# Patient Record
Sex: MALE | Race: WHITE | Employment: FULL TIME | URBAN - METROPOLITAN AREA
[De-identification: names, ages, dates, MRNs, and addresses within clinical notes are randomized per-mention and may not be internally consistent; named-entity substitution may affect disease eponyms.]

---

## 2017-01-17 ENCOUNTER — OFFICE VISIT (OUTPATIENT)
Dept: INTERNAL MEDICINE CLINIC | Age: 52
End: 2017-01-17

## 2017-01-17 ENCOUNTER — TELEPHONE (OUTPATIENT)
Dept: INTERNAL MEDICINE | Age: 52
End: 2017-01-17

## 2017-01-17 VITALS
WEIGHT: 306 LBS | TEMPERATURE: 98.6 F | HEART RATE: 65 BPM | BODY MASS INDEX: 46.53 KG/M2 | DIASTOLIC BLOOD PRESSURE: 94 MMHG | SYSTOLIC BLOOD PRESSURE: 140 MMHG | OXYGEN SATURATION: 98 %

## 2017-01-17 DIAGNOSIS — J01.10 ACUTE NON-RECURRENT FRONTAL SINUSITIS: Primary | ICD-10-CM

## 2017-01-17 PROCEDURE — 99213 OFFICE O/P EST LOW 20 MIN: CPT | Performed by: NURSE PRACTITIONER

## 2017-01-17 RX ORDER — AMOXICILLIN AND CLAVULANATE POTASSIUM 875; 125 MG/1; MG/1
1 TABLET, FILM COATED ORAL 2 TIMES DAILY
Qty: 20 TABLET | Refills: 0 | Status: SHIPPED | OUTPATIENT
Start: 2017-01-17 | End: 2017-01-27

## 2017-01-17 ASSESSMENT — ENCOUNTER SYMPTOMS
VOMITING: 0
RHINORRHEA: 1
SORE THROAT: 1
WHEEZING: 0
COUGH: 1
NAUSEA: 1
SHORTNESS OF BREATH: 1

## 2017-01-26 RX ORDER — AMLODIPINE BESYLATE 10 MG/1
TABLET ORAL
Qty: 90 TABLET | Refills: 1 | Status: SHIPPED | OUTPATIENT
Start: 2017-01-26 | End: 2017-10-04 | Stop reason: SDUPTHER

## 2017-01-26 RX ORDER — ATORVASTATIN CALCIUM 10 MG/1
10 TABLET, FILM COATED ORAL DAILY
Qty: 90 TABLET | Refills: 1 | Status: SHIPPED | OUTPATIENT
Start: 2017-01-26 | End: 2017-10-04 | Stop reason: SDUPTHER

## 2017-01-26 RX ORDER — SPIRONOLACTONE 25 MG/1
TABLET ORAL
Qty: 90 TABLET | Refills: 0 | Status: SHIPPED | OUTPATIENT
Start: 2017-01-26 | End: 2017-03-01

## 2017-01-26 RX ORDER — CARVEDILOL 12.5 MG/1
TABLET ORAL
Qty: 180 TABLET | Refills: 1 | Status: SHIPPED | OUTPATIENT
Start: 2017-01-26 | End: 2017-10-04 | Stop reason: SDUPTHER

## 2017-01-26 RX ORDER — LOSARTAN POTASSIUM 100 MG/1
100 TABLET ORAL DAILY
Qty: 90 TABLET | Refills: 1 | Status: SHIPPED | OUTPATIENT
Start: 2017-01-26 | End: 2017-10-04 | Stop reason: SDUPTHER

## 2017-03-01 RX ORDER — SPIRONOLACTONE 25 MG/1
TABLET ORAL
Qty: 90 TABLET | Refills: 0 | Status: SHIPPED | OUTPATIENT
Start: 2017-03-01 | End: 2017-04-12 | Stop reason: SDUPTHER

## 2017-04-12 RX ORDER — SPIRONOLACTONE 25 MG/1
25 TABLET ORAL DAILY
Qty: 90 TABLET | Refills: 0 | Status: SHIPPED | OUTPATIENT
Start: 2017-04-12 | End: 2017-10-04 | Stop reason: SDUPTHER

## 2017-09-28 ENCOUNTER — TELEPHONE (OUTPATIENT)
Dept: INTERNAL MEDICINE | Age: 52
End: 2017-09-28

## 2017-09-28 RX ORDER — SPIRONOLACTONE 25 MG/1
25 TABLET ORAL DAILY
Qty: 90 TABLET | Refills: 0 | OUTPATIENT
Start: 2017-09-28

## 2017-09-28 RX ORDER — ATORVASTATIN CALCIUM 10 MG/1
10 TABLET, FILM COATED ORAL DAILY
Qty: 90 TABLET | Refills: 1 | Status: CANCELLED | OUTPATIENT
Start: 2017-09-28

## 2017-09-28 RX ORDER — LOSARTAN POTASSIUM 100 MG/1
100 TABLET ORAL DAILY
Qty: 90 TABLET | Refills: 1 | OUTPATIENT
Start: 2017-09-28

## 2017-09-28 RX ORDER — AMLODIPINE BESYLATE 10 MG/1
TABLET ORAL
Qty: 90 TABLET | Refills: 1 | OUTPATIENT
Start: 2017-09-28

## 2017-09-28 RX ORDER — ATORVASTATIN CALCIUM 10 MG/1
10 TABLET, FILM COATED ORAL DAILY
Qty: 90 TABLET | Refills: 1 | OUTPATIENT
Start: 2017-09-28

## 2017-09-28 RX ORDER — CARVEDILOL 12.5 MG/1
TABLET ORAL
Qty: 180 TABLET | Refills: 1 | Status: CANCELLED | OUTPATIENT
Start: 2017-09-28

## 2017-09-28 RX ORDER — AMLODIPINE BESYLATE 10 MG/1
TABLET ORAL
Qty: 90 TABLET | Refills: 1 | Status: CANCELLED | OUTPATIENT
Start: 2017-09-28

## 2017-09-28 RX ORDER — SPIRONOLACTONE 25 MG/1
25 TABLET ORAL DAILY
Qty: 90 TABLET | Refills: 0 | Status: CANCELLED | OUTPATIENT
Start: 2017-09-28

## 2017-09-28 RX ORDER — CARVEDILOL 12.5 MG/1
TABLET ORAL
Qty: 180 TABLET | Refills: 1 | OUTPATIENT
Start: 2017-09-28

## 2017-09-28 RX ORDER — LOSARTAN POTASSIUM 100 MG/1
100 TABLET ORAL DAILY
Qty: 90 TABLET | Refills: 1 | Status: CANCELLED | OUTPATIENT
Start: 2017-09-28

## 2017-10-04 ENCOUNTER — OFFICE VISIT (OUTPATIENT)
Dept: INTERNAL MEDICINE | Age: 52
End: 2017-10-04

## 2017-10-04 VITALS
OXYGEN SATURATION: 98 % | WEIGHT: 314 LBS | SYSTOLIC BLOOD PRESSURE: 190 MMHG | DIASTOLIC BLOOD PRESSURE: 115 MMHG | HEART RATE: 67 BPM | BODY MASS INDEX: 47.59 KG/M2 | TEMPERATURE: 98.1 F | HEIGHT: 68 IN

## 2017-10-04 DIAGNOSIS — F34.1 DYSTHYMIA: ICD-10-CM

## 2017-10-04 DIAGNOSIS — E78.2 MIXED HYPERLIPIDEMIA: ICD-10-CM

## 2017-10-04 DIAGNOSIS — I10 ESSENTIAL HYPERTENSION: Primary | ICD-10-CM

## 2017-10-04 PROCEDURE — 99214 OFFICE O/P EST MOD 30 MIN: CPT | Performed by: NURSE PRACTITIONER

## 2017-10-04 RX ORDER — LOSARTAN POTASSIUM 100 MG/1
100 TABLET ORAL DAILY
Qty: 90 TABLET | Refills: 1 | Status: SHIPPED | OUTPATIENT
Start: 2017-10-04 | End: 2018-07-05 | Stop reason: SDUPTHER

## 2017-10-04 RX ORDER — AMLODIPINE BESYLATE 10 MG/1
TABLET ORAL
Qty: 90 TABLET | Refills: 1 | Status: SHIPPED | OUTPATIENT
Start: 2017-10-04 | End: 2018-10-22 | Stop reason: SDUPTHER

## 2017-10-04 RX ORDER — SPIRONOLACTONE 25 MG/1
25 TABLET ORAL DAILY
Qty: 90 TABLET | Refills: 0 | Status: SHIPPED | OUTPATIENT
Start: 2017-10-04 | End: 2017-12-31 | Stop reason: SDUPTHER

## 2017-10-04 RX ORDER — HYDRALAZINE HYDROCHLORIDE 10 MG/1
10 TABLET, FILM COATED ORAL 3 TIMES DAILY
Qty: 90 TABLET | Refills: 3 | Status: SHIPPED | OUTPATIENT
Start: 2017-10-04 | End: 2017-10-04 | Stop reason: SDUPTHER

## 2017-10-04 RX ORDER — ASPIRIN 81 MG/1
81 TABLET ORAL DAILY
Qty: 30 TABLET | Refills: 3 | Status: SHIPPED | OUTPATIENT
Start: 2017-10-04 | End: 2018-03-04 | Stop reason: SDUPTHER

## 2017-10-04 RX ORDER — ATORVASTATIN CALCIUM 10 MG/1
10 TABLET, FILM COATED ORAL DAILY
Qty: 90 TABLET | Refills: 1 | Status: SHIPPED | OUTPATIENT
Start: 2017-10-04 | End: 2018-03-04 | Stop reason: SDUPTHER

## 2017-10-04 RX ORDER — HYDRALAZINE HYDROCHLORIDE 10 MG/1
10 TABLET, FILM COATED ORAL 3 TIMES DAILY
Qty: 90 TABLET | Refills: 3 | Status: SHIPPED | OUTPATIENT
Start: 2017-10-04 | End: 2017-10-11 | Stop reason: SDUPTHER

## 2017-10-04 RX ORDER — CARVEDILOL 12.5 MG/1
TABLET ORAL
Qty: 180 TABLET | Refills: 1 | Status: SHIPPED | OUTPATIENT
Start: 2017-10-04 | End: 2018-07-05 | Stop reason: SDUPTHER

## 2017-10-04 ASSESSMENT — ENCOUNTER SYMPTOMS
COUGH: 0
WHEEZING: 0
SHORTNESS OF BREATH: 0

## 2017-10-04 NOTE — MR AVS SNAPSHOT
Learn more at: [email or web address]             Today's Medication Changes          These changes are accurate as of: 10/4/17  3:08 PM.  If you have any questions, ask your nurse or doctor. START taking these medications           hydrALAZINE 10 MG tablet   Commonly known as:  APRESOLINE   Instructions:   Take 1 tablet by mouth 3 times daily   Quantity:  90 tablet   Refills:  3   Started by:  Jaimie Blue CNP            Where to Get Your Medications      These medications were sent to [facility]/pharmacy #[id number]- [place], [place] - [address] - P [phone] - F [phone]  [address], [facility] [place] [address]     Phone:  [phone]     amLODIPine 10 MG tablet    aspirin 81 MG EC tablet    atorvastatin 10 MG tablet    carvedilol 12.5 MG tablet    hydrALAZINE 10 MG tablet    losartan 100 MG tablet    sertraline 50 MG tablet    spironolactone 25 MG tablet               Your Current Medications Are              hydrALAZINE (APRESOLINE) 10 MG tablet Take 1 tablet by mouth 3 times daily    spironolactone (ALDACTONE) 25 MG tablet Take 1 tablet by mouth daily    carvedilol (COREG) 12.5 MG tablet TAKE 1 TABLET BY MOUTH TWICE A DAY AFTER MEALS    amLODIPine (NORVASC) 10 MG tablet TAKE 1 TABLET BY MOUTH NIGHTLY    sertraline (ZOLOFT) 50 MG tablet TAKE ONE TABLET BY MOUTH EVERY DAY    losartan (COZAAR) 100 MG tablet Take 1 tablet by mouth daily    atorvastatin (LIPITOR) 10 MG tablet Take 1 tablet by mouth daily    aspirin 81 MG EC tablet Take 1 tablet by mouth daily    HYDROcodone-acetaminophen (NORCO) 5-325 MG per tablet Take 1-2 tablets by mouth every 8 hours as needed for Pain      Allergies              Lisinopril     cough         Additional Information        Basic Information     Date Of Birth Sex Race Ethnicity Preferred Language    1965 Male White Non-/Non  English      Problem List as of 10/4/2017  Date Reviewed: 1/17/2017 Paresthesia    Lumbar strain    SEVILLA (dyspnea on exertion)    Routine general medical examination at a health care facility    Depression    Obesity    MARY (obstructive sleep apnea)    Hypertension      Immunizations as of 10/4/2017     Name Date    Influenza Virus Vaccine 10/6/2014    Influenza Whole 11/10/2008    Tdap (Boostrix, Adacel) 2/11/2010      Preventive Care        Date Due    Colon Cancer Stool Test 6/7/2017    Yearly Flu Vaccine (1) 9/1/2017    Diabetes Screening 6/7/2019    Tetanus Combination Vaccine (2 - Td) 2/11/2020    Cholesterol Screening 6/7/2021            [employer] Signup           Our records indicate that you have an active [employer] account. You can view your After Visit Summary by going to [email or web address] and logging in with your [employer] username and password. If you don't have a [employer] username and password but a parent or guardian has access to your record, the parent or guardian should login with their own [employer] username and password and access your record to view the After Visit Summary. Additional Information  If you have questions, please contact the physician practice where you receive care. Remember, [employer] is NOT to be used for urgent needs. For medical emergencies, dial 911. For questions regarding your [employer] account call [phone]. If you have a clinical question, please call your doctor's office.

## 2017-10-04 NOTE — PROGRESS NOTES
hyperlipidemia    - atorvastatin (LIPITOR) 10 MG tablet; Take 1 tablet by mouth daily  Dispense: 90 tablet; Refill: 1        Discussed use, benefit, and side effects of all prescribed medications. Barriers to medication compliance addressed. All patient questions answered. Pt voiced understanding. All patient questions answered. Pt voiced understanding.

## 2017-10-11 ENCOUNTER — OFFICE VISIT (OUTPATIENT)
Dept: INTERNAL MEDICINE | Age: 52
End: 2017-10-11

## 2017-10-11 VITALS
OXYGEN SATURATION: 98 % | WEIGHT: 315 LBS | HEIGHT: 68 IN | SYSTOLIC BLOOD PRESSURE: 142 MMHG | BODY MASS INDEX: 47.74 KG/M2 | HEART RATE: 65 BPM | DIASTOLIC BLOOD PRESSURE: 100 MMHG

## 2017-10-11 DIAGNOSIS — I10 ESSENTIAL HYPERTENSION: ICD-10-CM

## 2017-10-11 DIAGNOSIS — E66.01 MORBID OBESITY DUE TO EXCESS CALORIES (HCC): Primary | ICD-10-CM

## 2017-10-11 PROCEDURE — 99213 OFFICE O/P EST LOW 20 MIN: CPT | Performed by: NURSE PRACTITIONER

## 2017-10-11 RX ORDER — HYDRALAZINE HYDROCHLORIDE 25 MG/1
25 TABLET, FILM COATED ORAL 3 TIMES DAILY
Qty: 90 TABLET | Refills: 3 | Status: SHIPPED | OUTPATIENT
Start: 2017-10-11 | End: 2018-04-30 | Stop reason: SDUPTHER

## 2017-10-11 NOTE — PROGRESS NOTES
TAKE 1 TABLET BY MOUTH NIGHTLY 90 tablet 1    sertraline (ZOLOFT) 50 MG tablet TAKE ONE TABLET BY MOUTH EVERY DAY 90 tablet 1    losartan (COZAAR) 100 MG tablet Take 1 tablet by mouth daily 90 tablet 1    atorvastatin (LIPITOR) 10 MG tablet Take 1 tablet by mouth daily 90 tablet 1    aspirin 81 MG EC tablet Take 1 tablet by mouth daily 30 tablet 3    HYDROcodone-acetaminophen (NORCO) 5-325 MG per tablet Take 1-2 tablets by mouth every 8 hours as needed for Pain 16 tablet 0     No current facility-administered medications for this visit. Assessment:     1. Morbid obesity due to excess calories ([address]    2. Essential hypertension      Plan:   1. Essential hypertension  - increase apresoline to 25 mg TID  - follow up in 1-2 weeks  - hydrALAZINE (APRESOLINE) 25 MG tablet; Take 1 tablet by mouth 3 times daily  Dispense: 90 tablet; Refill: 3    2. Morbid obesity due to excess calories ([address]  - [facility] [address] [facility]        Discussed use, benefit, and side effects of all prescribed medications. Barriers to medication compliance addressed. All patient questions answered. Pt voiced understanding. All patient questions answered. Pt voiced understanding.

## 2017-10-18 ENCOUNTER — OFFICE VISIT (OUTPATIENT)
Dept: INTERNAL MEDICINE | Age: 52
End: 2017-10-18

## 2017-10-18 VITALS
SYSTOLIC BLOOD PRESSURE: 164 MMHG | HEART RATE: 73 BPM | WEIGHT: 311 LBS | OXYGEN SATURATION: 97 % | BODY MASS INDEX: 47.13 KG/M2 | DIASTOLIC BLOOD PRESSURE: 102 MMHG | HEIGHT: 68 IN | TEMPERATURE: 98.1 F

## 2017-10-18 DIAGNOSIS — I10 ESSENTIAL HYPERTENSION: ICD-10-CM

## 2017-10-18 PROCEDURE — 99213 OFFICE O/P EST LOW 20 MIN: CPT | Performed by: NURSE PRACTITIONER

## 2017-10-24 ENCOUNTER — TELEPHONE (OUTPATIENT)
Dept: INTERNAL MEDICINE | Age: 52
End: 2017-10-24

## 2017-10-24 DIAGNOSIS — I10 ESSENTIAL HYPERTENSION: Primary | ICD-10-CM

## 2017-10-24 NOTE — TELEPHONE ENCOUNTER
What specific order do they recommend? CTA abd and pelvis? Please order what they recommend.  Thanks

## 2017-10-26 ENCOUNTER — OFFICE VISIT (OUTPATIENT)
Dept: INTERNAL MEDICINE | Age: 52
End: 2017-10-26

## 2017-10-26 VITALS
WEIGHT: 313 LBS | OXYGEN SATURATION: 98 % | TEMPERATURE: 98.3 F | BODY MASS INDEX: 47.44 KG/M2 | SYSTOLIC BLOOD PRESSURE: 164 MMHG | HEIGHT: 68 IN | DIASTOLIC BLOOD PRESSURE: 115 MMHG | HEART RATE: 78 BPM

## 2017-10-26 DIAGNOSIS — I10 ESSENTIAL HYPERTENSION: Primary | ICD-10-CM

## 2017-10-26 PROCEDURE — 99213 OFFICE O/P EST LOW 20 MIN: CPT | Performed by: NURSE PRACTITIONER

## 2017-10-27 ENCOUNTER — HOSPITAL ENCOUNTER (OUTPATIENT)
Dept: CT IMAGING | Age: 52
Discharge: HOME | End: 2017-10-27
Attending: NURSE PRACTITIONER | Admitting: NURSE PRACTITIONER

## 2017-10-27 DIAGNOSIS — I10 ESSENTIAL (PRIMARY) HYPERTENSION: ICD-10-CM

## 2017-10-27 DIAGNOSIS — I10 ESSENTIAL HYPERTENSION: ICD-10-CM

## 2017-10-27 ASSESSMENT — ENCOUNTER SYMPTOMS
WHEEZING: 0
SHORTNESS OF BREATH: 0
CHEST TIGHTNESS: 0
COUGH: 0

## 2017-10-27 NOTE — PROGRESS NOTES
Subjective:      Patient ID: Sammi Cheung is a 47 y.o. male. HPI  Theo Flores is here today to follow up for his HTN  He has been taking an extensive amount of medication for his HTN and yet he is still not at goal  He continues to try to loose weight  He is having CTA tomorrow to check renal etiologies  He denies any side effects related to his HTN  Past Medical History:   Diagnosis Date    Carpal tunnel syndrome     Hypertension     Obesity, unspecified     MARY (obstructive sleep apnea)      Past Surgical History:   Procedure Laterality Date    KNEE ARTHROSCOPY Right     NOSE SURGERY      Repair from Fx     Family History   Problem Relation Age of Onset    Diabetes Mother     Stroke Maternal Uncle      Social History     Social History    Marital status:      Spouse name: N/A    Number of children: 2    Years of education: N/A     Occupational History          Social History Main Topics    Smoking status: Never Smoker    Smokeless tobacco: Never Used    Alcohol use Yes      Comment: occasionally    Drug use: No    Sexual activity: Not on file     Other Topics Concern    Not on file     Social History Narrative    No narrative on file       Review of Systems   Constitutional: Negative for fatigue. Respiratory: Negative for cough, chest tightness, shortness of breath and wheezing. Cardiovascular: Negative for chest pain, palpitations and leg swelling. Neurological: Negative for dizziness, syncope, weakness, light-headedness and headaches. Objective:     Vitals:    10/26/17 1644 10/26/17 1651   BP: (!) 168/96 (!) 164/115   Site: Left Arm    Position: Sitting    Cuff Size: Medium Adult    Pulse: 78    Temp: 98.3 °F (36.8 °C)    SpO2: 98%    Weight: (!) 313 lb (142 kg)    Height: 5' 8\" (1.727 m)      Physical Exam   Constitutional: He appears well-developed and well-nourished. Cardiovascular: Normal rate, regular rhythm and normal heart sounds.

## 2017-11-02 DIAGNOSIS — I70.1 RENAL ARTERY STENOSIS (HCC): Primary | ICD-10-CM

## 2017-11-06 ENCOUNTER — TELEPHONE (OUTPATIENT)
Dept: CARDIAC CATH/INVASIVE PROCEDURES | Age: 52
End: 2017-11-06

## 2017-11-06 NOTE — TELEPHONE ENCOUNTER
Phone patient today. Aware of Renal artery angiogram with possible stent placement with  on 11/10 at 10:30. Patient will arrive at 0900, be NPO at midnight the night before, have a  the day of and someone will stay the night with him that night. Left him my name and number should any questions arise.  This will be done at [address] [facility]  2:47 PM  [phone]

## 2017-11-09 NOTE — PRE-PROCEDURE INSTRUCTIONS
Pre procedure phone call complete. Pt aware of 0900 arrival, npo after midnight, to take morning meds with a small amt of water, to bring a list of all meds with dosages and to have someone available to drive him home and stay the next 24 hours with him. Understanding verbalized.

## 2017-11-10 ENCOUNTER — HOSPITAL ENCOUNTER (OUTPATIENT)
Dept: CARDIAC CATH/INVASIVE PROCEDURES | Age: 52
Discharge: HOME | End: 2017-11-10
Attending: RADIOLOGY | Admitting: RADIOLOGY

## 2017-11-10 VITALS
HEIGHT: 68 IN | SYSTOLIC BLOOD PRESSURE: 169 MMHG | BODY MASS INDEX: 47.74 KG/M2 | DIASTOLIC BLOOD PRESSURE: 102 MMHG | WEIGHT: 315 LBS | TEMPERATURE: 97.9 F

## 2017-11-10 DIAGNOSIS — I70.1 RENAL ARTERY STENOSIS (HCC): ICD-10-CM

## 2017-11-10 LAB
ANION GAP SERPL CALCULATED.3IONS-SCNC: 11 MMOL/L (ref 3–16)
BUN BLDV-MCNC: 19 MG/DL (ref 7–20)
CALCIUM SERPL-MCNC: 9.1 MG/DL (ref 8.3–10.6)
CHLORIDE BLD-SCNC: 103 MMOL/L (ref 99–110)
CO2: 26 MMOL/L (ref 21–32)
CREAT SERPL-MCNC: 0.9 MG/DL (ref 0.9–1.3)
GFR AFRICAN AMERICAN: >60
GFR NON-AFRICAN AMERICAN: >60
GLUCOSE BLD-MCNC: 111 MG/DL (ref 70–99)
HCT VFR BLD CALC: 44.6 % (ref 40.5–52.5)
HEMOGLOBIN: 15 G/DL (ref 13.5–17.5)
INR BLD: 1.04 (ref 0.85–1.15)
MCH RBC QN AUTO: 29.1 PG (ref 26–34)
MCHC RBC AUTO-ENTMCNC: 33.6 G/DL (ref 31–36)
MCV RBC AUTO: 86.6 FL (ref 80–100)
PDW BLD-RTO: 14.4 % (ref 12.4–15.4)
PLATELET # BLD: 194 K/UL (ref 135–450)
PMV BLD AUTO: 9.9 FL (ref 5–10.5)
POC ACT LR: 175 SEC (ref 113–149)
POTASSIUM SERPL-SCNC: 4.3 MMOL/L (ref 3.5–5.1)
PROTHROMBIN TIME: 11.7 SEC (ref 9.6–13)
RBC # BLD: 5.15 M/UL (ref 4.2–5.9)
SODIUM BLD-SCNC: 140 MMOL/L (ref 136–145)
WBC # BLD: 6.7 K/UL (ref 4–11)

## 2017-11-10 RX ORDER — CLOPIDOGREL 300 MG/1
300 TABLET, FILM COATED ORAL ONCE
Status: COMPLETED | OUTPATIENT
Start: 2017-11-10 | End: 2017-11-10

## 2017-11-10 RX ORDER — HYDRALAZINE HYDROCHLORIDE 20 MG/ML
20 INJECTION INTRAMUSCULAR; INTRAVENOUS ONCE
Status: COMPLETED | OUTPATIENT
Start: 2017-11-10 | End: 2017-11-10

## 2017-11-10 RX ADMIN — HYDRALAZINE HYDROCHLORIDE 20 MG: 20 INJECTION INTRAMUSCULAR; INTRAVENOUS at 13:39

## 2017-11-10 RX ADMIN — CLOPIDOGREL 300 MG: 300 TABLET, FILM COATED ORAL at 16:25

## 2017-11-10 NOTE — H&P
Patient:  Kiana Dodd   :   1965      Relevant clinical history, particularly as it involves the pending procedure, was reviewed and discussed. The procedure including risks and benefits was discussed at length with the patient (or designated family member) and all questions were answered. Informed consent to proceed with the procedure was given. Vital signs were monitored and documented by the Radiology nurse. Targeted physical examination  Heart : regular rate and rhythm  Lungs : clear, breathing easily  Condition : stable    [facility] nurses notes reviewed and agreed. Past Medical History:        Diagnosis Date    Carpal tunnel syndrome     Hypertension     Obesity, unspecified     MARY (obstructive sleep apnea)        Past Surgical History:           Procedure Laterality Date    KNEE ARTHROSCOPY Right     NOSE SURGERY      Repair from Fx       Allergies:  Lisinopril    Medications:   Home Meds  Current Outpatient Prescriptions on File Prior to Encounter   Medication Sig Dispense Refill    hydrALAZINE (APRESOLINE) 25 MG tablet Take 1 tablet by mouth 3 times daily 90 tablet 3    spironolactone (ALDACTONE) 25 MG tablet Take 1 tablet by mouth daily 90 tablet 0    carvedilol (COREG) 12.5 MG tablet TAKE 1 TABLET BY MOUTH TWICE A DAY AFTER MEALS 180 tablet 1    amLODIPine (NORVASC) 10 MG tablet TAKE 1 TABLET BY MOUTH NIGHTLY 90 tablet 1    sertraline (ZOLOFT) 50 MG tablet TAKE ONE TABLET BY MOUTH EVERY DAY 90 tablet 1    losartan (COZAAR) 100 MG tablet Take 1 tablet by mouth daily 90 tablet 1    atorvastatin (LIPITOR) 10 MG tablet Take 1 tablet by mouth daily 90 tablet 1    aspirin 81 MG EC tablet Take 1 tablet by mouth daily 30 tablet 3    HYDROcodone-acetaminophen (NORCO) 5-325 MG per tablet Take 1-2 tablets by mouth every 8 hours as needed for Pain 16 tablet 0     No current facility-administered medications on file prior to encounter.         Current Meds    clopidogrel

## 2017-11-13 ENCOUNTER — TELEPHONE (OUTPATIENT)
Dept: INTERNAL MEDICINE | Age: 52
End: 2017-11-13

## 2017-11-15 ENCOUNTER — OFFICE VISIT (OUTPATIENT)
Dept: INTERNAL MEDICINE | Age: 52
End: 2017-11-15

## 2017-11-15 VITALS
HEART RATE: 63 BPM | BODY MASS INDEX: 47.74 KG/M2 | HEIGHT: 68 IN | TEMPERATURE: 98 F | DIASTOLIC BLOOD PRESSURE: 90 MMHG | OXYGEN SATURATION: 98 % | WEIGHT: 315 LBS | SYSTOLIC BLOOD PRESSURE: 148 MMHG

## 2017-11-15 DIAGNOSIS — I10 ESSENTIAL HYPERTENSION: Primary | ICD-10-CM

## 2017-11-15 DIAGNOSIS — Z98.890 HISTORY OF STENT INSERTION OF RENAL ARTERY: ICD-10-CM

## 2017-11-15 PROCEDURE — 99213 OFFICE O/P EST LOW 20 MIN: CPT | Performed by: NURSE PRACTITIONER

## 2017-11-15 RX ORDER — CLOPIDOGREL BISULFATE 75 MG/1
75 TABLET ORAL DAILY
COMMUNITY
End: 2018-04-30

## 2017-11-15 ASSESSMENT — ENCOUNTER SYMPTOMS
EYE ITCHING: 0
WHEEZING: 0
VOMITING: 0
COLOR CHANGE: 0
RHINORRHEA: 0
DIARRHEA: 0
CHEST TIGHTNESS: 0
SINUS PRESSURE: 0
NAUSEA: 0
BLOOD IN STOOL: 0
SORE THROAT: 0
EYE REDNESS: 0
CONSTIPATION: 0
ABDOMINAL PAIN: 0
BACK PAIN: 0
SHORTNESS OF BREATH: 0
COUGH: 0

## 2017-11-15 NOTE — PROGRESS NOTES
Subjective:      Patient ID: Antelmo Chacon is a 47 y.o. male. HPI   Patient following up from renal stent placement last week. He stated that surgery went well, and he has been feeling good. He states that his BP when he left the hospital was 130/70 which he was really happy about. His BP is up today, but significantly improved. He is still taking his medication daily. He knows he needs to work on diet and weight loss. He was also started on plavix for 6 months for stent placement. He denies any blood in the stool, urine, or other abnormal bruising. He goes back to work on Monday. Past Medical History:   Diagnosis Date    Carpal tunnel syndrome     Hypertension     Obesity, unspecified     MARY (obstructive sleep apnea)      Past Surgical History:   Procedure Laterality Date    KNEE ARTHROSCOPY Right     NOSE SURGERY      Repair from Fx     Family History   Problem Relation Age of Onset    Diabetes Mother     Stroke Maternal Uncle      Social History     Social History    Marital status:      Spouse name: N/A    Number of children: 2    Years of education: N/A     Occupational History          Social History Main Topics    Smoking status: Never Smoker    Smokeless tobacco: Never Used    Alcohol use Yes      Comment: occasionally    Drug use: No    Sexual activity: Not on file     Other Topics Concern    Not on file     Social History Narrative    No narrative on file       Review of Systems   Constitutional: Negative for chills, fatigue and fever. HENT: Negative for congestion, ear pain, postnasal drip, rhinorrhea, sinus pressure, sneezing and sore throat. Eyes: Negative for redness and itching. Respiratory: Negative for cough, chest tightness, shortness of breath and wheezing. Cardiovascular: Negative for chest pain and palpitations. Gastrointestinal: Negative for abdominal pain, blood in stool, constipation, diarrhea, nausea and vomiting.    Endocrine: Current Outpatient Prescriptions   Medication Sig Dispense Refill    clopidogrel (PLAVIX) 75 MG tablet Take 75 mg by mouth daily      hydrALAZINE (APRESOLINE) 25 MG tablet Take 1 tablet by mouth 3 times daily 90 tablet 3    spironolactone (ALDACTONE) 25 MG tablet Take 1 tablet by mouth daily 90 tablet 0    carvedilol (COREG) 12.5 MG tablet TAKE 1 TABLET BY MOUTH TWICE A DAY AFTER MEALS 180 tablet 1    amLODIPine (NORVASC) 10 MG tablet TAKE 1 TABLET BY MOUTH NIGHTLY 90 tablet 1    sertraline (ZOLOFT) 50 MG tablet TAKE ONE TABLET BY MOUTH EVERY DAY 90 tablet 1    losartan (COZAAR) 100 MG tablet Take 1 tablet by mouth daily 90 tablet 1    atorvastatin (LIPITOR) 10 MG tablet Take 1 tablet by mouth daily 90 tablet 1    aspirin 81 MG EC tablet Take 1 tablet by mouth daily 30 tablet 3    HYDROcodone-acetaminophen (NORCO) 5-325 MG per tablet Take 1-2 tablets by mouth every 8 hours as needed for Pain 16 tablet 0     No current facility-administered medications for this visit. Assessment:     1. Essential hypertension    2. History of stent insertion of renal artery      Plan:   1. Essential hypertension  - BP is a little elevated today from where I would like him to be  - will have him continue medication at this time  - he will be in weekly for BP checks     2. History of stent insertion of renal artery  - on plavix for 6 months, stable at this time        Discussed use, benefit, and side effects of all prescribed medications. Barriers to medication compliance addressed. All patient questions answered. Pt voiced understanding. All patient questions answered. Pt voiced understanding.

## 2017-11-15 NOTE — LETTER
[facility] [address]  [address] [address]  Phone: [phone]  Fax: [phone]    Jillian Morton CNP        November 15, 2017     Patient: Dori Conde   YOB: 1965   Date of Visit: 11/15/2017       To Whom It May Concern: It is my medical opinion that Melida Wei may return to full duty 11/21/17 with no restrictions. If you have any questions or concerns, please don't hesitate to call.     Sincerely,        Jillian Morton CNP

## 2017-12-31 DIAGNOSIS — I10 ESSENTIAL HYPERTENSION: ICD-10-CM

## 2018-01-02 RX ORDER — SPIRONOLACTONE 25 MG/1
25 TABLET ORAL DAILY
Qty: 90 TABLET | Refills: 0 | Status: SHIPPED | OUTPATIENT
Start: 2018-01-02 | End: 2018-04-23 | Stop reason: SDUPTHER

## 2018-02-19 ENCOUNTER — OFFICE VISIT (OUTPATIENT)
Dept: INTERNAL MEDICINE | Age: 53
End: 2018-02-19

## 2018-02-19 VITALS
OXYGEN SATURATION: 98 % | HEART RATE: 80 BPM | DIASTOLIC BLOOD PRESSURE: 90 MMHG | SYSTOLIC BLOOD PRESSURE: 142 MMHG | BODY MASS INDEX: 47.74 KG/M2 | WEIGHT: 315 LBS | HEIGHT: 68 IN

## 2018-02-19 DIAGNOSIS — I10 ESSENTIAL HYPERTENSION: Primary | ICD-10-CM

## 2018-02-19 DIAGNOSIS — E78.2 MIXED HYPERLIPIDEMIA: ICD-10-CM

## 2018-02-19 DIAGNOSIS — F34.1 DYSTHYMIA: ICD-10-CM

## 2018-02-19 PROCEDURE — 99214 OFFICE O/P EST MOD 30 MIN: CPT | Performed by: INTERNAL MEDICINE

## 2018-02-19 ASSESSMENT — ENCOUNTER SYMPTOMS
SHORTNESS OF BREATH: 0
WHEEZING: 0
GASTROINTESTINAL NEGATIVE: 1
RESPIRATORY NEGATIVE: 1
CHEST TIGHTNESS: 0

## 2018-04-16 ENCOUNTER — TELEPHONE (OUTPATIENT)
Dept: INTERNAL MEDICINE | Age: 53
End: 2018-04-16

## 2018-04-19 ENCOUNTER — TELEPHONE (OUTPATIENT)
Dept: INTERNAL MEDICINE | Age: 53
End: 2018-04-19

## 2018-04-23 DIAGNOSIS — I10 ESSENTIAL HYPERTENSION: ICD-10-CM

## 2018-04-23 RX ORDER — SPIRONOLACTONE 25 MG/1
25 TABLET ORAL DAILY
Qty: 90 TABLET | Refills: 0 | Status: SHIPPED | OUTPATIENT
Start: 2018-04-23 | End: 2018-10-29 | Stop reason: SDUPTHER

## 2018-04-30 ENCOUNTER — OFFICE VISIT (OUTPATIENT)
Dept: INTERNAL MEDICINE | Age: 53
End: 2018-04-30

## 2018-04-30 VITALS
HEIGHT: 66 IN | RESPIRATION RATE: 16 BRPM | DIASTOLIC BLOOD PRESSURE: 90 MMHG | BODY MASS INDEX: 50.62 KG/M2 | HEART RATE: 74 BPM | TEMPERATURE: 98 F | SYSTOLIC BLOOD PRESSURE: 190 MMHG | WEIGHT: 315 LBS | OXYGEN SATURATION: 98 %

## 2018-04-30 DIAGNOSIS — I10 ESSENTIAL HYPERTENSION: Primary | ICD-10-CM

## 2018-04-30 DIAGNOSIS — I10 HYPERTENSION, POOR CONTROL: ICD-10-CM

## 2018-04-30 DIAGNOSIS — I70.1 RENAL ARTERY STENOSIS (HCC): ICD-10-CM

## 2018-04-30 PROCEDURE — 99213 OFFICE O/P EST LOW 20 MIN: CPT | Performed by: INTERNAL MEDICINE

## 2018-04-30 RX ORDER — HYDRALAZINE HYDROCHLORIDE 50 MG/1
50 TABLET, FILM COATED ORAL 3 TIMES DAILY
Qty: 270 TABLET | Refills: 0
Start: 2018-04-30 | End: 2018-05-03 | Stop reason: SDUPTHER

## 2018-04-30 ASSESSMENT — ENCOUNTER SYMPTOMS
SHORTNESS OF BREATH: 0
GASTROINTESTINAL NEGATIVE: 1
CHEST TIGHTNESS: 0
RESPIRATORY NEGATIVE: 1
WHEEZING: 0

## 2018-04-30 ASSESSMENT — PATIENT HEALTH QUESTIONNAIRE - PHQ9
SUM OF ALL RESPONSES TO PHQ QUESTIONS 1-9: 0
SUM OF ALL RESPONSES TO PHQ9 QUESTIONS 1 & 2: 0
2. FEELING DOWN, DEPRESSED OR HOPELESS: 0
1. LITTLE INTEREST OR PLEASURE IN DOING THINGS: 0

## 2018-05-02 ENCOUNTER — TELEPHONE (OUTPATIENT)
Dept: INTERNAL MEDICINE | Age: 53
End: 2018-05-02

## 2018-05-02 VITALS — DIASTOLIC BLOOD PRESSURE: 100 MMHG | OXYGEN SATURATION: 99 % | HEART RATE: 78 BPM | SYSTOLIC BLOOD PRESSURE: 160 MMHG

## 2018-05-02 DIAGNOSIS — I10 ESSENTIAL HYPERTENSION: ICD-10-CM

## 2018-05-03 RX ORDER — HYDRALAZINE HYDROCHLORIDE 50 MG/1
50 TABLET, FILM COATED ORAL 4 TIMES DAILY
Qty: 270 TABLET | Refills: 0 | Status: SHIPPED | OUTPATIENT
Start: 2018-05-03 | End: 2018-07-30 | Stop reason: SDUPTHER

## 2018-05-07 ENCOUNTER — OFFICE VISIT (OUTPATIENT)
Dept: INTERNAL MEDICINE | Age: 53
End: 2018-05-07

## 2018-05-07 VITALS
HEIGHT: 68 IN | WEIGHT: 315 LBS | BODY MASS INDEX: 47.74 KG/M2 | OXYGEN SATURATION: 98 % | DIASTOLIC BLOOD PRESSURE: 78 MMHG | SYSTOLIC BLOOD PRESSURE: 144 MMHG | HEART RATE: 78 BPM

## 2018-05-07 DIAGNOSIS — I10 HYPERTENSION, POOR CONTROL: ICD-10-CM

## 2018-05-07 PROCEDURE — 99213 OFFICE O/P EST LOW 20 MIN: CPT | Performed by: INTERNAL MEDICINE

## 2018-05-07 ASSESSMENT — ENCOUNTER SYMPTOMS
GASTROINTESTINAL NEGATIVE: 1
RESPIRATORY NEGATIVE: 1
WHEEZING: 0
CHEST TIGHTNESS: 0
SHORTNESS OF BREATH: 0

## 2018-05-15 ENCOUNTER — HOSPITAL ENCOUNTER (OUTPATIENT)
Dept: CT IMAGING | Age: 53
Discharge: HOME | End: 2018-05-15

## 2018-05-15 DIAGNOSIS — I70.1 RENAL ARTERY STENOSIS (HCC): ICD-10-CM

## 2018-05-15 DIAGNOSIS — I10 ESSENTIAL HYPERTENSION: ICD-10-CM

## 2018-05-15 DIAGNOSIS — I10 ESSENTIAL (PRIMARY) HYPERTENSION: ICD-10-CM

## 2018-07-05 DIAGNOSIS — I10 ESSENTIAL HYPERTENSION: ICD-10-CM

## 2018-07-05 DIAGNOSIS — F34.1 DYSTHYMIA: ICD-10-CM

## 2018-07-05 RX ORDER — LOSARTAN POTASSIUM 100 MG/1
100 TABLET ORAL DAILY
Qty: 90 TABLET | Refills: 1 | Status: SHIPPED | OUTPATIENT
Start: 2018-07-05 | End: 2019-03-19

## 2018-07-05 RX ORDER — CARVEDILOL 12.5 MG/1
TABLET ORAL
Qty: 180 TABLET | Refills: 1 | Status: SHIPPED | OUTPATIENT
Start: 2018-07-05 | End: 2018-10-22

## 2018-07-05 NOTE — TELEPHONE ENCOUNTER
From: Jeri Interiano  Sent: 7/4/2018 7:08 PM EDT  Subject: Medication Renewal Request    Ulisses Landry.  Darren would like a refill of the following medications:     losartan (COZAAR) 100 MG tablet JARET Rae - CNP]    Preferred pharmacy:  [address], [address] - F [phone]    Comment:

## 2018-07-30 DIAGNOSIS — I10 ESSENTIAL HYPERTENSION: ICD-10-CM

## 2018-07-30 RX ORDER — HYDRALAZINE HYDROCHLORIDE 50 MG/1
50 TABLET, FILM COATED ORAL 4 TIMES DAILY
Qty: 270 TABLET | Refills: 0 | Status: SHIPPED | OUTPATIENT
Start: 2018-07-30 | End: 2019-03-18 | Stop reason: SDUPTHER

## 2018-09-15 DIAGNOSIS — I10 ESSENTIAL HYPERTENSION: ICD-10-CM

## 2018-09-17 RX ORDER — ASPIRIN 81 MG/1
81 TABLET ORAL DAILY
Qty: 30 TABLET | Refills: 3 | Status: ON HOLD | OUTPATIENT
Start: 2018-09-17 | End: 2021-01-07

## 2018-10-22 ENCOUNTER — OFFICE VISIT (OUTPATIENT)
Dept: INTERNAL MEDICINE CLINIC | Age: 53
End: 2018-10-22
Payer: COMMERCIAL

## 2018-10-22 VITALS
DIASTOLIC BLOOD PRESSURE: 115 MMHG | HEART RATE: 81 BPM | HEIGHT: 68 IN | BODY MASS INDEX: 47.29 KG/M2 | SYSTOLIC BLOOD PRESSURE: 182 MMHG | WEIGHT: 312 LBS | OXYGEN SATURATION: 97 %

## 2018-10-22 DIAGNOSIS — I10 ESSENTIAL HYPERTENSION: ICD-10-CM

## 2018-10-22 DIAGNOSIS — Z01.818 PRE-OP EXAM: Primary | ICD-10-CM

## 2018-10-22 PROCEDURE — 93000 ELECTROCARDIOGRAM COMPLETE: CPT | Performed by: NURSE PRACTITIONER

## 2018-10-22 PROCEDURE — 99214 OFFICE O/P EST MOD 30 MIN: CPT | Performed by: NURSE PRACTITIONER

## 2018-10-22 RX ORDER — NEBIVOLOL 5 MG/1
5 TABLET ORAL DAILY
Qty: 30 TABLET | Refills: 3 | Status: SHIPPED | OUTPATIENT
Start: 2018-10-22 | End: 2018-10-22 | Stop reason: SDUPTHER

## 2018-10-22 RX ORDER — NEBIVOLOL 5 MG/1
5 TABLET ORAL DAILY
Qty: 21 TABLET | Refills: 0 | COMMUNITY
Start: 2018-10-22 | End: 2019-03-18 | Stop reason: SDUPTHER

## 2018-10-22 RX ORDER — AMLODIPINE BESYLATE 10 MG/1
TABLET ORAL
Qty: 90 TABLET | Refills: 1 | Status: SHIPPED | OUTPATIENT
Start: 2018-10-22 | End: 2019-03-17 | Stop reason: SDUPTHER

## 2018-10-22 ASSESSMENT — ENCOUNTER SYMPTOMS
WHEEZING: 0
CONSTIPATION: 0
COLOR CHANGE: 0
COUGH: 0
DIARRHEA: 0
BLOOD IN STOOL: 0
RHINORRHEA: 0
SHORTNESS OF BREATH: 0
EYE ITCHING: 0
SINUS PRESSURE: 0
BACK PAIN: 0
NAUSEA: 0
VOMITING: 0
ABDOMINAL PAIN: 0
SORE THROAT: 0
EYE REDNESS: 0
CHEST TIGHTNESS: 0

## 2018-10-22 ASSESSMENT — PATIENT HEALTH QUESTIONNAIRE - PHQ9
1. LITTLE INTEREST OR PLEASURE IN DOING THINGS: 0
SUM OF ALL RESPONSES TO PHQ9 QUESTIONS 1 & 2: 0
SUM OF ALL RESPONSES TO PHQ QUESTIONS 1-9: 0
2. FEELING DOWN, DEPRESSED OR HOPELESS: 0
SUM OF ALL RESPONSES TO PHQ QUESTIONS 1-9: 0

## 2018-10-22 NOTE — PROGRESS NOTES
No respiratory distress. He has no wheezes. Abdominal: Soft. Bowel sounds are normal. He exhibits no distension and no mass. There is no tenderness. There is no rebound and no guarding. Musculoskeletal: Normal range of motion. He exhibits no tenderness. Neurological: He is alert and oriented to person, place, and time. He has normal reflexes. Skin: Skin is warm and dry. Psychiatric: He has a normal mood and affect. His behavior is normal.       ASSESSMENT/PLAN:  1. Pre-op exam  - Nael Woodward is not cleared for surgery today due to uncontrolled htn, please see below for plan  - cbc, cmp, bnp  - EKG 12 lead    2. Essential hypertension  - ekg concerning for LVH, given uncontrolled HTN, this is likely, will check ECHO and blood work. - stop coreg, switch to bystolic 5 mg  - given 10 mg samples, split in half, patient verbalized understanding  - amLODIPine (NORVASC) 10 MG tablet; TAKE 1 TABLET BY MOUTH NIGHTLY  Dispense: 90 tablet; Refill: 1  - ECHO Complete 2D W Doppler W Color; Future  - echo scheduled for Friday, follow up in office on Monday       No Follow-up on file. An electronic signature was used to authenticate this note.     --JARET García - CNP on 10/22/2018 at 4:08 PM
no fever/no nausea/no burning urination/no dysuria/no vomiting/no abdominal distention

## 2018-10-26 ENCOUNTER — HOSPITAL ENCOUNTER (OUTPATIENT)
Dept: NON INVASIVE DIAGNOSTICS | Age: 53
Discharge: HOME | End: 2018-10-26
Payer: COMMERCIAL

## 2018-10-26 DIAGNOSIS — I10 ESSENTIAL HYPERTENSION: ICD-10-CM

## 2018-10-26 DIAGNOSIS — Z01.818 PRE-OP EXAM: ICD-10-CM

## 2018-10-26 LAB
A/G RATIO: 1.6 (ref 1.1–2.2)
ALBUMIN SERPL-MCNC: 4.6 G/DL (ref 3.4–5)
ALP BLD-CCNC: 85 U/L (ref 40–129)
ALT SERPL-CCNC: 23 U/L (ref 10–40)
ANION GAP SERPL CALCULATED.3IONS-SCNC: 15 MMOL/L (ref 3–16)
AST SERPL-CCNC: 16 U/L (ref 15–37)
BASOPHILS ABSOLUTE: 0 K/UL (ref 0–0.2)
BASOPHILS RELATIVE PERCENT: 0.6 %
BILIRUB SERPL-MCNC: 0.7 MG/DL (ref 0–1)
BUN BLDV-MCNC: 20 MG/DL (ref 7–20)
CALCIUM SERPL-MCNC: 9.7 MG/DL (ref 8.3–10.6)
CHLORIDE BLD-SCNC: 104 MMOL/L (ref 99–110)
CO2: 22 MMOL/L (ref 21–32)
CREAT SERPL-MCNC: 0.9 MG/DL (ref 0.9–1.3)
EOSINOPHILS ABSOLUTE: 0.1 K/UL (ref 0–0.6)
EOSINOPHILS RELATIVE PERCENT: 1.8 %
GFR AFRICAN AMERICAN: >60
GFR NON-AFRICAN AMERICAN: >60
GLOBULIN: 2.9 G/DL
GLUCOSE BLD-MCNC: 110 MG/DL (ref 70–99)
HCT VFR BLD CALC: 45 % (ref 40.5–52.5)
HEMOGLOBIN: 14.9 G/DL (ref 13.5–17.5)
LV EF: 63 %
LVEF MODALITY: NORMAL
LYMPHOCYTES ABSOLUTE: 2.3 K/UL (ref 1–5.1)
LYMPHOCYTES RELATIVE PERCENT: 30.9 %
MCH RBC QN AUTO: 28.9 PG (ref 26–34)
MCHC RBC AUTO-ENTMCNC: 33 G/DL (ref 31–36)
MCV RBC AUTO: 87.5 FL (ref 80–100)
MONOCYTES ABSOLUTE: 0.6 K/UL (ref 0–1.3)
MONOCYTES RELATIVE PERCENT: 7.9 %
NEUTROPHILS ABSOLUTE: 4.5 K/UL (ref 1.7–7.7)
NEUTROPHILS RELATIVE PERCENT: 58.8 %
PDW BLD-RTO: 15.3 % (ref 12.4–15.4)
PLATELET # BLD: 197 K/UL (ref 135–450)
PMV BLD AUTO: 10.4 FL (ref 5–10.5)
POTASSIUM SERPL-SCNC: 4.5 MMOL/L (ref 3.5–5.1)
RBC # BLD: 5.15 M/UL (ref 4.2–5.9)
SODIUM BLD-SCNC: 141 MMOL/L (ref 136–145)
TOTAL PROTEIN: 7.5 G/DL (ref 6.4–8.2)
WBC # BLD: 7.6 K/UL (ref 4–11)

## 2018-10-26 PROCEDURE — 93306 TTE W/DOPPLER COMPLETE: CPT

## 2018-10-29 ENCOUNTER — OFFICE VISIT (OUTPATIENT)
Dept: INTERNAL MEDICINE CLINIC | Age: 53
End: 2018-10-29
Payer: COMMERCIAL

## 2018-10-29 VITALS
DIASTOLIC BLOOD PRESSURE: 82 MMHG | HEIGHT: 68 IN | SYSTOLIC BLOOD PRESSURE: 140 MMHG | OXYGEN SATURATION: 98 % | WEIGHT: 315 LBS | HEART RATE: 71 BPM | BODY MASS INDEX: 47.74 KG/M2

## 2018-10-29 DIAGNOSIS — I50.30 (HFPEF) HEART FAILURE WITH PRESERVED EJECTION FRACTION (HCC): ICD-10-CM

## 2018-10-29 DIAGNOSIS — I10 HYPERTENSION, POOR CONTROL: Primary | ICD-10-CM

## 2018-10-29 DIAGNOSIS — E66.01 CLASS 3 SEVERE OBESITY DUE TO EXCESS CALORIES WITH SERIOUS COMORBIDITY AND BODY MASS INDEX (BMI) OF 45.0 TO 49.9 IN ADULT (HCC): ICD-10-CM

## 2018-10-29 PROCEDURE — 99214 OFFICE O/P EST MOD 30 MIN: CPT | Performed by: NURSE PRACTITIONER

## 2018-10-29 RX ORDER — SPIRONOLACTONE 25 MG/1
37.5 TABLET ORAL DAILY
Qty: 90 TABLET | Refills: 0 | Status: SHIPPED | OUTPATIENT
Start: 2018-10-29 | End: 2018-11-12

## 2018-10-29 ASSESSMENT — ENCOUNTER SYMPTOMS
EYE PAIN: 0
STRIDOR: 0
SHORTNESS OF BREATH: 0
SINUS PAIN: 0
CONSTIPATION: 0
COLOR CHANGE: 0
CHEST TIGHTNESS: 0
COUGH: 0
ABDOMINAL PAIN: 0
SORE THROAT: 0
VOMITING: 0
PHOTOPHOBIA: 0
EYE ITCHING: 0
EYE REDNESS: 0
BACK PAIN: 0
EYE DISCHARGE: 0
WHEEZING: 0
NAUSEA: 0
DIARRHEA: 0
BLOOD IN STOOL: 0
SINUS PRESSURE: 0
RHINORRHEA: 0

## 2018-10-29 NOTE — PROGRESS NOTES
Head: Normocephalic. Right Ear: External ear normal.   Left Ear: External ear normal.   Eyes: Pupils are equal, round, and reactive to light. Conjunctivae and EOM are normal.   Neck: Normal range of motion. Neck supple. No JVD present. Cardiovascular: Normal rate and regular rhythm. Exam reveals no gallop and no friction rub. No murmur heard. Pulmonary/Chest: Effort normal and breath sounds normal. No respiratory distress. He has no wheezes. Abdominal: Soft. Bowel sounds are normal. He exhibits no distension and no mass. There is no tenderness. There is no rebound and no guarding. Musculoskeletal: Normal range of motion. He exhibits no tenderness. Neurological: He is alert and oriented to person, place, and time. He has normal reflexes. Skin: Skin is warm and dry. Psychiatric: He has a normal mood and affect. His behavior is normal.       ASSESSMENT/PLAN:  1. Hypertension, poor control  - increase aldactone to 37.5 mg daily, track BP BID bring in list on Thrusday    2. Class 3 severe obesity due to excess calories with serious comorbidity and body mass index (BMI) of 45.0 to 49.9 in adult [facility])  - firm conversation about weight and direct consequence to his HF. He seems to be more concerned about his knee replacement  - firm conversation included that weight loss would aid in recovery from knee replacement    3. (HFpEF) heart failure with preserved ejection fraction (HCC)  - increase aldactone to 37. 5 mg, track BP, weight loss, follow up Thursday   - spironolactone (ALDACTONE) 25 MG tablet; Take 1.5 tablets by mouth daily  Dispense: 90 tablet; Refill: 0      No Follow-up on file. An electronic signature was used to authenticate this note.     --JARET Ross - CNP on 10/29/2018 at 12:13 PM

## 2018-11-01 ENCOUNTER — TELEPHONE (OUTPATIENT)
Dept: INTERNAL MEDICINE CLINIC | Age: 53
End: 2018-11-01

## 2018-11-02 ENCOUNTER — OFFICE VISIT (OUTPATIENT)
Dept: INTERNAL MEDICINE CLINIC | Age: 53
End: 2018-11-02
Payer: COMMERCIAL

## 2018-11-02 VITALS
WEIGHT: 315 LBS | HEIGHT: 68 IN | BODY MASS INDEX: 47.74 KG/M2 | HEART RATE: 70 BPM | OXYGEN SATURATION: 99 % | SYSTOLIC BLOOD PRESSURE: 135 MMHG | DIASTOLIC BLOOD PRESSURE: 85 MMHG

## 2018-11-02 DIAGNOSIS — Z01.818 PREOP EXAM FOR INTERNAL MEDICINE: Primary | ICD-10-CM

## 2018-11-02 DIAGNOSIS — E78.2 MIXED HYPERLIPIDEMIA: ICD-10-CM

## 2018-11-02 DIAGNOSIS — I50.30 (HFPEF) HEART FAILURE WITH PRESERVED EJECTION FRACTION (HCC): ICD-10-CM

## 2018-11-02 DIAGNOSIS — M17.11 PRIMARY OSTEOARTHRITIS OF RIGHT KNEE: ICD-10-CM

## 2018-11-02 DIAGNOSIS — I10 HYPERTENSION, POOR CONTROL: ICD-10-CM

## 2018-11-02 PROCEDURE — 99244 OFF/OP CNSLTJ NEW/EST MOD 40: CPT | Performed by: NURSE PRACTITIONER

## 2018-11-02 ASSESSMENT — ENCOUNTER SYMPTOMS
VOMITING: 0
SHORTNESS OF BREATH: 0
COUGH: 0
CONSTIPATION: 0
SORE THROAT: 0
NAUSEA: 0
COLOR CHANGE: 0
SINUS PRESSURE: 0
BLOOD IN STOOL: 0
WHEEZING: 0
BACK PAIN: 0
EYE REDNESS: 0
DIARRHEA: 0
EYE ITCHING: 0
CHEST TIGHTNESS: 0
ABDOMINAL PAIN: 0
RHINORRHEA: 0

## 2018-11-02 NOTE — PROGRESS NOTES
Subjective:   Pedro Dandy who presentsto the office today for a preoperative consultation at the request of surgeon Dr. Rambo James who plans on performing right total knee arthroplasty on 11/6/18 at [facility]. Thisconsultation is requested for the specific conditions prompting preoperative evaluation(i.e. because of potential affect on operative risk): HTN, HFpEF. Planned anesthesia isGeneral.  The patient has the following known anesthesiaissues: none  Patient has a bleeding risk of : none  Patient's medications, allergies, past medical, surgical,social and family histories were reviewed and updated as appropriate. Past Medical History:   Diagnosis Date    Carpal tunnel syndrome     Hypertension     Obesity, unspecified     MARY (obstructive sleep apnea)      Past Surgical History:   Procedure Laterality Date    KNEE ARTHROSCOPY Right     NOSE SURGERY      Repair from Fx     Social History     Social History    Marital status:      Spouse name: N/A    Number of children: 2    Years of education: N/A     Occupational History          Social History Main Topics    Smoking status: Never Smoker    Smokeless tobacco: Never Used    Alcohol use Yes      Comment: occasionally    Drug use: No    Sexual activity: Not on file     Other Topics Concern    Not on file     Social History Narrative    No narrative on file       Review of Systems  Review of Systems   Constitutional: Negative for chills, fatigue and fever. HENT: Negative for congestion, ear pain, postnasal drip, rhinorrhea, sinus pressure, sneezing and sore throat. Eyes: Negative for redness and itching. Respiratory: Negative for cough, chest tightness, shortness of breath and wheezing. Cardiovascular: Negative for chest pain and palpitations. Gastrointestinal: Negative for abdominal pain, blood in stool, constipation, diarrhea, nausea and vomiting.    Endocrine: Negative for cold intolerance and mouth daily 90 tablet 0    amLODIPine (NORVASC) 10 MG tablet TAKE 1 TABLET BY MOUTH NIGHTLY 90 tablet 1    nebivolol (BYSTOLIC) 5 MG tablet Take 1 tablet by mouth daily 21 tablet 0    aspirin 81 MG EC tablet TAKE 1 TABLET BY MOUTH DAILY 30 tablet 3    hydrALAZINE (APRESOLINE) 50 MG tablet TAKE 1 TABLET BY MOUTH 4 TIMES DAILY 270 tablet 0    sertraline (ZOLOFT) 50 MG tablet Take 1 tablet by mouth daily 90 tablet 0    losartan (COZAAR) 100 MG tablet Take 1 tablet by mouth daily 90 tablet 1    atorvastatin (LIPITOR) 10 MG tablet TAKE 1 TABLET BY MOUTH DAILY 90 tablet 0     No current facility-administered medications on file prior to visit. Assessment:       1. Preop exam for internal medicine    2. Primary osteoarthritis of right knee    3. Hypertension, poor control    4. Mixed hyperlipidemia    5. (HFpEF) heart failure with preserved ejection fraction ([facility] [address]           Plan:     1. Preop exam for internal medicine  - EKG stabel, echo showed HFpEF, on aldactone, working to get better BP control, BP stable at home pressures 135/80s  - Preoperative workup as follows ECG, ECHO   - Change in medication regimen before surgery: no meds morning of surgery   - Prophylaxisfor cardiac events with perioperative beta-blockers: should be considered, specific regimen per anesthesia  - Perioperative risk related to the patient's upcoming surgery is considered low. he is cleared for surgery. Pre-op exam was completed on 11/2/18 11:24 AM.      2. Primary osteoarthritis of right knee  - surgery as planned     3. Hypertension, poor control  - BP elevated today, 135/80 at home   - will likely need to increase aldactone to 50 mg, follow up after surgery     4. Mixed hyperlipidemia  - stable, controlled  - no changes    5.  (HFpEF) heart failure with preserved ejection fraction (HCC)  - stable today, pressures high in the office, but controlled at home  - continue aldactone at this time at current dose, plan for increase to 50 mg after surgery

## 2018-11-10 DIAGNOSIS — E78.2 MIXED HYPERLIPIDEMIA: ICD-10-CM

## 2018-11-10 DIAGNOSIS — I10 ESSENTIAL HYPERTENSION: ICD-10-CM

## 2018-11-12 RX ORDER — SPIRONOLACTONE 25 MG/1
25 TABLET ORAL DAILY
Qty: 90 TABLET | Refills: 0 | Status: SHIPPED | OUTPATIENT
Start: 2018-11-12 | End: 2019-03-19

## 2018-11-12 RX ORDER — ATORVASTATIN CALCIUM 10 MG/1
10 TABLET, FILM COATED ORAL DAILY
Qty: 90 TABLET | Refills: 0 | Status: SHIPPED | OUTPATIENT
Start: 2018-11-12 | End: 2019-03-18 | Stop reason: SDUPTHER

## 2018-11-26 ENCOUNTER — OFFICE VISIT (OUTPATIENT)
Dept: INTERNAL MEDICINE CLINIC | Age: 53
End: 2018-11-26
Payer: COMMERCIAL

## 2018-11-26 VITALS
BODY MASS INDEX: 47.44 KG/M2 | HEART RATE: 71 BPM | HEIGHT: 68 IN | SYSTOLIC BLOOD PRESSURE: 140 MMHG | WEIGHT: 313 LBS | DIASTOLIC BLOOD PRESSURE: 80 MMHG | OXYGEN SATURATION: 98 %

## 2018-11-26 DIAGNOSIS — E66.01 CLASS 3 SEVERE OBESITY DUE TO EXCESS CALORIES WITH SERIOUS COMORBIDITY AND BODY MASS INDEX (BMI) OF 45.0 TO 49.9 IN ADULT (HCC): ICD-10-CM

## 2018-11-26 DIAGNOSIS — I10 HYPERTENSION, POOR CONTROL: ICD-10-CM

## 2018-11-26 DIAGNOSIS — I50.30 (HFPEF) HEART FAILURE WITH PRESERVED EJECTION FRACTION (HCC): Primary | ICD-10-CM

## 2018-11-26 DIAGNOSIS — Z23 NEED FOR PROPHYLACTIC VACCINATION AGAINST STREPTOCOCCUS PNEUMONIAE (PNEUMOCOCCUS): ICD-10-CM

## 2018-11-26 PROCEDURE — 90732 PPSV23 VACC 2 YRS+ SUBQ/IM: CPT | Performed by: NURSE PRACTITIONER

## 2018-11-26 PROCEDURE — 90471 IMMUNIZATION ADMIN: CPT | Performed by: NURSE PRACTITIONER

## 2018-11-26 PROCEDURE — 99214 OFFICE O/P EST MOD 30 MIN: CPT | Performed by: NURSE PRACTITIONER

## 2018-11-26 ASSESSMENT — ENCOUNTER SYMPTOMS
NAUSEA: 0
SHORTNESS OF BREATH: 0
SINUS PRESSURE: 0
WHEEZING: 0
SORE THROAT: 0
COLOR CHANGE: 0
CHEST TIGHTNESS: 0
BACK PAIN: 0
ABDOMINAL PAIN: 0
EYE REDNESS: 0
COUGH: 0
CONSTIPATION: 0
EYE ITCHING: 0
DIARRHEA: 0
VOMITING: 0
BLOOD IN STOOL: 0
RHINORRHEA: 0

## 2018-11-26 ASSESSMENT — PATIENT HEALTH QUESTIONNAIRE - PHQ9
SUM OF ALL RESPONSES TO PHQ QUESTIONS 1-9: 0
SUM OF ALL RESPONSES TO PHQ9 QUESTIONS 1 & 2: 0
1. LITTLE INTEREST OR PLEASURE IN DOING THINGS: 0
SUM OF ALL RESPONSES TO PHQ QUESTIONS 1-9: 0
2. FEELING DOWN, DEPRESSED OR HOPELESS: 0

## 2019-03-18 DIAGNOSIS — F34.1 DYSTHYMIA: ICD-10-CM

## 2019-03-18 DIAGNOSIS — E78.2 MIXED HYPERLIPIDEMIA: ICD-10-CM

## 2019-03-18 DIAGNOSIS — I10 ESSENTIAL HYPERTENSION: ICD-10-CM

## 2019-03-19 RX ORDER — LOSARTAN POTASSIUM 100 MG/1
100 TABLET ORAL DAILY
Qty: 90 TABLET | Refills: 1 | Status: SHIPPED | OUTPATIENT
Start: 2019-03-19 | End: 2020-02-24 | Stop reason: SDUPTHER

## 2019-03-19 RX ORDER — NEBIVOLOL 5 MG/1
5 TABLET ORAL DAILY
Qty: 21 TABLET | Refills: 0 | Status: SHIPPED | OUTPATIENT
Start: 2019-03-19 | End: 2020-02-24 | Stop reason: SDUPTHER

## 2019-03-19 RX ORDER — SPIRONOLACTONE 25 MG/1
25 TABLET ORAL DAILY
Qty: 90 TABLET | Refills: 0 | Status: SHIPPED | OUTPATIENT
Start: 2019-03-19 | End: 2020-02-24 | Stop reason: SDUPTHER

## 2019-03-19 RX ORDER — HYDRALAZINE HYDROCHLORIDE 50 MG/1
50 TABLET, FILM COATED ORAL 4 TIMES DAILY
Qty: 270 TABLET | Refills: 0 | Status: ON HOLD | OUTPATIENT
Start: 2019-03-19 | End: 2020-02-21 | Stop reason: HOSPADM

## 2019-03-19 RX ORDER — AMLODIPINE BESYLATE 10 MG/1
TABLET ORAL
Qty: 30 TABLET | Refills: 0 | Status: SHIPPED | OUTPATIENT
Start: 2019-03-19 | End: 2020-02-24 | Stop reason: SDUPTHER

## 2019-03-19 RX ORDER — ATORVASTATIN CALCIUM 10 MG/1
10 TABLET, FILM COATED ORAL DAILY
Qty: 90 TABLET | Refills: 0 | Status: SHIPPED | OUTPATIENT
Start: 2019-03-19 | End: 2020-02-24 | Stop reason: SDUPTHER

## 2019-04-11 ENCOUNTER — HOSPITAL ENCOUNTER (EMERGENCY)
Age: 54
Discharge: HOME | End: 2019-04-11
Attending: EMERGENCY MEDICINE
Payer: COMMERCIAL

## 2019-04-11 VITALS
SYSTOLIC BLOOD PRESSURE: 136 MMHG | HEART RATE: 72 BPM | BODY MASS INDEX: 47.29 KG/M2 | OXYGEN SATURATION: 98 % | HEIGHT: 68 IN | DIASTOLIC BLOOD PRESSURE: 72 MMHG | TEMPERATURE: 98.2 F | WEIGHT: 312 LBS | RESPIRATION RATE: 18 BRPM

## 2019-04-11 DIAGNOSIS — R39.198 DIFFICULTY URINATING: Primary | ICD-10-CM

## 2019-04-11 LAB
ANION GAP SERPL CALCULATED.3IONS-SCNC: 11 MMOL/L (ref 3–16)
BILIRUBIN URINE: NEGATIVE
BLOOD, URINE: NEGATIVE
BUN BLDV-MCNC: 22 MG/DL (ref 7–20)
CALCIUM SERPL-MCNC: 9.6 MG/DL (ref 8.3–10.6)
CHLORIDE BLD-SCNC: 104 MMOL/L (ref 99–110)
CLARITY: CLEAR
CO2: 25 MMOL/L (ref 21–32)
COLOR: YELLOW
CREAT SERPL-MCNC: 0.9 MG/DL (ref 0.9–1.3)
GFR AFRICAN AMERICAN: >60
GFR NON-AFRICAN AMERICAN: >60
GLUCOSE BLD-MCNC: 101 MG/DL (ref 70–99)
GLUCOSE URINE: NEGATIVE MG/DL
KETONES, URINE: NEGATIVE MG/DL
LEUKOCYTE ESTERASE, URINE: NEGATIVE
MICROSCOPIC EXAMINATION: NORMAL
NITRITE, URINE: NEGATIVE
PH UA: 6 (ref 5–8)
POTASSIUM REFLEX MAGNESIUM: 4.1 MMOL/L (ref 3.5–5.1)
PROTEIN UA: NEGATIVE MG/DL
SODIUM BLD-SCNC: 140 MMOL/L (ref 136–145)
SPECIFIC GRAVITY UA: 1.02 (ref 1–1.03)
URINE TYPE: NORMAL
UROBILINOGEN, URINE: 0.2 E.U./DL

## 2019-04-11 PROCEDURE — 99283 EMERGENCY DEPT VISIT LOW MDM: CPT

## 2019-04-11 PROCEDURE — 81003 URINALYSIS AUTO W/O SCOPE: CPT

## 2019-04-11 PROCEDURE — 80048 BASIC METABOLIC PNL TOTAL CA: CPT

## 2019-04-11 RX ORDER — TAMSULOSIN HYDROCHLORIDE 0.4 MG/1
0.4 CAPSULE ORAL DAILY
Qty: 14 CAPSULE | Refills: 0 | Status: SHIPPED | OUTPATIENT
Start: 2019-04-11 | End: 2020-02-18

## 2019-04-11 ASSESSMENT — ENCOUNTER SYMPTOMS
EYES NEGATIVE: 1
ALLERGIC/IMMUNOLOGIC NEGATIVE: 1
RESPIRATORY NEGATIVE: 1
ABDOMINAL PAIN: 1

## 2019-04-11 ASSESSMENT — PAIN DESCRIPTION - PAIN TYPE: TYPE: ACUTE PAIN

## 2019-04-11 NOTE — ED PROVIDER NOTES
aspect on exam   Musculoskeletal: He exhibits no edema or deformity. Neurological: He is oriented to person, place, and time. Skin: Skin is warm and dry. Diagnostic Results     EKG       RADIOLOGY:  No orders to display       LABS:   Results for orders placed or performed during the hospital encounter of 99/33/69   Basic Metabolic Panel w/ Reflex to MG   Result Value Ref Range    Sodium 140 136 - 145 mmol/L    Potassium reflex Magnesium 4.1 3.5 - 5.1 mmol/L    Chloride 104 99 - 110 mmol/L    CO2 25 21 - 32 mmol/L    Anion Gap 11 3 - 16    Glucose 101 (H) 70 - 99 mg/dL    BUN 22 (H) 7 - 20 mg/dL    CREATININE 0.9 0.9 - 1.3 mg/dL    GFR Non-African American >60 >60    GFR African American >60 >60    Calcium 9.6 8.3 - 10.6 mg/dL   Urinalysis, reflex to microscopic   Result Value Ref Range    Color, UA Yellow Straw/Yellow    Clarity, UA Clear Clear    Glucose, Ur Negative Negative mg/dL    Bilirubin Urine Negative Negative    Ketones, Urine Negative Negative mg/dL    Specific Gravity, UA 1.025 1.005 - 1.030    Blood, Urine Negative Negative    pH, UA 6.0 5.0 - 8.0    Protein, UA Negative Negative mg/dL    Urobilinogen, Urine 0.2 <2.0 E.U./dL    Nitrite, Urine Negative Negative    Leukocyte Esterase, Urine Negative Negative    Microscopic Examination Not Indicated     Urine Type VOID        ED BEDSIDE ULTRASOUND:      RECENT VITALS:  BP: 136/72,Temp: 98.2 °F (36.8 °C), Pulse: 72, Resp: 18, SpO2: 98 %     Procedures         ED Course     Nursing Notes, Past Medical Hx, Past Surgical Hx, Social Hx,Allergies, and Family Hx were reviewed.     The patient was given the following medications:  Orders Placed This Encounter   Medications    tamsulosin (FLOMAX) 0.4 MG capsule     Sig: Take 1 capsule by mouth daily for 14 days     Dispense:  14 capsule     Refill:  0       CONSULTS:  IP CONSULT TO Amy [facility] DECISIONMAKING / ASSESSMENT / Rupali Julien is a 47 y.o. male who presents with progressive

## 2019-04-16 ENCOUNTER — TELEPHONE (OUTPATIENT)
Dept: INTERNAL MEDICINE CLINIC | Age: 54
End: 2019-04-16

## 2019-04-16 NOTE — TELEPHONE ENCOUNTER
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

## 2019-09-07 ENCOUNTER — HOSPITAL ENCOUNTER (EMERGENCY)
Age: 54
Discharge: HOME | End: 2019-09-07
Attending: EMERGENCY MEDICINE
Payer: COMMERCIAL

## 2019-09-07 ENCOUNTER — APPOINTMENT (OUTPATIENT)
Dept: GENERAL RADIOLOGY | Age: 54
End: 2019-09-07
Payer: COMMERCIAL

## 2019-09-07 VITALS
SYSTOLIC BLOOD PRESSURE: 207 MMHG | HEART RATE: 77 BPM | OXYGEN SATURATION: 99 % | RESPIRATION RATE: 16 BRPM | HEIGHT: 68 IN | TEMPERATURE: 98.4 F | BODY MASS INDEX: 47.74 KG/M2 | WEIGHT: 315 LBS | DIASTOLIC BLOOD PRESSURE: 110 MMHG

## 2019-09-07 DIAGNOSIS — M79.672 LEFT FOOT PAIN: Primary | ICD-10-CM

## 2019-09-07 PROCEDURE — 99283 EMERGENCY DEPT VISIT LOW MDM: CPT

## 2019-09-07 PROCEDURE — 73630 X-RAY EXAM OF FOOT: CPT

## 2019-09-07 PROCEDURE — 73610 X-RAY EXAM OF ANKLE: CPT

## 2019-09-07 RX ORDER — ACETAMINOPHEN 500 MG
1000 TABLET ORAL 3 TIMES DAILY PRN
Qty: 30 TABLET | Refills: 0 | Status: ON HOLD | OUTPATIENT
Start: 2019-09-07 | End: 2021-01-07

## 2019-09-07 RX ORDER — ACETAMINOPHEN AND CODEINE PHOSPHATE 300; 30 MG/1; MG/1
1 TABLET ORAL EVERY 4 HOURS PRN
Qty: 42 TABLET | Refills: 0 | Status: SHIPPED | OUTPATIENT
Start: 2019-09-07 | End: 2019-09-07

## 2019-09-07 RX ORDER — IBUPROFEN 600 MG/1
600 TABLET ORAL 3 TIMES DAILY PRN
Qty: 30 TABLET | Refills: 0 | Status: ON HOLD | OUTPATIENT
Start: 2019-09-07 | End: 2020-10-26 | Stop reason: DRUGHIGH

## 2019-09-07 ASSESSMENT — PAIN SCALES - GENERAL: PAINLEVEL_OUTOF10: 6

## 2019-09-07 NOTE — ED PROVIDER NOTES
daily    SPIRONOLACTONE (ALDACTONE) 25 MG TABLET    Take 1 tablet by mouth daily    TAMSULOSIN (FLOMAX) 0.4 MG CAPSULE    Take 1 capsule by mouth daily for 14 days       Allergies     He is allergic to lisinopril. Physical Exam     INITIAL VITALS: BP (!) 206/122 Comment: pt states this is his baseline when he doesn't take his BP med, didnt take bp med today, dr. Evelin Clemons made aware no new orders  Pulse 80   Temp 98.4 °F (36.9 °C) (Oral)   Resp 16   Ht 5' 8\" (1.727 m)   Wt (!) 316 lb (143.3 kg)   SpO2 99%   BMI 48.05 kg/m²      Physical Examination:   · General appearance: Appears comfortable at rest, fully alert and orientated    · HEENT: Atraumatic, normocephalic, moist mucus membranes  · Respiratory: Normal respiratory effort. No wheezes, rubs, or crackles  · Cardiovascular: regular S1/S2, with no Murmur, rub or gallop. · Abdomen: Soft, non-tender, non-distended  · Musculoskeletal: There is some swelling in the left ankle. Sharp tenderness over the base of the fifth metatarsal.  Moderate tenderness in the posterior aspect of the lateral malleoli. · Neurologic: Patient is neurovascularly intact in all extremities. Diagnostic Results     RADIOLOGY:  XR FOOT LEFT (MIN 3 VIEWS)   Final Result   Impression:    No acute osseous injury. XR ANKLE LEFT (MIN 3 VIEWS)   Final Result   Impression:    No acute osseous injury.         LABS:   Labs Reviewed - No data to display    RECENT VITALS:  BP: (!) 206/122(pt states this is his baseline when he doesn't take his BP med, didnt take bp med today, dr. Evelin Clemons made aware no new orders), Temp: 98.4 °F (36.9 °C), Pulse: 80, Resp: 16     Procedures     None    ED Course     The patient was given the following medications:  Orders Placed This Encounter   Medications    ibuprofen (ADVIL;MOTRIN) 600 MG tablet     Sig: Take 1 tablet by mouth 3 times daily as needed for Pain     Dispense:  30 tablet     Refill:  0    acetaminophen-codeine (TYLENOL/CODEINE #3) 300-30

## 2020-02-18 ENCOUNTER — APPOINTMENT (OUTPATIENT)
Dept: CT IMAGING | Age: 55
DRG: 287 | End: 2020-02-18
Payer: COMMERCIAL

## 2020-02-18 ENCOUNTER — APPOINTMENT (OUTPATIENT)
Dept: GENERAL RADIOLOGY | Age: 55
DRG: 287 | End: 2020-02-18
Payer: COMMERCIAL

## 2020-02-18 ENCOUNTER — HOSPITAL ENCOUNTER (INPATIENT)
Age: 55
LOS: 3 days | Discharge: HOME | DRG: 287 | End: 2020-02-21
Attending: EMERGENCY MEDICINE
Payer: COMMERCIAL

## 2020-02-18 PROBLEM — I16.0 HYPERTENSIVE URGENCY: Status: ACTIVE | Noted: 2020-02-18

## 2020-02-18 LAB
A/G RATIO: 1.4 (ref 1.1–2.2)
ALBUMIN SERPL-MCNC: 4.4 G/DL (ref 3.4–5)
ALP BLD-CCNC: 98 U/L (ref 40–129)
ALT SERPL-CCNC: 24 U/L (ref 10–40)
ANION GAP SERPL CALCULATED.3IONS-SCNC: 17 MMOL/L (ref 3–16)
AST SERPL-CCNC: 30 U/L (ref 15–37)
BASOPHILS ABSOLUTE: 0.1 K/UL (ref 0–0.2)
BASOPHILS RELATIVE PERCENT: 1.2 %
BILIRUB SERPL-MCNC: 1.2 MG/DL (ref 0–1)
BUN BLDV-MCNC: 14 MG/DL (ref 7–20)
CALCIUM SERPL-MCNC: 9.7 MG/DL (ref 8.3–10.6)
CHLORIDE BLD-SCNC: 101 MMOL/L (ref 99–110)
CO2: 22 MMOL/L (ref 21–32)
CREAT SERPL-MCNC: 0.9 MG/DL (ref 0.9–1.3)
EKG ATRIAL RATE: 187 BPM
EKG ATRIAL RATE: 77 BPM
EKG ATRIAL RATE: 80 BPM
EKG DIAGNOSIS: NORMAL
EKG P AXIS: -20 DEGREES
EKG P AXIS: 45 DEGREES
EKG P-R INTERVAL: 150 MS
EKG P-R INTERVAL: 158 MS
EKG Q-T INTERVAL: 272 MS
EKG Q-T INTERVAL: 430 MS
EKG Q-T INTERVAL: 434 MS
EKG QRS DURATION: 84 MS
EKG QRS DURATION: 98 MS
EKG QRS DURATION: 98 MS
EKG QTC CALCULATION (BAZETT): 456 MS
EKG QTC CALCULATION (BAZETT): 486 MS
EKG QTC CALCULATION (BAZETT): 500 MS
EKG R AXIS: -70 DEGREES
EKG T AXIS: 110 DEGREES
EKG T AXIS: 123 DEGREES
EKG T AXIS: 126 DEGREES
EKG VENTRICULAR RATE: 169 BPM
EKG VENTRICULAR RATE: 77 BPM
EKG VENTRICULAR RATE: 80 BPM
EOSINOPHILS ABSOLUTE: 0.2 K/UL (ref 0–0.6)
EOSINOPHILS RELATIVE PERCENT: 1.7 %
GFR AFRICAN AMERICAN: >60
GFR NON-AFRICAN AMERICAN: >60
GLOBULIN: 3.2 G/DL
GLUCOSE BLD-MCNC: 142 MG/DL (ref 70–99)
HCT VFR BLD CALC: 49.7 % (ref 40.5–52.5)
HEMOGLOBIN: 16.6 G/DL (ref 13.5–17.5)
LYMPHOCYTES ABSOLUTE: 3.4 K/UL (ref 1–5.1)
LYMPHOCYTES RELATIVE PERCENT: 35 %
MCH RBC QN AUTO: 28 PG (ref 26–34)
MCHC RBC AUTO-ENTMCNC: 33.5 G/DL (ref 31–36)
MCV RBC AUTO: 83.7 FL (ref 80–100)
MONOCYTES ABSOLUTE: 0.6 K/UL (ref 0–1.3)
MONOCYTES RELATIVE PERCENT: 6.1 %
NEUTROPHILS ABSOLUTE: 5.4 K/UL (ref 1.7–7.7)
NEUTROPHILS RELATIVE PERCENT: 56 %
PDW BLD-RTO: 14.7 % (ref 12.4–15.4)
PLATELET # BLD: 190 K/UL (ref 135–450)
PLATELET SLIDE REVIEW: ADEQUATE
PMV BLD AUTO: 10.3 FL (ref 5–10.5)
POTASSIUM SERPL-SCNC: 3.7 MMOL/L (ref 3.5–5.1)
PRO-BNP: 890 PG/ML (ref 0–124)
RBC # BLD: 5.94 M/UL (ref 4.2–5.9)
SODIUM BLD-SCNC: 140 MMOL/L (ref 136–145)
TOTAL PROTEIN: 7.6 G/DL (ref 6.4–8.2)
TROPONIN: 0.01 NG/ML
TROPONIN: 0.04 NG/ML
TROPONIN: 0.05 NG/ML
WBC # BLD: 9.6 K/UL (ref 4–11)

## 2020-02-18 PROCEDURE — 2580000003 HC RX 258: Performed by: EMERGENCY MEDICINE

## 2020-02-18 PROCEDURE — 93005 ELECTROCARDIOGRAM TRACING: CPT | Performed by: EMERGENCY MEDICINE

## 2020-02-18 PROCEDURE — 93005 ELECTROCARDIOGRAM TRACING: CPT | Performed by: INTERNAL MEDICINE

## 2020-02-18 PROCEDURE — 6360000002 HC RX W HCPCS

## 2020-02-18 PROCEDURE — 2500000003 HC RX 250 WO HCPCS: Performed by: INTERNAL MEDICINE

## 2020-02-18 PROCEDURE — 80053 COMPREHEN METABOLIC PANEL: CPT

## 2020-02-18 PROCEDURE — 99285 EMERGENCY DEPT VISIT HI MDM: CPT

## 2020-02-18 PROCEDURE — 6370000000 HC RX 637 (ALT 250 FOR IP)

## 2020-02-18 PROCEDURE — 36415 COLL VENOUS BLD VENIPUNCTURE: CPT

## 2020-02-18 PROCEDURE — 2060000000 HC ICU INTERMEDIATE R&B

## 2020-02-18 PROCEDURE — 74174 CTA ABD&PLVS W/CONTRAST: CPT

## 2020-02-18 PROCEDURE — 93010 ELECTROCARDIOGRAM REPORT: CPT | Performed by: INTERNAL MEDICINE

## 2020-02-18 PROCEDURE — 2580000003 HC RX 258

## 2020-02-18 PROCEDURE — 71046 X-RAY EXAM CHEST 2 VIEWS: CPT

## 2020-02-18 PROCEDURE — 85025 COMPLETE CBC W/AUTO DIFF WBC: CPT

## 2020-02-18 PROCEDURE — 2500000003 HC RX 250 WO HCPCS: Performed by: EMERGENCY MEDICINE

## 2020-02-18 PROCEDURE — 84484 ASSAY OF TROPONIN QUANT: CPT

## 2020-02-18 PROCEDURE — 83880 ASSAY OF NATRIURETIC PEPTIDE: CPT

## 2020-02-18 PROCEDURE — 84443 ASSAY THYROID STIM HORMONE: CPT

## 2020-02-18 PROCEDURE — 6360000004 HC RX CONTRAST MEDICATION: Performed by: EMERGENCY MEDICINE

## 2020-02-18 PROCEDURE — 6370000000 HC RX 637 (ALT 250 FOR IP): Performed by: EMERGENCY MEDICINE

## 2020-02-18 RX ORDER — POLYETHYLENE GLYCOL 3350 17 G/17G
17 POWDER, FOR SOLUTION ORAL DAILY PRN
Status: DISCONTINUED | OUTPATIENT
Start: 2020-02-18 | End: 2020-02-21 | Stop reason: HOSPADM

## 2020-02-18 RX ORDER — ATORVASTATIN CALCIUM 10 MG/1
10 TABLET, FILM COATED ORAL DAILY
Status: DISCONTINUED | OUTPATIENT
Start: 2020-02-19 | End: 2020-02-21 | Stop reason: HOSPADM

## 2020-02-18 RX ORDER — AMLODIPINE BESYLATE 5 MG/1
10 TABLET ORAL ONCE
Status: COMPLETED | OUTPATIENT
Start: 2020-02-18 | End: 2020-02-18

## 2020-02-18 RX ORDER — LABETALOL 20 MG/4 ML (5 MG/ML) INTRAVENOUS SYRINGE
10 EVERY 4 HOURS PRN
Status: DISCONTINUED | OUTPATIENT
Start: 2020-02-18 | End: 2020-02-19 | Stop reason: SDUPTHER

## 2020-02-18 RX ORDER — METOPROLOL TARTRATE 5 MG/5ML
10 INJECTION INTRAVENOUS EVERY 5 MIN PRN
Status: DISCONTINUED | OUTPATIENT
Start: 2020-02-18 | End: 2020-02-18 | Stop reason: ALTCHOICE

## 2020-02-18 RX ORDER — LABETALOL HYDROCHLORIDE 5 MG/ML
20 INJECTION, SOLUTION INTRAVENOUS
Status: DISCONTINUED | OUTPATIENT
Start: 2020-02-18 | End: 2020-02-18

## 2020-02-18 RX ORDER — NICARDIPINE HYDROCHLORIDE 2.5 MG/ML
INJECTION INTRAVENOUS
Status: DISCONTINUED
Start: 2020-02-18 | End: 2020-02-19

## 2020-02-18 RX ORDER — SODIUM CHLORIDE 0.9 % (FLUSH) 0.9 %
10 SYRINGE (ML) INJECTION EVERY 12 HOURS SCHEDULED
Status: DISCONTINUED | OUTPATIENT
Start: 2020-02-18 | End: 2020-02-21 | Stop reason: HOSPADM

## 2020-02-18 RX ORDER — ASPIRIN 81 MG/1
TABLET, CHEWABLE ORAL
Status: DISCONTINUED
Start: 2020-02-18 | End: 2020-02-18

## 2020-02-18 RX ORDER — ONDANSETRON 2 MG/ML
4 INJECTION INTRAMUSCULAR; INTRAVENOUS EVERY 6 HOURS PRN
Status: DISCONTINUED | OUTPATIENT
Start: 2020-02-18 | End: 2020-02-21 | Stop reason: HOSPADM

## 2020-02-18 RX ORDER — ASPIRIN 81 MG/1
324 TABLET, CHEWABLE ORAL ONCE
Status: COMPLETED | OUTPATIENT
Start: 2020-02-18 | End: 2020-02-18

## 2020-02-18 RX ORDER — HYDRALAZINE HYDROCHLORIDE 25 MG/1
50 TABLET, FILM COATED ORAL ONCE
Status: COMPLETED | OUTPATIENT
Start: 2020-02-18 | End: 2020-02-18

## 2020-02-18 RX ORDER — SODIUM CHLORIDE 0.9 % (FLUSH) 0.9 %
10 SYRINGE (ML) INJECTION PRN
Status: DISCONTINUED | OUTPATIENT
Start: 2020-02-18 | End: 2020-02-21 | Stop reason: HOSPADM

## 2020-02-18 RX ORDER — LABETALOL HYDROCHLORIDE 5 MG/ML
10 INJECTION, SOLUTION INTRAVENOUS ONCE
Status: COMPLETED | OUTPATIENT
Start: 2020-02-18 | End: 2020-02-18

## 2020-02-18 RX ORDER — SPIRONOLACTONE 25 MG/1
25 TABLET ORAL DAILY
Status: DISCONTINUED | OUTPATIENT
Start: 2020-02-19 | End: 2020-02-21 | Stop reason: HOSPADM

## 2020-02-18 RX ORDER — LOSARTAN POTASSIUM 100 MG/1
100 TABLET ORAL DAILY
Status: DISCONTINUED | OUTPATIENT
Start: 2020-02-19 | End: 2020-02-21 | Stop reason: HOSPADM

## 2020-02-18 RX ORDER — NITROGLYCERIN 0.4 MG/1
0.4 TABLET SUBLINGUAL EVERY 5 MIN PRN
Status: DISCONTINUED | OUTPATIENT
Start: 2020-02-18 | End: 2020-02-21 | Stop reason: HOSPADM

## 2020-02-18 RX ORDER — ASPIRIN 325 MG
325 TABLET ORAL ONCE
Status: DISCONTINUED | OUTPATIENT
Start: 2020-02-18 | End: 2020-02-18

## 2020-02-18 RX ORDER — NEBIVOLOL 5 MG/1
5 TABLET ORAL DAILY
Status: DISCONTINUED | OUTPATIENT
Start: 2020-02-19 | End: 2020-02-21 | Stop reason: HOSPADM

## 2020-02-18 RX ORDER — HYDRALAZINE HYDROCHLORIDE 50 MG/1
50 TABLET, FILM COATED ORAL 4 TIMES DAILY
Status: DISCONTINUED | OUTPATIENT
Start: 2020-02-18 | End: 2020-02-19

## 2020-02-18 RX ORDER — AMLODIPINE BESYLATE 10 MG/1
10 TABLET ORAL DAILY
Status: DISCONTINUED | OUTPATIENT
Start: 2020-02-19 | End: 2020-02-21 | Stop reason: HOSPADM

## 2020-02-18 RX ORDER — LABETALOL 20 MG/4 ML (5 MG/ML) INTRAVENOUS SYRINGE
10 ONCE
Status: DISCONTINUED | OUTPATIENT
Start: 2020-02-18 | End: 2020-02-18 | Stop reason: CLARIF

## 2020-02-18 RX ORDER — ASPIRIN 81 MG/1
81 TABLET ORAL DAILY
Status: DISCONTINUED | OUTPATIENT
Start: 2020-02-18 | End: 2020-02-21 | Stop reason: HOSPADM

## 2020-02-18 RX ADMIN — ASPIRIN 324 MG: 81 TABLET, CHEWABLE ORAL at 11:28

## 2020-02-18 RX ADMIN — Medication 10 ML: at 21:45

## 2020-02-18 RX ADMIN — IOPAMIDOL 100 ML: 755 INJECTION, SOLUTION INTRAVENOUS at 12:20

## 2020-02-18 RX ADMIN — HYDRALAZINE HYDROCHLORIDE 50 MG: 25 TABLET, FILM COATED ORAL at 15:10

## 2020-02-18 RX ADMIN — NITROGLYCERIN 0.4 MG: 0.4 TABLET, ORALLY DISINTEGRATING SUBLINGUAL at 11:28

## 2020-02-18 RX ADMIN — ENOXAPARIN SODIUM 40 MG: 40 INJECTION SUBCUTANEOUS at 21:43

## 2020-02-18 RX ADMIN — HYDRALAZINE HYDROCHLORIDE 50 MG: 50 TABLET, FILM COATED ORAL at 21:43

## 2020-02-18 RX ADMIN — METOPROLOL TARTRATE 10 MG: 5 INJECTION INTRAVENOUS at 11:32

## 2020-02-18 RX ADMIN — SODIUM CHLORIDE 5 MG/HR: 9 INJECTION, SOLUTION INTRAVENOUS at 12:06

## 2020-02-18 RX ADMIN — ASPIRIN 81 MG: 81 TABLET, COATED ORAL at 19:53

## 2020-02-18 RX ADMIN — AMLODIPINE BESYLATE 10 MG: 5 TABLET ORAL at 14:16

## 2020-02-18 RX ADMIN — LABETALOL HYDROCHLORIDE 10 MG: 5 INJECTION, SOLUTION INTRAVENOUS at 17:54

## 2020-02-18 RX ADMIN — ASPIRIN 81 MG 324 MG: 81 TABLET ORAL at 11:28

## 2020-02-18 ASSESSMENT — ENCOUNTER SYMPTOMS
COUGH: 0
APNEA: 0
VOMITING: 0
ABDOMINAL PAIN: 0
SHORTNESS OF BREATH: 1
SORE THROAT: 0
NAUSEA: 0
DIARRHEA: 0
CHEST TIGHTNESS: 0
CONSTIPATION: 0

## 2020-02-18 ASSESSMENT — PAIN DESCRIPTION - LOCATION: LOCATION: CHEST

## 2020-02-18 ASSESSMENT — PAIN DESCRIPTION - FREQUENCY: FREQUENCY: CONTINUOUS

## 2020-02-18 ASSESSMENT — PAIN SCALES - GENERAL
PAINLEVEL_OUTOF10: 0
PAINLEVEL_OUTOF10: 0

## 2020-02-18 ASSESSMENT — PAIN DESCRIPTION - PAIN TYPE: TYPE: ACUTE PAIN

## 2020-02-18 NOTE — H&P
Diagnosis Date    Carpal tunnel syndrome     Hypertension     Obesity, unspecified     MARY (obstructive sleep apnea)    ·     Past Surgical History:        Procedure Laterality Date    KNEE ARTHROSCOPY Right     NOSE SURGERY      Repair from Fx   ·     Medications Priorto Admission:    · Medications Prior to Admission: ibuprofen (ADVIL;MOTRIN) 600 MG tablet, Take 1 tablet by mouth 3 times daily as needed for Pain  · acetaminophen (TYLENOL) 500 MG tablet, Take 2 tablets by mouth 3 times daily as needed for Pain  · sertraline (ZOLOFT) 50 MG tablet, Take 1 tablet by mouth daily  · losartan (COZAAR) 100 MG tablet, Take 1 tablet by mouth daily  · hydrALAZINE (APRESOLINE) 50 MG tablet, Take 1 tablet by mouth 4 times daily  · nebivolol (BYSTOLIC) 5 MG tablet, Take 1 tablet by mouth daily  · spironolactone (ALDACTONE) 25 MG tablet, Take 1 tablet by mouth daily  · atorvastatin (LIPITOR) 10 MG tablet, Take 1 tablet by mouth daily  · amLODIPine (NORVASC) 10 MG tablet, TAKE 1 TABLET BY MOUTH NIGHTLY  · aspirin 81 MG EC tablet, TAKE 1 TABLET BY MOUTH DAILY    Allergies:  Lisinopril    Social History:   · TOBACCO:   reports that he has never smoked. He has never used smokeless tobacco.  · ETOH:   reports current alcohol use. · DRUGS : none  · Patient currently lives with family    Family History:       Problem Relation Age of Onset    Diabetes Mother     Stroke Maternal Uncle    ·     Review of Systems   Constitutional: Negative for activity change, appetite change, chills, diaphoresis, fever and unexpected weight change. HENT: Negative for congestion and sore throat. Eyes: Negative for visual disturbance. Respiratory: Positive for shortness of breath. Negative for apnea, cough and chest tightness. Cardiovascular: Positive for chest pain (mild non-radiating) and palpitations. Negative for leg swelling. Gastrointestinal: Negative for abdominal pain, constipation, diarrhea, nausea and vomiting. Endocrine: Negative for cold intolerance, heat intolerance, polydipsia, polyphagia and polyuria. Genitourinary: Negative for difficulty urinating. Musculoskeletal: Negative for arthralgias and myalgias. Neurological: Negative for weakness and headaches. Psychiatric/Behavioral: Negative for confusion and sleep disturbance. All other systems reviewed and are negative. ROS: A 10 point review of systems was conducted, significant findings as noted in HPI. Physical Exam:     Vitals:    02/18/20 1705   BP: (!) 189/108   Pulse:    Resp:    Temp:    SpO2:      Physical Exam  Vitals signs reviewed. Constitutional:       General: He is not in acute distress. Appearance: He is well-developed and well-groomed. He is obese. HENT:      Head: Normocephalic and atraumatic. Mouth/Throat:      Mouth: Mucous membranes are moist.      Pharynx: Oropharynx is clear. Eyes:      General: No scleral icterus. Extraocular Movements: Extraocular movements intact. Conjunctiva/sclera: Conjunctivae normal.      Pupils: Pupils are equal, round, and reactive to light. Neck:      Musculoskeletal: Full passive range of motion without pain, normal range of motion and neck supple. Cardiovascular:      Rate and Rhythm: Normal rate and regular rhythm. Pulses: Normal pulses. Heart sounds: Normal heart sounds, S1 normal and S2 normal. No murmur. Pulmonary:      Effort: Pulmonary effort is normal. No respiratory distress. Breath sounds: Normal breath sounds and air entry. Abdominal:      General: Abdomen is flat. Bowel sounds are normal.      Palpations: Abdomen is soft. Tenderness: There is no abdominal tenderness. Musculoskeletal: Normal range of motion. General: No swelling. Right lower leg: No edema. Left lower leg: No edema. Skin:     General: Skin is warm and dry. Neurological:      General: No focal deficit present.       Mental Status: He is alert and oriented to person, place, and time. Cranial Nerves: Cranial nerves are intact. Sensory: Sensation is intact. Motor: Motor function is intact. Coordination: Coordination is intact. Gait: Gait is intact. Deep Tendon Reflexes: Reflexes are normal and symmetric. Psychiatric:         Attention and Perception: Attention and perception normal.         Mood and Affect: Mood normal.         Speech: Speech normal.         Behavior: Behavior normal. Behavior is cooperative. Thought Content: Thought content normal.         Cognition and Memory: Cognition and memory normal.         Judgment: Judgment normal.         Data:   Labs:  CBC:   Recent Labs     02/18/20  1116   WBC 9.6   HGB 16.6   HCT 49.7          BMP:   Recent Labs     02/18/20  1116      K 3.7      CO2 22   BUN 14   CREATININE 0.9   GLUCOSE 142*     LFT's:   Recent Labs     02/18/20  1116   AST 30   ALT 24   BILITOT 1.2*   ALKPHOS 98     Troponin:   Recent Labs     02/18/20  1116   TROPONINI 0.01     BNP:No results for input(s): BNP in the last 72 hours. ABGs: No results for input(s): PHART, DYS0AMU, PO2ART in the last 72 hours. INR: No results for input(s): INR in the last 72 hours. U/A:No results for input(s): NITRITE, COLORU, PHUR, LABCAST, WBCUA, RBCUA, MUCUS, TRICHOMONAS, YEAST, BACTERIA, CLARITYU, SPECGRAV, LEUKOCYTESUR, UROBILINOGEN, BILIRUBINUR, BLOODU, GLUCOSEU, AMORPHOUS in the last 72 hours. Invalid input(s): KETONESU    CTA CHEST ABDOMEN PELVIS W CONTRAST   Final Result      Scattered coronary calcification noted. No acute infiltrate or effusion. No evidence of aneurysm or dissection identified in the thoracic or abdominal aorta. Prior left renal artery stent appears grossly patent. Hepatic steatosis. Otherwise no acute abnormality identified.       XR CHEST STANDARD (2 VW)   Final Result      No lobar consolidation      Ectasia versus aneurysm of the thoracic aorta. If further evaluation of the aorta is required CT without and with contrast recommended            ASSESSMENT AND PLAN:   Mr. Radha Murguia is a 49year-old male with a PMHx of Hypertension, hyperlipidemia, right renal artery stenosis (s/p stent in 2018), grade II diastolic heart failure, Obstructive Sleep Apnea, and obesity presents with palpitations. Hypertensive urgency, in setting of Essential Hypertension  Patient compliant with his medications. Regularly checks his BP and it is in the 130-140s. · Amlodipine 10 mg daily  · Hydralazine 50 m 4x daily  · Losartan 100 mg daily  · Labatolol 10 mg Q4hr PRN for SBP >160  · F/U Echocardiogram    New onset Atrial Fibrillation  Patient converted back to sinus rhythm. Initial Troponin in the ED <0.01. Second 0.04.  · Monitor on telemetry  · Trend Troponin ( 0.01->0.04->  )  · F/U Echocardiogram  · F/U TSH    Grade II Diastolic Heart Failure  ECHO (10/26/18): Mild-Mod LVH, EF 60-65%. No regional wall motion abnormalities, grade II diastolic dysfunction. · Aldactone 25 mg  · Nebivolol 5 mg daily  · F/U Pro BNP    Hyperlipidemia  · Lipitor 10    Left renal artery stenosis (s/p stent in 2018)  CTA done 2/18/20 showed a left sided renal artery stent in place. Renal arteries appear patent.   · Consider further workup for cause of hypertensive urgency     Depression  · Zoloft    Obstructive Sleep Apnea  · CPAP at night    Obesity  · Patient BMI >47, complicated care and contributing to comorbid conditions    Code Status: Full  FEN: Cardiac  PPX: Lovenox  DISPO: General Medical Floor    This patient has been staffed and discussed with Marquis Marcella MD.   -----------------------------  Judie Rangel MD, PGY-1  2/18/2020  5:41 PM

## 2020-02-18 NOTE — ED NOTES
Called [facility] dispatch to inform of patient transport set up.      Braden Cockayne, RN  02/18/20 4524

## 2020-02-18 NOTE — ED PROVIDER NOTES
EMERGENCY DEPARTMENT PHYSICIAN DOCUMENTATION      CHIEF COMPLAINT  Chest Pain and Shortness of Breath      Patient information was obtained from patient. History/Exam limitations: none. HISTORY OF PRESENT ILLNESS  Chelsea Mantilla is a 49 y.o. male with complaint of Chest Pain and Shortness of Breath  . Patient is a 52-year-old man with aggressive hypertension on several antihypertensives who is here for dyspnea chest discomfort and palpitations. He states this started a week ago at rest and lasted several hours and resolved. He states that intervening week he has had no exertional chest pain. He states 1 hour prior to arrival after eating breakfast he  developed sudden onset palpitation feeling of dyspnea, sweats, very mild chest discomfort which radiates to his neck. Is associated primarily with dyspnea and the palpitations that bother him the most.    REVIEW OF SYSTEMS  A full 10 point Review of Systems was performed and is negative aside from pertinent positives mentioned in HPI    ALLERGIES:  Allergies   Allergen Reactions    Lisinopril      cough       PAST HISTORY  Past Medical History:   Diagnosis Date    Carpal tunnel syndrome     Hypertension     Obesity, unspecified     MARY (obstructive sleep apnea)        Family History   Problem Relation Age of Onset    Diabetes Mother     Stroke Maternal Uncle        No current facility-administered medications on file prior to encounter.       Current Outpatient Medications on File Prior to Encounter   Medication Sig Dispense Refill    ibuprofen (ADVIL;MOTRIN) 600 MG tablet Take 1 tablet by mouth 3 times daily as needed for Pain 30 tablet 0    acetaminophen (TYLENOL) 500 MG tablet Take 2 tablets by mouth 3 times daily as needed for Pain 30 tablet 0    tamsulosin (FLOMAX) 0.4 MG capsule Take 1 capsule by mouth daily for 14 days 14 capsule 0    sertraline (ZOLOFT) 50 MG tablet Take 1 tablet by mouth daily 90 tablet 0    losartan (COZAAR) 100 MG monitoring for potential decompensation. Interventions were performed as documented above. This medical chart used with aid of transcription software. As such, there may be inadvertent errors in transcription of spellings and words despite physician's attempts to correct all possible errors.          Leonarda Rowe MD  02/18/20 9685

## 2020-02-18 NOTE — PROGRESS NOTES
[facility] Vital signs obtained and tele placed on pt. After returning from the bathroom, pt's /113. Pt's BP reevaluated a few moments later and was 189/108. Dr. Octavia Fernando made aware. Order placed for 10 mg IV labetalol one time. Awaiting medication from pharmacy. Pt low fall risk and is able to ambulate independently. Pt instructed to call RN before getting up if he needs assistance, or experiences any lightheadedness or dizziness. Pt is alert and oriented x 4 and aware of his own needs. Pt oriented to room and call light. Currently awaiting orders from physician. Will continue to monitor and update MD as needed.  Electronically signed by Andria Gitelman, RN on 2/18/2020 at 5:20 PM

## 2020-02-19 LAB
ANION GAP SERPL CALCULATED.3IONS-SCNC: 13 MMOL/L (ref 3–16)
BASOPHILS ABSOLUTE: 0 K/UL (ref 0–0.2)
BASOPHILS RELATIVE PERCENT: 0.4 %
BUN BLDV-MCNC: 15 MG/DL (ref 7–20)
CALCIUM SERPL-MCNC: 9.1 MG/DL (ref 8.3–10.6)
CHLORIDE BLD-SCNC: 101 MMOL/L (ref 99–110)
CO2: 26 MMOL/L (ref 21–32)
CREAT SERPL-MCNC: 0.9 MG/DL (ref 0.9–1.3)
EKG ATRIAL RATE: 70 BPM
EKG DIAGNOSIS: NORMAL
EKG P AXIS: 66 DEGREES
EKG P-R INTERVAL: 150 MS
EKG Q-T INTERVAL: 450 MS
EKG QRS DURATION: 100 MS
EKG QTC CALCULATION (BAZETT): 486 MS
EKG R AXIS: 15 DEGREES
EKG T AXIS: 209 DEGREES
EKG VENTRICULAR RATE: 70 BPM
EOSINOPHILS ABSOLUTE: 0.2 K/UL (ref 0–0.6)
EOSINOPHILS RELATIVE PERCENT: 1.9 %
GFR AFRICAN AMERICAN: >60
GFR NON-AFRICAN AMERICAN: >60
GLUCOSE BLD-MCNC: 115 MG/DL (ref 70–99)
HCT VFR BLD CALC: 45.2 % (ref 40.5–52.5)
HEMOGLOBIN: 15.1 G/DL (ref 13.5–17.5)
LV EF: 58 %
LVEF MODALITY: NORMAL
LYMPHOCYTES ABSOLUTE: 1.9 K/UL (ref 1–5.1)
LYMPHOCYTES RELATIVE PERCENT: 23.3 %
MCH RBC QN AUTO: 28.5 PG (ref 26–34)
MCHC RBC AUTO-ENTMCNC: 33.5 G/DL (ref 31–36)
MCV RBC AUTO: 85.1 FL (ref 80–100)
MONOCYTES ABSOLUTE: 0.6 K/UL (ref 0–1.3)
MONOCYTES RELATIVE PERCENT: 7.8 %
NEUTROPHILS ABSOLUTE: 5.4 K/UL (ref 1.7–7.7)
NEUTROPHILS RELATIVE PERCENT: 66.6 %
PDW BLD-RTO: 14.9 % (ref 12.4–15.4)
PLATELET # BLD: 183 K/UL (ref 135–450)
PMV BLD AUTO: 10.1 FL (ref 5–10.5)
POTASSIUM REFLEX MAGNESIUM: 4.4 MMOL/L (ref 3.5–5.1)
RBC # BLD: 5.31 M/UL (ref 4.2–5.9)
SODIUM BLD-SCNC: 140 MMOL/L (ref 136–145)
TROPONIN: 0.03 NG/ML
TSH REFLEX: 2.8 UIU/ML (ref 0.27–4.2)
WBC # BLD: 8.1 K/UL (ref 4–11)

## 2020-02-19 PROCEDURE — 84484 ASSAY OF TROPONIN QUANT: CPT

## 2020-02-19 PROCEDURE — 85025 COMPLETE CBC W/AUTO DIFF WBC: CPT

## 2020-02-19 PROCEDURE — 36415 COLL VENOUS BLD VENIPUNCTURE: CPT

## 2020-02-19 PROCEDURE — 6360000004 HC RX CONTRAST MEDICATION

## 2020-02-19 PROCEDURE — 2500000003 HC RX 250 WO HCPCS

## 2020-02-19 PROCEDURE — 6360000002 HC RX W HCPCS

## 2020-02-19 PROCEDURE — 93010 ELECTROCARDIOGRAM REPORT: CPT | Performed by: INTERNAL MEDICINE

## 2020-02-19 PROCEDURE — C8929 TTE W OR WO FOL WCON,DOPPLER: HCPCS

## 2020-02-19 PROCEDURE — 6370000000 HC RX 637 (ALT 250 FOR IP)

## 2020-02-19 PROCEDURE — 2060000000 HC ICU INTERMEDIATE R&B

## 2020-02-19 PROCEDURE — 99252 IP/OBS CONSLTJ NEW/EST SF 35: CPT | Performed by: INTERNAL MEDICINE

## 2020-02-19 PROCEDURE — 2580000003 HC RX 258

## 2020-02-19 PROCEDURE — 80048 BASIC METABOLIC PNL TOTAL CA: CPT

## 2020-02-19 RX ORDER — LABETALOL HYDROCHLORIDE 5 MG/ML
10 INJECTION, SOLUTION INTRAVENOUS EVERY 4 HOURS PRN
Status: DISCONTINUED | OUTPATIENT
Start: 2020-02-19 | End: 2020-02-21 | Stop reason: HOSPADM

## 2020-02-19 RX ADMIN — LOSARTAN POTASSIUM 100 MG: 100 TABLET ORAL at 08:37

## 2020-02-19 RX ADMIN — SERTRALINE HYDROCHLORIDE 50 MG: 50 TABLET ORAL at 08:37

## 2020-02-19 RX ADMIN — HYDRALAZINE HYDROCHLORIDE 75 MG: 50 TABLET, FILM COATED ORAL at 21:19

## 2020-02-19 RX ADMIN — NEBIVOLOL HYDROCHLORIDE 5 MG: 5 TABLET ORAL at 08:37

## 2020-02-19 RX ADMIN — Medication 10 ML: at 08:59

## 2020-02-19 RX ADMIN — ENOXAPARIN SODIUM 40 MG: 40 INJECTION SUBCUTANEOUS at 21:19

## 2020-02-19 RX ADMIN — PERFLUTREN 1.65 MG: 6.52 INJECTION, SUSPENSION INTRAVENOUS at 14:38

## 2020-02-19 RX ADMIN — SPIRONOLACTONE 25 MG: 25 TABLET ORAL at 08:37

## 2020-02-19 RX ADMIN — AMLODIPINE BESYLATE 10 MG: 10 TABLET ORAL at 08:37

## 2020-02-19 RX ADMIN — Medication 10 ML: at 08:38

## 2020-02-19 RX ADMIN — ASPIRIN 81 MG: 81 TABLET, COATED ORAL at 08:37

## 2020-02-19 RX ADMIN — HYDRALAZINE HYDROCHLORIDE 50 MG: 50 TABLET, FILM COATED ORAL at 08:37

## 2020-02-19 RX ADMIN — HYDRALAZINE HYDROCHLORIDE 75 MG: 50 TABLET, FILM COATED ORAL at 18:32

## 2020-02-19 RX ADMIN — LABETALOL HYDROCHLORIDE 10 MG: 5 INJECTION INTRAVENOUS at 05:55

## 2020-02-19 RX ADMIN — Medication 10 ML: at 21:19

## 2020-02-19 RX ADMIN — ATORVASTATIN CALCIUM 10 MG: 10 TABLET, FILM COATED ORAL at 08:37

## 2020-02-19 RX ADMIN — HYDRALAZINE HYDROCHLORIDE 75 MG: 50 TABLET, FILM COATED ORAL at 15:08

## 2020-02-19 ASSESSMENT — ENCOUNTER SYMPTOMS
SHORTNESS OF BREATH: 0
NAUSEA: 0
COUGH: 0
CHEST TIGHTNESS: 0
CONSTIPATION: 0
VOMITING: 0
SORE THROAT: 0
ABDOMINAL PAIN: 0
DIARRHEA: 0
APNEA: 0

## 2020-02-19 ASSESSMENT — PAIN SCALES - GENERAL
PAINLEVEL_OUTOF10: 0

## 2020-02-19 NOTE — PROGRESS NOTES
Progress Note  PGY 1  Internal Medicine      Admit Date: 2/18/2020  Day: 2  Diet: DIET CARDIAC;    Chief Complaint: Palpitations    Interval history:   Patient has been asymptomatic overnight. He does have new ST changes on EKG this morning. Trops were <0.1, 0.04, 0.05, 0.03. Consult has been placed to cardiology. Patient's BP this morinng is in the 170s, continuing to monitor as he receives his morning medications. Denies chest pain, shortness of breath, palpitations, and abdominal pain. HPI:   Sharonda Meng is a 49year-old male with a PMHx of Hypertension, hyperlipidemia, right renal artery stenosis (s/p stent in 2018), grade II diastolic heart failure, Obstructive Sleep Apnea, and obesity presents with palpitations to [facility] ED. Patient also found to be in hypertensive urgencyy with SBP >200. Patient states that 8 days before admission he had similar palpitations that resolved on their own and he did not present to the hospital at that time. The day before admission he also endorses palpitations that were not sustained. On the day of admission he endorses sustained palpitations with dyspnea, diaphoresis, and some chest discomfort. Mr. Cassidy Class states that he has been compliant with his medications, checks his blood pressure regularly at home (has been in the 130-140s, which is his baseline), and has never had an episode like this before. The only change the patient has made recently is he stopped drinking soda, making improved dietary choices, and has lost 20 lbs in the past 1-2 months. He denies leg swelling, PND, and is able to lay flat at night. Does use a CPAP at night. Of note, Mr. Timm Severs has a history of renal artery stenosis and had a stent placed 2.5 years ago.     In the ED: Initial EKG showed A. Fib with a rate of 170. Repeat EKG showed sinus rhythm. Patient hypertensive to >200s. Placed briefly on Nicaripine drip. CTA performed showed no evidence of aneurysm or dissection.  a left perception normal.         Mood and Affect: Mood normal.         Speech: Speech normal.         Behavior: Behavior normal. Behavior is cooperative. Thought Content: Thought content normal.         Cognition and Memory: Cognition and memory normal.         Judgment: Judgment normal.         LABS:    CBC:   Recent Labs     02/18/20  1116 02/19/20  0459   WBC 9.6 8.1   HGB 16.6 15.1   HCT 49.7 45.2    183   MCV 83.7 85.1     Renal:    Recent Labs     02/18/20  1116 02/19/20  0459    140   K 3.7 4.4    101   CO2 22 26   BUN 14 15   CREATININE 0.9 0.9   GLUCOSE 142* 115*   CALCIUM 9.7 9.1   ANIONGAP 17* 13     Hepatic:   Recent Labs     02/18/20  1116   AST 30   ALT 24   BILITOT 1.2*   PROT 7.6   LABALBU 4.4   ALKPHOS 98     Troponin:   Recent Labs     02/18/20  1751 02/18/20  2321 02/19/20  0459   TROPONINI 0.04* 0.05* 0.03*     BNP: No results for input(s): BNP in the last 72 hours. Lipids: No results for input(s): CHOL, HDL in the last 72 hours. Invalid input(s): LDLCALCU, TRIGLYCERIDE  ABGs:  No results for input(s): PHART, HJP9FHI, PO2ART, WYP8UXH, BEART, THGBART, J2IJNKFE, COT3TPQ in the last 72 hours. INR: No results for input(s): INR in the last 72 hours. Lactate: No results for input(s): LACTATE in the last 72 hours. Cultures:  -----------------------------------------------------------------  RAD:   CTA CHEST ABDOMEN PELVIS W CONTRAST   Final Result      Scattered coronary calcification noted. No acute infiltrate or effusion. No evidence of aneurysm or dissection identified in the thoracic or abdominal aorta. Prior left renal artery stent appears grossly patent. Hepatic steatosis. Otherwise no acute abnormality identified. XR CHEST STANDARD (2 VW)   Final Result      No lobar consolidation      Ectasia versus aneurysm of the thoracic aorta.  If further evaluation of the aorta is required CT without and with contrast recommended

## 2020-02-19 NOTE — CONSULTS
[facility]  Cardiology Inpatient Consult Service                                                                                          Pt Name: Darryl Chauhan  Age: 49 y.o. Sex: male  : 1965  Location: [id number]    Referring Physician: Bret Lange MD      Reason for Consult:       Reason for Consultation/Chief Complaint: Hypertensive emergency, elevated troponin, new onset Afib,renal artery stent      HPI:      Darryl Chauhan is a 49 y.o. male with a past medical history of NAIDA, HTN, HLD, G2DD who presented to the hospital with substernal chest pressure, mod to severe intensity, rad to neck, assoc with palpitations, imporved once in the ER. On arrival noted to be hypertensive and in AF w/RVR. We are consulted for further management. Histories     Past Medical History:   has a past medical history of Carpal tunnel syndrome, Hypertension, Obesity, unspecified, and MARY (obstructive sleep apnea). Surgical History:   has a past surgical history that includes Nose surgery and Knee arthroscopy (Right). Social History:   reports that he has never smoked. He has never used smokeless tobacco. He reports current alcohol use. He reports that he does not use drugs. Family History:  No evidence for sudden cardiac death or premature CAD      Medications:       Home Medications  Were reviewed and are listed in nursing record. and/or listed below  Prior to Admission medications    Medication Sig Start Date End Date Taking?  Authorizing Provider   sertraline (ZOLOFT) 50 MG tablet Take 1 tablet by mouth daily 3/19/19  Yes Shawnee Chadwick MD   losartan (COZAAR) 100 MG tablet Take 1 tablet by mouth daily 3/19/19  Yes Shawnee Chadwick MD   hydrALAZINE (APRESOLINE) 50 MG tablet Take 1 tablet by mouth 4 times daily 3/19/19  Yes Shawnee Chadwick MD   nebivolol (BYSTOLIC) 5 MG tablet Take 1 tablet by mouth daily 3/19/19  Yes Shawnee Chadwick MD spironolactone (ALDACTONE) 25 MG tablet Take 1 tablet by mouth daily 3/19/19  Yes Alek Rendon MD   atorvastatin (LIPITOR) 10 MG tablet Take 1 tablet by mouth daily 3/19/19  Yes Alek Rendon MD   amLODIPine (NORVASC) 10 MG tablet TAKE 1 TABLET BY MOUTH NIGHTLY 3/19/19  Yes Alek Rendon MD   aspirin 81 MG EC tablet TAKE 1 TABLET BY MOUTH DAILY 9/17/18  Yes Alek Rendon MD   ibuprofen (ADVIL;MOTRIN) 600 MG tablet Take 1 tablet by mouth 3 times daily as needed for Pain 9/7/19   Mnidy Alberts MD   acetaminophen (TYLENOL) 500 MG tablet Take 2 tablets by mouth 3 times daily as needed for Pain 9/7/19   Mindy Alberts MD          Inpatient Medications:   hydrALAZINE  75 mg Oral 4x Daily    amLODIPine  10 mg Oral Daily    aspirin  81 mg Oral Daily    atorvastatin  10 mg Oral Daily    losartan  100 mg Oral Daily    nebivolol  5 mg Oral Daily    sertraline  50 mg Oral Daily    spironolactone  25 mg Oral Daily    sodium chloride flush  10 mL Intravenous 2 times per day    enoxaparin  40 mg Subcutaneous Nightly       IV drips:      PRN:  labetalol, nitroGLYCERIN, sodium chloride flush, ondansetron, polyethylene glycol, perflutren lipid microspheres    Allergy:     Lisinopril       Review of Systems:     All 12 point review of symptoms completed. Pertinent positives identified in the HPI, all other review of symptoms negative as below. CONSTITUTIONAL: Nounanticipated weight loss. No change in energy level, sleep pattern, or activity level. SKIN: No rash or pruritis. EYES: No visual changes or diplopia. No scleral icterus. ENT: No Headaches, hearing loss or vertigo. No mouth sores or sore throat. CARDIOVASCULAR: No chest pain/chest pressure/chest discomfort. Palpitations. RESPIRATORY: No cough or wheezing, no sputum production. No hematemesis. GASTROINTESTINAL: No N/V/D. No abdominal pain, appetite loss, blood in stools.   GENITOURINARY: No dysuria, trouble voiding, texture, turgor normal, no rashes or lesions   Pysch: Normal mood and affect   Neurologic: Normal gross motor and sensory exam.  Cranial nerves intact        Labs:     Recent Labs     02/18/20  1116 02/19/20  0459    140   K 3.7 4.4   BUN 14 15   CREATININE 0.9 0.9    101   CO2 22 26   GLUCOSE 142* 115*   CALCIUM 9.7 9.1     Recent Labs     02/18/20  1116 02/19/20  0459   WBC 9.6 8.1   HGB 16.6 15.1   HCT 49.7 45.2    183   MCV 83.7 85.1     No results for input(s): CHOLTOT, TRIG, HDL in the last 72 hours. Invalid input(s): LIPIDCOMM, CHOLHDL, VLDCHOL, LDL  No results for input(s): PTT, INR in the last 72 hours. Invalid input(s): PT  Recent Labs     02/18/20  1116 02/18/20  1751 02/18/20  2321 02/19/20  0459   TROPONINI 0.01 0.04* 0.05* 0.03*     No results for input(s): BNP in the last 72 hours. No results for input(s): TSH in the last 72 hours. No results for input(s): CHOL, HDL, LDLCALC, TRIG in the last 72 hours.]    Lab Results   Component Value Date    TROPONINI 0.03 (H) 02/19/2020         Imaging:     I personally reviewed imaging studies including CXR, Stress test, TTE/SAMINA. Last ECG (if available) - EKG:  I have reviewed EKG with the following interpretation     -Normal sinus rhythm. Possible Left atrial enlargement. Intra-ventricular conduction delay. Septal infarct, age undetermined. Marked ST abnormality, possible inferior subendocardial injury.   -Afib w/RVR    Telemetry: NSR  NSR    Last Stress (if available):    Last TTE/SAMINA(if available):    Last Cath (if available):    Last CMR  (if available):      Assessment / Plan:   Hypertensive urgency on essential hypertension  - SBP >200 on presentation  - Continue Amlodipine 10 mg daily  - Continue Hydralazine to 75 mg 4x daily  - Continue Losartan 100 mg daily  - Continue Labatolol 10 mg Q4hr PRN for SBP >160  - F/U Echocardiogram today    Elevated troponin with EKG changes  - Trops: <0.01, 0.04, 0.05, 0.03  - EKG  2/19/20 showed

## 2020-02-19 NOTE — PLAN OF CARE
Problem: Falls - Risk of:  Goal: Will remain free from falls  Description  Will remain free from falls  Outcome: Met This Shift   Pt scored as low fall risk and has steady gait. Problem: Cardiac:  Goal: Ability to maintain vital signs within normal range will improve  Description  Ability to maintain vital signs within normal range will improve  Outcome: Ongoing   BP elevated PRN medications given per MD order. Will continue to monitor.

## 2020-02-19 NOTE — CARE COORDINATION
Case Management Assessment           Initial Evaluation                Date / Time of Evaluation: 2/19/2020 10:02 AM                 Assessment Completed by: Renojohan Stevens    Patient Name: Suraj Roberson     YOB: 1965  Diagnosis: Hypertensive urgency [I16.0]     Date / Time: 2/18/2020 11:02 AM    Patient Admission Status: Inpatient    If patient is discharged prior to next notation, then this note serves as note for discharge by case management. Current PCP: Herlinda [place] MD  Clinic Patient: No    Chart Reviewed: Yes  Patient/ Family Interviewed: Yes    Initial assessment completed at bedside with: patient and son, Nolberto Chacon    Hospitalization in the last 30 days: No    Emergency Contacts:  Extended Emergency Contact Information  Primary Emergency Contact: [address] Phone: [phone]  Work Phone: [phone]  Relation: Parent  Secondary Emergency Contact: Shanice Banks  Address: [facility]           [id number]  [place] Phone: [phone]  Relation: Child    Advance Directives:   Code Status: Full [address]: No      Copy present: No     In paper Chart: No    Scanned into EMR No    Financial  Payor: Minus Blanks / Plan: BCBS - [place] PPO / Product Type: *No Product type* /     Pre-cert required for SNF: Yes    Pharmacy    [facility]  [address] [place] [address]  Phone: [phone] Fax: [phone] [facility] Danielle [address] [phone]  [address] [place] [address]  Phone: [phone] Fax: [phone]      Potential assistance Purchasing Medications: Potential Assistance Purchasing Medications: No  Does Patient want to participate in local refill/ meds to beds program?: No    Meds To Beds General Rules:  1. Can ONLY be done Monday- Friday between 8:30am-5pm  2. Prescription(s) must be in pharmacy by 3pm to be filled same day  3. Copy of patient's insurance/ prescription drug card and patient face sheet must be sent along with the prescription(s)  4. Cost of Rx cannot be added to hospital bill. If financial assistance is needed, please contact unit  or ;  or  CANNOT provide pharmacy voucher for patients co-pays  5. Patients can then  the prescription on their way out of the hospital at discharge, or pharmacy can deliver to the bedside if staff is available. (payment due at time of pick-up or delivery - cash, check, or card accepted)     Able to afford home medications/ co-pay costs: No    ADLS  Support Systems: Family Members    PT AM-PAC:   /24  OT AM-PAC:   /24    [place]: home with son  Steps: 2    Plans to RETURN to current housing: Yes  Barriers to RETURNING to current housing: none    Glenn Kaur 79  Currently ACTIVE with [address]: No  Home Care Agency: Not Applicable    Currently ACTIVE with [place] on Aging: No  Passport/ Waiver: No  Passport/ Waiver Services: Not Applicable          Durable Medical Equipment  DME Provider: na  Equipment: na    Home Oxygen and [address] prior to admission: No  [address]: Not Applicable  Other Respiratory Equipment: na    Dialysis  Active with HD/PD prior to admission: No  Nephrologist: na    HD Center:  Not Applicable    DISCHARGE PLAN:  Disposition: Home- No Services Needed    Transportation PLAN for discharge: family     Factors facilitating achievement of predicted outcomes: Pleasant    Barriers to discharge: none    Additional Case Management Notes: CM met with pt.       The Plan for Transition of Care is related to the following treatment goals of Hypertensive urgency [I16.0]    The Patient and/or patient representative Giovanni Cloud and his family were provided with a choice of provider and agrees with the discharge plan Not Indicated    Freedom of choice list was provided with basic dialogue that supports the patient's individualized plan of care/goals and shares the quality data associated with the providers.  Not Indicated      Care Transition patient: No    Patito Kirkland RN  [facility] [employer]  Case Management Department  Ph: [phone]

## 2020-02-20 PROBLEM — I16.1 HYPERTENSIVE EMERGENCY: Status: ACTIVE | Noted: 2020-02-20

## 2020-02-20 LAB
ANION GAP SERPL CALCULATED.3IONS-SCNC: 14 MMOL/L (ref 3–16)
BASOPHILS ABSOLUTE: 0 K/UL (ref 0–0.2)
BASOPHILS RELATIVE PERCENT: 0.4 %
BUN BLDV-MCNC: 15 MG/DL (ref 7–20)
CALCIUM SERPL-MCNC: 9.2 MG/DL (ref 8.3–10.6)
CHLORIDE BLD-SCNC: 101 MMOL/L (ref 99–110)
CO2: 24 MMOL/L (ref 21–32)
CREAT SERPL-MCNC: 0.8 MG/DL (ref 0.9–1.3)
EOSINOPHILS ABSOLUTE: 0.1 K/UL (ref 0–0.6)
EOSINOPHILS RELATIVE PERCENT: 1.6 %
GFR AFRICAN AMERICAN: >60
GFR NON-AFRICAN AMERICAN: >60
GLUCOSE BLD-MCNC: 114 MG/DL (ref 70–99)
HCT VFR BLD CALC: 45.3 % (ref 40.5–52.5)
HEMOGLOBIN: 15 G/DL (ref 13.5–17.5)
LEFT VENTRICULAR EJECTION FRACTION HIGH VALUE: 55 %
LEFT VENTRICULAR EJECTION FRACTION MODE: NORMAL
LV EF: 50 %
LV EF: 58 %
LVEF MODALITY: NORMAL
LYMPHOCYTES ABSOLUTE: 1.9 K/UL (ref 1–5.1)
LYMPHOCYTES RELATIVE PERCENT: 21.2 %
MAGNESIUM: 2 MG/DL (ref 1.8–2.4)
MCH RBC QN AUTO: 28.1 PG (ref 26–34)
MCHC RBC AUTO-ENTMCNC: 33.1 G/DL (ref 31–36)
MCV RBC AUTO: 84.8 FL (ref 80–100)
MONOCYTES ABSOLUTE: 0.6 K/UL (ref 0–1.3)
MONOCYTES RELATIVE PERCENT: 7.3 %
NEUTROPHILS ABSOLUTE: 6.2 K/UL (ref 1.7–7.7)
NEUTROPHILS RELATIVE PERCENT: 69.5 %
PDW BLD-RTO: 15 % (ref 12.4–15.4)
PLATELET # BLD: 200 K/UL (ref 135–450)
PMV BLD AUTO: 9.4 FL (ref 5–10.5)
POTASSIUM REFLEX MAGNESIUM: 3.5 MMOL/L (ref 3.5–5.1)
RBC # BLD: 5.35 M/UL (ref 4.2–5.9)
SODIUM BLD-SCNC: 139 MMOL/L (ref 136–145)
WBC # BLD: 8.9 K/UL (ref 4–11)

## 2020-02-20 PROCEDURE — C1887 CATHETER, GUIDING: HCPCS

## 2020-02-20 PROCEDURE — 80048 BASIC METABOLIC PNL TOTAL CA: CPT

## 2020-02-20 PROCEDURE — 2500000003 HC RX 250 WO HCPCS

## 2020-02-20 PROCEDURE — 6360000002 HC RX W HCPCS: Performed by: STUDENT IN AN ORGANIZED HEALTH CARE EDUCATION/TRAINING PROGRAM

## 2020-02-20 PROCEDURE — C1894 INTRO/SHEATH, NON-LASER: HCPCS

## 2020-02-20 PROCEDURE — 85025 COMPLETE CBC W/AUTO DIFF WBC: CPT

## 2020-02-20 PROCEDURE — 36252 INS CATH REN ART 1ST BILAT: CPT | Performed by: INTERNAL MEDICINE

## 2020-02-20 PROCEDURE — 36415 COLL VENOUS BLD VENIPUNCTURE: CPT

## 2020-02-20 PROCEDURE — 6360000004 HC RX CONTRAST MEDICATION: Performed by: INTERNAL MEDICINE

## 2020-02-20 PROCEDURE — 2060000000 HC ICU INTERMEDIATE R&B

## 2020-02-20 PROCEDURE — 2709999900 HC NON-CHARGEABLE SUPPLY

## 2020-02-20 PROCEDURE — 2580000003 HC RX 258: Performed by: INTERNAL MEDICINE

## 2020-02-20 PROCEDURE — 36252 INS CATH REN ART 1ST BILAT: CPT

## 2020-02-20 PROCEDURE — 93017 CV STRESS TEST TRACING ONLY: CPT

## 2020-02-20 PROCEDURE — C1725 CATH, TRANSLUMIN NON-LASER: HCPCS

## 2020-02-20 PROCEDURE — B2111ZZ FLUOROSCOPY OF MULTIPLE CORONARY ARTERIES USING LOW OSMOLAR CONTRAST: ICD-10-PCS | Performed by: INTERNAL MEDICINE

## 2020-02-20 PROCEDURE — 4A023N7 MEASUREMENT OF CARDIAC SAMPLING AND PRESSURE, LEFT HEART, PERCUTANEOUS APPROACH: ICD-10-PCS | Performed by: INTERNAL MEDICINE

## 2020-02-20 PROCEDURE — 99153 MOD SED SAME PHYS/QHP EA: CPT

## 2020-02-20 PROCEDURE — 93458 L HRT ARTERY/VENTRICLE ANGIO: CPT

## 2020-02-20 PROCEDURE — A9502 TC99M TETROFOSMIN: HCPCS | Performed by: INTERNAL MEDICINE

## 2020-02-20 PROCEDURE — 78451 HT MUSCLE IMAGE SPECT SING: CPT

## 2020-02-20 PROCEDURE — 93458 L HRT ARTERY/VENTRICLE ANGIO: CPT | Performed by: INTERNAL MEDICINE

## 2020-02-20 PROCEDURE — 78452 HT MUSCLE IMAGE SPECT MULT: CPT

## 2020-02-20 PROCEDURE — 2580000003 HC RX 258

## 2020-02-20 PROCEDURE — 6360000002 HC RX W HCPCS

## 2020-02-20 PROCEDURE — 99152 MOD SED SAME PHYS/QHP 5/>YRS: CPT

## 2020-02-20 PROCEDURE — C1769 GUIDE WIRE: HCPCS

## 2020-02-20 PROCEDURE — 83735 ASSAY OF MAGNESIUM: CPT

## 2020-02-20 PROCEDURE — B2151ZZ FLUOROSCOPY OF LEFT HEART USING LOW OSMOLAR CONTRAST: ICD-10-PCS | Performed by: INTERNAL MEDICINE

## 2020-02-20 PROCEDURE — 6370000000 HC RX 637 (ALT 250 FOR IP)

## 2020-02-20 PROCEDURE — 6370000000 HC RX 637 (ALT 250 FOR IP): Performed by: INTERNAL MEDICINE

## 2020-02-20 PROCEDURE — 3430000000 HC RX DIAGNOSTIC RADIOPHARMACEUTICAL: Performed by: INTERNAL MEDICINE

## 2020-02-20 RX ORDER — ACETAMINOPHEN 325 MG/1
650 TABLET ORAL EVERY 4 HOURS PRN
Status: DISCONTINUED | OUTPATIENT
Start: 2020-02-20 | End: 2020-02-21 | Stop reason: HOSPADM

## 2020-02-20 RX ORDER — SODIUM CHLORIDE 9 MG/ML
INJECTION, SOLUTION INTRAVENOUS CONTINUOUS
Status: ACTIVE | OUTPATIENT
Start: 2020-02-20 | End: 2020-02-20

## 2020-02-20 RX ORDER — SODIUM CHLORIDE 9 MG/ML
INJECTION, SOLUTION INTRAVENOUS CONTINUOUS
Status: DISCONTINUED | OUTPATIENT
Start: 2020-02-20 | End: 2020-02-21

## 2020-02-20 RX ORDER — HYDRALAZINE HYDROCHLORIDE 100 MG/1
100 TABLET, FILM COATED ORAL 4 TIMES DAILY
Status: DISCONTINUED | OUTPATIENT
Start: 2020-02-20 | End: 2020-02-21

## 2020-02-20 RX ORDER — SODIUM CHLORIDE 0.9 % (FLUSH) 0.9 %
10 SYRINGE (ML) INJECTION EVERY 12 HOURS SCHEDULED
Status: DISCONTINUED | OUTPATIENT
Start: 2020-02-20 | End: 2020-02-20 | Stop reason: SDUPTHER

## 2020-02-20 RX ORDER — SODIUM CHLORIDE 0.9 % (FLUSH) 0.9 %
10 SYRINGE (ML) INJECTION PRN
Status: DISCONTINUED | OUTPATIENT
Start: 2020-02-20 | End: 2020-02-20 | Stop reason: SDUPTHER

## 2020-02-20 RX ADMIN — HYDRALAZINE HYDROCHLORIDE 100 MG: 100 TABLET, FILM COATED ORAL at 13:08

## 2020-02-20 RX ADMIN — SODIUM CHLORIDE: 9 INJECTION, SOLUTION INTRAVENOUS at 13:15

## 2020-02-20 RX ADMIN — IOVERSOL 200 ML: 741 INJECTION INTRA-ARTERIAL; INTRAVENOUS at 14:36

## 2020-02-20 RX ADMIN — SERTRALINE HYDROCHLORIDE 50 MG: 50 TABLET ORAL at 08:27

## 2020-02-20 RX ADMIN — ASPIRIN 81 MG: 81 TABLET, COATED ORAL at 08:27

## 2020-02-20 RX ADMIN — POTASSIUM BICARBONATE 40 MEQ: 782 TABLET, EFFERVESCENT ORAL at 13:01

## 2020-02-20 RX ADMIN — AMLODIPINE BESYLATE 10 MG: 10 TABLET ORAL at 08:27

## 2020-02-20 RX ADMIN — Medication 10 ML: at 10:25

## 2020-02-20 RX ADMIN — NEBIVOLOL HYDROCHLORIDE 5 MG: 5 TABLET ORAL at 13:08

## 2020-02-20 RX ADMIN — SODIUM CHLORIDE: 9 INJECTION, SOLUTION INTRAVENOUS at 15:21

## 2020-02-20 RX ADMIN — LOSARTAN POTASSIUM 100 MG: 100 TABLET ORAL at 08:27

## 2020-02-20 RX ADMIN — ACETAMINOPHEN 650 MG: 325 TABLET ORAL at 16:01

## 2020-02-20 RX ADMIN — REGADENOSON 0.4 MG: 0.08 INJECTION, SOLUTION INTRAVENOUS at 10:25

## 2020-02-20 RX ADMIN — Medication 10 ML: at 08:32

## 2020-02-20 RX ADMIN — HYDRALAZINE HYDROCHLORIDE 100 MG: 100 TABLET, FILM COATED ORAL at 16:44

## 2020-02-20 RX ADMIN — HYDRALAZINE HYDROCHLORIDE 100 MG: 100 TABLET, FILM COATED ORAL at 22:05

## 2020-02-20 RX ADMIN — Medication 10 ML: at 22:06

## 2020-02-20 RX ADMIN — HYDRALAZINE HYDROCHLORIDE 75 MG: 50 TABLET, FILM COATED ORAL at 08:27

## 2020-02-20 RX ADMIN — TETROFOSMIN 30 MILLICURIE: 1.38 INJECTION, POWDER, LYOPHILIZED, FOR SOLUTION INTRAVENOUS at 10:25

## 2020-02-20 RX ADMIN — ATORVASTATIN CALCIUM 10 MG: 10 TABLET, FILM COATED ORAL at 08:31

## 2020-02-20 ASSESSMENT — PAIN SCALES - GENERAL
PAINLEVEL_OUTOF10: 0
PAINLEVEL_OUTOF10: 2

## 2020-02-20 ASSESSMENT — ENCOUNTER SYMPTOMS
SORE THROAT: 0
VOMITING: 0
CONSTIPATION: 0
NAUSEA: 0
ABDOMINAL PAIN: 0
CHEST TIGHTNESS: 0
DIARRHEA: 0
SHORTNESS OF BREATH: 0
APNEA: 0
COUGH: 0

## 2020-02-20 NOTE — PLAN OF CARE
Problem: Falls - Risk of:  Goal: Will remain free from falls  Outcome: Ongoing  Note:   Remains free from falls and accidental injury. Assessed as low fall risk, all precautions in place, utilizing call light appropriately for needs. Will monitor. Problem: Cardiac:  Goal: Ability to maintain vital signs within normal range will improve  Outcome: Ongoing  Note:   Vitals have been stable this shift, BP controlled under 160. Patient denies pain or SOB.

## 2020-02-20 NOTE — PLAN OF CARE
Problem: Falls - Risk of:  Goal: Will remain free from falls  Description  Will remain free from falls  2/20/2020 0905 by Allie Herman RN  Note:   Pt is a Low Fall Risk. See Pete Pugh Fall Risk Score. Pt bed in low position, bed wheels locked, non-skid socks in use, and 2/4 side rails up. Pt up ad albert with steady gait. Pt denies dizziness, SOB, or pain upon ambulation. Call light and belongings in reach and pt encouraged to call for assistance. Will continue with hourly rounds for PO intake, pain needs, toileting, and repositioning as needed. No needs expressed at this time. Problem: Cardiac:  Goal: Ability to maintain vital signs within normal range will improve  Description  Ability to maintain vital signs within normal range will improve  2/20/2020 0905 by Allie Herman RN  Note:   Vitals signs stable (see doc flow sheets). NSR on telemetry. Pt denies dizziness, SOB, or pain at this time. Pt is NPO for stress test today. Will continue to monitor and report changes in condition to physician.

## 2020-02-20 NOTE — PROGRESS NOTES
Pt returned to room from cath lab.  Electronically signed by Angela Arzate RN on 2/20/2020 at 3:00 PM

## 2020-02-20 NOTE — ANESTHESIA PRE-OP
Brief Pre-Op Note/Sedation Assessment      Luisana Swanson  1965  [id number]  [id number]  2:59 PM    Planned Procedure: Cardiac Catheterization Procedure    Post Procedure Plan: Return to same level of care    Consent: I have discussed with the patient and/or the patient representative the indication, alternatives, and the possible risks and/or complications of the planned procedure and the anesthesia methods. The patient and/or patient representative appear to understand and agree to proceed. Chief Complaint: Chest Pain/Pressure  Dyspnea on Exertion  Dyspnea      Indications for the Procedure:   CAD Presentation:  Suspected CAD and Other  Anginal Classification within 2 weeks:  CCS I - Angina only during strenuous or prolonged physical activity  NYHA Heart Failure Class within 2 weeks: No symptoms      Anti- Anginal Meds within 2 weeks:   ANTI-ANGINAL MEDS: Yes: Beta Blockers      Stress or Imaging Studies Performed:  None    Vital Signs:  BP (!) (P) 142/65   Pulse (P) 80   Temp 98 °F (36.7 °C) (Oral)   Resp (P) 20   Ht 5' 8\" (1.727 m)   Wt (!) 309 lb 6.4 oz (140.3 kg)   SpO2 98%   BMI 47.04 kg/m²     Allergies:   Allergies   Allergen Reactions    Lisinopril      cough       Past Medical History:  Past Medical History:   Diagnosis Date    Carpal tunnel syndrome     Hypertension     Obesity, unspecified     MARY (obstructive sleep apnea)          Surgical History:  Past Surgical History:   Procedure Laterality Date    KNEE ARTHROSCOPY Right     NOSE SURGERY      Repair from Fx         Medications:  Current Facility-Administered Medications   Medication Dose Route Frequency Provider Last Rate Last Dose    hydrALAZINE (APRESOLINE) tablet 100 mg  100 mg Oral 4x Daily iVkki Pacheco MD   100 mg at 02/20/20 1308    0.9 % sodium chloride infusion   Intravenous Continuous Lance Barlow MD   Stopped at 02/20/20 1436    labetalol (NORMODYNE;TRANDATE) injection 10 mg  10 mg Intravenous

## 2020-02-20 NOTE — PROCEDURES
The [address]                                                LEFT HEART CATH    Latanya Gao   49 y.o., male  1965 2/20/2020      Procedure  Selective Coronary Angiography  Cardiac Catheterization for Coronary Anatomy  Left Heart Catheterization  Left Ventriculogram  Selective renal angiogram  Arterial Access Right Radial Artery after a negative Christo test  TR Band    Indication:Cardiac cath to rule out ischemic CAD, Possible angioplasty, The procedure and risks described to patient including risk of CVA, MI, bleeding, emergency surgery, death, previuos stent to tne left renal artery, Consent signed or positive stress test  Unspecified Angina  Anesthesia: Moderate sedation with Versed and Fentanyl IV  Estimated blood loss :minimal  Abnormal Stress Test      Procedure:  After written informed consent was obtained, the patient was   brought to the cardiac catheterization suite, where patient was prepped and   draped in the usual sterile fashion. Local anesthesia was achieved in the   right wrist with 2% lidocaine. A 5-Iranian hemostasis sheath was placed into   the right radial artery. The pre cocktail of heparin, verapamil and nitroglycerin was injected into the sheath. A 5-Iranian Kyle catheter was introduced; used to engage the left main   coronary artery. Radiographic  images were obtained. The catheter was pulled back then rotated. The Kyle catheter was introduced  to engage the right coronary   artery. Radiographic  images were obtained. The catheter was removed and exchanged over an exchange length 0.35 soft guide wire. A 5-Iranian angled pigtail catheter was then positioned in the left ventricle. Left ventriculogram was performed.  After

## 2020-02-20 NOTE — PROGRESS NOTES
difficulty urinating. Musculoskeletal: Negative for arthralgias and myalgias. Neurological: Negative for weakness and headaches. Psychiatric/Behavioral: Negative for confusion and sleep disturbance. All other systems reviewed and are negative. Physical Exam  Vitals signs reviewed. Constitutional:       General: He is not in acute distress. Appearance: He is well-developed and well-groomed. He is obese. HENT:      Head: Normocephalic and atraumatic. Mouth/Throat:      Mouth: Mucous membranes are moist.      Pharynx: Oropharynx is clear. Eyes:      General: No scleral icterus. Extraocular Movements: Extraocular movements intact. Conjunctiva/sclera: Conjunctivae normal.      Pupils: Pupils are equal, round, and reactive to light. Neck:      Musculoskeletal: Full passive range of motion without pain, normal range of motion and neck supple. Cardiovascular:      Rate and Rhythm: Normal rate and regular rhythm. Pulses: Normal pulses. Heart sounds: Normal heart sounds, S1 normal and S2 normal. No murmur. Pulmonary:      Effort: Pulmonary effort is normal. No respiratory distress. Breath sounds: Normal breath sounds and air entry. Abdominal:      General: Abdomen is flat. Bowel sounds are normal.      Palpations: Abdomen is soft. Tenderness: There is no abdominal tenderness. Musculoskeletal: Normal range of motion. Skin:     General: Skin is warm and dry. Neurological:      General: No focal deficit present. Mental Status: He is alert and oriented to person, place, and time. Cranial Nerves: Cranial nerves are intact. Sensory: Sensation is intact. Motor: Motor function is intact. Coordination: Coordination is intact. Gait: Gait is intact. Deep Tendon Reflexes: Reflexes are normal and symmetric.    Psychiatric:         Attention and Perception: Attention and perception normal.         Mood and Affect: Mood normal. Speech: Speech normal.         Behavior: Behavior normal. Behavior is cooperative. Thought Content: Thought content normal.         Cognition and Memory: Cognition and memory normal.         Judgment: Judgment normal.         LABS:    CBC:   Recent Labs     02/18/20  1116 02/19/20  0459 02/20/20  0459   WBC 9.6 8.1 8.9   HGB 16.6 15.1 15.0   HCT 49.7 45.2 45.3    183 200   MCV 83.7 85.1 84.8     Renal:    Recent Labs     02/18/20  1116 02/19/20  0459 02/20/20 0459    140 139   K 3.7 4.4 3.5    101 101   CO2 22 26 24   BUN 14 15 15   CREATININE 0.9 0.9 0.8*   GLUCOSE 142* 115* 114*   CALCIUM 9.7 9.1 9.2   MG  --   --  2.00   ANIONGAP 17* 13 14     Hepatic:   Recent Labs     02/18/20  1116   AST 30   ALT 24   BILITOT 1.2*   PROT 7.6   LABALBU 4.4   ALKPHOS 98     Troponin:   Recent Labs     02/18/20  1751 02/18/20  2321 02/19/20 0459   TROPONINI 0.04* 0.05* 0.03*     BNP: No results for input(s): BNP in the last 72 hours. Lipids: No results for input(s): CHOL, HDL in the last 72 hours. Invalid input(s): LDLCALCU, TRIGLYCERIDE  ABGs:  No results for input(s): PHART, AIT1THT, PO2ART, SLC6FOI, BEART, THGBART, Z6UOACVV, NBM3BWT in the last 72 hours. INR: No results for input(s): INR in the last 72 hours. Lactate: No results for input(s): LACTATE in the last 72 hours. Cultures:  -----------------------------------------------------------------  RAD:   CTA CHEST ABDOMEN PELVIS W CONTRAST   Final Result      Scattered coronary calcification noted. No acute infiltrate or effusion. No evidence of aneurysm or dissection identified in the thoracic or abdominal aorta. Prior left renal artery stent appears grossly patent. Hepatic steatosis. Otherwise no acute abnormality identified. XR CHEST STANDARD (2 VW)   Final Result      No lobar consolidation      Ectasia versus aneurysm of the thoracic aorta.  If further evaluation of the aorta is required CT without and with contrast recommended      NM Cardiac Stress Test Nuclear Imaging    (Results Pending)       Assessment/Plan:   Mr. Aldo Cortes is a 49year-old male with a PMHx of Hypertension, hyperlipidemia, right renal artery stenosis (s/p stent in 2018), grade II diastolic heart failure, Obstructive Sleep Apnea, and obesity presents with palpitations.     Hypertensive urgency, in setting of Essential Hypertension, R/O ACS  Patient compliant with his medications. Regularly checks his BP and it is in the 130-140s. Had BP elevated to >200 at presentation. Trops: <0.01, 0.04, 0.05, 0.03. EKG  2/19/20 showed some new ST changes. CT reviewed with radiology, Renal arteries appear patent w/o visible stenosis. · Amlodipine 10 mg daily  · Increase Hydralazine to 100 mg 4x daily  · Losartan 100 mg daily  · Labatolol 10 mg Q4hr PRN for SBP >160  · Consult Placed to Cardiology:  · Stress test 2/20/20     New onset Atrial Fibrillation  Patient converted back to sinus rhythm. Initial Troponin in the ED <0.01. TSH 2.8  · Trend Troponin ( 0.01->0.04-> 0.05 ->0.03)  · Monitor on telemetry  · Consult Placed to Cardiology:  · Stress test 2/20/20  · Recommend AC with Eliquis at D/C     Grade II Diastolic Heart Failure  Echo (2/20/20): LV normal size, mild-mod LVH, EF 55-60%, no regional wall motion abnormalities, grade II diastolic dysfunction. ECHO (10/26/18): Mild-Mod LVH, EF 60-65%. No regional wall motion abnormalities, grade II diastolic dysfunction. Pro-  · Aldactone 25 mg  · Nebivolol 5 mg daily     Hyperlipidemia  · Lipitor 10     Left renal artery stenosis (s/p stent in 2018)  CTA done 2/18/20 showed a left sided renal artery stent in place. Renal arteries appear patent.   · Consider further workup for cause of hypertensive urgency      Depression  · Zoloft     Obstructive Sleep Apnea  · CPAP at night     Obesity  · Patient BMI >60, complicated care and contributing to comorbid conditions     Code Status: Full  FEN: Cardiac  PPX: Lovenox  DISPO: General Medical Floor    This patient has been staffed and discussed with Preston Russell MD.   -----------------------------  Vikki Pacheco MD, PGY-1  2/20/2020  8:57 AM

## 2020-02-21 VITALS
BODY MASS INDEX: 46.89 KG/M2 | HEIGHT: 68 IN | SYSTOLIC BLOOD PRESSURE: 150 MMHG | HEART RATE: 65 BPM | RESPIRATION RATE: 20 BRPM | OXYGEN SATURATION: 96 % | TEMPERATURE: 97.8 F | DIASTOLIC BLOOD PRESSURE: 75 MMHG | WEIGHT: 309.4 LBS

## 2020-02-21 LAB
ANION GAP SERPL CALCULATED.3IONS-SCNC: 14 MMOL/L (ref 3–16)
BASOPHILS ABSOLUTE: 0 K/UL (ref 0–0.2)
BASOPHILS RELATIVE PERCENT: 0.4 %
BUN BLDV-MCNC: 18 MG/DL (ref 7–20)
CALCIUM SERPL-MCNC: 9.4 MG/DL (ref 8.3–10.6)
CHLORIDE BLD-SCNC: 100 MMOL/L (ref 99–110)
CO2: 27 MMOL/L (ref 21–32)
CREAT SERPL-MCNC: 1 MG/DL (ref 0.9–1.3)
EOSINOPHILS ABSOLUTE: 0.1 K/UL (ref 0–0.6)
EOSINOPHILS RELATIVE PERCENT: 1 %
GFR AFRICAN AMERICAN: >60
GFR NON-AFRICAN AMERICAN: >60
GLUCOSE BLD-MCNC: 110 MG/DL (ref 70–99)
HCT VFR BLD CALC: 44.9 % (ref 40.5–52.5)
HEMOGLOBIN: 14.9 G/DL (ref 13.5–17.5)
LYMPHOCYTES ABSOLUTE: 2.1 K/UL (ref 1–5.1)
LYMPHOCYTES RELATIVE PERCENT: 21.4 %
MCH RBC QN AUTO: 28.4 PG (ref 26–34)
MCHC RBC AUTO-ENTMCNC: 33.3 G/DL (ref 31–36)
MCV RBC AUTO: 85.3 FL (ref 80–100)
MONOCYTES ABSOLUTE: 0.8 K/UL (ref 0–1.3)
MONOCYTES RELATIVE PERCENT: 7.9 %
NEUTROPHILS ABSOLUTE: 6.7 K/UL (ref 1.7–7.7)
NEUTROPHILS RELATIVE PERCENT: 69.3 %
PDW BLD-RTO: 15.7 % (ref 12.4–15.4)
PLATELET # BLD: 198 K/UL (ref 135–450)
PMV BLD AUTO: 9.7 FL (ref 5–10.5)
POTASSIUM REFLEX MAGNESIUM: 4.2 MMOL/L (ref 3.5–5.1)
RBC # BLD: 5.26 M/UL (ref 4.2–5.9)
SODIUM BLD-SCNC: 141 MMOL/L (ref 136–145)
WBC # BLD: 9.7 K/UL (ref 4–11)

## 2020-02-21 PROCEDURE — 85025 COMPLETE CBC W/AUTO DIFF WBC: CPT

## 2020-02-21 PROCEDURE — 6370000000 HC RX 637 (ALT 250 FOR IP)

## 2020-02-21 PROCEDURE — 80048 BASIC METABOLIC PNL TOTAL CA: CPT

## 2020-02-21 PROCEDURE — 6370000000 HC RX 637 (ALT 250 FOR IP): Performed by: STUDENT IN AN ORGANIZED HEALTH CARE EDUCATION/TRAINING PROGRAM

## 2020-02-21 PROCEDURE — 6370000000 HC RX 637 (ALT 250 FOR IP): Performed by: INTERNAL MEDICINE

## 2020-02-21 PROCEDURE — 36415 COLL VENOUS BLD VENIPUNCTURE: CPT

## 2020-02-21 PROCEDURE — 2580000003 HC RX 258

## 2020-02-21 RX ORDER — HYDRALAZINE HYDROCHLORIDE 25 MG/1
75 TABLET, FILM COATED ORAL 4 TIMES DAILY
Qty: 360 TABLET | Refills: 3 | Status: SHIPPED | OUTPATIENT
Start: 2020-02-21 | End: 2021-01-12 | Stop reason: SDUPTHER

## 2020-02-21 RX ORDER — HYDRALAZINE HYDROCHLORIDE 25 MG/1
75 TABLET, FILM COATED ORAL 4 TIMES DAILY
Qty: 90 TABLET | Refills: 3 | Status: SHIPPED | OUTPATIENT
Start: 2020-02-21 | End: 2020-02-21

## 2020-02-21 RX ADMIN — SERTRALINE HYDROCHLORIDE 50 MG: 50 TABLET ORAL at 07:46

## 2020-02-21 RX ADMIN — NEBIVOLOL HYDROCHLORIDE 5 MG: 5 TABLET ORAL at 07:46

## 2020-02-21 RX ADMIN — AMLODIPINE BESYLATE 10 MG: 10 TABLET ORAL at 07:46

## 2020-02-21 RX ADMIN — SPIRONOLACTONE 25 MG: 25 TABLET ORAL at 07:46

## 2020-02-21 RX ADMIN — LOSARTAN POTASSIUM 100 MG: 100 TABLET ORAL at 07:46

## 2020-02-21 RX ADMIN — HYDRALAZINE HYDROCHLORIDE 75 MG: 50 TABLET, FILM COATED ORAL at 13:43

## 2020-02-21 RX ADMIN — HYDRALAZINE HYDROCHLORIDE 100 MG: 100 TABLET, FILM COATED ORAL at 07:46

## 2020-02-21 RX ADMIN — APIXABAN 5 MG: 5 TABLET, FILM COATED ORAL at 07:46

## 2020-02-21 RX ADMIN — ATORVASTATIN CALCIUM 10 MG: 10 TABLET, FILM COATED ORAL at 07:46

## 2020-02-21 RX ADMIN — ASPIRIN 81 MG: 81 TABLET, COATED ORAL at 07:46

## 2020-02-21 RX ADMIN — Medication 10 ML: at 07:45

## 2020-02-21 RX ADMIN — HYDRALAZINE HYDROCHLORIDE 75 MG: 50 TABLET, FILM COATED ORAL at 16:40

## 2020-02-21 ASSESSMENT — PAIN SCALES - GENERAL
PAINLEVEL_OUTOF10: 0

## 2020-02-21 ASSESSMENT — ENCOUNTER SYMPTOMS
ABDOMINAL PAIN: 0
CHEST TIGHTNESS: 0
APNEA: 0
CONSTIPATION: 0
SHORTNESS OF BREATH: 0
SORE THROAT: 0
DIARRHEA: 0
VOMITING: 0
NAUSEA: 0
COUGH: 0

## 2020-02-21 NOTE — CARE COORDINATION
[address] Transitions Interview     2020    Patient: Kristi Escalona Patient : 1965   MRN: [id number]   Reason for Admission: HTN Urgency   RARS: Readmission Risk Score: 7         Spoke with: Kristi Escalona        Readmission Risk  Patient Active Problem List   Diagnosis    MARY (obstructive sleep apnea)    Hypertension, poor control    Obesity    Depression    SEVILLA (dyspnea on exertion)    Lumbar strain    Paresthesia    Mixed hyperlipidemia    (HFpEF) heart failure with preserved ejection fraction ([facility] [address]    Hypertensive urgency    Hypertensive emergency       Inpatient Assessment  Care Transitions Summary    Care Transitions Inpatient Review  Medication Review  Are you able to afford your medications?:  Yes  How often do you have difficulty taking your medications?:  I always take them as prescribed. Housing Review  Who do you live with?:  Child  Are you an active caregiver in your home?:  No  Social Support  Do you have a ?:  No  Do you have a [address] [place] [address]?:  No  Durable Medical Equipment  Functional Review  Ability to seek help/take action for Emergent/Urgent situations i.e. fire, crime, inclement weather or health crisis. :  Independent  Ability handle personal hygiene needs (bathing/dressing/grooming): Independent  Ability to manage medications: Independent  Ability to prepare food:  Independent  Ability to maintain home (clean home, laundry): Independent  Ability to drive and/or has transportation:  Independent  Ability to do shopping:  Independent  Ability to manage finances: Independent  Is patient able to live independently?:  Yes  Hearing and Vision  Visual Impairment:  None  Hearing Impairment:  None  Care Transitions Interventions  No Identified Needs                               Summary  CTN spoke with patient this afternoon for initial face to face interview.   Patient lives in home with adult Son, states he is normally very

## 2020-02-21 NOTE — DISCHARGE SUMMARY
daily as needed for Pain     losartan 100 MG tablet  Commonly known as:  Cozaar  Take 1 tablet by mouth daily     nebivolol 5 MG tablet  Commonly known as:  Bystolic  Take 1 tablet by mouth daily     sertraline 50 MG tablet  Commonly known as:  ZOLOFT  Take 1 tablet by mouth daily     spironolactone 25 MG tablet  Commonly known as:  ALDACTONE  Take 1 tablet by mouth daily           Where to Get Your Medications      These medications were sent to [address] [place] [address] [address] - P [phone] - F [phone]  [facility] [address]    Hours:  24-hours Phone:  [phone]   · apixaban 5 MG Tabs tablet  · hydrALAZINE 25 MG tablet       Activity: activity as tolerated  Diet: cardiac diet and diabetic diet  Wound Care: none needed    Time Spent on discharge is more than 30 minutes    Signed:  Yan Shen MD   2/21/2020

## 2020-02-21 NOTE — PROGRESS NOTES
Progress Note  PGY 1  Internal Medicine      Admit Date: 2/18/2020  Day: 2  Diet: Diet NPO Time Specified    Chief Complaint: Palpitations    Interval history:   Patient had a high risk stress test result yesterday, referred to cath. Angiography showed normal coronary arteries. Renal arteries also examined and patent with non obstructive stent. BP overnight in the 140-150's, elevated to the 190s this morning. Denies chest pain, shortness of breath, palpitations, and abdominal pain. HPI:   Aldo Cortes is a 49year-old male with a PMHx of Hypertension, hyperlipidemia, right renal artery stenosis (s/p stent in 2018), grade II diastolic heart failure, Obstructive Sleep Apnea, and obesity presents with palpitations to [address] [place] [address] ED. Patient also found to be in hypertensive urgencyy with SBP >200. Patient states that 8 days before admission he had similar palpitations that resolved on their own and he did not present to the hospital at that time. The day before admission he also endorses palpitations that were not sustained. On the day of admission he endorses sustained palpitations with dyspnea, diaphoresis, and some chest discomfort. Mr. Cameron Monahan states that he has been compliant with his medications, checks his blood pressure regularly at home (has been in the 130-140s, which is his baseline), and has never had an episode like this before. The only change the patient has made recently is he stopped drinking soda, making improved dietary choices, and has lost 20 lbs in the past 1-2 months. He denies leg swelling, PND, and is able to lay flat at night. Does use a CPAP at night. Of note, Mr. Sierra Gutierrez has a history of renal artery stenosis and had a stent placed 2.5 years ago.     In the ED: Initial EKG showed A. Fib with a rate of 170. Repeat EKG showed sinus rhythm. Patient hypertensive to >200s. Placed briefly on Nicaripine drip. CTA performed showed no evidence of aneurysm or dissection.  a left sided renal artery sten in place. Renal arteries appear patent. Initial Trop <0.01. Patient then transferred to [facility]. for further monitor and evaluation. On arrival to [facility] patient remains hypertensive, in sinus rhythm, and denies chest pain, shortness of breath, further palpitations, and abdominal pain. Medications:     Scheduled Meds:   apixaban  5 mg Oral BID    hydrALAZINE  100 mg Oral 4x Daily    amLODIPine  10 mg Oral Daily    aspirin  81 mg Oral Daily    atorvastatin  10 mg Oral Daily    losartan  100 mg Oral Daily    nebivolol  5 mg Oral Daily    sertraline  50 mg Oral Daily    spironolactone  25 mg Oral Daily    sodium chloride flush  10 mL Intravenous 2 times per day     Continuous Infusions:   sodium chloride Stopped (02/20/20 1436)     PRN Meds:acetaminophen, labetalol, nitroGLYCERIN, sodium chloride flush, ondansetron, polyethylene glycol    Objective:   Vitals:   T-max:  Patient Vitals for the past 8 hrs:   BP Temp Temp src Pulse Resp SpO2   02/21/20 0455 (!) 149/76 97.3 °F (36.3 °C) Oral 72 20 96 %   02/21/20 0020 (!) 151/75 97.9 °F (36.6 °C) Oral 81 20 97 %       Intake/Output Summary (Last 24 hours) at 2/21/2020 0716  Last data filed at 2/21/2020 0455  Gross per 24 hour   Intake 1564.9 ml   Output 1350 ml   Net 214.9 ml       Review of Systems   Constitutional: Negative for activity change, appetite change, chills, diaphoresis, fever and unexpected weight change. HENT: Negative for congestion and sore throat. Eyes: Negative for visual disturbance. Respiratory: Negative for apnea, cough, chest tightness and shortness of breath. Cardiovascular: Positive for palpitations (prior to admission). Negative for chest pain and leg swelling. Gastrointestinal: Negative for abdominal pain, constipation, diarrhea, nausea and vomiting. Endocrine: Negative for cold intolerance, heat intolerance, polydipsia, polyphagia and polyuria.    Genitourinary: Negative for difficulty Behavior is cooperative. Thought Content: Thought content normal.         Cognition and Memory: Cognition and memory normal.         Judgment: Judgment normal.         LABS:    CBC:   Recent Labs     02/19/20 0459 02/20/20 0459 02/21/20 0449   WBC 8.1 8.9 9.7   HGB 15.1 15.0 14.9   HCT 45.2 45.3 44.9    200 198   MCV 85.1 84.8 85.3     Renal:    Recent Labs     02/19/20 0459 02/20/20 0459 02/21/20 0449    139 141   K 4.4 3.5 4.2    101 100   CO2 26 24 27   BUN 15 15 18   CREATININE 0.9 0.8* 1.0   GLUCOSE 115* 114* 110*   CALCIUM 9.1 9.2 9.4   MG  --  2.00  --    ANIONGAP 13 14 14     Hepatic:   Recent Labs     02/18/20  1116   AST 30   ALT 24   BILITOT 1.2*   PROT 7.6   LABALBU 4.4   ALKPHOS 98     Troponin:   Recent Labs     02/18/20  1751 02/18/20  2321 02/19/20 0459   TROPONINI 0.04* 0.05* 0.03*     BNP: No results for input(s): BNP in the last 72 hours. Lipids: No results for input(s): CHOL, HDL in the last 72 hours. Invalid input(s): LDLCALCU, TRIGLYCERIDE  ABGs:  No results for input(s): PHART, REW2GFV, PO2ART, ZZT6HLS, BEART, THGBART, X5BTKWHQ, KDK4WCL in the last 72 hours. INR: No results for input(s): INR in the last 72 hours. Lactate: No results for input(s): LACTATE in the last 72 hours. Cultures:  -----------------------------------------------------------------  RAD:   NM Cardiac Stress Test Nuclear Imaging   Final Result      CTA CHEST ABDOMEN PELVIS W CONTRAST   Final Result      Scattered coronary calcification noted. No acute infiltrate or effusion. No evidence of aneurysm or dissection identified in the thoracic or abdominal aorta. Prior left renal artery stent appears grossly patent. Hepatic steatosis. Otherwise no acute abnormality identified. XR CHEST STANDARD (2 VW)   Final Result      No lobar consolidation      Ectasia versus aneurysm of the thoracic aorta.  If further evaluation of the aorta is required CT without and of hypertensive urgency      Depression  · Zoloft     Obstructive Sleep Apnea  · CPAP at night     Obesity  · Patient BMI >76, complicated care and contributing to comorbid conditions     Code Status: Full  FEN: Cardiac  PPX: Lovenox  DISPO: General Medical Floor    This patient has been staffed and discussed with Santo Reyes MD.   -----------------------------  Rufina Hayes MD, PGY-1  2/21/2020  7:16 AM

## 2020-02-21 NOTE — PLAN OF CARE
Problem: Cardiac:  Goal: Ability to maintain vital signs within normal range will improve  Description  Ability to maintain vital signs within normal range will improve  2/21/2020 1022 by Debra Dixon RN  Note:   NSR on telemetry with HR 66. BP elevated at 195/76 this AM prior to morning medication administration. BP after 118/61. Pt denies dizziness, SOB, or pain. R wrist angiogram site clean, dry, intact. Nephrology consult pending for hypertension management. Will continue to monitor and report changes in condition to physician. Problem: Falls - Risk of:  Goal: Will remain free from falls  Description  Will remain free from falls  2/21/2020 1022 by Debra Dixon RN  Note:   Pt is a Low Fall Risk. See [facility] Fall Risk Score. Pt bed in low position, bed wheels locked, non-skid socks in use, and 2/4 side rails up. Pt up ad albert with steady gait observed. Pt denies dizziness or SOB upon ambulation. Call light and belongings in reach and pt encouraged to call for assistance. Will continue with hourly rounds for PO intake, pain needs, toileting, and repositioning as needed. Problem: Pain:  Goal: Pain level will decrease  Description  Pain level will decrease  Note:   Pt denies pain at this time. Will continue to monitor.

## 2020-02-21 NOTE — CONSULTS
smoking  Works as a  says that he checks his blood pressure at home usually above 130 most of the time     Interval History:     Blood pressure is coming down    ROS:     Seen with-no family    positives in bold   Constitutional:  fever, chills, weakness, weight change, fatigue  Skin:  rash, pruritus, hair loss, bruising, dry skin, petechiae  Head, Face, Neck   headaches, swelling,  cervical adenopathy  Respiratory: shortness of breath, cough, or wheezing  Cardiovascular: chest pain, palpitations, dizzy, edema  Gastrointestinal: nausea, vomiting, diarrhea, constipation,belly pain    Yellow skin, blood in stool  Musculoskeletal:  back pain, muscle weakness, gait problems,       joint pain or swelling. Genitourinary:  dysuria, poor urine flow, flank pain, blood in urine  Neurologic:  vertigo, TIA'S, syncope, seizures, focal weakness  Psychosocial:  insomnia, anxiety, or depression. Additional positive findings: No complaints at this time                       All other remaining systems are negative or unable to obtain        PMH/PSH/SH/Family History:     Past Medical History:   Diagnosis Date    Carpal tunnel syndrome     Hypertension     Obesity, unspecified     MARY (obstructive sleep apnea)        Past Surgical History:   Procedure Laterality Date    KNEE ARTHROSCOPY Right     NOSE SURGERY      Repair from         reports that he has never smoked. He has never used smokeless tobacco. He reports current alcohol use. He reports that he does not use drugs. family history includes Diabetes in his mother; Stroke in his maternal uncle.          Medication:     Current Facility-Administered Medications: apixaban (ELIQUIS) tablet 5 mg, 5 mg, Oral, BID  hydrALAZINE (APRESOLINE) tablet 75 mg, 75 mg, Oral, 4x Daily  acetaminophen (TYLENOL) tablet 650 mg, 650 mg, Oral, Q4H PRN  labetalol (NORMODYNE;TRANDATE) injection 10 mg, 10 mg, Intravenous, Q4H PRN  nitroGLYCERIN (NITROSTAT) SL tablet 0.4 mg, 0.4 mg, Sublingual, Q5 Min PRN  amLODIPine (NORVASC) tablet 10 mg, 10 mg, Oral, Daily  aspirin EC tablet 81 mg, 81 mg, Oral, Daily  atorvastatin (LIPITOR) tablet 10 mg, 10 mg, Oral, Daily  losartan (COZAAR) tablet 100 mg, 100 mg, Oral, Daily  nebivolol (BYSTOLIC) tablet 5 mg, 5 mg, Oral, Daily  sertraline (ZOLOFT) tablet 50 mg, 50 mg, Oral, Daily  spironolactone (ALDACTONE) tablet 25 mg, 25 mg, Oral, Daily  sodium chloride flush 0.9 % injection 10 mL, 10 mL, Intravenous, 2 times per day  sodium chloride flush 0.9 % injection 10 mL, 10 mL, Intravenous, PRN  ondansetron (ZOFRAN) injection 4 mg, 4 mg, Intravenous, Q6H PRN  polyethylene glycol (GLYCOLAX) packet 17 g, 17 g, Oral, Daily PRN       Vitals :     Vitals:    02/21/20 1147   BP: 130/72   Pulse: 56   Resp: 20   Temp: 98 °F (36.7 °C)   SpO2: 98%       I & O :       Intake/Output Summary (Last 24 hours) at 2/21/2020 1311  Last data filed at 2/21/2020 1006  Gross per 24 hour   Intake 1994.9 ml   Output 1000 ml   Net 994.9 ml        Physical Examination :     General appearance: Anxious- no, distressed- no, in good spirits- yes  Morbidly obese  HEENT: Lips- normal, teeth- ok , oral mucosa- moist  Neck : Mass- no, appears symmetrical, JVD- not visible  Respiratory: Respiratory effort-  normal, wheeze- no, crackles - no  Cardiovascular:  Ausculation- No M/R/G, Edema no  Abdomen: visible mass- no, distention- no, scar- no, tenderness- no                            hepatosplenomegaly-  no  Musculoskeletal:  clubbing no,cyanosis- no , digital ischemia- no                           muscle strength- grossly normal , tone - grossly normal  Skin: rashes- no , ulcers- no, induration- no, tightening - no  Psychiatric:  Judgement and insight- normal           AAO X 3  Additional finding:      LABS:     Recent Labs     02/19/20  0459 02/20/20  0459 02/21/20  0449   WBC 8.1 8.9 9.7   HGB 15.1 15.0 14.9   HCT 45.2 45.3 44.9    200 198     Recent Labs     02/19/20  3048 02/20/20  0459 02/21/20  0449    139 141   K 4.4 3.5 4.2    101 100   CO2 26 24 27   BUN 15 15 18   CREATININE 0.9 0.8* 1.0   GLUCOSE 115* 114* 110*   MG  --  2.00  --

## 2020-02-22 ENCOUNTER — CARE COORDINATION (OUTPATIENT)
Dept: CASE MANAGEMENT | Age: 55
End: 2020-02-22

## 2020-02-22 NOTE — CARE COORDINATION
[facility] Transitions Initial Follow Up Call    Call within 2 business days of discharge: Yes    Patient: Luisana Swanson Patient : 1965   MRN: <[id number]>  Reason for Admission:   Discharge Date: 20 RARS: Readmission Risk Score: 8      Last Discharge [facility]       Complaint Diagnosis Description Type Department Provider    20 Chest Pain; Shortness of Breath Hypertensive emergency . .. ED to Hosp-Admission (Discharged) (ADMIT) [address] Preston Russell MD; Brigette Krause ... Spoke with: N/A    Facility: [facility]    Non-face-to-face services provided:  Reviewed encounter information for continuity of care prior to follow up phone call - chart notes, consults, progress notes, test results, med list, appointments, AVS, other information. Care Transitions 24 Hour Call    Do you have support at home?:  Child  Are you an active caregiver in your home?:  No  Care Transitions Interventions                                 Follow Up : Attempted to make contact with Colt Cormier for an initial follow up call post discharge from the hospital without success. Unable to leave a message regarding intent of call and call back information. Will try again my next business day. No future appointments.     Eric Carrera RN

## 2020-02-24 ENCOUNTER — CARE COORDINATION (OUTPATIENT)
Dept: CASE MANAGEMENT | Age: 55
End: 2020-02-24

## 2020-02-25 ENCOUNTER — CARE COORDINATION (OUTPATIENT)
Dept: CASE MANAGEMENT | Age: 55
End: 2020-02-25

## 2020-02-27 ENCOUNTER — OFFICE VISIT (OUTPATIENT)
Dept: INTERNAL MEDICINE CLINIC | Age: 55
End: 2020-02-27
Payer: COMMERCIAL

## 2020-02-27 VITALS
OXYGEN SATURATION: 97 % | SYSTOLIC BLOOD PRESSURE: 144 MMHG | HEART RATE: 69 BPM | WEIGHT: 309 LBS | BODY MASS INDEX: 46.98 KG/M2 | DIASTOLIC BLOOD PRESSURE: 90 MMHG

## 2020-02-27 PROBLEM — I48.91 ATRIAL FIBRILLATION (HCC): Status: ACTIVE | Noted: 2020-02-27

## 2020-02-27 PROBLEM — I16.1 HYPERTENSIVE EMERGENCY: Status: RESOLVED | Noted: 2020-02-18 | Resolved: 2020-02-27

## 2020-02-27 PROBLEM — I16.1 HYPERTENSIVE EMERGENCY: Status: ACTIVE | Noted: 2020-02-18

## 2020-02-27 PROCEDURE — 99215 OFFICE O/P EST HI 40 MIN: CPT | Performed by: NURSE PRACTITIONER

## 2020-02-27 PROCEDURE — 1111F DSCHRG MED/CURRENT MED MERGE: CPT | Performed by: NURSE PRACTITIONER

## 2020-02-27 ASSESSMENT — PATIENT HEALTH QUESTIONNAIRE - PHQ9
SUM OF ALL RESPONSES TO PHQ QUESTIONS 1-9: 0
SUM OF ALL RESPONSES TO PHQ QUESTIONS 1-9: 0
2. FEELING DOWN, DEPRESSED OR HOPELESS: 0
SUM OF ALL RESPONSES TO PHQ9 QUESTIONS 1 & 2: 0
1. LITTLE INTEREST OR PLEASURE IN DOING THINGS: 0

## 2020-02-27 ASSESSMENT — ENCOUNTER SYMPTOMS
ABDOMINAL PAIN: 0
SINUS PAIN: 0
BACK PAIN: 0
CONSTIPATION: 0
SHORTNESS OF BREATH: 0
SINUS PRESSURE: 0
COUGH: 0
WHEEZING: 0
COLOR CHANGE: 0
DIARRHEA: 0

## 2020-02-27 NOTE — ASSESSMENT & PLAN NOTE
Stable   No weight gain, SOB, orthopnea   Education provided on disease management   Encouraged weight loss

## 2020-03-02 ENCOUNTER — OFFICE VISIT (OUTPATIENT)
Dept: CARDIOLOGY CLINIC | Age: 55
End: 2020-03-02
Payer: COMMERCIAL

## 2020-03-02 VITALS
BODY MASS INDEX: 47.16 KG/M2 | OXYGEN SATURATION: 98 % | DIASTOLIC BLOOD PRESSURE: 100 MMHG | SYSTOLIC BLOOD PRESSURE: 142 MMHG | HEIGHT: 68 IN | HEART RATE: 64 BPM | WEIGHT: 311.2 LBS

## 2020-03-02 PROCEDURE — 99214 OFFICE O/P EST MOD 30 MIN: CPT | Performed by: INTERNAL MEDICINE

## 2020-03-02 RX ORDER — HYDRALAZINE HYDROCHLORIDE 100 MG/1
100 TABLET, FILM COATED ORAL 2 TIMES DAILY
Qty: 60 TABLET | Refills: 3 | Status: ON HOLD | OUTPATIENT
Start: 2020-03-02 | End: 2020-10-26

## 2020-03-02 RX ORDER — NEBIVOLOL 20 MG/1
20 TABLET ORAL DAILY
Qty: 30 TABLET | Refills: 5 | Status: SHIPPED | OUTPATIENT
Start: 2020-03-02 | End: 2020-08-26 | Stop reason: SDUPTHER

## 2020-03-10 ENCOUNTER — TELEPHONE (OUTPATIENT)
Dept: CARDIOLOGY CLINIC | Age: 55
End: 2020-03-10

## 2020-03-10 NOTE — TELEPHONE ENCOUNTER
Edmund Meeks from [facility] called to speak with 's nurse or MA about patient's care plan and compliance. Please advise. [phone] ext.  [phone]

## 2020-08-25 RX ORDER — SPIRONOLACTONE 25 MG/1
25 TABLET ORAL DAILY
Qty: 30 TABLET | Refills: 0 | Status: SHIPPED | OUTPATIENT
Start: 2020-08-25 | End: 2021-01-12 | Stop reason: SDUPTHER

## 2020-08-25 RX ORDER — LOSARTAN POTASSIUM 100 MG/1
100 TABLET ORAL DAILY
Qty: 30 TABLET | Refills: 0 | Status: SHIPPED | OUTPATIENT
Start: 2020-08-25 | End: 2021-01-12 | Stop reason: SDUPTHER

## 2020-08-25 RX ORDER — ATORVASTATIN CALCIUM 10 MG/1
10 TABLET, FILM COATED ORAL DAILY
Qty: 30 TABLET | Refills: 0 | Status: SHIPPED | OUTPATIENT
Start: 2020-08-25 | End: 2021-01-12 | Stop reason: SDUPTHER

## 2020-08-26 ENCOUNTER — TELEPHONE (OUTPATIENT)
Dept: INTERNAL MEDICINE CLINIC | Age: 55
End: 2020-08-26

## 2020-08-26 RX ORDER — NEBIVOLOL HYDROCHLORIDE 20 MG/1
20 TABLET ORAL DAILY
Qty: 30 TABLET | Refills: 5 | Status: ON HOLD | OUTPATIENT
Start: 2020-08-26 | End: 2020-10-27 | Stop reason: HOSPADM

## 2020-08-26 NOTE — TELEPHONE ENCOUNTER
nebivolol (BYSTOLIC) 20 MG TABS tablet [[id number]]     Order Details   Dose: 20 mg  Route: Oral  Frequency: DAILY    Dispense Quantity: 30 tablet  Refills: 5  Fills remaining: --             Sig: Take 1 tablet by mouth daily            Written Date: 08/26/20  Expiration Date: 08/26/21      Start Date: 08/26/20  End Date: --      Ordering Provider:  --  Authorizing Provider: Jose Luis Fuchs MD  Ordering User:  Karina Navarro MA     Cosigner:  Jose Luis Fuchs MD  Signed:  --               Original Order:  nebivolol (BYSTOLIC) 20 MG TABS tablet [[id number]]       Pharmacy:  [address] [phone]    Pharmacy Comments: --

## 2020-08-26 NOTE — TELEPHONE ENCOUNTER
----- Message from Evelina Foley sent at 8/25/2020  4:54 PM EDT -----  Subject: Message to Provider    QUESTIONS  Information for Provider? Pt called in wanting to speak to Dr. Tc Lizarraga   about why his medication   nebivolol (BYSTOLIC) 20 MG TABS tablet   was denied. Please call to advise.   ---------------------------------------------------------------------------  --------------  CALL BACK INFO  What is the best way for the office to contact you? OK to leave message on   voicemail  Preferred Call Back Phone Number? [phone]  ---------------------------------------------------------------------------  --------------  SCRIPT ANSWERS  Relationship to Patient?  Self

## 2020-10-25 ENCOUNTER — HOSPITAL ENCOUNTER (INPATIENT)
Age: 55
LOS: 1 days | Discharge: HOME | DRG: 308 | End: 2020-10-27
Attending: EMERGENCY MEDICINE | Admitting: INTERNAL MEDICINE
Payer: COMMERCIAL

## 2020-10-25 ENCOUNTER — APPOINTMENT (OUTPATIENT)
Dept: GENERAL RADIOLOGY | Age: 55
DRG: 308 | End: 2020-10-25
Payer: COMMERCIAL

## 2020-10-25 LAB
A/G RATIO: 1.6 (ref 1.1–2.2)
ALBUMIN SERPL-MCNC: 4.5 G/DL (ref 3.4–5)
ALP BLD-CCNC: 104 U/L (ref 40–129)
ALT SERPL-CCNC: 30 U/L (ref 10–40)
ANION GAP SERPL CALCULATED.3IONS-SCNC: 13 MMOL/L (ref 3–16)
APTT: 33 SEC (ref 24.2–36.2)
AST SERPL-CCNC: 42 U/L (ref 15–37)
BASE EXCESS VENOUS: -0.1 MMOL/L (ref -2–3)
BASOPHILS ABSOLUTE: 0 K/UL (ref 0–0.2)
BASOPHILS RELATIVE PERCENT: 0.4 %
BILIRUB SERPL-MCNC: 0.6 MG/DL (ref 0–1)
BUN BLDV-MCNC: 19 MG/DL (ref 7–20)
CALCIUM SERPL-MCNC: 9.1 MG/DL (ref 8.3–10.6)
CARBOXYHEMOGLOBIN: 2 % (ref 0–1.5)
CHLORIDE BLD-SCNC: 102 MMOL/L (ref 99–110)
CO2: 22 MMOL/L (ref 21–32)
CREAT SERPL-MCNC: 0.9 MG/DL (ref 0.9–1.3)
EOSINOPHILS ABSOLUTE: 0.1 K/UL (ref 0–0.6)
EOSINOPHILS RELATIVE PERCENT: 1.1 %
GFR AFRICAN AMERICAN: >60
GFR NON-AFRICAN AMERICAN: >60
GLOBULIN: 2.8 G/DL
GLUCOSE BLD-MCNC: 175 MG/DL (ref 70–99)
HCO3 VENOUS: 23.3 MMOL/L (ref 24–28)
HCT VFR BLD CALC: 48.4 % (ref 40.5–52.5)
HEMOGLOBIN, VEN, REDUCED: 5 %
HEMOGLOBIN: 16.3 G/DL (ref 13.5–17.5)
INR BLD: 1.03 (ref 0.86–1.14)
LACTIC ACID: 1.9 MMOL/L (ref 0.4–2)
LYMPHOCYTES ABSOLUTE: 2.2 K/UL (ref 1–5.1)
LYMPHOCYTES RELATIVE PERCENT: 18.8 %
MAGNESIUM: 1.9 MG/DL (ref 1.8–2.4)
MCH RBC QN AUTO: 29 PG (ref 26–34)
MCHC RBC AUTO-ENTMCNC: 33.7 G/DL (ref 31–36)
MCV RBC AUTO: 85.9 FL (ref 80–100)
METHEMOGLOBIN VENOUS: 0.4 % (ref 0–1.5)
MONOCYTES ABSOLUTE: 0.8 K/UL (ref 0–1.3)
MONOCYTES RELATIVE PERCENT: 6.6 %
NEUTROPHILS ABSOLUTE: 8.5 K/UL (ref 1.7–7.7)
NEUTROPHILS RELATIVE PERCENT: 73.1 %
O2 SAT, VEN: 95 %
PCO2, VEN: 36.1 MMHG (ref 41–51)
PDW BLD-RTO: 14.5 % (ref 12.4–15.4)
PH VENOUS: 7.43 (ref 7.35–7.45)
PLATELET # BLD: 190 K/UL (ref 135–450)
PMV BLD AUTO: 10.2 FL (ref 5–10.5)
PO2, VEN: 72.6 MMHG (ref 25–40)
POTASSIUM REFLEX MAGNESIUM: 3.4 MMOL/L (ref 3.5–5.1)
PRO-BNP: 1219 PG/ML (ref 0–124)
PROTHROMBIN TIME: 12 SEC (ref 10–13.2)
RBC # BLD: 5.63 M/UL (ref 4.2–5.9)
SODIUM BLD-SCNC: 137 MMOL/L (ref 136–145)
TCO2 CALC VENOUS: 24 MMOL/L
TOTAL PROTEIN: 7.3 G/DL (ref 6.4–8.2)
TROPONIN: 0.04 NG/ML
WBC # BLD: 11.6 K/UL (ref 4–11)

## 2020-10-25 PROCEDURE — 99285 EMERGENCY DEPT VISIT HI MDM: CPT

## 2020-10-25 PROCEDURE — 2500000003 HC RX 250 WO HCPCS: Performed by: EMERGENCY MEDICINE

## 2020-10-25 PROCEDURE — 85610 PROTHROMBIN TIME: CPT

## 2020-10-25 PROCEDURE — 6360000002 HC RX W HCPCS: Performed by: STUDENT IN AN ORGANIZED HEALTH CARE EDUCATION/TRAINING PROGRAM

## 2020-10-25 PROCEDURE — 83735 ASSAY OF MAGNESIUM: CPT

## 2020-10-25 PROCEDURE — 6370000000 HC RX 637 (ALT 250 FOR IP): Performed by: EMERGENCY MEDICINE

## 2020-10-25 PROCEDURE — 85730 THROMBOPLASTIN TIME PARTIAL: CPT

## 2020-10-25 PROCEDURE — 85025 COMPLETE CBC W/AUTO DIFF WBC: CPT

## 2020-10-25 PROCEDURE — 2500000003 HC RX 250 WO HCPCS

## 2020-10-25 PROCEDURE — 71045 X-RAY EXAM CHEST 1 VIEW: CPT

## 2020-10-25 PROCEDURE — 2580000003 HC RX 258: Performed by: STUDENT IN AN ORGANIZED HEALTH CARE EDUCATION/TRAINING PROGRAM

## 2020-10-25 PROCEDURE — 80053 COMPREHEN METABOLIC PANEL: CPT

## 2020-10-25 PROCEDURE — 96376 TX/PRO/DX INJ SAME DRUG ADON: CPT

## 2020-10-25 PROCEDURE — 82803 BLOOD GASES ANY COMBINATION: CPT

## 2020-10-25 PROCEDURE — 84484 ASSAY OF TROPONIN QUANT: CPT

## 2020-10-25 PROCEDURE — 83880 ASSAY OF NATRIURETIC PEPTIDE: CPT

## 2020-10-25 PROCEDURE — 96375 TX/PRO/DX INJ NEW DRUG ADDON: CPT

## 2020-10-25 PROCEDURE — 83605 ASSAY OF LACTIC ACID: CPT

## 2020-10-25 PROCEDURE — 96365 THER/PROPH/DIAG IV INF INIT: CPT

## 2020-10-25 PROCEDURE — 96366 THER/PROPH/DIAG IV INF ADDON: CPT

## 2020-10-25 PROCEDURE — 93005 ELECTROCARDIOGRAM TRACING: CPT | Performed by: STUDENT IN AN ORGANIZED HEALTH CARE EDUCATION/TRAINING PROGRAM

## 2020-10-25 RX ORDER — HEPARIN SODIUM 1000 [USP'U]/ML
10000 INJECTION, SOLUTION INTRAVENOUS; SUBCUTANEOUS ONCE
Status: DISCONTINUED | OUTPATIENT
Start: 2020-10-25 | End: 2020-10-26

## 2020-10-25 RX ORDER — HEPARIN SODIUM 1000 [USP'U]/ML
10000 INJECTION, SOLUTION INTRAVENOUS; SUBCUTANEOUS PRN
Status: DISCONTINUED | OUTPATIENT
Start: 2020-10-25 | End: 2020-10-26

## 2020-10-25 RX ORDER — HEPARIN SODIUM 1000 [USP'U]/ML
5000 INJECTION, SOLUTION INTRAVENOUS; SUBCUTANEOUS PRN
Status: DISCONTINUED | OUTPATIENT
Start: 2020-10-25 | End: 2020-10-26

## 2020-10-25 RX ORDER — METOPROLOL TARTRATE 5 MG/5ML
INJECTION INTRAVENOUS
Status: COMPLETED
Start: 2020-10-25 | End: 2020-10-25

## 2020-10-25 RX ORDER — HEPARIN SODIUM 10000 [USP'U]/100ML
15 INJECTION, SOLUTION INTRAVENOUS CONTINUOUS
Status: DISCONTINUED | OUTPATIENT
Start: 2020-10-25 | End: 2020-10-26

## 2020-10-25 RX ORDER — METOPROLOL TARTRATE 5 MG/5ML
5 INJECTION INTRAVENOUS EVERY 5 MIN PRN
Status: DISCONTINUED | OUTPATIENT
Start: 2020-10-25 | End: 2020-10-27 | Stop reason: HOSPADM

## 2020-10-25 RX ADMIN — HEPARIN SODIUM 10000 UNITS: 1000 INJECTION INTRAVENOUS; SUBCUTANEOUS at 22:37

## 2020-10-25 RX ADMIN — METOPROLOL TARTRATE 5 MG: 5 INJECTION, SOLUTION INTRAVENOUS at 21:31

## 2020-10-25 RX ADMIN — PROCAINAMIDE HYDROCHLORIDE 1000 MG: 500 INJECTION INTRAMUSCULAR; INTRAVENOUS at 23:18

## 2020-10-25 RX ADMIN — METOPROLOL TARTRATE 100 MG: 25 TABLET, FILM COATED ORAL at 21:47

## 2020-10-25 RX ADMIN — METOPROLOL TARTRATE 5 MG: 5 INJECTION, SOLUTION INTRAVENOUS at 21:42

## 2020-10-25 RX ADMIN — HEPARIN SODIUM 18 UNITS/KG/HR: 10000 INJECTION, SOLUTION INTRAVENOUS at 22:37

## 2020-10-26 PROBLEM — Z92.89 H/O ANGIOGRAPHY: Status: ACTIVE | Noted: 2020-10-26

## 2020-10-26 PROBLEM — I48.91 A-FIB (HCC): Status: ACTIVE | Noted: 2020-10-26

## 2020-10-26 LAB
ANION GAP SERPL CALCULATED.3IONS-SCNC: 13 MMOL/L (ref 3–16)
ANTI-XA UNFRAC HEPARIN: 1.01 IU/ML (ref 0.3–0.7)
BACTERIA: ABNORMAL /HPF
BILIRUBIN URINE: NEGATIVE
BLOOD, URINE: NEGATIVE
BUN BLDV-MCNC: 20 MG/DL (ref 7–20)
CALCIUM SERPL-MCNC: 8.9 MG/DL (ref 8.3–10.6)
CHLORIDE BLD-SCNC: 104 MMOL/L (ref 99–110)
CLARITY: CLEAR
CO2: 22 MMOL/L (ref 21–32)
COLOR: YELLOW
CREAT SERPL-MCNC: 0.9 MG/DL (ref 0.9–1.3)
EKG ATRIAL RATE: 210 BPM
EKG ATRIAL RATE: 71 BPM
EKG DIAGNOSIS: NORMAL
EKG DIAGNOSIS: NORMAL
EKG P AXIS: 50 DEGREES
EKG P-R INTERVAL: 162 MS
EKG Q-T INTERVAL: 272 MS
EKG Q-T INTERVAL: 482 MS
EKG QRS DURATION: 100 MS
EKG QRS DURATION: 88 MS
EKG QTC CALCULATION (BAZETT): 477 MS
EKG QTC CALCULATION (BAZETT): 523 MS
EKG R AXIS: -68 DEGREES
EKG R AXIS: -73 DEGREES
EKG T AXIS: 126 DEGREES
EKG T AXIS: 127 DEGREES
EKG VENTRICULAR RATE: 185 BPM
EKG VENTRICULAR RATE: 71 BPM
EPITHELIAL CELLS, UA: ABNORMAL /HPF (ref 0–5)
GFR AFRICAN AMERICAN: >60
GFR NON-AFRICAN AMERICAN: >60
GLUCOSE BLD-MCNC: 130 MG/DL (ref 70–99)
GLUCOSE URINE: NEGATIVE MG/DL
HCT VFR BLD CALC: 44.8 % (ref 40.5–52.5)
HEMOGLOBIN: 14.9 G/DL (ref 13.5–17.5)
HYALINE CASTS: ABNORMAL /LPF (ref 0–2)
KETONES, URINE: ABNORMAL MG/DL
LEUKOCYTE ESTERASE, URINE: NEGATIVE
MAGNESIUM: 2.1 MG/DL (ref 1.8–2.4)
MCH RBC QN AUTO: 29 PG (ref 26–34)
MCHC RBC AUTO-ENTMCNC: 33.4 G/DL (ref 31–36)
MCV RBC AUTO: 87 FL (ref 80–100)
MICROSCOPIC EXAMINATION: YES
NITRITE, URINE: NEGATIVE
PDW BLD-RTO: 14.5 % (ref 12.4–15.4)
PH UA: 6 (ref 5–8)
PLATELET # BLD: 187 K/UL (ref 135–450)
PMV BLD AUTO: 10.4 FL (ref 5–10.5)
POTASSIUM REFLEX MAGNESIUM: 3.2 MMOL/L (ref 3.5–5.1)
PROTEIN UA: 30 MG/DL
RBC # BLD: 5.15 M/UL (ref 4.2–5.9)
RBC UA: ABNORMAL /HPF (ref 0–4)
SODIUM BLD-SCNC: 139 MMOL/L (ref 136–145)
SPECIFIC GRAVITY UA: >=1.03 (ref 1–1.03)
TROPONIN: 0.05 NG/ML
TROPONIN: 0.08 NG/ML
TROPONIN: 0.08 NG/ML
TSH REFLEX: 4.17 UIU/ML (ref 0.27–4.2)
URINE REFLEX TO CULTURE: ABNORMAL
URINE TYPE: ABNORMAL
UROBILINOGEN, URINE: 0.2 E.U./DL
WBC # BLD: 9.7 K/UL (ref 4–11)
WBC UA: ABNORMAL /HPF (ref 0–5)

## 2020-10-26 PROCEDURE — 93005 ELECTROCARDIOGRAM TRACING: CPT | Performed by: STUDENT IN AN ORGANIZED HEALTH CARE EDUCATION/TRAINING PROGRAM

## 2020-10-26 PROCEDURE — 85520 HEPARIN ASSAY: CPT

## 2020-10-26 PROCEDURE — 2580000003 HC RX 258: Performed by: INTERNAL MEDICINE

## 2020-10-26 PROCEDURE — 36415 COLL VENOUS BLD VENIPUNCTURE: CPT

## 2020-10-26 PROCEDURE — APPSS45 APP SPLIT SHARED TIME 31-45 MINUTES: Performed by: NURSE PRACTITIONER

## 2020-10-26 PROCEDURE — 80048 BASIC METABOLIC PNL TOTAL CA: CPT

## 2020-10-26 PROCEDURE — 6370000000 HC RX 637 (ALT 250 FOR IP): Performed by: INTERNAL MEDICINE

## 2020-10-26 PROCEDURE — 97165 OT EVAL LOW COMPLEX 30 MIN: CPT

## 2020-10-26 PROCEDURE — 2060000000 HC ICU INTERMEDIATE R&B

## 2020-10-26 PROCEDURE — 97161 PT EVAL LOW COMPLEX 20 MIN: CPT

## 2020-10-26 PROCEDURE — 6370000000 HC RX 637 (ALT 250 FOR IP): Performed by: NURSE PRACTITIONER

## 2020-10-26 PROCEDURE — 83735 ASSAY OF MAGNESIUM: CPT

## 2020-10-26 PROCEDURE — 84443 ASSAY THYROID STIM HORMONE: CPT

## 2020-10-26 PROCEDURE — 84484 ASSAY OF TROPONIN QUANT: CPT

## 2020-10-26 PROCEDURE — 85027 COMPLETE CBC AUTOMATED: CPT

## 2020-10-26 PROCEDURE — 99255 IP/OBS CONSLTJ NEW/EST HI 80: CPT | Performed by: INTERNAL MEDICINE

## 2020-10-26 PROCEDURE — 81003 URINALYSIS AUTO W/O SCOPE: CPT

## 2020-10-26 RX ORDER — PROMETHAZINE HYDROCHLORIDE 25 MG/1
12.5 TABLET ORAL EVERY 6 HOURS PRN
Status: DISCONTINUED | OUTPATIENT
Start: 2020-10-26 | End: 2020-10-27 | Stop reason: HOSPADM

## 2020-10-26 RX ORDER — NEBIVOLOL 5 MG/1
20 TABLET ORAL DAILY
Status: DISCONTINUED | OUTPATIENT
Start: 2020-10-26 | End: 2020-10-26

## 2020-10-26 RX ORDER — CARVEDILOL 25 MG/1
25 TABLET ORAL 2 TIMES DAILY WITH MEALS
Status: DISCONTINUED | OUTPATIENT
Start: 2020-10-26 | End: 2020-10-27 | Stop reason: HOSPADM

## 2020-10-26 RX ORDER — ONDANSETRON 2 MG/ML
4 INJECTION INTRAMUSCULAR; INTRAVENOUS EVERY 6 HOURS PRN
Status: DISCONTINUED | OUTPATIENT
Start: 2020-10-26 | End: 2020-10-27 | Stop reason: HOSPADM

## 2020-10-26 RX ORDER — FUROSEMIDE 20 MG/1
20 TABLET ORAL DAILY
Status: DISCONTINUED | OUTPATIENT
Start: 2020-10-26 | End: 2020-10-27 | Stop reason: HOSPADM

## 2020-10-26 RX ORDER — SPIRONOLACTONE 25 MG/1
25 TABLET ORAL DAILY
Status: DISCONTINUED | OUTPATIENT
Start: 2020-10-26 | End: 2020-10-27 | Stop reason: HOSPADM

## 2020-10-26 RX ORDER — ATORVASTATIN CALCIUM 10 MG/1
10 TABLET, FILM COATED ORAL DAILY
Status: DISCONTINUED | OUTPATIENT
Start: 2020-10-26 | End: 2020-10-27 | Stop reason: HOSPADM

## 2020-10-26 RX ORDER — ASPIRIN 81 MG/1
81 TABLET ORAL DAILY
Status: DISCONTINUED | OUTPATIENT
Start: 2020-10-26 | End: 2020-10-27

## 2020-10-26 RX ORDER — POTASSIUM CHLORIDE 7.45 MG/ML
10 INJECTION INTRAVENOUS PRN
Status: DISCONTINUED | OUTPATIENT
Start: 2020-10-26 | End: 2020-10-27 | Stop reason: HOSPADM

## 2020-10-26 RX ORDER — ACETAMINOPHEN 325 MG/1
650 TABLET ORAL EVERY 6 HOURS PRN
Status: DISCONTINUED | OUTPATIENT
Start: 2020-10-26 | End: 2020-10-27 | Stop reason: HOSPADM

## 2020-10-26 RX ORDER — IBUPROFEN 200 MG
400 TABLET ORAL EVERY 6 HOURS PRN
Status: ON HOLD | COMMUNITY
End: 2020-10-27 | Stop reason: HOSPADM

## 2020-10-26 RX ORDER — POTASSIUM CHLORIDE 20 MEQ/1
40 TABLET, EXTENDED RELEASE ORAL EVERY 4 HOURS
Status: COMPLETED | OUTPATIENT
Start: 2020-10-26 | End: 2020-10-26

## 2020-10-26 RX ORDER — SODIUM CHLORIDE 0.9 % (FLUSH) 0.9 %
10 SYRINGE (ML) INJECTION EVERY 12 HOURS SCHEDULED
Status: DISCONTINUED | OUTPATIENT
Start: 2020-10-26 | End: 2020-10-27 | Stop reason: HOSPADM

## 2020-10-26 RX ORDER — ACETAMINOPHEN 650 MG/1
650 SUPPOSITORY RECTAL EVERY 6 HOURS PRN
Status: DISCONTINUED | OUTPATIENT
Start: 2020-10-26 | End: 2020-10-27 | Stop reason: HOSPADM

## 2020-10-26 RX ORDER — MAGNESIUM SULFATE IN WATER 40 MG/ML
2 INJECTION, SOLUTION INTRAVENOUS PRN
Status: DISCONTINUED | OUTPATIENT
Start: 2020-10-26 | End: 2020-10-27 | Stop reason: HOSPADM

## 2020-10-26 RX ORDER — LABETALOL HYDROCHLORIDE 5 MG/ML
10 INJECTION, SOLUTION INTRAVENOUS EVERY 4 HOURS PRN
Status: DISCONTINUED | OUTPATIENT
Start: 2020-10-26 | End: 2020-10-27 | Stop reason: HOSPADM

## 2020-10-26 RX ORDER — AMLODIPINE BESYLATE 10 MG/1
10 TABLET ORAL DAILY
Status: DISCONTINUED | OUTPATIENT
Start: 2020-10-26 | End: 2020-10-26

## 2020-10-26 RX ORDER — LOSARTAN POTASSIUM 100 MG/1
100 TABLET ORAL DAILY
Status: DISCONTINUED | OUTPATIENT
Start: 2020-10-26 | End: 2020-10-27 | Stop reason: HOSPADM

## 2020-10-26 RX ORDER — HYDRALAZINE HYDROCHLORIDE 100 MG/1
100 TABLET, FILM COATED ORAL 2 TIMES DAILY
Status: DISCONTINUED | OUTPATIENT
Start: 2020-10-26 | End: 2020-10-26

## 2020-10-26 RX ORDER — SODIUM CHLORIDE 0.9 % (FLUSH) 0.9 %
10 SYRINGE (ML) INJECTION PRN
Status: DISCONTINUED | OUTPATIENT
Start: 2020-10-26 | End: 2020-10-27 | Stop reason: HOSPADM

## 2020-10-26 RX ADMIN — APIXABAN 5 MG: 5 TABLET, FILM COATED ORAL at 22:12

## 2020-10-26 RX ADMIN — HYDRALAZINE HYDROCHLORIDE 75 MG: 50 TABLET, FILM COATED ORAL at 22:12

## 2020-10-26 RX ADMIN — NEBIVOLOL HYDROCHLORIDE 20 MG: 5 TABLET ORAL at 10:21

## 2020-10-26 RX ADMIN — APIXABAN 5 MG: 5 TABLET, FILM COATED ORAL at 11:57

## 2020-10-26 RX ADMIN — POTASSIUM CHLORIDE 40 MEQ: 1500 TABLET, EXTENDED RELEASE ORAL at 10:22

## 2020-10-26 RX ADMIN — HYDRALAZINE HYDROCHLORIDE 75 MG: 50 TABLET, FILM COATED ORAL at 10:21

## 2020-10-26 RX ADMIN — ASPIRIN 81 MG: 81 TABLET, COATED ORAL at 10:22

## 2020-10-26 RX ADMIN — Medication 10 ML: at 22:13

## 2020-10-26 RX ADMIN — ATORVASTATIN CALCIUM 10 MG: 10 TABLET, FILM COATED ORAL at 10:22

## 2020-10-26 RX ADMIN — FUROSEMIDE 20 MG: 20 TABLET ORAL at 11:57

## 2020-10-26 RX ADMIN — LOSARTAN POTASSIUM 100 MG: 100 TABLET, FILM COATED ORAL at 10:22

## 2020-10-26 RX ADMIN — SPIRONOLACTONE 25 MG: 25 TABLET, FILM COATED ORAL at 10:22

## 2020-10-26 RX ADMIN — POTASSIUM CHLORIDE 40 MEQ: 1500 TABLET, EXTENDED RELEASE ORAL at 18:06

## 2020-10-26 RX ADMIN — AMLODIPINE BESYLATE 10 MG: 10 TABLET ORAL at 10:22

## 2020-10-26 ASSESSMENT — PAIN SCALES - GENERAL
PAINLEVEL_OUTOF10: 0

## 2020-10-26 NOTE — PROGRESS NOTES
Patient admitted to 56. Alert and oriented x 4. Vitals stable, afebrile. Denies pain. Heparin gtt infusing. SR on tele. Admission paperwork complete. 4 eye skin assessment complete. Call light and plan of care reviewed. Patient encouraged to call with needs and concerns. Name band;

## 2020-10-26 NOTE — PROGRESS NOTES
tolerated  Vision/Hearing  Vision: Within Functional Limits  Hearing: Within functional limits     Subjective  General  Chart Reviewed: Yes  Patient assessed for rehabilitation services?: Yes  Additional Pertinent Hx: Patient is a 49 y/o male admitted 10/25 with a-fib.  chest x-ray (-) for acute findings. PMH significant for HTN, HLD, CKD, right TKA, cardiac cath. Response To Previous Treatment: Not applicable  Family / Caregiver Present: No  Referring Practitioner: Bibi Frank MD  Referral Date : 10/26/20  Diagnosis: a-fib  Follows Commands: Within Functional Limits  General Comment  Comments: Patient supine in bed upon arrival.  Subjective  Subjective: Patient agreeable to PT evaluation.   Pain Screening  Patient Currently in Pain: Denies  Vital Signs  Patient Currently in Pain: Denies       Orientation  Orientation  Overall Orientation Status: Within Normal Limits  Social/Functional History  Social/Functional History  Lives With: Son  Type of Home: Apartment(two family apartment)  Home Layout: Two level  Home Access: Stairs to enter with rails  Entrance Stairs - Number of Steps: 13 with unilateral handrail  Bathroom Shower/Tub: Tub/Shower unit  Bathroom Toilet: Standard  Bathroom Equipment: Shower chair, Hand-held shower, Grab bars in shower  Home Equipment: Rolling walker, Crutches  ADL Assistance: Independent  Homemaking Assistance: Independent(laundry at [employer], shares tasks with son)  Homemaking Responsibilities: Yes  Ambulation Assistance: Independent  Transfer Assistance: Independent  Active : Yes  Occupation: Full time employment  Type of occupation: [employer]  Additional Comments: patient denies history of falls at home  Cognition   Cognition  Overall Cognitive Status: WNL    Objective          AROM RLE (degrees)  RLE AROM: WFL  AROM LLE (degrees)  LLE AROM : WFL  Strength RLE  Strength RLE: WFL  Strength LLE  Strength LLE: WFL        Bed mobility  Supine to Sit: Modified independent(head of bed elevated)  Sit to Supine: Modified independent(head of bed elevated)  Transfers  Sit to Stand: Independent(from EOB and from toilet)  Stand to sit:  Independent(to toilet and to EOB)  Ambulation  Ambulation?: Yes  Ambulation 1  Surface: level tile  Device: No Device(IV pole)  Assistance: Independent  Quality of Gait: steady gait with no loss of balance noted, wide base of support, bobbi WFL  Distance: 8' into bathroom, 8' back to EOB sitting, 200' in room and hallways returning to EOB sitting  Stairs/Curb  Stairs?: No     Balance  Sitting - Static: Good  Sitting - Dynamic: Good  Standing - Static: Good  Standing - Dynamic: Good(independent to ambulate without an assistive device)        Plan   Plan  Times per week: d/c from acute care PT  Safety Devices  Type of devices: Call light within reach, Left in bed, Nurse notified(patient is up ad albert in room)    OutComes Score                                                  AM-PAC Score  AM-PAC Inpatient Mobility Raw Score : 24 (10/26/20 0830)  AM-PAC Inpatient T-Scale Score : 61.14 (10/26/20 0830)  Mobility Inpatient CMS 0-100% Score: 0 (10/26/20 0830)  Mobility Inpatient CMS G-Code Modifier : [facility] (10/26/20 0830)          Goals  Short term goals  Time Frame for Short term goals: none; patient will be discharged from acute care PT  Patient Goals   Patient goals : to go home       Therapy Time   Individual Concurrent Group Co-treatment   Time In 0759         Time Out 0816         Minutes 17         Timed Code Treatment Minutes: 0 Minutes    Timed Code Treatment Minutes:  0 Minutes    Total Treatment Minutes:    17 minutes evaluation    LAURA [address] [place] [id number]

## 2020-10-26 NOTE — CARE COORDINATION
done Monday- Friday between 8:30am-5pm  2. Prescription(s) must be in pharmacy by 3pm to be filled same day  3. Copy of patient's insurance/ prescription drug card and patient face sheet must be sent along with the prescription(s)  4. Cost of Rx cannot be added to hospital bill. If financial assistance is needed, please contact unit  or ;  or  CANNOT provide pharmacy voucher for patients co-pays  5.  Patients can then  the prescription on their way out of the hospital at discharge, or pharmacy can deliver to the bedside if staff is available. (payment due at time of pick-up or delivery - cash, check, or card accepted)     Able to afford home medications/ co-pay costs: Yes; patient discussed cost of eliquis with clinical pharmacist Duncan Mccormick today, and is able to afford medications at this time    ADLS  Support Systems: Children    PT AM-PAC: 24 /24  OT AM-PAC: 24 /24    [place]: from home with son  Steps: 14    Plans to RETURN to current housing: Yes  Barriers to RETURNING to current housing: none per [address]  Currently ACTIVE with [address]: No  Home Care Agency: Not Applicable    Currently ACTIVE with [place] on Aging: No  Passport/ Waiver: No  Passport/ Waiver Services: Not Applicable    :  Phone:       [address]  List of hospitals in the [place] Provider: none per patient  Equipment:     Home Oxygen and [address] prior to admission: No  [address]: Not Applicable  Other Respiratory Equipment: CPAP from [facility]    Informed of need to bring portable home O2 tank on day of DISCHARGE for nursing to connect prior to leaving: No  Verbalized agreement/Understanding: No  Person to bring portable tank at discharge:     Dialysis  Active with HD/PD prior to admission: No  Nephrologist:     HD Center:  Not Applicable    DISCHARGE PLAN:  Disposition: Home- No Services Needed    Transportation PLAN for discharge: family     Factors facilitating achievement of predicted outcomes: Family support, Cooperative and Pleasant    Barriers to discharge: Medication managment    Additional Case Management Notes: CM met with patient at the bedside, patient plans for return home with son, declines need for DME or HHC at DC. Patient reports he would like to speak with dietician prior to DC and has spoken with [facility], clinical pharmacist regarding medication costs and is able to afford his eliquis and other medications at this time. The Plan for Transition of Care is related to the following treatment goals of A-fib ([facility] [I48.91]  A-fib ([facility] [I48.91]    The Patient and/or patient representative Basilia Vang and his family were provided with a choice of provider and agrees with the discharge plan Yes    Freedom of choice list was provided with basic dialogue that supports the patient's individualized plan of care/goals and shares the quality data associated with the providers.  Yes    Care Transition patient: Yes    Laura Olmedo RN  [facility]  Case Management Department  Ph: [phone]   Fax: [phone]

## 2020-10-26 NOTE — PROGRESS NOTES
Clinical Pharmacy Progress Note  Discharge Planning    Spoke with JARET Flaherty via telephone who would like patient to be discharged on Apixaban 5 mg BID on discharge. Patient stated he was not able to afford medication in the past. He may be eligible for  couaria and Kamar Bond will assist patient with financial assistance via drug  in cardiology office following discharge. Prescription for Apixaban was sent to [facility] to be filled via Meds to [place]. Current Discharge Medication List      START taking these medications    Details   apixaban (ELIQUIS) 5 MG TABS tablet Take 1 tablet by mouth 2 times daily  Qty: 60 tablet, Refills: 3             Thanks!   Darcy Mane PharmD, [facility]  Wireless # [phone]  10/26/2020 10:32 AM

## 2020-10-26 NOTE — PROGRESS NOTES
Physician Progress Note      [place]  CIERRA #:                  [id number]  :                       1965  ADMIT DATE:       10/25/2020 9:15 PM  [address] [place] DATE:  RESPONDING  PROVIDER #:        Margarette Johnson CNP          QUERY TEXT:    Pt admitted with Afib w/RVR. Noted documentation of Heart failure with   preserved ejection fraction in H&P. If possible, please document in progress   notes and discharge summary:    The medical record reflects the following:  Risk Factors: 49 y/o with a Hx of Diastolic CHF and Afib w/RVR  Clinical Indicators: Per Card consult note: HFpEF Mild decompensated acute on   chronic  Pro BNP 1219  /1+ edema in legs Start low dose lasix p0  Treatment: Lasix 20 mg PO, Metoprolol, Spironolactone, Coreg  Options provided:  -- Acute on Chronic Diastolic CHF/HFpEF  -- Chronic Diastolic CHF/HFpEF  -- Other - I will add my own diagnosis  -- Disagree - Not applicable / Not valid  -- Disagree - Clinically unable to determine / Unknown  -- Refer to Clinical Documentation Reviewer    PROVIDER RESPONSE TEXT:    This patient is in acute on chronic diastolic CHF/HFpEF. Query created by: Kathy Fernando on 10/26/2020 11:25 AM      Electronically signed by:  Margarette Johnson CNP 10/26/2020 11:27 AM      This patient is back in with atrial fibrillation which has converted to sinus rhythm. Not having any current chest discomfort or pain. We had a long discussion about the possibility of an ablation procedure. Have a cardiac cath that showed essentially clean coronary arteries. This was done 2020. The chat score was 2 but the troponin levels did rise as much as 0.08. Further discussions about A. fib ablation procedures to hopefully keep him out of atrial fibrillation. In the meantime I believe that treatment with an anticoagulant would be appropriate.   Clarissa Villalba MD, [facility] [place]

## 2020-10-26 NOTE — PROGRESS NOTES
Occupational Therapy   Occupational Therapy Initial Assessment and Discharge Note  Date: 10/26/2020   Patient Name: Eliceo Mcmahon  MRN: [id number]     : 1965    Date of Service: 10/26/2020    Discharge Recommendations:    Eliceo Mcmahon scored a 24/24 on the AM-PAC ADL Inpatient form. At this time, no further OT is recommended upon discharge. Recommend patient returns to prior setting with prior services. OT Equipment Recommendations  Equipment Needed: No    Assessment   Assessment: Patient independent with ADL tasks and functional mobility of household distances at this time. Reports no concerns with planned d/c home when medically cleared to do so. No acute OT needs identified at this time. Prognosis: Good  Decision Making: Low Complexity  OT Education: OT Role;Plan of Care  Patient Education: d/c planning - verb understanding  No Skilled OT: Independent with ADL's;Safe to return home; No OT goals identified  REQUIRES OT FOLLOW UP: No  Activity Tolerance  Activity Tolerance: Patient Tolerated treatment well  Activity Tolerance: Noted elevated BP throughout session, assessed as 160/107 at start of session and increasing to 180/117 following mobility. RN notified and aware  Safety Devices  Safety Devices in place: Not Applicable(up ad albert in room)           Patient Diagnosis(es): The primary encounter diagnosis was Atrial fibrillation with rapid ventricular response ([address] A diagnosis of Essential hypertension was also pertinent to this visit. has a past medical history of Carpal tunnel syndrome, Chronic kidney disease, Hyperlipidemia, Hypertension, Obesity, unspecified, and MARY (obstructive sleep apnea).   has a past surgical history that includes Nose surgery; Knee arthroscopy (Right); and Cardiac catheterization.            Restrictions  Position Activity Restriction  Other position/activity restrictions: up as tolerated    Subjective   General  Patient assessed for rehabilitation services?: Yes  Additional Pertinent Hx: 48 y.o. admit to ED with afib with RVR. PMHx: HTN, HLD  Family / Caregiver Present: No  Referring Practitioner: Sherwin Koch MD  Diagnosis: afib  Subjective  Subjective: Patient in bed upon arrival, agreeable to OT/PT evaluation. Patient Currently in Pain: No  Vital Signs  Patient Currently in Pain: No  Social/Functional History  Social/Functional History  Lives With: Son  Type of Home: Apartment(two family apartment)  Home Layout: Two level  Home Access: Stairs to enter with rails  Entrance Stairs - Number of Steps: 13 with unilateral handrail  Bathroom Shower/Tub: Tub/Shower unit  Bathroom Toilet: Standard  Bathroom Equipment: Shower chair, Hand-held shower, Grab bars in shower  Home Equipment: Rolling walker, Crutches  ADL Assistance: Independent  Homemaking Assistance: Independent(laundry at [employer], shares tasks with son)  Homemaking Responsibilities: Yes  Ambulation Assistance: Independent  Transfer Assistance: Independent  Active : Yes  Occupation: Full time employment  Type of occupation: [employer]  Additional Comments: patient denies history of falls at home       Objective   Balance  Sitting Balance: Independent  Standing Balance: Independent  Functional Mobility  Functional - Mobility Device: No device  Activity: To/from bathroom(+ distance in hallway)  Assist Level:  Independent  Toilet Transfers  Toilet - Technique: Ambulating  Equipment Used: Standard toilet  Toilet Transfer: Modified independent  Toilet Transfers Comments: use of grab bar  ADL  Grooming: Independent(hand hygiene in stance at sink)  LE Dressing: Independent(don/doff socks)  Toileting: (declined need)  Bed mobility  Sit to Supine: Modified independent   Cognition  Overall Cognitive Status: WNL     Plan   Plan  Times per week: d/c acute OT    AM-PAC Score        AM-PAC Inpatient Daily Activity Raw Score: 24 (10/26/20 1052)  AM-PAC Inpatient ADL T-Scale Score : 57.54 (10/26/20 1052)  ADL Inpatient CMS 0-100% Score: 0 (10/26/20 1052)  ADL Inpatient CMS G-Code Modifier : [address] (10/26/20 1052)    Goals   No goals initiated at this time.        Therapy Time   Individual Concurrent Group Co-treatment   Time In 0800         Time Out 0816         Minutes [address]  [facility] Shelley, OTR/L #[id number]

## 2020-10-26 NOTE — PLAN OF CARE
Problem: Activity:  Goal: Ability to tolerate increased activity will improve  Description: Ability to tolerate increased activity will improve  10/26/2020 1243 by Pauline Alonzo RN  Outcome: Ongoing  10/26/2020 0230 by Hal Gordon RN  Outcome: Ongoing  Goal: Expression of feelings of increased energy will increase  Description: Expression of feelings of increased energy will increase  Outcome: Ongoing     Problem: Cardiac:  Goal: Ability to maintain an adequate cardiac output will improve  Description: Ability to maintain an adequate cardiac output will improve  10/26/2020 1243 by Pauline Alozno RN  Outcome: Ongoing  10/26/2020 0230 by Hal Gordon RN  Outcome: Ongoing  Goal: Complications related to the disease process, condition or treatment will be avoided or minimized  Description: Complications related to the disease process, condition or treatment will be avoided or minimized  Outcome: Ongoing     Problem: Coping:  Goal: Level of anxiety will decrease  Description: Level of anxiety will decrease  Outcome: Ongoing  Goal: General experience of comfort will improve  Description: General experience of comfort will improve  Outcome: Ongoing     Problem: Health Behavior:  Goal: Ability to manage health-related needs will improve  Description: Ability to manage health-related needs will improve  Outcome: Ongoing     Problem: Safety:  Goal: Ability to remain free from injury will improve  Description: Ability to remain free from injury will improve  Outcome: Ongoing  Goal: Will show no signs and symptoms of excessive bleeding  Description: Will show no signs and symptoms of excessive bleeding  Outcome: Ongoing     Problem: Falls - Risk of:  Goal: Will remain free from falls  Description: Will remain free from falls  Outcome: Ongoing  Note: Free from falls at this time. All precautions remain in place. Dome light illuminated for fall risk. Call light and assistive devices within reach. Will monitor.  Roberta Cruz Aleksandra   Goal: Absence of physical injury  Description: Absence of physical injury  Outcome: Ongoing

## 2020-10-26 NOTE — ED NOTES
Pt noted to have converted to NSR.  EKG obtained and troponin sent to lab      Liss Cralin RN  10/26/20 0037

## 2020-10-26 NOTE — PROGRESS NOTES
normal.  No rashes or lesions. Neurologic:  Neurovascularly intact without any focal sensory/motor deficits. Cranial nerves: II-XII intact, grossly non-focal.  Psychiatric: Alert and oriented, thought content appropriate, normal insight  Peripheral Pulses: +2 palpable, equal bilaterally       Labs:   Recent Labs     10/25/20  2132 10/26/20  0430   WBC 11.6* 9.7   HGB 16.3 14.9   HCT 48.4 44.8    187     Recent Labs     10/25/20  2132 10/26/20  0430    139   K 3.4* 3.2*    104   CO2 22 22   BUN 19 20   CREATININE 0.9 0.9   CALCIUM 9.1 8.9     Recent Labs     10/25/20  2132   AST 42*   ALT 30   BILITOT 0.6   ALKPHOS 104     Recent Labs     10/25/20  2132   INR 1.03     Recent Labs     10/26/20  0027 10/26/20  0430 10/26/20  1127   TROPONINI 0.05* 0.08* 0.08*       Urinalysis:      Lab Results   Component Value Date    NITRU Negative 10/26/2020    WBCUA 3-5 10/26/2020    BACTERIA Rare 10/26/2020    RBCUA 0-2 10/26/2020    BLOODU Negative 10/26/2020    SPECGRAV >=1.030 10/26/2020    GLUCOSEU Negative 10/26/2020    GLUCOSEU NEGATIVE 06/17/2011       Radiology:  XR CHEST PORTABLE   Final Result   Impression:   1. No airspace disease. Assessment/Plan:    Active Hospital Problems    Diagnosis    A-fib ([facility] [I48.91]    H/O angiography [Z92.89]    Atrial fibrillation with rapid ventricular response ([address] [I48.91]    Essential hypertension [I10]       afib with RVR - converted to NSR, continue with BB with adjustments per cardiology. Plan to resume eliquis, will involve SW to ensure he can afford     Acute on chronic diastolic CHF - mild, likely exacerbated by the above. Continue PO lasix. Appreciate cardiology assistance     HTN - controlled, continue BB, ARB. Hypokalemia - replaced PO, monitor serial BMPs     Morbid Obesity - Body mass index is 48.61 kg/m². places patient at risk for multiple co-morbidities as well as early death and contributing to presentation.           DVT Prophylaxis: eliquis   Diet: DIET CARDIAC;  Code Status: Full Code    PT/OT Eval Status: not consulted     Dispo - likely tmr pending HR ,BP control     Jayla Oswald MD

## 2020-10-26 NOTE — ED PROVIDER NOTES
ED Attending Attestation Note     Date of evaluation: 10/25/2020    This patient was seen by the resident, Dr. Dariela Gonzalez. I have seen and examined the patient, agree with the workup, evaluation, management and diagnosis. The care plan has been discussed. I have reviewed the ECG and concur with the resident's interpretation. This is a 61-year-old male with a past medical history of atrial fibrillation, obstructive sleep apnea, and hypertension that presents today for evaluation of rapid heart rate. Patient brought in by EMS after developing palpitations. Patient states he went to a ball game had 1 beer, and was asymptomatic until he got home. He states he had some shortness of breath and some intermittent dizziness. He states he had similar symptoms like this in the past, and review of his medical records he was admitted to [facility] for similar symptoms. It appears that the patient had a stress test earlier this year with a normal cardiac catheterization. His medication states that he is on Eliquis, however the patient relates that he is not taking this at the time due to insurance issues. Patient states that he is taking his blood pressure medicine. On exam, patient is in no acute distress. He has a tachycardic rate, irregular rhythm. His lung sounds are clear but diminished. His belly is soft. He has trace edema in the lower extremities. EKG, as interpreted by me, performed at 2112 shows atrial fibrillation with a rapid ventricular response of a rate of 185. He has a left axis deviation. His intervals are noted to have a QRS = 88, QTC = 477. He does have some ST segment depressions noted that are likely secondary to rate in V4, V5, and V6. This is an abnormal EKG. Patient's CHADS Score is 1 which is low to moderate risk and could benefit from anticoagulation.   We will attempt to control his rate with location: Metoprolol 5 mg IV push every 5 minutes x3, as well as metoprolol 100 mg p.o. x1. If this does not help his rate, to consider starting amiodarone bolus with drip. If the patient becomes unstable, we will have a low threshold for synchronous cardioversion. We will start heparin drip. Anticipate the patient will need to be admitted to the hospital for continued management of his rate and possibly cardioversion by cardiology in the morning. We will also obtain a portable chest x-ray, EKG as above, and labs including a troponin and BNP. Please see resident note for reassessments and final disposition. Critical Care:  Due to the immediate potential for life-threatening deterioration due to atrial fibrillation with rapid ventricular response requiring IV heparin and multiple doses of rate controlling agents, I spent 32 minutes providing critical care. This time excludes time spent performing procedures but includes time spent on direct patient care, history retrieval, review of the chart, and discussions with patient, family, and consultant(s).           Conchetta Bamberger, MD  10/25/20 8105

## 2020-10-26 NOTE — H&P
Hospital Medicine History & Physical      PCP: No primary care provider on file. Date of Admission: 10/25/2020    Chief Complaint: Palpitations, my A. fib is acting up    History Of Present Illness:  Patient is a 67-year-old male with past medical history of atrial fibrillation, MARY, hypertension who presents to the hospital for palpitations. Patient mentions he felt like his atrial fibrillation was acting up. He also had associated dizziness and mild chest discomfort. Patient denies nausea vomiting shortness of breath. Patient mentions he was compliant with his medications he follows up with Dr. Sameer Dunbar from cardiology. Denied fevers chills nausea vomiting diarrhea constipation dysuria. He mentions this is his third episode of A. fib with RVR since diagnosis this year. He mentions he is only on aspirin for A. fib. Past Medical History:          Diagnosis Date    Carpal tunnel syndrome     Chronic kidney disease     Hyperlipidemia     Hypertension     Obesity, unspecified     MARY (obstructive sleep apnea)        Past Surgical History:          Procedure Laterality Date    CARDIAC CATHETERIZATION      KNEE ARTHROSCOPY Right     NOSE SURGERY      Repair from Fx       Medications Prior to Admission:      Prior to Admission medications    Medication Sig Start Date End Date Taking?  Authorizing Provider   nebivolol (BYSTOLIC) 20 MG TABS tablet Take 1 tablet by mouth daily 8/26/20  Yes Effie Yanez MD   losartan (COZAAR) 100 MG tablet TAKE 1 TABLET BY MOUTH DAILY 8/25/20  Yes Effie Yanez MD   sertraline (ZOLOFT) 50 MG tablet TAKE 1 TABLET BY MOUTH DAILY 8/25/20  Yes Effie Yanez MD   spironolactone (ALDACTONE) 25 MG tablet TAKE 1 TABLET BY MOUTH DAILY 8/25/20  Yes Effie Yanez MD   atorvastatin (LIPITOR) 10 MG tablet TAKE 1 TABLET BY MOUTH DAILY 8/25/20  Yes Effie Yanez MD   amLODIPine (NORVASC) 10 MG tablet TAKE 1 TABLET BY MOUTH NIGHTLY 2/24/20  Yes Gregor Thomas MD   hydrALAZINE (APRESOLINE) 25 MG tablet Take 3 tablets by mouth 4 times daily 2/21/20  Yes Rita Owens MD   acetaminophen (TYLENOL) 500 MG tablet Take 2 tablets by mouth 3 times daily as needed for Pain 9/7/19  Yes Paresh Saini MD   aspirin 81 MG EC tablet TAKE 1 TABLET BY MOUTH DAILY 9/17/18  Yes Gregor Thomas MD   hydrALAZINE (APRESOLINE) 100 MG tablet Take 1 tablet by mouth 2 times daily 3/2/20   Dayana Tavares MD   ibuprofen (ADVIL;MOTRIN) 600 MG tablet Take 1 tablet by mouth 3 times daily as needed for Pain 9/7/19   Paresh Saini MD       Allergies:  Lisinopril    Social History:      TOBACCO:   reports that he has never smoked. He has never used smokeless tobacco.  ETOH:   reports current alcohol use. Family History:       Reviewed in detail and non contributory          Problem Relation Age of Onset    Diabetes Mother     Stroke Maternal Uncle        REVIEW OF SYSTEMS:   Pertinent positives as noted in the HPI. All other systems reviewed and negative. PHYSICAL EXAM PERFORMED:    BP (!) 173/116   Pulse 77   Temp 97.6 °F (36.4 °C) (Oral)   Resp 16   Ht 5' 8\" (1.727 m)   Wt (!) 319 lb 10.7 oz (145 kg)   SpO2 98%   BMI 48.61 kg/m²     General appearance:  No apparent distress, cooperative. HEENT:  Normal cephalic, atraumatic without obvious deformity. Conjunctivae/corneas clear. Neck: Supple, with full range of motion. No cervical lymphadenopathy  Respiratory:  Normal respiratory effort. Clear to auscultation, bilaterally without Rales/Wheezes/Rhonchi. Cardiovascular: Currently regular rate and rhythm with normal S1/S2 without murmurs, rubs or gallops. Abdomen: Soft, non-tender, non-distended, normal bowel sounds. Musculoskeletal:  No edema noted bilaterally. No tenderness on palpation   Skin: no rash visible  Neurologic:  Neurologically intact without any focal sensory/motor deficits.   grossly non-focal.  Psychiatric:  Alert and oriented, normal mood  Peripheral Pulses: +2 palpable, equal bilaterally       Labs:     Recent Labs     10/25/20  2132   WBC 11.6*   HGB 16.3   HCT 48.4        Recent Labs     10/25/20  2132      K 3.4*      CO2 22   BUN 19   CREATININE 0.9   CALCIUM 9.1     Recent Labs     10/25/20  2132   AST 42*   ALT 30   BILITOT 0.6   ALKPHOS 104     Recent Labs     10/25/20  2132   INR 1.03     Recent Labs     10/25/20  2132 10/26/20  0027   TROPONINI 0.04* 0.05*       Urinalysis:      Lab Results   Component Value Date    NITRU Negative 10/26/2020    WBCUA 1 02/24/2018    BACTERIA Rare 08/22/2013    RBCUA 4 02/24/2018    BLOODU Negative 10/26/2020    SPECGRAV >=1.030 10/26/2020    GLUCOSEU Negative 10/26/2020    GLUCOSEU NEGATIVE 06/17/2011       Radiology:       XR CHEST PORTABLE   Final Result   Impression:   1. No airspace disease. Active Hospital Problems    Diagnosis Date Noted   [facility] [I48.91] 10/26/2020       Patient is a 81-year-old male with past medical history of atrial fibrillation, MARY, hypertension who presents to the hospital for palpitations. Patient mentions he felt like his atrial fibrillation was acting up. He also had associated dizziness and mild chest discomfort. Patient denies nausea vomiting shortness of breath. Patient mentions he was compliant with his medications he follows up with Dr. Carols Poon from cardiology. Denied fevers chills nausea vomiting diarrhea constipation dysuria. He mentions this is his third episode of A. fib with RVR.     Assessment  Atrial fibrillation with RVR  Hypokalemia  Elevated troponin likely demand ischemia  Leukocytosis likely reactive  Hyperlipidemia  Hypertension  Obstructive sleep apnea  Heart failure with preserved ejection fraction    Plan  Patient was given Lopressor, procainamide in ED, currently in sinus rhythm, monitor on cardiac telemetry, check magnesium, check TSH, consult cardiology  Monitor and replace electrolytes, monitor troponin  Resume home aspirin,, statin, hydralazine  Patient was a started on IV heparin-we will continue  DVT prophylaxis-on IV heparin  EF 55 to 60% on previous echo 2/2020 with normal left ventricular systolic function, grade 2 diastolic dysfunction  Diet: DIET CARDIAC;  Code Status: Full Code    PT/OT Eval Status: ordered    Dispo - pending clinical improvement       Heather Holt MD    The note was completed using EMR and Dragon dictation system. Every effort was made to ensure accuracy; however, inadvertent computerized transcription errors may be present. Thank you No primary care provider on file. for the opportunity to be involved in this patient's care. If you have any questions or concerns please feel free to contact me at [phone].     Heather Holt MD

## 2020-10-26 NOTE — PROGRESS NOTES
Clinical Pharmacy Progress Note  Discharge Planning    Spoke with JARET Ruiz via telephone who would like patient to be discharged on Apixaban 5 mg BID on discharge. Patient is eligible for discount through drug  -- provided patient with coupon and he activated coupon via website. Patient has co-pay of $10/30-day supply. Notified patient of co-pay and he stated this was affordable for him. Current Discharge Medication List      START taking these medications    Details   apixaban (ELIQUIS) 5 MG TABS tablet Take 1 tablet by mouth 2 times daily  Qty: 60 tablet, Refills: 3             Thanks!   Aparna FountainD, [facility] # [phone]  10/26/2020 3:27 PM

## 2020-10-26 NOTE — PROGRESS NOTES
Physician Progress Note      Keri Mckinney  CSN #:                  [id number]  :                       1965  ADMIT DATE:       10/25/2020 9:15 PM  [address] [place] DATE:  RESPONDING  PROVIDER #:        Moreno Ramirez MD          QUERY TEXT:    Patient admitted with BMI 48.61. If possible, please document in progress   notes and discharge summary if you are evaluating and /or treating any of the   following: The medical record reflects the following:  Risk Factors: 47 y/o male with a Hx of Obesity with a  BMI 48.61  Clinical Indicators: BMI: 48.61, 319 lbs 10.7 oz 5'8\"  Treatment: counseled on weight loss, daily weights, I&O's  Options provided:  -- Obesity  -- Morbid obesity with BMI 48.61. Complicating assessment and treatment. Placing patient at risk for multiple co-morbidities as well as early death and   contributing to the patient's presentation. Counseled on weight loss  -- Overweight  -- BMI not clinically significant  -- Other - I will add my own diagnosis  -- Disagree - Not applicable / Not valid  -- Disagree - Clinically unable to determine / Unknown  -- Refer to Clinical Documentation Reviewer    PROVIDER RESPONSE TEXT:    This patient Morbid obesity with BMI 48.61. Complicating assessment and   treatment. Placing patient at risk for multiple co-morbidities as well as   early death and contributing to the patient's presentation. Counseled on   weight loss.  .    Query created by: Danell Osler on 10/26/2020 11:29 AM      Electronically signed by:  Moreno Ramirez MD 10/26/2020 1:07 PM

## 2020-10-26 NOTE — CONSULTS
frequency, urgency, incontinence, hematuria or dysuria. Skin: No cyanosis or skin lesions. Musculoskeletal: No new muscle or joint pain. Neurological: No syncope or TIA-like symptoms.   Psychiatric: No anxiety, insomnia or depression     Past Medical History:   Diagnosis Date    Carpal tunnel syndrome     Chronic kidney disease     Hyperlipidemia     Hypertension     Obesity, unspecified     MARY (obstructive sleep apnea)      Past Surgical History:   Procedure Laterality Date    CARDIAC CATHETERIZATION      KNEE ARTHROSCOPY Right     NOSE SURGERY      Repair from Fx     Family History   Problem Relation Age of Onset    Diabetes Mother     Stroke Maternal Uncle      Social History     Tobacco Use    Smoking status: Never Smoker    Smokeless tobacco: Never Used   Substance Use Topics    Alcohol use: Yes     Comment: occasionally    Drug use: No       Allergies   Allergen Reactions    Lisinopril      cough     Current Facility-Administered Medications   Medication Dose Route Frequency Provider Last Rate Last Dose    sodium chloride flush 0.9 % injection 10 mL  10 mL Intravenous 2 times per day Leroy Pollard MD        sodium chloride flush 0.9 % injection 10 mL  10 mL Intravenous PRN Leroy Pollard MD        potassium chloride 10 mEq/100 mL IVPB (Peripheral Line)  10 mEq Intravenous PRN Leroy Pollard MD        magnesium sulfate 2 g in 50 mL IVPB premix  2 g Intravenous PRN Leroy Pollard MD        acetaminophen (TYLENOL) tablet 650 mg  650 mg Oral Q6H PRN Leroy Pollard MD        Or    acetaminophen (TYLENOL) suppository 650 mg  650 mg Rectal Q6H PRN Leroy Pollard MD        magnesium hydroxide (MILK OF MAGNESIA) 400 MG/5ML suspension 30 mL  30 mL Oral Daily PRN Leroy Pollard MD        promethazine (PHENERGAN) tablet 12.5 mg  12.5 mg Oral Q6H PRN Leroy Pollard MD        Or    ondansetron (ZOFRAN) injection 4 mg  4 mg Intravenous Q6H PRN Leroy Pollard MD        labetalol (NORMODYNE;TRANDATE) injection 10 mg  10 mg Intravenous Q4H PRN Lexa Andino MD        hydrALAZINE (APRESOLINE) tablet 100 mg  100 mg Oral BID Lexa Andino MD        amLODIPine (NORVASC) tablet 10 mg  10 mg Oral Daily Lexa Andino MD        aspirin EC tablet 81 mg  81 mg Oral Daily Lexa Andino MD        atorvastatin (LIPITOR) tablet 10 mg  10 mg Oral Daily Lexa Andino MD        losartan (COZAAR) tablet 100 mg  100 mg Oral Daily Lexa Andino MD        nebivolol (BYSTOLIC) tablet 20 mg  20 mg Oral Daily Lexa Andino MD        spironolactone (ALDACTONE) tablet 25 mg  25 mg Oral Daily Lexa Andino MD        metoprolol (LOPRESSOR) injection 5 mg  5 mg Intravenous Q5 Min PRN Nadeem Navarro MD   5 mg at 10/25/20 2142    heparin (porcine) injection 10,000 Units  10,000 Units Intravenous Once Elsa Arechiga MD        heparin (porcine) injection 10,000 Units  10,000 Units Intravenous PRN Elsa Arechiga MD   10,000 Units at 10/25/20 2237    heparin (porcine) injection 5,000 Units  5,000 Units Intravenous PRN Elsa Arechiga MD        heparin 25,000 units in dextrose 5% 250 mL infusion  15 Units/kg/hr Intravenous Continuous Marilee Bradley MD 21.8 mL/hr at 10/26/20 0721 15 Units/kg/hr at 10/26/20 0721       Physical Exam:  BP (!) 160/104   Pulse 70   Temp 98.1 °F (36.7 °C) (Oral)   Resp 20   Ht 5' 8\" (1.727 m)   Wt (!) 319 lb 10.7 oz (145 kg)   SpO2 99%   BMI 48.61 kg/m²     Intake/Output Summary (Last 24 hours) at 10/26/2020 0958  Last data filed at 10/26/2020 0538  Gross per 24 hour   Intake 522 ml   Output 250 ml   Net 272 ml     Wt Readings from Last 2 Encounters:   10/25/20 (!) 319 lb 10.7 oz (145 kg)   03/02/20 (!) 311 lb 3.2 oz (141.2 kg)     Constitutional: He is oriented to person, place, and time. He appears well-developed and well-nourished. In no acute distress. Head: Normocephalic and atraumatic. Eyes: JARRELL   Neck: Neck supple. No JVD present. Carotid bruit is not present.  No mass and no [id number]  Score  2   afib x 3 events since Feb 2020 & only taking asa not his eliquis due to cost     Trop bump and flat   trop 0.04  0.05  0.08 / suspect NSTEMI type II with ventricular rate 185    Supply demand with rapid HR     Hypokalemia  Potassium 3.2 / replace     HFpEF  Mild decompensated acute on chronic    Pro BNP 1219  /1+ edema in legs   Start low dose lasix p0     HTN   Uncontrolled    MARY  CPAP discussed      Obesity  Body mass index is 48.61 kg/m². Discussed diet and activity with me and nutritionist     HLD? ?   Lipids in am     Plan:  dc hep gtt start eliquis 5mg BID / try to get meds to beds before discharge    Change bystolic to coreg 20SG BID    Stop Norvasc / cont cozaar / apresoline / aldactone   Replacing potassium orally   Start lasix 20mg po daily   consult nutritionist for low salt low fat diet counciling   Will discuss with Dr. Fabby Jordan if antiarrythmic or afib ablation consult  would be appropriate  (afib x 3 events since Feb 2020)   CMP / lipids in am   If b/p is controlled plan home tomorrow     [address] [facility] cardiology

## 2020-10-26 NOTE — CONSULTS
Clinical Pharmacy Progress Note  Medication History     Admit Date: 10/25/2020    List of of current medications patient is taking is complete. Home Medication list in EPIC updated to reflect changes noted below. Source of information: interview with patient    Patient's home pharmacy: [address] ([phone])     Changes made to medication list:   Medication doses adjusted:    Hydralazine - pt takes 75 mg TID   Ibuprofen   Other notes:    Patient has been prescribed Apixaban in the past, but was not able to afford medication and stopped taking. He may be eligble for  coupon. Recommend using Meds to Beds on discharge to verify co-pay and ensure pt can afford medication. Please call with any questions.   Marva Degroot PharmD, [facility]  Wireless # [phone]  10/26/2020 9:45 AM

## 2020-10-26 NOTE — ED PROVIDER NOTES
[address] [facility] RESIDENT NOTE       Date of evaluation: 10/25/2020    Chief Complaint     Tachycardia    History of Present Illness     Jonatan Tolbert is a 48 y.o. male with a PMHx of afib on no AC, HTN, HFpEF, obesity who presents to the Emergency Department c/o afib and palpitations    Patient presents to the emergency department with reports of palpitations and atrial fibrillation. He was at the [facility] game earlier, he consumed 1 beer. He then felt acute onset palpitations and shortness of breath around 3:57 PM.  He tried to relax at home but he ultimately called the ambulance for transfer. He has a history of atrial fibrillation and formally was on Eliquis. He had a hospital stay in February regarding this. His EF was normal but he did have diastolic dysfunction, his left heart cath was normal.  Unclear why he was taken off Eliquis but he states it was because he could not afford it. He does not believe that he is ever been in A. fib since February until today. He does report shortness of breath. Denies chest pain, dizziness, syncope. Feels mildly lightheaded. No abdominal pain, nausea, vomiting, diarrhea. Patient has not yet tried any other treatment for their symptoms and nothing else seems to make them better or worse. Aside from the above, patient denies any aggravating or alleviating factors or associated symptoms. Review of Systems     Positive for palpitations, shortness of breath, lightheadedness. Negative for fevers, dizziness, syncope, headache. All other systems reviewed are negative except as mentioned in HPI. Past Medical, Surgical, Family, and Social History     He has a past medical history of Carpal tunnel syndrome, Chronic kidney disease, Hyperlipidemia, Hypertension, Obesity, unspecified, and MARY (obstructive sleep apnea).   He has a past surgical history that includes Nose surgery; Knee arthroscopy (Right); and Cardiac catheterization. His family history includes Diabetes in his mother; Stroke in his maternal uncle. He reports that he has never smoked. He has never used smokeless tobacco. He reports current alcohol use. He reports that he does not use drugs. Medications     Previous Medications    ACETAMINOPHEN (TYLENOL) 500 MG TABLET    Take 2 tablets by mouth 3 times daily as needed for Pain    AMLODIPINE (NORVASC) 10 MG TABLET    TAKE 1 TABLET BY MOUTH NIGHTLY    ASPIRIN 81 MG EC TABLET    TAKE 1 TABLET BY MOUTH DAILY    ATORVASTATIN (LIPITOR) 10 MG TABLET    TAKE 1 TABLET BY MOUTH DAILY    HYDRALAZINE (APRESOLINE) 100 MG TABLET    Take 1 tablet by mouth 2 times daily    HYDRALAZINE (APRESOLINE) 25 MG TABLET    Take 3 tablets by mouth 4 times daily    IBUPROFEN (ADVIL;MOTRIN) 600 MG TABLET    Take 1 tablet by mouth 3 times daily as needed for Pain    LOSARTAN (COZAAR) 100 MG TABLET    TAKE 1 TABLET BY MOUTH DAILY    NEBIVOLOL (BYSTOLIC) 20 MG TABS TABLET    Take 1 tablet by mouth daily    SERTRALINE (ZOLOFT) 50 MG TABLET    TAKE 1 TABLET BY MOUTH DAILY    SPIRONOLACTONE (ALDACTONE) 25 MG TABLET    TAKE 1 TABLET BY MOUTH DAILY       Allergies     He is allergic to lisinopril. Physical Exam     INITIAL VITALS: BP: (!) 207/127, Temp: 98.1 °F (36.7 °C), Pulse: 193, Resp: 18, SpO2: 97 %     General:  Nontoxic appearing, well-developed, well-nourished male. Obese    HEENT:  Normocephalic, atraumatic     Eyes: Anicteric, EOMI, PERRLA     Neck:  Supple, full ROM    Pulmonary:   Clear to auscultation bilaterally, no wheezes, rales, rhonchi    Cardiac:  Tachycardic, irregularly irregular    Abdomen:  Soft, nondistended, nontender. No masses.  No guarding, no rebound     Extremities:  Warm, 2+ radial pulses    Skin: No rashes or bruises    Neuro:   Awake, alert, moving UE and LE spontaneously    Psych:   Mood and affect appropriate, answering questions appropriately      Diagnostic Results     EKG #1  Interpreted in 8.2 g/dL    Alb 4.5 3.4 - 5.0 g/dL    Albumin/Globulin Ratio 1.6 1.1 - 2.2    Total Bilirubin 0.6 0.0 - 1.0 mg/dL    Alkaline Phosphatase 104 40 - 129 U/L    ALT 30 10 - 40 U/L    AST 42 (H) 15 - 37 U/L    Globulin 2.8 g/dL   Troponin   Result Value Ref Range    Troponin 0.04 (H) <0.01 ng/mL   Brain Natriuretic Peptide   Result Value Ref Range    Pro-BNP 1,219 (H) 0 - 124 pg/mL   Protime-INR   Result Value Ref Range    Protime 12.0 10.0 - 13.2 sec    INR 1.03 0.86 - 1.14   APTT   Result Value Ref Range    aPTT 33.0 24.2 - 36.2 sec   Blood Gas, Venous   Result Value Ref Range    pH, Ros[place] 7.426 7.350 - 7.450    pCO2, Rosendo 36.1 (L) 41.0 - 51.0 mmHg    pO2, Rosendo 72.6 (H) 25.0 - 40.0 mmHg    HCO3, Venous 23.3 (L) 24.0 - 28.0 mmol/L    Base Excess, Rosendo -0.1 -2.0 - 3.0 mmol/L    O2 Sat, Rosendo 95 Not established %    Carboxyhemoglobin 2.0 (H) 0.0 - 1.5 %    MetHgb, Rosendo 0.4 0.0 - 1.5 %    TC02 (Calc), Rosnedo 24 mmol/L    Hemoglobin, Rosendo, Reduced 5.00 %   Lactic Acid, Plasma   Result Value Ref Range    Lactic Acid 1.9 0.4 - 2.0 mmol/L   Urinalysis Reflex to Culture    Specimen: Urine, clean catch   Result Value Ref Range    Urine Type not given    Magnesium   Result Value Ref Range    Magnesium 1.90 1.80 - 2.40 mg/dL          RECENT VITALS:  BP: (!) 140/90, Temp: 98.1 °F (36.7 °C), Pulse: 70(NSR), Resp: 16, SpO2: 97 %     Procedures     Procedures    ED Course     Nursing Notes, Past Medical Hx, Past Surgical Hx, Social Hx, Allergies, and Family Hx were reviewed.     The patient was given the following medications:  Orders Placed This Encounter   Medications    metoprolol (LOPRESSOR) injection 5 mg    metoprolol tartrate (LOPRESSOR) tablet 100 mg    metoprolol (LOPRESSOR) 5 MG/5ML injection     STEPHANY CAMPOS: cabinet override    heparin (porcine) injection 10,000 Units    heparin (porcine) injection 10,000 Units    heparin (porcine) injection 5,000 Units    heparin 25,000 units in dextrose 5% 250 mL infusion    procainamide (PRONESTYL) 1,000 mg in dextrose 5 % 100 mL bolus       CONSULTS:  IP CONSULT TO HOSPITALIST    MEDICAL DECISION MAKING / ASSESSMENT / Brice Familia is a 48 y.o. male with a history and presentation as described above in HPI. The patient was evaluated by myself and the ED Attending Physician, Dr. Alejo Lucia MD. All management and disposition plans were discussed and agreed upon. Appropriate labs and diagnostic studies were reviewed as they were made available. Pertinent laboratory studies in medical decision making are listed below. Upon presentation, the patient was evaluated by me. He is mentating, his blood pressure is elevated with systolic above 717. His heart rate is in the 180s. EKG shows that it is atrial fibrillation. Unfortunately, he was taken off his Eliquis, we discussed electrical cardioversion given that he is likely only been in this rhythm for approximately 5 hours. He declined. We will trial beta-blockade. He was given 5 mg of IV metoprolol followed closely by another 5 5 mg given that it seemed to have an effect. This was followed by metoprolol tartrate 100 mg. Unfortunately this was unsuccessful. We tried 1 g of procainamide over an hour and this was successful in converting him to sinus rhythm at a rate of 70s. His neuro exam remained intact. His troponin is mildly elevated at 0.04, his NT proBNP is elevated at 1200. His potassium is mildly low. Lactate reassuring. We did start him on a heparin drip given that his chads vas score is 1. I believe that he will require further observation and to see cardiology while he is in the hospital.  He may need to be restarted on Eliquis. We will also trend his troponins. He voices understanding and agreement with this care plan. All questions answered.   Will discuss with the hospitalist    At this time the patient has been admitted to the hospitalist for further evaluation and management of A. fib with RVR. The patient will continue to be monitored here in the emergency department until which time he is moved to his new treatment location. Clinical Impression     1. Atrial fibrillation with rapid ventricular response ([address]    2. Essential hypertension        Disposition     PATIENT REFERRED TO:  No follow-up provider specified.     DISCHARGE MEDICATIONS:  New Prescriptions    No medications on file       DISPOSITION Decision To Admit 10/25/2020 09:28:21 PM          Roma Berry MD  Resident  10/26/20 7289

## 2020-10-27 VITALS
BODY MASS INDEX: 47.74 KG/M2 | RESPIRATION RATE: 18 BRPM | WEIGHT: 315 LBS | HEART RATE: 76 BPM | TEMPERATURE: 98 F | HEIGHT: 68 IN | DIASTOLIC BLOOD PRESSURE: 62 MMHG | OXYGEN SATURATION: 99 % | SYSTOLIC BLOOD PRESSURE: 116 MMHG

## 2020-10-27 LAB
A/G RATIO: 1.7 (ref 1.1–2.2)
ALBUMIN SERPL-MCNC: 4 G/DL (ref 3.4–5)
ALP BLD-CCNC: 70 U/L (ref 40–129)
ALT SERPL-CCNC: 25 U/L (ref 10–40)
ANION GAP SERPL CALCULATED.3IONS-SCNC: 12 MMOL/L (ref 3–16)
ANION GAP SERPL CALCULATED.3IONS-SCNC: 6 MMOL/L (ref 3–16)
AST SERPL-CCNC: 38 U/L (ref 15–37)
BILIRUB SERPL-MCNC: 0.9 MG/DL (ref 0–1)
BUN BLDV-MCNC: 16 MG/DL (ref 7–20)
BUN BLDV-MCNC: 16 MG/DL (ref 7–20)
CALCIUM SERPL-MCNC: 8.7 MG/DL (ref 8.3–10.6)
CALCIUM SERPL-MCNC: 8.9 MG/DL (ref 8.3–10.6)
CHLORIDE BLD-SCNC: 104 MMOL/L (ref 99–110)
CHLORIDE BLD-SCNC: 104 MMOL/L (ref 99–110)
CHOLESTEROL, TOTAL: 135 MG/DL (ref 0–199)
CO2: 23 MMOL/L (ref 21–32)
CO2: 27 MMOL/L (ref 21–32)
CREAT SERPL-MCNC: 1.1 MG/DL (ref 0.9–1.3)
CREAT SERPL-MCNC: 1.1 MG/DL (ref 0.9–1.3)
GFR AFRICAN AMERICAN: >60
GFR AFRICAN AMERICAN: >60
GFR NON-AFRICAN AMERICAN: >60
GFR NON-AFRICAN AMERICAN: >60
GLOBULIN: 2.4 G/DL
GLUCOSE BLD-MCNC: 108 MG/DL (ref 70–99)
GLUCOSE BLD-MCNC: 115 MG/DL (ref 70–99)
HCT VFR BLD CALC: 45.4 % (ref 40.5–52.5)
HDLC SERPL-MCNC: 26 MG/DL (ref 40–60)
HEMOGLOBIN: 14.8 G/DL (ref 13.5–17.5)
LDL CHOLESTEROL CALCULATED: 77 MG/DL
MAGNESIUM: 1.9 MG/DL (ref 1.8–2.4)
MCH RBC QN AUTO: 28.7 PG (ref 26–34)
MCHC RBC AUTO-ENTMCNC: 32.6 G/DL (ref 31–36)
MCV RBC AUTO: 87.9 FL (ref 80–100)
PDW BLD-RTO: 14.8 % (ref 12.4–15.4)
PLATELET # BLD: 191 K/UL (ref 135–450)
PMV BLD AUTO: 9.8 FL (ref 5–10.5)
POTASSIUM REFLEX MAGNESIUM: 4.1 MMOL/L (ref 3.5–5.1)
POTASSIUM SERPL-SCNC: 4.4 MMOL/L (ref 3.5–5.1)
RBC # BLD: 5.16 M/UL (ref 4.2–5.9)
SODIUM BLD-SCNC: 137 MMOL/L (ref 136–145)
SODIUM BLD-SCNC: 139 MMOL/L (ref 136–145)
TOTAL PROTEIN: 6.4 G/DL (ref 6.4–8.2)
TRIGL SERPL-MCNC: 158 MG/DL (ref 0–150)
VITAMIN D 25-HYDROXY: 26.3 NG/ML
VLDLC SERPL CALC-MCNC: 32 MG/DL
WBC # BLD: 11.5 K/UL (ref 4–11)

## 2020-10-27 PROCEDURE — 83735 ASSAY OF MAGNESIUM: CPT

## 2020-10-27 PROCEDURE — 36415 COLL VENOUS BLD VENIPUNCTURE: CPT

## 2020-10-27 PROCEDURE — 82306 VITAMIN D 25 HYDROXY: CPT

## 2020-10-27 PROCEDURE — 6360000002 HC RX W HCPCS: Performed by: INTERNAL MEDICINE

## 2020-10-27 PROCEDURE — 85027 COMPLETE CBC AUTOMATED: CPT

## 2020-10-27 PROCEDURE — 2580000003 HC RX 258: Performed by: INTERNAL MEDICINE

## 2020-10-27 PROCEDURE — 6370000000 HC RX 637 (ALT 250 FOR IP): Performed by: INTERNAL MEDICINE

## 2020-10-27 PROCEDURE — G0008 ADMIN INFLUENZA VIRUS VAC: HCPCS | Performed by: INTERNAL MEDICINE

## 2020-10-27 PROCEDURE — 90686 IIV4 VACC NO PRSV 0.5 ML IM: CPT | Performed by: INTERNAL MEDICINE

## 2020-10-27 PROCEDURE — 80061 LIPID PANEL: CPT

## 2020-10-27 PROCEDURE — 80053 COMPREHEN METABOLIC PANEL: CPT

## 2020-10-27 PROCEDURE — 99233 SBSQ HOSP IP/OBS HIGH 50: CPT | Performed by: NURSE PRACTITIONER

## 2020-10-27 PROCEDURE — 6370000000 HC RX 637 (ALT 250 FOR IP): Performed by: NURSE PRACTITIONER

## 2020-10-27 RX ORDER — FUROSEMIDE 20 MG/1
20 TABLET ORAL DAILY PRN
Qty: 60 TABLET | Refills: 3 | Status: SHIPPED | OUTPATIENT
Start: 2020-10-27 | End: 2021-01-12 | Stop reason: SDUPTHER

## 2020-10-27 RX ORDER — CARVEDILOL 25 MG/1
25 TABLET ORAL 2 TIMES DAILY WITH MEALS
Qty: 60 TABLET | Refills: 3 | Status: SHIPPED | OUTPATIENT
Start: 2020-10-27 | End: 2021-05-07 | Stop reason: SDUPTHER

## 2020-10-27 RX ADMIN — CARVEDILOL 25 MG: 25 TABLET, FILM COATED ORAL at 08:56

## 2020-10-27 RX ADMIN — LOSARTAN POTASSIUM 100 MG: 100 TABLET, FILM COATED ORAL at 08:56

## 2020-10-27 RX ADMIN — SPIRONOLACTONE 25 MG: 25 TABLET, FILM COATED ORAL at 08:56

## 2020-10-27 RX ADMIN — APIXABAN 5 MG: 5 TABLET, FILM COATED ORAL at 08:56

## 2020-10-27 RX ADMIN — ATORVASTATIN CALCIUM 10 MG: 10 TABLET, FILM COATED ORAL at 08:56

## 2020-10-27 RX ADMIN — HYDRALAZINE HYDROCHLORIDE 75 MG: 50 TABLET, FILM COATED ORAL at 06:04

## 2020-10-27 RX ADMIN — ASPIRIN 81 MG: 81 TABLET, COATED ORAL at 08:56

## 2020-10-27 RX ADMIN — FUROSEMIDE 20 MG: 20 TABLET ORAL at 08:56

## 2020-10-27 RX ADMIN — INFLUENZA A VIRUS A/VICTORIA/2454/2019 IVR-207 (H1N1) ANTIGEN (PROPIOLACTONE INACTIVATED), INFLUENZA A VIRUS A/HONG KONG/2671/2019 IVR-208 (H3N2) ANTIGEN (PROPIOLACTONE INACTIVATED), INFLUENZA B VIRUS B/VICTORIA/705/2018 BVR-11 ANTIGEN (PROPIOLACTONE INACTIVATED), INFLUENZA B VIRUS B/PHUKET/3073/2013 BVR-1B ANTIGEN (PROPIOLACTONE INACTIVATED) 0.5 ML: 15; 15; 15; 15 INJECTION, SUSPENSION INTRAMUSCULAR at 11:52

## 2020-10-27 RX ADMIN — Medication 10 ML: at 08:57

## 2020-10-27 ASSESSMENT — PAIN SCALES - GENERAL
PAINLEVEL_OUTOF10: 0

## 2020-10-27 NOTE — PLAN OF CARE
Problem: Cardiac:  Goal: Ability to maintain an adequate cardiac output will improve  Description: Ability to maintain an adequate cardiac output will improve  Outcome: Ongoing   Pt has NSR on telemetry and BP is still elevated, but has improved since admission. Pt is on scheduled PO hydralazine and PO lasix. Will continue to monitor pt's vital signs, specifically blood pressure. Problem: Safety:  Goal: Will show no signs and symptoms of excessive bleeding  Description: Will show no signs and symptoms of excessive bleeding  Outcome: Ongoing   Pt is off the heparin drip and has been placed on eliquis as his anticoagulant. Will continue to monitor for signs and symptoms of excessive bleeding. Problem: Falls - Risk of:  Goal: Will remain free from falls  Description: Will remain free from falls  Outcome: Ongoing   Pt is up ad albert and independent when needing to use the bathroom. Pt denies any pain, lightheadedness, or SOB.

## 2020-10-27 NOTE — PROGRESS NOTES
[address]       In Patient  Progress note        Junior Cast MD,  [address] [facility], [place]   Daily Progress Note      Admit Date:  10/25/2020  Primary Cardiologist : Gaetano Prasad       CC: Interval Hx:   The patient is 48 y.o. male is being seen today and followed by cardiology for c/o having palpitations when he returned home from [place]. states felt fine at the game and after returning home he felt SOB and palpitations. He states he had one beer & soft drinks at the game with eating salty foods lately. He denies cp/fever/presyncope or syncope     PMH afib x 3 events since Feb 2020 & only taking asa not his eliquis due to cost / HTN which appears to be uncontrolled & MARY /obesity      Today VSS in NSR afebrile SV02 97% on RA   b/p is not as high today but would like better control / cont his b/p meds and we discussed wt lose low sodium diet with limited fluid intake Aiden ann in office   Cont asa eliquis Lipitor coreg lasix apresoline cozaar klorcon aldactone  Consider out pt EP consult for afib ablation     testing   trop 0.04  0.05  0.08 / suspect NSTEMI type II with ventricular rate 185    Mg TSH wnl    2/19/20 TTE EF 5-60% DDGII LA Dimension: 5.1 cm  2/20/20 LHC No evidence   coronary artery disease  Ole left renal stent patent. Normal LV function      EKG 10/25/20 V rate 185 afib   EKG 10/26/20 Vent.  rate 71   CA interval 162 ms  QRS duration 100 ms  QT/QTc 482/523 ms  I have examined pt and reviewed notes / any lab work EKGs stress test, angiograms, & images reviewed    Objective:   BP (!) 141/80   Pulse 76   Temp 97.8 °F (36.6 °C) (Oral)   Resp 18   Ht 5' 8\" (1.727 m)   Wt (!) 319 lb 10.7 oz (145 kg)   SpO2 97%   BMI 48.61 kg/m²       Intake/Output Summary (Last 24 hours) at 10/27/2020 0944  Last data filed at 10/27/2020 0995  Gross per 24 hour   Intake 1150 ml   Output 350 ml   Net 800 ml     Wt Readings from Last 3 Encounters:   10/25/20 (!) 319 lb 10.7 oz (145 kg)   03/02/20 (!) 311 lb 3.2 oz (141.2 kg)   02/27/20 (!) 309 lb (140.2 kg)       Physical Exam:   BP (!) 141/80   Pulse 76   Temp 97.8 °F (36.6 °C) (Oral)   Resp 18   Ht 5' 8\" (1.727 m)   Wt (!) 319 lb 10.7 oz (145 kg)   SpO2 97%   BMI 48.61 kg/m²   BP Readings from Last 3 Encounters:   10/27/20 (!) 141/80   03/02/20 (!) 142/100   02/27/20 (!) 144/90     Pulse Readings from Last 3 Encounters:   10/27/20 76   03/02/20 64   02/27/20 69       Intake/Output Summary (Last 24 hours) at 10/27/2020 0944  Last data filed at 10/27/2020 3201  Gross per 24 hour   Intake 1150 ml   Output 350 ml   Net 800 ml     Wt Readings from Last 2 Encounters:   10/25/20 (!) 319 lb 10.7 oz (145 kg)   03/02/20 (!) 311 lb 3.2 oz (141.2 kg)     Constitutional:In no acute distress. Head: Normocephalic and atraumatic. Eyes: PEERL   Neck: Neck supple. No JVD present. Carotid bruit is not present. No mass and no thyromegaly present. No lymphadenopathy present. Cardiovascular: Normal rate, regular rhythm, normal heart sounds and intact distal pulses. Exam reveals no gallop and no friction rub. No murmur heard. Pulmonary/Chest: Effort normal and breath sounds normal. No respiratory distress. He has no wheezes, rhonchi or rales. Abdominal: Soft, non-tender. Bowel sounds and aorta are normal. He exhibits no organomegaly, mass or bruit. Extremities: <1+edema, cyanosis, or clubbing. Pulses are 2+ radial/carotid/dorsalis pedis and posterior tibial bilaterally. Neurological: oriented to person place    Skin: Skin is warm and dry. There is no rash or diaphoresis. Psychiatric: He has a normal mood and affect.  His speech is normal and behavior is normal.     Medications:    sodium chloride flush  10 mL Intravenous 2 times per day    aspirin  81 mg Oral Daily    atorvastatin  10 mg Oral Daily    losartan  100 mg Oral Daily    spironolactone  25 mg Oral Daily    hydrALAZINE 75 mg Oral 3 times per day    carvedilol  25 mg Oral BID WC    apixaban  5 mg Oral BID    furosemide  20 mg Oral Daily    influenza virus vaccine  0.5 mL Intramuscular Prior to discharge     Recent Labs     10/25/20  2132 10/26/20  0430 10/27/20  0442   WBC 11.6* 9.7 11.5*   HGB 16.3 14.9 14.8    187 191     BMP:    Recent Labs     10/25/20  2132 10/26/20  0430 10/27/20  0442    139 137   K 3.4* 3.2* 4.1   CO2 22 22 27   BUN 19 20 16   CREATININE 0.9 0.9 1.1     LIVR:   Recent Labs     10/25/20  2132   AST 42*   ALT 30     INR:    Recent Labs     10/25/20  2132   INR 1.03     APTT:   Recent Labs     10/25/20  2132   APTT 33.0     Reviewed  available lab work,  EKGs, images, LHC       Assessment    PAF / in NSR / EF wnl  / The left atrium is dilated /  LA Dimension: 5.1 cm    Risk   Factors   Component Value   C CHF No 0   H HTN Yes 1   A2 Age >= 76 No,  (48 y.o.) 0   D DM No 0   S2 Prior Stroke/TIA No 0   V Vascular Disease Yes 1   A Age 74-69 No,  (54 y.o.) 0   Sc Sex male 0     [id number]  Score   2   afib x 3 events since Feb 2020 & only taking asa not his eliquis due to cost   Start eliquis / mg TSH wnl / denies falls or bleeds Hgb wnl   Consider EP consult as out pt.      Trop bump and flat   trop 0.04  0.05  0.08 / suspect NSTEMI type II with ventricular rate 185    Supply demand with rapid HR      Hypokalemia  Potassium 3.2 / replaced      HFpEF  Mild decompensated acute on chronic    Pro BNP 1219  /1+ edema in legs   Start low dose lasix po      HTN   Uncontrolled     MARY  CPAP discussed       Obesity  Body mass index is 48.61 kg/m². Discussed diet and activity with me and nutritionist      JUANA? ?   Lipids in am /awaiting results      Plan:   in NSR / b/p is not as high today but would like better control / cont his b/p meds and we discussed wt lose low sodium diet with limited fluid intake Hans Jackson fu in office   Cont asa eliquis Lipitor coreg lasix apresoline cozaar Michelle Mohamud aldactone  Awaiting lipid profile results   Consider out pt EP consult for afib ablation   May discharge today from cardiac standpoint   Fu with Cris Hernandez / Dr Solano Shall in 2-3 weeks     Donnal Prudent APRN, [address]

## 2020-10-27 NOTE — PROGRESS NOTES
Review discharge instructions with patient  Discuss follow up appointments , new RX,medication to stop taking  Patient verbalizes understanding  Patient states he does not have money or credit card with him  to pay for new RX at outpatient pharmacy  States family member doesn't either  Patient states he will return to  RX

## 2020-10-27 NOTE — DISCHARGE SUMMARY
Discharge Summary    Date:10/27/2020        Patient Name:Jonny Banks     YOB: 1965     Age:55 y.o. Admit Date:10/25/2020   Admission Condition:fair   Discharged Condition:good  Discharge Date: 10/27/20    Discharge Diagnoses   Active Problems:    A-fib [facility])    H/O angiography    Atrial fibrillation with rapid ventricular response ([address]    Essential hypertension  Resolved Problems:    * No resolved hospital problems. [facility] Stay   Narrative of Hospital Course:     49 y/o male came in with CHRIS mclain with RVR and shortness of breath. Patient was given Lopressor and procainamide in the emergency room with a conversion into normal sinus rhythm. Cardiology was consulted, they have started patient on Coreg 25 mg p.o. twice daily. Patient has been maintained in normal sinus rhythm. Cardiology had recommended Eliquis 5 mg p.o. twice daily and they have arranged for the prescription. He also has a resistant hypertension on multiple antihypertensive medications which will be continued on discharge. Cardiology will do outpatient follow-up. On dc Patient denies any CP,SOB,Denies any nausea, vomiting, abdominal pain. Denies any diarrhea. Patient denies any palpitations, lightheadedness, orthopnea, PND. Patient doesn't appear to be in any distress on discharge . Physical exam   Vitals:    10/27/20 0457 10/27/20 0607 10/27/20 0845 10/27/20 1115   BP: (!) 155/93  (!) 141/80 116/62   Pulse: 71 81 76    Resp: 18  18 18   Temp: 97.5 °F (36.4 °C)  97.8 °F (36.6 °C) 98 °F (36.7 °C)   TempSrc: Oral  Oral Oral   SpO2: 96%  97% 99%   Weight:       Height:             Physical Exam  Constitutional:       Appearance: Normal appearance. HENT:      Head: Normocephalic and atraumatic. Cardiovascular:      Rate and Rhythm: Normal rate and regular rhythm. Pulses: Normal pulses. Heart sounds: Normal heart sounds.    Pulmonary:      Effort: Pulmonary effort is normal.      Breath sounds: Normal breath sounds. Abdominal:      General: Abdomen is flat. Palpations: Abdomen is soft. Musculoskeletal: Normal range of motion. General: No swelling or tenderness. Skin:     General: Skin is warm and dry. Capillary Refill: Capillary refill takes less than 2 seconds. Neurological:      General: No focal deficit present. Mental Status: He is alert and oriented to person, place, and time. Psychiatric:         Mood and Affect: Mood normal.         Behavior: Behavior normal.           Consultants:   IP CONSULT TO HOSPITALIST  IP CONSULT TO PHARMACY  IP CONSULT TO CARDIOLOGY  IP CONSULT TO DIETITIAN  IP CONSULT TO SOCIAL WORK    Time Spent on Discharge:  45 minutes were spent in patient examination, evaluation, counseling as well as medication reconciliation, prescriptions for required medications, discharge plan and follow up. Surgeries/Procedures Performed:   none        Significant Diagnostic Studies:   Recent Labs:  CBC:   Recent Labs     10/25/20  2132 10/26/20  0430 10/27/20  0442   WBC 11.6* 9.7 11.5*   HGB 16.3 14.9 14.8   HCT 48.4 44.8 45.4   MCV 85.9 87.0 87.9    187 191     BMP:   Recent Labs     10/25/20  2132 10/26/20  0430 10/27/20  0442    139 139  137   K 3.4* 3.2* 4.4  4.1    104 104  104   CO2 22 22 23  27   BUN 19 20 16  16   CREATININE 0.9 0.9 1.1  1.1     Mag:   Lab Results   Component Value Date    MG 1.90 10/27/2020     LIVER PROFILE:   Recent Labs     10/25/20  2132 10/27/20  0442   AST 42* 38*   ALT 30 25   BILITOT 0.6 0.9   ALKPHOS 104 70     PT/INR:   Recent Labs     10/25/20  2132   PROTIME 12.0   INR 1.03     APTT:   Recent Labs     10/25/20  2132   APTT 33.0     BNP:  No results for input(s): BNP in the last 72 hours. CARDIAC ENZYMES:   Recent Labs     10/26/20  0027 10/26/20  0430 10/26/20  1127   TROPONINI 0.05* 0.08* 0.08*         Radiology Last 7 Days:  XR CHEST PORTABLE   Final Result   Impression:   1.  No airspace disease. Discharge Plan   Disposition: Home    Provider Follow-Up:   JARET Chavarria - CNP  [address]  [phone]    In 2 weeks         Hospital/Incidental Findings Requiring Follow-Up:  XR CHEST PORTABLE   Final Result   Impression:   1. No airspace disease. Discharge Medications         Medication List      START taking these medications    apixaban 5 MG Tabs tablet  Commonly known as:  ELIQUIS  Take 1 tablet by mouth 2 times daily     carvedilol 25 MG tablet  Commonly known as:  COREG  Take 1 tablet by mouth 2 times daily (with meals)     furosemide 20 MG tablet  Commonly known as:  LASIX  Take 1 tablet by mouth daily as needed (edema, weight gain >3lbs in day)        CHANGE how you take these medications    hydrALAZINE 25 MG tablet  Commonly known as:  APRESOLINE  Take 3 tablets by mouth 4 times daily  What changed:  when to take this        CONTINUE taking these medications    acetaminophen 500 MG tablet  Commonly known as:  TYLENOL  Take 2 tablets by mouth 3 times daily as needed for Pain     aspirin 81 MG EC tablet  TAKE 1 TABLET BY MOUTH DAILY     atorvastatin 10 MG tablet  Commonly known as:  LIPITOR  TAKE 1 TABLET BY MOUTH DAILY     losartan 100 MG tablet  Commonly known as:  COZAAR  TAKE 1 TABLET BY MOUTH DAILY     sertraline 50 MG tablet  Commonly known as:  ZOLOFT  TAKE 1 TABLET BY MOUTH DAILY     spironolactone 25 MG tablet  Commonly known as:  ALDACTONE  TAKE 1 TABLET BY MOUTH DAILY        STOP taking these medications    amLODIPine 10 MG tablet  Commonly known as:  NORVASC     Bystolic 20 MG Tabs tablet  Generic drug:  nebivolol     ibuprofen 200 MG tablet  Commonly known as:  ADVIL;MOTRIN           Where to Get Your Medications      These medications were sent to [facility], [address] [phone] - [phone] [facility] [address]    Phone:  [phone] · carvedilol 25 MG tablet  · furosemide 20 MG tablet     You can get these medications from any pharmacy    Bring a paper prescription for each of these medications  · apixaban 5 MG Tabs tablet         Electronically signed by Andres Monteiro MD on 10/27/20 at 12:20 PM EDT

## 2020-11-03 PROBLEM — I48.91 A-FIB (HCC): Status: RESOLVED | Noted: 2020-10-26 | Resolved: 2020-11-03

## 2021-01-06 ENCOUNTER — APPOINTMENT (OUTPATIENT)
Dept: GENERAL RADIOLOGY | Age: 56
DRG: 309 | End: 2021-01-06
Payer: COMMERCIAL

## 2021-01-06 ENCOUNTER — HOSPITAL ENCOUNTER (INPATIENT)
Age: 56
LOS: 1 days | Discharge: HOME | DRG: 309 | End: 2021-01-08
Attending: EMERGENCY MEDICINE | Admitting: INTERNAL MEDICINE
Payer: COMMERCIAL

## 2021-01-06 DIAGNOSIS — I48.91 ATRIAL FIBRILLATION WITH RAPID VENTRICULAR RESPONSE (HCC): Primary | ICD-10-CM

## 2021-01-06 DIAGNOSIS — I48.92 ATRIAL FLUTTER, UNSPECIFIED TYPE (HCC): ICD-10-CM

## 2021-01-06 LAB
ANION GAP SERPL CALCULATED.3IONS-SCNC: 12 MMOL/L (ref 3–16)
BASOPHILS ABSOLUTE: 0.1 K/UL (ref 0–0.2)
BASOPHILS RELATIVE PERCENT: 0.7 %
BUN BLDV-MCNC: 17 MG/DL (ref 7–20)
CALCIUM SERPL-MCNC: 9.4 MG/DL (ref 8.3–10.6)
CHLORIDE BLD-SCNC: 103 MMOL/L (ref 99–110)
CO2: 22 MMOL/L (ref 21–32)
CREAT SERPL-MCNC: 0.9 MG/DL (ref 0.9–1.3)
EOSINOPHILS ABSOLUTE: 0.1 K/UL (ref 0–0.6)
EOSINOPHILS RELATIVE PERCENT: 1.6 %
GFR AFRICAN AMERICAN: >60
GFR NON-AFRICAN AMERICAN: >60
GLUCOSE BLD-MCNC: 149 MG/DL (ref 70–99)
HCT VFR BLD CALC: 47.8 % (ref 40.5–52.5)
HEMOGLOBIN: 15.7 G/DL (ref 13.5–17.5)
LYMPHOCYTES ABSOLUTE: 1.9 K/UL (ref 1–5.1)
LYMPHOCYTES RELATIVE PERCENT: 20.3 %
MCH RBC QN AUTO: 28.1 PG (ref 26–34)
MCHC RBC AUTO-ENTMCNC: 32.8 G/DL (ref 31–36)
MCV RBC AUTO: 85.8 FL (ref 80–100)
MONOCYTES ABSOLUTE: 0.8 K/UL (ref 0–1.3)
MONOCYTES RELATIVE PERCENT: 8.2 %
NEUTROPHILS ABSOLUTE: 6.3 K/UL (ref 1.7–7.7)
NEUTROPHILS RELATIVE PERCENT: 69.2 %
PDW BLD-RTO: 15.3 % (ref 12.4–15.4)
PLATELET # BLD: 186 K/UL (ref 135–450)
PMV BLD AUTO: 10.3 FL (ref 5–10.5)
POTASSIUM REFLEX MAGNESIUM: 4 MMOL/L (ref 3.5–5.1)
PRO-BNP: 1326 PG/ML (ref 0–124)
RBC # BLD: 5.57 M/UL (ref 4.2–5.9)
SODIUM BLD-SCNC: 137 MMOL/L (ref 136–145)
TROPONIN: 0.02 NG/ML
WBC # BLD: 9.1 K/UL (ref 4–11)

## 2021-01-06 PROCEDURE — 71046 X-RAY EXAM CHEST 2 VIEWS: CPT

## 2021-01-06 PROCEDURE — 2500000003 HC RX 250 WO HCPCS: Performed by: STUDENT IN AN ORGANIZED HEALTH CARE EDUCATION/TRAINING PROGRAM

## 2021-01-06 PROCEDURE — 83880 ASSAY OF NATRIURETIC PEPTIDE: CPT

## 2021-01-06 PROCEDURE — 80048 BASIC METABOLIC PNL TOTAL CA: CPT

## 2021-01-06 PROCEDURE — 2580000003 HC RX 258: Performed by: STUDENT IN AN ORGANIZED HEALTH CARE EDUCATION/TRAINING PROGRAM

## 2021-01-06 PROCEDURE — 96365 THER/PROPH/DIAG IV INF INIT: CPT

## 2021-01-06 PROCEDURE — 99285 EMERGENCY DEPT VISIT HI MDM: CPT

## 2021-01-06 PROCEDURE — 96375 TX/PRO/DX INJ NEW DRUG ADDON: CPT

## 2021-01-06 PROCEDURE — 84484 ASSAY OF TROPONIN QUANT: CPT

## 2021-01-06 PROCEDURE — 93005 ELECTROCARDIOGRAM TRACING: CPT | Performed by: STUDENT IN AN ORGANIZED HEALTH CARE EDUCATION/TRAINING PROGRAM

## 2021-01-06 PROCEDURE — 85025 COMPLETE CBC W/AUTO DIFF WBC: CPT

## 2021-01-06 PROCEDURE — 96376 TX/PRO/DX INJ SAME DRUG ADON: CPT

## 2021-01-06 PROCEDURE — 96366 THER/PROPH/DIAG IV INF ADDON: CPT

## 2021-01-06 RX ORDER — DILTIAZEM HYDROCHLORIDE 5 MG/ML
10 INJECTION INTRAVENOUS ONCE
Status: COMPLETED | OUTPATIENT
Start: 2021-01-06 | End: 2021-01-06

## 2021-01-06 RX ADMIN — DILTIAZEM HYDROCHLORIDE 10 MG: 5 INJECTION INTRAVENOUS at 22:43

## 2021-01-06 RX ADMIN — DILTIAZEM HYDROCHLORIDE 5 MG/HR: 5 INJECTION INTRAVENOUS at 23:19

## 2021-01-06 ASSESSMENT — PAIN DESCRIPTION - LOCATION: LOCATION: CHEST

## 2021-01-06 ASSESSMENT — PAIN SCALES - GENERAL: PAINLEVEL_OUTOF10: 6

## 2021-01-07 PROBLEM — I48.91 ATRIAL FIBRILLATION WITH RVR (HCC): Status: ACTIVE | Noted: 2021-01-07

## 2021-01-07 LAB
ALBUMIN SERPL-MCNC: 3.9 G/DL (ref 3.4–5)
ALP BLD-CCNC: 80 U/L (ref 40–129)
ALT SERPL-CCNC: 25 U/L (ref 10–40)
AST SERPL-CCNC: 46 U/L (ref 15–37)
BILIRUB SERPL-MCNC: 0.4 MG/DL (ref 0–1)
BILIRUBIN DIRECT: <0.2 MG/DL (ref 0–0.3)
BILIRUBIN, INDIRECT: ABNORMAL MG/DL (ref 0–1)
EKG ATRIAL RATE: 143 BPM
EKG ATRIAL RATE: 150 BPM
EKG ATRIAL RATE: 48 BPM
EKG DIAGNOSIS: NORMAL
EKG P AXIS: 4 DEGREES
EKG P-R INTERVAL: 116 MS
EKG P-R INTERVAL: 158 MS
EKG Q-T INTERVAL: 282 MS
EKG Q-T INTERVAL: 294 MS
EKG Q-T INTERVAL: 468 MS
EKG QRS DURATION: 84 MS
EKG QRS DURATION: 88 MS
EKG QRS DURATION: 96 MS
EKG QTC CALCULATION (BAZETT): 418 MS
EKG QTC CALCULATION (BAZETT): 435 MS
EKG QTC CALCULATION (BAZETT): 498 MS
EKG R AXIS: -62 DEGREES
EKG R AXIS: -65 DEGREES
EKG R AXIS: -70 DEGREES
EKG T AXIS: 118 DEGREES
EKG T AXIS: 138 DEGREES
EKG T AXIS: 156 DEGREES
EKG VENTRICULAR RATE: 143 BPM
EKG VENTRICULAR RATE: 173 BPM
EKG VENTRICULAR RATE: 48 BPM
MAGNESIUM: 2 MG/DL (ref 1.8–2.4)
T4 FREE: 1.5 NG/DL (ref 0.9–1.8)
TOTAL PROTEIN: 6.9 G/DL (ref 6.4–8.2)
TROPONIN: 0.04 NG/ML
TSH REFLEX: 5.12 UIU/ML (ref 0.27–4.2)

## 2021-01-07 PROCEDURE — 99223 1ST HOSP IP/OBS HIGH 75: CPT | Performed by: INTERNAL MEDICINE

## 2021-01-07 PROCEDURE — 6370000000 HC RX 637 (ALT 250 FOR IP): Performed by: INTERNAL MEDICINE

## 2021-01-07 PROCEDURE — 6360000002 HC RX W HCPCS: Performed by: STUDENT IN AN ORGANIZED HEALTH CARE EDUCATION/TRAINING PROGRAM

## 2021-01-07 PROCEDURE — 2500000003 HC RX 250 WO HCPCS: Performed by: EMERGENCY MEDICINE

## 2021-01-07 PROCEDURE — 84484 ASSAY OF TROPONIN QUANT: CPT

## 2021-01-07 PROCEDURE — 93005 ELECTROCARDIOGRAM TRACING: CPT | Performed by: EMERGENCY MEDICINE

## 2021-01-07 PROCEDURE — 6360000002 HC RX W HCPCS: Performed by: EMERGENCY MEDICINE

## 2021-01-07 PROCEDURE — 2500000003 HC RX 250 WO HCPCS: Performed by: STUDENT IN AN ORGANIZED HEALTH CARE EDUCATION/TRAINING PROGRAM

## 2021-01-07 PROCEDURE — 2580000003 HC RX 258: Performed by: STUDENT IN AN ORGANIZED HEALTH CARE EDUCATION/TRAINING PROGRAM

## 2021-01-07 PROCEDURE — 84439 ASSAY OF FREE THYROXINE: CPT

## 2021-01-07 PROCEDURE — 84443 ASSAY THYROID STIM HORMONE: CPT

## 2021-01-07 PROCEDURE — 2580000003 HC RX 258: Performed by: INTERNAL MEDICINE

## 2021-01-07 PROCEDURE — 2700000000 HC OXYGEN THERAPY PER DAY

## 2021-01-07 PROCEDURE — 5A2204Z RESTORATION OF CARDIAC RHYTHM, SINGLE: ICD-10-PCS | Performed by: EMERGENCY MEDICINE

## 2021-01-07 PROCEDURE — 94761 N-INVAS EAR/PLS OXIMETRY MLT: CPT

## 2021-01-07 PROCEDURE — APPSS30 APP SPLIT SHARED TIME 16-30 MINUTES: Performed by: NURSE PRACTITIONER

## 2021-01-07 PROCEDURE — 80076 HEPATIC FUNCTION PANEL: CPT

## 2021-01-07 PROCEDURE — 83735 ASSAY OF MAGNESIUM: CPT

## 2021-01-07 PROCEDURE — 36415 COLL VENOUS BLD VENIPUNCTURE: CPT

## 2021-01-07 PROCEDURE — 1200000000 HC SEMI PRIVATE

## 2021-01-07 RX ORDER — ADENOSINE 3 MG/ML
6 INJECTION, SOLUTION INTRAVENOUS ONCE
Status: COMPLETED | OUTPATIENT
Start: 2021-01-07 | End: 2021-01-07

## 2021-01-07 RX ORDER — ACETAMINOPHEN 325 MG/1
650 TABLET ORAL EVERY 6 HOURS PRN
Status: DISCONTINUED | OUTPATIENT
Start: 2021-01-07 | End: 2021-01-08 | Stop reason: HOSPADM

## 2021-01-07 RX ORDER — ONDANSETRON 2 MG/ML
4 INJECTION INTRAMUSCULAR; INTRAVENOUS EVERY 6 HOURS PRN
Status: DISCONTINUED | OUTPATIENT
Start: 2021-01-07 | End: 2021-01-08 | Stop reason: HOSPADM

## 2021-01-07 RX ORDER — PROPOFOL 10 MG/ML
150 INJECTION, EMULSION INTRAVENOUS ONCE
Status: COMPLETED | OUTPATIENT
Start: 2021-01-07 | End: 2021-01-07

## 2021-01-07 RX ORDER — METOPROLOL TARTRATE 5 MG/5ML
5 INJECTION INTRAVENOUS ONCE
Status: COMPLETED | OUTPATIENT
Start: 2021-01-07 | End: 2021-01-07

## 2021-01-07 RX ORDER — ATORVASTATIN CALCIUM 10 MG/1
10 TABLET, FILM COATED ORAL NIGHTLY
Status: DISCONTINUED | OUTPATIENT
Start: 2021-01-07 | End: 2021-01-08 | Stop reason: HOSPADM

## 2021-01-07 RX ORDER — PROMETHAZINE HYDROCHLORIDE 12.5 MG/1
12.5 TABLET ORAL EVERY 6 HOURS PRN
Status: DISCONTINUED | OUTPATIENT
Start: 2021-01-07 | End: 2021-01-08 | Stop reason: HOSPADM

## 2021-01-07 RX ORDER — LOSARTAN POTASSIUM 50 MG/1
100 TABLET ORAL DAILY
Status: DISCONTINUED | OUTPATIENT
Start: 2021-01-07 | End: 2021-01-08

## 2021-01-07 RX ORDER — SODIUM CHLORIDE, SODIUM LACTATE, POTASSIUM CHLORIDE, AND CALCIUM CHLORIDE .6; .31; .03; .02 G/100ML; G/100ML; G/100ML; G/100ML
500 INJECTION, SOLUTION INTRAVENOUS ONCE
Status: COMPLETED | OUTPATIENT
Start: 2021-01-07 | End: 2021-01-07

## 2021-01-07 RX ORDER — SPIRONOLACTONE 25 MG/1
25 TABLET ORAL DAILY
Status: DISCONTINUED | OUTPATIENT
Start: 2021-01-07 | End: 2021-01-08 | Stop reason: HOSPADM

## 2021-01-07 RX ORDER — ACETAMINOPHEN 650 MG/1
650 SUPPOSITORY RECTAL EVERY 6 HOURS PRN
Status: DISCONTINUED | OUTPATIENT
Start: 2021-01-07 | End: 2021-01-08 | Stop reason: HOSPADM

## 2021-01-07 RX ORDER — FLECAINIDE ACETATE 100 MG/1
100 TABLET ORAL EVERY 12 HOURS SCHEDULED
Status: DISCONTINUED | OUTPATIENT
Start: 2021-01-07 | End: 2021-01-08 | Stop reason: HOSPADM

## 2021-01-07 RX ORDER — SODIUM CHLORIDE 0.9 % (FLUSH) 0.9 %
10 SYRINGE (ML) INJECTION EVERY 12 HOURS SCHEDULED
Status: DISCONTINUED | OUTPATIENT
Start: 2021-01-07 | End: 2021-01-08 | Stop reason: HOSPADM

## 2021-01-07 RX ORDER — SODIUM CHLORIDE 0.9 % (FLUSH) 0.9 %
10 SYRINGE (ML) INJECTION PRN
Status: DISCONTINUED | OUTPATIENT
Start: 2021-01-07 | End: 2021-01-08 | Stop reason: HOSPADM

## 2021-01-07 RX ORDER — FUROSEMIDE 20 MG/1
20 TABLET ORAL DAILY PRN
Status: DISCONTINUED | OUTPATIENT
Start: 2021-01-07 | End: 2021-01-08 | Stop reason: HOSPADM

## 2021-01-07 RX ORDER — DILTIAZEM HYDROCHLORIDE 5 MG/ML
10 INJECTION INTRAVENOUS ONCE
Status: DISCONTINUED | OUTPATIENT
Start: 2021-01-07 | End: 2021-01-07

## 2021-01-07 RX ORDER — POLYETHYLENE GLYCOL 3350 17 G/17G
17 POWDER, FOR SOLUTION ORAL DAILY PRN
Status: DISCONTINUED | OUTPATIENT
Start: 2021-01-07 | End: 2021-01-08 | Stop reason: HOSPADM

## 2021-01-07 RX ORDER — CARVEDILOL 12.5 MG/1
12.5 TABLET ORAL 2 TIMES DAILY WITH MEALS
Status: DISCONTINUED | OUTPATIENT
Start: 2021-01-07 | End: 2021-01-08

## 2021-01-07 RX ADMIN — SPIRONOLACTONE 25 MG: 25 TABLET, FILM COATED ORAL at 11:17

## 2021-01-07 RX ADMIN — AMIODARONE HYDROCHLORIDE 1 MG/MIN: 50 INJECTION, SOLUTION INTRAVENOUS at 03:31

## 2021-01-07 RX ADMIN — SODIUM CHLORIDE, POTASSIUM CHLORIDE, SODIUM LACTATE AND CALCIUM CHLORIDE 500 ML: 600; 310; 30; 20 INJECTION, SOLUTION INTRAVENOUS at 00:40

## 2021-01-07 RX ADMIN — METOPROLOL TARTRATE 5 MG: 5 INJECTION INTRAVENOUS at 05:14

## 2021-01-07 RX ADMIN — DEXTROSE MONOHYDRATE 150 MG: 50 INJECTION, SOLUTION INTRAVENOUS at 03:19

## 2021-01-07 RX ADMIN — CARVEDILOL 12.5 MG: 12.5 TABLET, FILM COATED ORAL at 21:31

## 2021-01-07 RX ADMIN — ADENOSINE 6 MG: 3 INJECTION, SOLUTION INTRAVENOUS at 02:03

## 2021-01-07 RX ADMIN — SERTRALINE HYDROCHLORIDE 50 MG: 50 TABLET ORAL at 10:05

## 2021-01-07 RX ADMIN — Medication 10 ML: at 09:39

## 2021-01-07 RX ADMIN — APIXABAN 5 MG: 5 TABLET, FILM COATED ORAL at 20:38

## 2021-01-07 RX ADMIN — APIXABAN 5 MG: 5 TABLET, FILM COATED ORAL at 10:05

## 2021-01-07 RX ADMIN — AMIODARONE HYDROCHLORIDE 0.5 MG/MIN: 50 INJECTION, SOLUTION INTRAVENOUS at 10:04

## 2021-01-07 RX ADMIN — METOPROLOL TARTRATE 5 MG: 5 INJECTION INTRAVENOUS at 01:35

## 2021-01-07 RX ADMIN — METOPROLOL TARTRATE 5 MG: 1 INJECTION, SOLUTION INTRAVENOUS at 04:24

## 2021-01-07 RX ADMIN — PROPOFOL 100 MG: 10 INJECTION, EMULSION INTRAVENOUS at 06:12

## 2021-01-07 RX ADMIN — ATORVASTATIN CALCIUM 10 MG: 10 TABLET, FILM COATED ORAL at 20:38

## 2021-01-07 RX ADMIN — FLECAINIDE ACETATE 100 MG: 100 TABLET ORAL at 21:31

## 2021-01-07 ASSESSMENT — ENCOUNTER SYMPTOMS
CHEST TIGHTNESS: 0
SHORTNESS OF BREATH: 0
COUGH: 0
CONSTIPATION: 0
VOMITING: 0
NAUSEA: 0
APNEA: 0
SORE THROAT: 0
DIARRHEA: 0
ABDOMINAL PAIN: 0

## 2021-01-07 NOTE — CONSULTS
· spironolactone (ALDACTONE) 25 MG tablet, TAKE 1 TABLET BY MOUTH DAILY  · atorvastatin (LIPITOR) 10 MG tablet, TAKE 1 TABLET BY MOUTH DAILY  · hydrALAZINE (APRESOLINE) 25 MG tablet, Take 3 tablets by mouth 4 times daily (Patient taking differently: Take 75 mg by mouth 3 times daily )  · furosemide (LASIX) 20 MG tablet, Take 1 tablet by mouth daily as needed (edema, weight gain >3lbs in day)  · losartan (COZAAR) 100 MG tablet, TAKE 1 TABLET BY MOUTH DAILY  · [DISCONTINUED] acetaminophen (TYLENOL) 500 MG tablet, Take 2 tablets by mouth 3 times daily as needed for Pain  · [DISCONTINUED] aspirin 81 MG EC tablet, TAKE 1 TABLET BY MOUTH DAILY    Allergies:  Lisinopril    Social History:   · TOBACCO:   reports that he has never smoked. He has never used smokeless tobacco.  · ETOH:   reports current alcohol use. · DRUGS : none  · Patient currently lives at home with family. Family History:       Problem Relation Age of Onset    Diabetes Mother     Stroke Maternal Uncle    ·     Review of Systems   Constitutional: Negative for activity change, appetite change, chills, diaphoresis, fever and unexpected weight change. HENT: Negative for congestion and sore throat. Eyes: Negative for visual disturbance. Respiratory: Negative for apnea, cough, chest tightness and shortness of breath. Cardiovascular: Positive for chest pain and palpitations. Negative for leg swelling. Gastrointestinal: Negative for abdominal pain, constipation, diarrhea, nausea and vomiting. Endocrine: Negative for cold intolerance, heat intolerance, polydipsia, polyphagia and polyuria. Genitourinary: Negative for difficulty urinating. Musculoskeletal: Negative for arthralgias and myalgias. Neurological: Negative for weakness and headaches. Psychiatric/Behavioral: Negative for confusion and sleep disturbance. All other systems reviewed and are negative. ROS: A 10 point review of systems was conducted, significant findings as noted in HPI. Physical Exam:     Vitals:    01/07/21 1112   BP: 127/77   Pulse: 59   Resp: 18   Temp: 98.2 °F (36.8 °C)   SpO2: 98%     Physical Exam  Vitals signs reviewed. Constitutional:       General: He is not in acute distress. Appearance: He is well-developed and well-groomed. HENT:      Head: Normocephalic and atraumatic. Mouth/Throat:      Mouth: Mucous membranes are moist.      Pharynx: Oropharynx is clear. Eyes:      General: No scleral icterus. Extraocular Movements: Extraocular movements intact. Conjunctiva/sclera: Conjunctivae normal.      Pupils: Pupils are equal, round, and reactive to light. Neck:      Musculoskeletal: Full passive range of motion without pain, normal range of motion and neck supple. Cardiovascular:      Rate and Rhythm: Normal rate and regular rhythm. Pulses: Normal pulses. Heart sounds: Normal heart sounds, S1 normal and S2 normal. No murmur. Pulmonary:      Effort: Pulmonary effort is normal. No respiratory distress. Breath sounds: Normal breath sounds and air entry. Abdominal:      General: Abdomen is flat. Bowel sounds are normal.      Palpations: Abdomen is soft. Tenderness: There is no abdominal tenderness. Musculoskeletal: Normal range of motion. Skin:     General: Skin is warm and dry. Neurological:      General: No focal deficit present. Mental Status: He is alert and oriented to person, place, and time. Cranial Nerves: Cranial nerves are intact. Sensory: Sensation is intact. Motor: Motor function is intact. Coordination: Coordination is intact. Gait: Gait is intact. Deep Tendon Reflexes: Reflexes are normal and symmetric.    Psychiatric:         Attention and Perception: Attention and perception normal.         Mood and Affect: Mood normal.         Speech: Speech normal. · Recommend ablation either during this admission or as an outpatient.       This patient has been staffed and discussed with Dr. Adia Whittington.  -----------------------------  Lorena Taveras MD, PGY-2  1/7/2021  5:06 PM

## 2021-01-07 NOTE — H&P (VIEW-ONLY)
tablet TAKE 1 TABLET BY MOUTH DAILY 8/25/20  Yes Danielle Shine MD   spironolactone (ALDACTONE) 25 MG tablet TAKE 1 TABLET BY MOUTH DAILY 8/25/20  Yes Danielle Shine MD   atorvastatin (LIPITOR) 10 MG tablet TAKE 1 TABLET BY MOUTH DAILY 8/25/20  Yes Danielle Shine MD   hydrALAZINE (APRESOLINE) 25 MG tablet Take 3 tablets by mouth 4 times daily  Patient taking differently: Take 75 mg by mouth 3 times daily  2/21/20  Yes Maddie Rankin MD   furosemide (LASIX) 20 MG tablet Take 1 tablet by mouth daily as needed (edema, weight gain >3lbs in day) 10/27/20   Ghassan Dominguez MD   losartan (COZAAR) 100 MG tablet TAKE 1 TABLET BY MOUTH DAILY 8/25/20   Danielle Shine MD       Allergies:  Lisinopril    Social History:  The patient currently lives at home. TOBACCO:   reports that he has never smoked. He has never used smokeless tobacco.  ETOH:   reports current alcohol use. Family History:  Reviewed in detail and Positive as follows:        Problem Relation Age of Onset    Diabetes Mother     Stroke Maternal Uncle        REVIEW OF SYSTEMS:   Positive and negative as noted in the HPI. All other systems reviewed and negative. PHYSICAL EXAM:    BP (!) 147/87   Pulse 61   Temp 97.5 °F (36.4 °C) (Oral)   Resp 18   Wt (!) 325 lb (147.4 kg)   SpO2 99%   BMI 49.42 kg/m²     General appearance: No apparent distress appears stated age and cooperative. HEENT Normal cephalic, atraumatic without obvious deformity. Conjunctivae/corneas clear. Neck: Supple, No jugular venous distention/bruits. Trachea midline without thyromegaly or adenopathy with full range of motion. Lungs: Clear to auscultation, bilaterally without Rales/Wheezes/Rhonchi with good respiratory effort.   Heart: Regular rate and rhythm with Normal S1/S2 without murmurs, rubs or gallops, point of maximum impulse non-displaced  Abdomen: Soft, non-tender or non-distended without rigidity or guarding and positive bowel sounds all four quadrants. Extremities: No clubbing, cyanosis, trace ankle edema bilaterally. Skin: Skin color, texture, turgor normal.  Venous stasis dermatitis noted. Neurologic: Alert and oriented X 3, grossly non-focal.  Mental status: Alert, oriented, thought content appropriate. Capillary refill is brisk  Peripheral pulses 2+    CXR:  I have reviewed the CXR with the following interpretation: no acute abnormality  EKG:  I have reviewed the EKG with the following interpretation: post cardioversion EKG showing sinus raymon with depression of ST segment in lateral leads. CBC   Recent Labs     01/06/21  2248   WBC 9.1   HGB 15.7   HCT 47.8         RENAL  Recent Labs     01/06/21  2248      K 4.0      CO2 22   BUN 17   CREATININE 0.9     LFT'S  No results for input(s): AST, ALT, ALB, BILIDIR, BILITOT, ALKPHOS in the last 72 hours. COAG  No results for input(s): INR in the last 72 hours. CARDIAC ENZYMES  Recent Labs     01/06/21  2248 01/07/21  0342   TROPONINI 0.02* 0.04*       U/A:    Lab Results   Component Value Date    COLORU Yellow 10/26/2020    WBCUA 3-5 10/26/2020    RBCUA 0-2 10/26/2020    MUCUS 2+ 08/22/2013    BACTERIA Rare 10/26/2020    CLARITYU Clear 10/26/2020    SPECGRAV >=1.030 10/26/2020    LEUKOCYTESUR Negative 10/26/2020    BLOODU Negative 10/26/2020    GLUCOSEU Negative 10/26/2020    GLUCOSEU NEGATIVE 06/17/2011       ABG  No results found for: QIP3LCW, BEART, S5OLQNTW, PHART, THGBART, ULH6GJW, PO2ART, XAK6TKO        Active Hospital Problems    Diagnosis Date Noted    Atrial fibrillation with RVR ([facility] [address] [I48.91] 01/07/2021         ASSESSMENT/PLAN:    A fib/flutter with RVR:  In the ER patient required cardioversion to sinus due to persistent symptoms and failure of diltiazem, metoprolol or amiodarone. Cont with amiodarone drip for now   Check TSH and LFTs  Consult EP cardiology  Cont with eliquis    HTN:  Takes coreg, losartan, hydralazine at home.  Resume as BP tolerated    MARY:  Consult RT for CPAP therapy. Patient reports compliance at home. DVT Prophylaxis: eliquis  Diet: DIET CARDIAC;  Code Status: Full Code  PT/OT Eval Status: at baseline    Lieutenant Kenia MD    Thank you No primary care provider on file. for the opportunity to be involved in this patient's care. If you have any questions or concerns please feel free to contact me at [phone].

## 2021-01-07 NOTE — ED PROVIDER NOTES
[address] [facility] RESIDENT NOTE       Date of evaluation: 1/6/2021    Chief Complaint     Heart Problem      History of Present Illness     Eric Melvin is a 48 y.o. male with PMH significant for A. fib on Eliquis, HFpEF, hypertension, hyperlipidemia, MARY who presents for evaluation of palpitations. Patient reports that approximately 2030, he had abrupt onset palpitations, substernal chest pain, nonradiating associated with shortness of breath. The pain is not necessarily worse with exertion or breathing. Pain currently rates 4 out of 10 on pain scale. He believes that he is in A. fib with RVR once again. He notes that in the past he has sometimes spontaneous converted after 2 hours, however this was not the case today and presented to the emergency room for evaluation. He has been compliant on his Coreg as well as Eliquis. Denies fevers, chills, congestion, sore throat, double vision, abdominal pain, constipation, diarrhea, dysuria, frequency, urgency, falls, rash, sensory deficits, weakness, headache. Other than stated above, no additional aggravating or alleviating factors are noted. Review of Systems     All other systems are negative except as mentioned in HPI. Past Medical, Surgical, Family, and Social History     He has a past medical history of Carpal tunnel syndrome, Chronic kidney disease, Hyperlipidemia, Hypertension, Obesity, unspecified, and MARY (obstructive sleep apnea). He has a past surgical history that includes Nose surgery; Knee arthroscopy (Right); and Cardiac catheterization. His family history includes Diabetes in his mother; Stroke in his maternal uncle. He reports that he has never smoked. He has never used smokeless tobacco. He reports current alcohol use. He reports that he does not use drugs.     Medications     Previous Medications ACETAMINOPHEN (TYLENOL) 500 MG TABLET    Take 2 tablets by mouth 3 times daily as needed for Pain    APIXABAN (ELIQUIS) 5 MG TABS TABLET    Take 1 tablet by mouth 2 times daily    ASPIRIN 81 MG EC TABLET    TAKE 1 TABLET BY MOUTH DAILY    ATORVASTATIN (LIPITOR) 10 MG TABLET    TAKE 1 TABLET BY MOUTH DAILY    CARVEDILOL (COREG) 25 MG TABLET    Take 1 tablet by mouth 2 times daily (with meals)    FUROSEMIDE (LASIX) 20 MG TABLET    Take 1 tablet by mouth daily as needed (edema, weight gain >3lbs in day)    HYDRALAZINE (APRESOLINE) 25 MG TABLET    Take 3 tablets by mouth 4 times daily    LOSARTAN (COZAAR) 100 MG TABLET    TAKE 1 TABLET BY MOUTH DAILY    SERTRALINE (ZOLOFT) 50 MG TABLET    TAKE 1 TABLET BY MOUTH DAILY    SPIRONOLACTONE (ALDACTONE) 25 MG TABLET    TAKE 1 TABLET BY MOUTH DAILY       Allergies     He is allergic to lisinopril. Physical Exam     INITIAL VITALS: BP: (!) 190/123, Temp: 98.1 °F (36.7 °C), Pulse: 163, Resp: 20, SpO2: 98 %   Physical Exam    General:   Uncomfortable appearing  Eyes:  Pupils reactive. No discharge from eyes   ENT:  No discharge from nose. OP clear  Neck:  Supple, trachea midline  Pulmonary:   Non-labored breathing. Breath sounds clear bilaterally  Cardiac:   Tachycardic irregular rate and rhythm. No murmurs  Abdomen:  Soft. Non-tender. Non-distended. Obese. Musculoskeletal:  No long bone deformity. No CVA tenderness  Vascular:  Extremities warm and perfused. Skin:  Dry, no rashes  Extremities:  No peripheral edema  Neuro:  Alert. Moves all four extremities to command. No focal deficit  Neuro:  Alert and oriented x 4. CN II-XII intact. 5/5 strength in all 4 extremities. Sensation grossly intact to light touch.  Speech and mentation normal.      DiagnosticResults     EKG   Interpreted in conjunction with emergencydepartment physician Breonna Ag MD  Rhythm: atrial fibrillation - rapid  Rate: tachycardia  Axis: normal  Ectopy: none  Conduction: normal  FOLLOWED BY Linked Order Group     dilTIAZem injection 10 mg     dilTIAZem 125 mg in dextrose 5 % 125 mL infusion    metoprolol (LOPRESSOR) injection 5 mg    lactated ringers bolus    DISCONTD: dilTIAZem injection 10 mg    adenosine (ADENOCARD) injection 6 mg    FOLLOWED BY Linked Order Group     amiodarone (CORDARONE) 150 mg in dextrose 5 % 100 mL bolus     amiodarone (CORDARONE) 450 mg in dextrose 5 % 250 mL infusion     amiodarone (CORDARONE) 450 mg in dextrose 5 % 250 mL infusion       CONSULTS:  IP CONSULT TO [address] [place] [address] / ASSESSMENT / Henry Rachelle is a 48 y.o. male with a history and presentation as described above in HPI. The patient was evaluated by myself and the ED Attending Physician, Dr. Viviana Cordova. All management and disposition plans were discussed and agreed upon. Upon presentation, the patient was uncomfortable-appearing, afebrile and hypertensive, tachycardic, otherwise hemodynamically stable. Patient's most recent EF is 50%. Patient without clear precipitating factor for this bout of A. fib and RVR. He denies any infectious symptoms prior to this episode. We did order Cardizem bolus with a drip, titrated to heart rate of 110. CBC without leukocytosis or anemia present. Pro-BNP similar to previous. BMP without sodium/potassium abnormalities or JOSELYN present. Troponin mildly elevated likely in the setting of demand ischemia. Diltiazem drip was initially titrated to 15 mg/hr with rates 110-140s. Metoprolol 5 mg x1 given with conversion of patient's rhythm to likely aflutter 2:1. Adenosine 6 mg IV administered without conversion to sinus, clear flutter waves. Cardiology consulted at this time. Due to concern for existing thrombus despite anticoagulation they recommended amiodarone bolus with drip rather than cardioversion. 150 mg amiodarone IV bolus with gtt ordered.     Medications received during this ED visit:  Medications dilTIAZem injection 10 mg (10 mg Intravenous Given 1/6/21 2243)     Followed by   dilTIAZem 125 mg in dextrose 5 % 125 mL infusion (15 mg/hr Intravenous Rate/Dose Change 1/7/21 0123)   amiodarone (CORDARONE) 150 mg in dextrose 5 % 100 mL bolus (has no administration in time range)     Followed by   amiodarone (CORDARONE) 450 mg in dextrose 5 % 250 mL infusion (has no administration in time range)     Followed by   amiodarone (CORDARONE) 450 mg in dextrose 5 % 250 mL infusion (has no administration in time range)   metoprolol (LOPRESSOR) injection 5 mg (5 mg Intravenous Given 1/7/21 0135)   lactated ringers bolus (0 mLs Intravenous Stopped 1/7/21 0159)   adenosine (ADENOCARD) injection 6 mg (6 mg Intravenous Given 1/7/21 0203)     At this time I am going off-service and will be signing out care of this patient to my colleague Dr. Nahomy Ferris for further care. My colleague's responsibilities will include: reassess after amiodarone administration, dispo    This patient was also evaluated by the attending physician. All care plans were discussed and agreed upon. Clinical Impression     1. Atrial fibrillation with rapid ventricular response ([address]    2. Atrial flutter, unspecified type ([address]        Disposition     PATIENT REFERRED TO:  No follow-up provider specified. DISCHARGE MEDICATIONS:  New Prescriptions    No medications on file       DISPOSITION      1. The patient is to be signed out in stable condition  2. Workup, treatment and diagnosis were discussed with the patient and/or family members; the patient agrees to the plan and all questions were addressed and answered.        Leopoldo Mondragon MD  Resident  01/07/21 3067

## 2021-01-07 NOTE — CONSULTS
[facility]    Cardiology Consult/H&P  Consulting Cardiologist Rocky Arellano M.D., [facility] [place]  Referring Provider:  No primary care provider on file. 1/7/2021, 2:47 PM    Chief Complaint   Patient presents with    Heart Problem      Primary Cardiologist:  Asked by Franc Nam MD/No primary care provider on file. to evaluate and assess this patient's atrial fibrillation    History of Present Illness:   Yecenia Whitfield is a 48 y.o. male admitted through the emergency department with rapid heart rate lightheadedness and chest pressure palpitations. Was found to be in atrial fibrillation with rapid ventricular response and did undergo electrical cardioversion after failing with medical and medicine management. He continued to have significant tachycardia. As I see him today at this time he is not having pain but he is asking about the possibility of having an ablation procedure which seems to be appropriate. Atrial fibrillation new onset  MARY with CPAP  Cardiac cath on 2/20/2020 with clean normal coronaries and ejection fraction of 55%       Past Medical History:   has a past medical history of Carpal tunnel syndrome, Chronic kidney disease, Hyperlipidemia, Hypertension, Obesity, unspecified, and MARY (obstructive sleep apnea). Surgical History:   has a past surgical history that includes Nose surgery; Knee arthroscopy (Right); and Cardiac catheterization. Social History:   reports that he has never smoked. He has never used smokeless tobacco. He reports current alcohol use. He reports that he does not use drugs. Family History:  family history includes Diabetes in his mother; Stroke in his maternal uncle. Home Medications:  Prior to Admission medications    Medication Sig Start Date End Date Taking?  Authorizing Provider   carvedilol (COREG) 25 MG tablet Take 1 tablet by mouth 2 times daily (with meals) 10/27/20  Yes Laury El MD apixaban (ELIQUIS) 5 MG TABS tablet Take 1 tablet by mouth 2 times daily 10/26/20  Yes JARET Corrales - CNP   sertraline (ZOLOFT) 50 MG tablet TAKE 1 TABLET BY MOUTH DAILY 8/25/20  Yes Douglas Carpio MD   spironolactone (ALDACTONE) 25 MG tablet TAKE 1 TABLET BY MOUTH DAILY 8/25/20  Yes Douglas Carpio MD   atorvastatin (LIPITOR) 10 MG tablet TAKE 1 TABLET BY MOUTH DAILY 8/25/20  Yes Douglas Carpio MD   hydrALAZINE (APRESOLINE) 25 MG tablet Take 3 tablets by mouth 4 times daily  Patient taking differently: Take 75 mg by mouth 3 times daily  2/21/20  Yes Sophie Sanchez MD   furosemide (LASIX) 20 MG tablet Take 1 tablet by mouth daily as needed (edema, weight gain >3lbs in day) 10/27/20   Greg Riley MD   losartan (COZAAR) 100 MG tablet TAKE 1 TABLET BY MOUTH DAILY 8/25/20   Douglas Carpio MD        Current Medications:  Current Facility-Administered Medications   Medication Dose Route Frequency Provider Last Rate Last Admin    amiodarone (CORDARONE) 450 mg in dextrose 5 % 250 mL infusion  0.5 mg/min Intravenous Continuous Jonathan Ledesma MD 16.7 mL/hr at 01/07/21 1004 0.5 mg/min at 01/07/21 1004    apixaban (ELIQUIS) tablet 5 mg  5 mg Oral BID Vazquez Beck MD   5 mg at 01/07/21 1005    atorvastatin (LIPITOR) tablet 10 mg  10 mg Oral Nightly Vazquez Beck MD        sertraline (ZOLOFT) tablet 50 mg  50 mg Oral Daily Vazquez Beck MD   50 mg at 01/07/21 1005    sodium chloride flush 0.9 % injection 10 mL  10 mL Intravenous 2 times per day Vazquez Beck MD   10 mL at 01/07/21 0939    sodium chloride flush 0.9 % injection 10 mL  10 mL Intravenous PRN Vazquez Beck MD        promethazine (PHENERGAN) tablet 12.5 mg  12.5 mg Oral Q6H PRN Vazquez Beck MD        Or    ondansetron (ZOFRAN) injection 4 mg  4 mg Intravenous Q6H PRN Vazquez Beck MD  polyethylene glycol (GLYCOLAX) packet 17 g  17 g Oral Daily PRN Frank Ivory MD        acetaminophen (TYLENOL) tablet 650 mg  650 mg Oral Q6H PRN Frank Ivory MD        Or    acetaminophen (TYLENOL) suppository 650 mg  650 mg Rectal Q6H PRN Frank Ivory MD        losartan (COZAAR) tablet 100 mg  100 mg Oral Daily King Gross MD   Stopped at 01/07/21 1113    spironolactone (ALDACTONE) tablet 25 mg  25 mg Oral Daily King Gross MD   25 mg at 01/07/21 1117    furosemide (LASIX) tablet 20 mg  20 mg Oral Daily PRN King Gross MD           Allergies:  Lisinopril     Review of Systems:   · Constitutional: there has been no unanticipated weight loss. · Eyes: No visual changes or diplopia. No scleral icterus. · ENT: No Headaches, hearing loss or vertigo. No mouth sores or sore throat. · Cardiovascular: Reviewed in HPI  ·  Pulmonary:  no cough or sputum production. · Gastrointestinal: No abdominal pain, appetite loss, blood in stools. No change in bowel or bladder habits. · Genitourinary: No dysuria, trouble voiding, or hematuria. · Musculoskeletal:  No gait disturbance, weakness or joint complaints. · Integumentary: No rash or pruritis. Endocrine: No malaise, fatigue or temperature intolerance. Hematologic/Lymphatic: No abnormal bruising or bleeding, blood clots or swollen lymph nodes. · Allergic/Immunologic: No nasal congestion or hives. · All other ROS are reviewed and are unremarkable. Physical Examination:    Vitals:    01/07/21 0730 01/07/21 0745 01/07/21 0828 01/07/21 1112   BP: 120/82 108/80 (!) 147/87 127/77   Pulse: 59 59 61 59   Resp: 18 19 18 18   Temp:   97.5 °F (36.4 °C) 98.2 °F (36.8 °C)   TempSrc:   Oral Oral   SpO2: 100% 100% 99% 98%   Weight:            EXAMl and General Appearance:  Healthy. And alert . HEENT: eyes and ears intact.  No nasal masses  THYROID: not enlarged  LUNGS:  · Clear to auscultation and percussion EKG: On 1/7/2021 atrial fibrillation 140 bpm cardioverted  echocardiogramSummary   Technically difficult examination with Definity. Left ventricular cavity size is normal.   There is mild to moderate concentric left ventricular hypertrophy. Overall left ventricular systolic function appears normal with an ejection   fraction of 55-60%. No regional wall motion abnormalities are noted. Diastolic filling parameters suggest grade II diastolic dysfunction. Bi-atrial enlargement. Trace mitral and tricuspid regurgitation. Estimated pulmonary artery systolic pressure is 25 mmHg assuming a right   atrial pressure of 15 mmHg. There is trivial pericardial fluid present  Assessment/ Plan     Patient Active Problem List    Diagnosis Date Noted    Atrial fibrillation with RVR ([address] 01/07/2021    H/O angiography 10/26/2020    Atrial fibrillation with rapid ventricular response ([address]     Essential hypertension     Atrial fibrillation ([address] 02/27/2020    Hypertensive urgency 02/18/2020    Heart failure with normal ejection fraction ([address] 10/29/2018    Mixed hyperlipidemia 02/19/2018    Paresthesia 06/07/2016    Lumbar strain 01/04/2016    Depressive disorder 08/19/2013    Obesity 07/15/2011    Obstructive sleep apnea syndrome 11/16/2010   Atrial fibrillation rapid ventricular response. Seemingly recurring onset. He does not have coronary disease as he did have angiogram February 2020. He is now converted back to sinus rhythm and at this point is asking for a an ablation procedure. I have reached out to Dr. Zev Martinez who will see him later and have further discussions about the possibility and timing of such procedure. In the meantime continue current management. And that includes anticoagulant therapy including his regular doses of apixaban and he is on amiodarone drip currently. Thanks for allowing me the opportunity  to participate in the evaluation and care of your patients.  Please call if we can assist further [phone]    This chart was likely completed using voice recognition technology and may contain unintended grammatical , phraseology,and/or punctuation errors  Lidia Thompson M.D., [facility] [place]  1/7/20212:47 PM

## 2021-01-07 NOTE — PROGRESS NOTES
ETCO2  Monitoring done for conscious sedation. Patient is on 2 liters/min via nasal cannula for procedure.     Baseline information:  HR: 140 BP: 156/119  RR: 26 LOC: alert  ETCO2: 24 SpO2: 100    5 minutes after sedation administered:  HR: 48 BP: 122/86  RR: 22 LOC: lethargic  ETCO2: 26 SpO2: 97    10 min after sedation administered:  HR: 48 BP: 122/86  RR: 22 LOC: alert  ETCO2: 24 SpO2: 99      well    Patient/caregiver was educated on the proper method of use:  Yes      Level of patient/caregiver understanding able to:   [] Verbalize understanding   [] Demonstrate understanding       [] Teach back        [] Needs reinforcement       []  No available caregiver               []  Other:     Response to education:  Good

## 2021-01-07 NOTE — PROGRESS NOTES
Pt arrives to floor alert oriented and pleasant. Amio gtt running per order. Assessment  As documented, admission complete. Pt is SB on tele with a hr of 61. All other vitals stable. Pt is aware of call light usage and plan of care. Will continue to round on pt for safety/needs.

## 2021-01-07 NOTE — ED NOTES
Pt resting in bed, 2/2 side rails up, fall precautions in place per Terry fall scale. Pt appears comfortable, no distress noted at this time. IV intact, no complications noted. Dressing clean, dry and intact. Denies any needs at this time. Call light in reach.        Barber Parrish RN  01/07/21 0258

## 2021-01-07 NOTE — PROGRESS NOTES
4 Eyes Admission Assessment     I agree as the admission nurse that 2 RN's have performed a thorough Head to Toe Skin Assessment on the patient. ALL assessment sites listed below have been assessed on admission. Areas assessed by both nurses: ***  [x]   Head, Face, and Ears   [x]   Shoulders, Back, and Chest  [x]   Arms, Elbows, and Hands   [x]   Coccyx, Sacrum, and Ischium  [x]   Legs, Feet, and Heels        Does the Patient have Skin Breakdown?   NA         Gaurang Prevention initiated:  NA   Wound Care Orders initiated: NA      WO nurse consulted for Pressure Injury (Stage 3,4, Unstageable, DTI, NWPT, and Complex wounds) or Gaurang score 18 or lower:  NA      Nurse 1 eSignature: Electronically signed by Gloria Knott RN on 1/7/21 at 9:02 AM EST    **SHARE this note so that the co-signing nurse is able to place an eSignature**    Nurse 2 eSignature: {Esignature:[id number]}

## 2021-01-07 NOTE — ED NOTES
Adenosine administered to the patient with MD, Resident, Charge RN and assisting RN at bedside. Patient's HR did not change following administration. Patient still tachycardic in the 140s.      Myriam Iniguez RN  01/07/21 0396

## 2021-01-07 NOTE — ED TRIAGE NOTES
Patient presents via [facility] for atrial fibrillation with RVR. Patient reports increased work of breathing and chest pain. States he takes Eliquis for his A-fib.

## 2021-01-07 NOTE — ED NOTES
Tip frazier paused for cardioversion then restarted per V.O. from Dr. Derick Shaikh, RN  01/07/21 6184

## 2021-01-07 NOTE — ED PROVIDER NOTES
This patient was seen by the resident. I have seen and examined the patient, agree with the workup, evaluation, management and diagnosis. The care plan has been discussed. I have reviewed the ECG and concur with the resident's interpretation. My assessment reveals patient with history of atrial fibrillation who is anticoagulated on Eliquis and takes carvedilol presents complaining of palpitations. Patient states that his symptoms started proximately 2 hours prior to presentation. Patient states he will have intermittent episodes of this that will self resolve but this did not which prompted him to come to the emergency department. On arrival, the patient was noted to be tachycardic into the 150s to 160s with atrial fibrillation with rapid ventricular response on EKG. He had clear breath sounds and otherwise normal heart sounds. Laboratory studies were unremarkable except for slightly elevated opponent of 0.02. Repeat troponin at 3 hours was 0.04. Patient was started on diltiazem IV bolus and then drip with initial improvement of his heart rate down into the 1 teens to 120s but then he was noted to go into the 140s and stayed consistently in the 1 40-1 45 range. Repeat EKG was obtained which showed the patient now to be in a flutter versus SVT. Patient was given 6 mg of adenosine and it was noted on his tracing that he was in atrial flutter. With this finding, we did consult cardiology who recommended amiodarone IV bolus and drip. This was initiated and the patient remained in atrial flutter with rapid ventricular spots. He is given metoprolol and remained tachycardic. At this point, the decision was made to undergo electrical cardioversion. Patient consented and was sedated using propofol. Synchronized cardioversion was performed using 100 J with conversion to sinus rhythm.   Repeat EKG showed the patient to be in a sinus bradycardic rhythm with a rate of 48, left axis deviation, normal IN and QT intervals, normal QRS duration, mild ST depressions in the lateral leads with LVH present. Patient will be maintained on an amiodarone drip and will be admitted to the hospitalist service for further evaluation. Critical Care:  Due to the immediate potential for life-threatening deterioration due to atrial fibrillation and atrial flutter with rapid ventricular response, I spent 45 minutes providing critical care. This time excludes time spent performing procedures but includes time spent on direct patient care, history retrieval, review of the chart, and discussions with patient, family, and consultant(s).     Procedural sedation    Date/Time: 1/7/2021 6:26 AM  Performed by: Heath Aparicio MD  Authorized by: Heath Aparicio MD     Consent:     Consent obtained:  Written    Consent given by:  Patient  Indications:     Procedure performed:  Cardioversion    Procedure necessitating sedation performed by:  Physician performing sedation    Intended level of sedation:  Moderate (conscious sedation)  Pre-sedation assessment:     Time since last food or drink:  11 hours    ASA classification: class 2 - patient with mild systemic disease      Neck mobility: normal      Mouth opening:  3 or more finger widths    Thyromental distance:  3 finger widths    Mallampati score:  II - soft palate, uvula, fauces visible    History of difficult intubation: no      Pre-sedation assessment completed:  1/7/2021 6:00 AM  Procedure details (see MAR for exact dosages):     Sedation start time:  1/7/2021 6:27 AM    Preoxygenation:  Nasal cannula    Sedation:  Propofol    Intra-procedure monitoring:  Blood pressure monitoring, continuous capnometry, continuous pulse oximetry and cardiac monitor    Intra-procedure events: none      Intra-procedure management:  Airway repositioning    Sedation end time:  1/7/2021 6:20 AM  Post-procedure details:     Post-sedation assessment completed:  1/7/2021 6:27 AM Attendance: Constant attendance by certified staff until patient recovered      Recovery: Patient returned to pre-procedure baseline      Complications:  None    Patient is stable for discharge or admission: yes      Patient tolerance: Tolerated well, no immediate complications  Cardiovert / Defib    Date/Time: 1/7/2021 6:28 AM  Performed by: Maribel Boyd MD  Authorized by: Maribel Boyd MD     Consent:     Consent obtained:  Verbal    Consent given by:  Patient  Pre-procedure details:     Cardioversion basis:  Emergent    Rhythm:  Atrial flutter    Electrode placement:  Anterior-posterior  Attempt one:     Cardioversion mode:  Synchronous    Waveform:  Biphasic    Shock (Joules):  100    Shock outcome:  Conversion to normal sinus rhythm  Post-procedure details:     Patient status:  Awake    Patient tolerance of procedure:   Tolerated well, no immediate complications         Maribel Boyd MD  01/07/21 9660

## 2021-01-07 NOTE — H&P
Abdomen: Soft, non-tender or non-distended without rigidity or guarding and positive bowel sounds all four quadrants. Extremities: No clubbing, cyanosis, trace ankle edema bilaterally. Skin: Skin color, texture, turgor normal.  Venous stasis dermatitis noted. Neurologic: Alert and oriented X 3, grossly non-focal.  Mental status: Alert, oriented, thought content appropriate. Capillary refill is brisk  Peripheral pulses 2+    CXR:  I have reviewed the CXR with the following interpretation: no acute abnormality  EKG:  I have reviewed the EKG with the following interpretation: post cardioversion EKG showing sinus raymon with depression of ST segment in lateral leads. CBC   Recent Labs     01/06/21  2248   WBC 9.1   HGB 15.7   HCT 47.8         RENAL  Recent Labs     01/06/21  2248      K 4.0      CO2 22   BUN 17   CREATININE 0.9     LFT'S  No results for input(s): AST, ALT, ALB, BILIDIR, BILITOT, ALKPHOS in the last 72 hours. COAG  No results for input(s): INR in the last 72 hours. CARDIAC ENZYMES  Recent Labs     01/06/21 2248 01/07/21  0342   TROPONINI 0.02* 0.04*       U/A:    Lab Results   Component Value Date    COLORU Yellow 10/26/2020    WBCUA 3-5 10/26/2020    RBCUA 0-2 10/26/2020    MUCUS 2+ 08/22/2013    BACTERIA Rare 10/26/2020    CLARITYU Clear 10/26/2020    SPECGRAV >=1.030 10/26/2020    LEUKOCYTESUR Negative 10/26/2020    BLOODU Negative 10/26/2020    GLUCOSEU Negative 10/26/2020    GLUCOSEU NEGATIVE 06/17/2011       ABG  No results found for: CBQ5YAD, BEART, D8FJENBV, PHART, THGBART, IZL4KSA, PO2ART, XIG4ZVJ        Active Hospital Problems    Diagnosis Date Noted    Atrial fibrillation with RVR ([address] [I48.91] 01/07/2021         ASSESSMENT/PLAN:    A fib/flutter with RVR:  In the ER patient required cardioversion to sinus due to persistent symptoms and failure of diltiazem, metoprolol or amiodarone.   Cont with amiodarone drip for now   Check TSH and LFTs Consult EP cardiology  Cont with eliquis    HTN:  Takes coreg, losartan, hydralazine at home. Resume as BP tolerated    MARY:  Consult RT for CPAP therapy. Patient reports compliance at home. DVT Prophylaxis: eliquis  Diet: DIET CARDIAC;  Code Status: Full Code  PT/OT Eval Status: at baseline    Amanda Cruz MD    Thank you No primary care provider on file. for the opportunity to be involved in this patient's care. If you have any questions or concerns please feel free to contact me at [phone].

## 2021-01-07 NOTE — ED NOTES
Patient asking to use a urinal. Patient unable to stand due to dizziness. Patient's  systolic. MD notified.      Myriam Iniguez RN  01/07/21 9741

## 2021-01-07 NOTE — CONSULTS
[address]       Consult hospital        Janey Adam MD,  [address] FNP [address]       Cardiology                   Camryn   1965 January 7, 2021      Reason for Cardiology Consult: chest pressure, palpitations  Primary Cardiologist Irish   CC:  chest pressure, palpitations / afib aflutter      HPI:  The patient is 48 y.o. male is being seen today and followed by cardiology for HTN, MARY afib / flutter / renal artery stenosis and status post renal artery stent on left side  Cardiac cath on 2/20/2028 with normal coronary arteries  he was admitted with c/o cp & palpitations with caffeine intake of 3 vincent    denies alcohol, smoking or recreational drugs. While in the ER patient was found to be in a fib with RVR to 150's. He was having symptomatic chest pain, diaphoresis, palpitations. He was given IV diltiazem, metoprolol, then amiodarone drip was added. Patient converted from a fib to flutter. He continued to have chest pains and ultimately was cardioverted in ER with propofol sedation to sinus rhythm. At this point he reports feeling back to his normal.    Mg TSH wnl     Today VSS / afebrile / SV02 98% RA / HR 60s in NSR   On amiodarone gtt eliquis lipitor     I have examined pt and reviewed notes / any lab work EKGs stress test, angiograms, & images reviewed  I  have spent >20 minutes of face to face time with the patient with more than 50% spent counseling and coordinating care this patient. 2/19/20 TTE    Technically difficult examination with Definity.   Left ventricular cavity size is normal.   There is mild to moderate concentric left ventricular hypertrophy.   Overall left ventricular systolic function appears normal with an ejection   fraction of 55-60%.   No regional wall motion abnormalities are noted.   Diastolic filling parameters suggest grade II diastolic dysfunction.  Bi-atrial enlargement.   Trace mitral and tricuspid regurgitation.   Estimated pulmonary artery systolic pressure is 25 mmHg assuming a right   atrial pressure of 15 mmHg.   There is trivial pericardial fluid present. Review of Systems:  Constitutional: No fatigue, weakness, night sweats or fever. HEENT: No new vision difficulties or ringing in the ears. Respiratory: No new SOB, PND, orthopnea or cough. Cardiovascular: See HPI   GI: No n/v, diarrhea, constipation, abdominal pain or changes in bowel habits. No melena, no hematochezia  : No urinary frequency, urgency, incontinence, hematuria or dysuria. Skin: No cyanosis or skin lesions. Musculoskeletal: No new muscle or joint pain. Neurological: No syncope or TIA-like symptoms.   Psychiatric: No anxiety, insomnia or depression     Past Medical History:   Diagnosis Date    Carpal tunnel syndrome     Chronic kidney disease     Hyperlipidemia     Hypertension     Obesity, unspecified     MARY (obstructive sleep apnea)      Past Surgical History:   Procedure Laterality Date    CARDIAC CATHETERIZATION      KNEE ARTHROSCOPY Right     NOSE SURGERY      Repair from Fx     Family History   Problem Relation Age of Onset    Diabetes Mother     Stroke Maternal Uncle      Social History     Tobacco Use    Smoking status: Never Smoker    Smokeless tobacco: Never Used   Substance Use Topics    Alcohol use: Yes     Comment: occasionally    Drug use: No       Allergies   Allergen Reactions    Lisinopril      cough     Current Facility-Administered Medications   Medication Dose Route Frequency Provider Last Rate Last Admin    amiodarone (CORDARONE) 450 mg in dextrose 5 % 250 mL infusion  0.5 mg/min Intravenous Continuous Zofia Ag MD 16.7 mL/hr at 01/07/21 1004 0.5 mg/min at 01/07/21 1004    apixaban (ELIQUIS) tablet 5 mg  5 mg Oral BID Mary Younger MD   5 mg at 01/07/21 1005 S2 Prior Stroke/TIA No 0   V Vascular Disease Yes 1   A Age 74-69 No,  (54 y.o.) 0   Sc Sex male 0    [id number]  Score  2     HTN  wnl      Hx MARY     NAIDA   renal artery stenosis and status post renal artery stent on left side    Plan:  HR 60s in NSR   on amiodarone gtt eliquis lipitor   EP consulted for possible afib ablation     [address] [facility] cardiology

## 2021-01-07 NOTE — ED NOTES
Pt synchronized cardioverted with 100 J, after Dr. Arita administered 100 mg Propofol IV push. HR now sinus raymon at 49. Breathing assisted temporarily with a jaw thrust maneuver. Repeat EKG performed for rhythm change.      Gwendolyn Hollins RN  01/07/21 8575

## 2021-01-07 NOTE — CARE COORDINATION
3.Copy of patient's insurance/ prescription drug card and patient face sheet must be sent along with the prescription(s)  4. Cost of Rx cannot be added to hospital bill. If financial assistance is needed, please contact unit  or ;  or  CANNOT provide pharmacy voucher for patients co-pays  5. Patients can then  the prescription on their way out of the hospital at discharge, or pharmacy can deliver to the bedside if staff is available. (payment due at time of pick-up or delivery - cash, check, or card accepted)     Able to afford home medications/ co-pay costs: Yes    ADLS  Support Systems: Children    PT AM-PAC:   /24  OT AM-PAC:   /24    HOUSING  Home Environment:  Lives with son  Steps: 14 steps    Plans to RETURN to current housing: No  Barriers to RETURNING to current housing: none    Home Care Information  Currently ACTIVE with [address]: No  Home Care Agency: Not Applicable    Currently ACTIVE with [place] on Aging: No    Durable Medical Equipment  DME Provider: none      Home Oxygen and [address] [place] prior to admission: No    Dialysis  Active with HD/PD prior to admission: No    DISCHARGE PLAN:  Disposition: Home- No Services Needed    Transportation PLAN for discharge: family     Factors facilitating achievement of predicted outcomes: Family support and Cooperative    Barriers to discharge: Medical complications    Additional Case Management Notes: Patient from home. Patient with medical history of HTN, MARY and a fib/flutter, who presents to [facility] with sudden onset of palpitations and chest discomfort. Patient was sitting watching TV when it started. He denies any specific triggers. Takes his medications regularly, uses CPAP. Has stable consumption of caffeine- 2-3 cans of cola a day. Denies alcohol, smoking or recreational drugs. While in the ER patient was found to be in a fib with RVR to 150's. He was having symptomatic chest pain, diaphoresis, palpitations. He was given IV diltiazem, metoprolol, then amiodarone drip was added. Patient converted from a fib to flutter. He continued to have chest pains and ultimately was cardioverted in ER with propofol sedation to sinus rhythm. At this point he reports feeling back to his normal. He is interested in ablation. Patient works at [employer] full time. Anticipate no needs at discharge    The Plan for Transition of Care is related to the following treatment goals of Atrial fibrillation with RVR ([facility] [address] [I48.91]    The Patient and/or patient representative Salo Kim and his family were provided with a choice of provider and agrees with the discharge plan Yes    Freedom of choice list was provided with basic dialogue that supports the patient's individualized plan of care/goals and shares the quality data associated with the providers.  Yes    Care Transition patient: No    Yan Hubbard RN  [facility]  Case Management Department  Ph: [phone]

## 2021-01-07 NOTE — ED NOTES
Pt moved to trauma for synchronized cardioversion, consent signed, suction set up, EKG and Pads on patient. RT and MD at bedside.      lGenis Bhat RN  01/07/21 3833

## 2021-01-08 VITALS
DIASTOLIC BLOOD PRESSURE: 104 MMHG | SYSTOLIC BLOOD PRESSURE: 163 MMHG | HEART RATE: 60 BPM | RESPIRATION RATE: 17 BRPM | OXYGEN SATURATION: 97 % | WEIGHT: 315 LBS | BODY MASS INDEX: 49.42 KG/M2 | TEMPERATURE: 97.8 F

## 2021-01-08 LAB
ALBUMIN SERPL-MCNC: 3.8 G/DL (ref 3.4–5)
ANION GAP SERPL CALCULATED.3IONS-SCNC: 8 MMOL/L (ref 3–16)
BUN BLDV-MCNC: 18 MG/DL (ref 7–20)
CALCIUM SERPL-MCNC: 9.1 MG/DL (ref 8.3–10.6)
CHLORIDE BLD-SCNC: 103 MMOL/L (ref 99–110)
CO2: 26 MMOL/L (ref 21–32)
CREAT SERPL-MCNC: 1 MG/DL (ref 0.9–1.3)
GFR AFRICAN AMERICAN: >60
GFR NON-AFRICAN AMERICAN: >60
GLUCOSE BLD-MCNC: 105 MG/DL (ref 70–99)
PHOSPHORUS: 3.1 MG/DL (ref 2.5–4.9)
POTASSIUM SERPL-SCNC: 3.9 MMOL/L (ref 3.5–5.1)
SODIUM BLD-SCNC: 137 MMOL/L (ref 136–145)

## 2021-01-08 PROCEDURE — 2580000003 HC RX 258: Performed by: INTERNAL MEDICINE

## 2021-01-08 PROCEDURE — 6370000000 HC RX 637 (ALT 250 FOR IP): Performed by: INTERNAL MEDICINE

## 2021-01-08 PROCEDURE — 36415 COLL VENOUS BLD VENIPUNCTURE: CPT

## 2021-01-08 PROCEDURE — 99232 SBSQ HOSP IP/OBS MODERATE 35: CPT | Performed by: INTERNAL MEDICINE

## 2021-01-08 PROCEDURE — 80069 RENAL FUNCTION PANEL: CPT

## 2021-01-08 RX ORDER — CARVEDILOL 25 MG/1
25 TABLET ORAL 2 TIMES DAILY WITH MEALS
Status: DISCONTINUED | OUTPATIENT
Start: 2021-01-08 | End: 2021-01-08 | Stop reason: HOSPADM

## 2021-01-08 RX ORDER — FLECAINIDE ACETATE 100 MG/1
100 TABLET ORAL EVERY 12 HOURS SCHEDULED
Qty: 60 TABLET | Refills: 3 | Status: SHIPPED | OUTPATIENT
Start: 2021-01-08 | End: 2022-07-11 | Stop reason: SDUPTHER

## 2021-01-08 RX ORDER — LOSARTAN POTASSIUM 50 MG/1
100 TABLET ORAL DAILY
Status: DISCONTINUED | OUTPATIENT
Start: 2021-01-08 | End: 2021-01-08 | Stop reason: HOSPADM

## 2021-01-08 RX ADMIN — CARVEDILOL 25 MG: 25 TABLET, FILM COATED ORAL at 08:39

## 2021-01-08 RX ADMIN — HYDRALAZINE HYDROCHLORIDE 75 MG: 50 TABLET, FILM COATED ORAL at 08:39

## 2021-01-08 RX ADMIN — LOSARTAN POTASSIUM 50 MG: 50 TABLET, FILM COATED ORAL at 01:11

## 2021-01-08 RX ADMIN — SERTRALINE HYDROCHLORIDE 50 MG: 50 TABLET ORAL at 08:39

## 2021-01-08 RX ADMIN — SPIRONOLACTONE 25 MG: 25 TABLET, FILM COATED ORAL at 08:39

## 2021-01-08 RX ADMIN — APIXABAN 5 MG: 5 TABLET, FILM COATED ORAL at 08:39

## 2021-01-08 RX ADMIN — Medication 10 ML: at 08:39

## 2021-01-08 RX ADMIN — FLECAINIDE ACETATE 100 MG: 100 TABLET ORAL at 08:39

## 2021-01-08 ASSESSMENT — PAIN SCALES - GENERAL: PAINLEVEL_OUTOF10: 0

## 2021-01-08 NOTE — CARE COORDINATION
Case Management Assessment            Discharge Note                    Date / Time of Note: 1/8/2021 11:09 AM                  Discharge Note Completed by: Estela Crane    Patient Name: Hailee Resendez   YOB: 1965  Diagnosis: Atrial fibrillation with RVR ([address] [I48.91]   Date / Time: 1/6/2021 10:14 PM    Current PCP: No primary care provider on file. Clinic patient: No    Hospitalization in the last 30 days: No    Advance Directives:  Code Status: Full Code  [place] DNR form completed and on chart: Not Indicated    Financial:  Payor: Jessica Mendoza / Plan: Ronna Molina 79 / Product Type: *No Product type* /      Pharmacy:    [id number] - [facility], [address] [facility] [phone] - F [phone]  [place] [phone]  Phone: [phone] Fax: [phone]      Assistance purchasing medications?: Potential Assistance Purchasing Medications: No  Assistance provided by Case Management: None at this time    Does patient want to participate in local refill/ meds to beds program?: No    Meds To Beds General Rules:  1. Can ONLY be done Monday- Friday between 8:30am-5pm  2. Prescription(s) must be in pharmacy by 3pm to be filled same day  3. Copy of patient's insurance/ prescription drug card and patient face sheet must be sent along with the prescription(s)  4. Cost of Rx cannot be added to hospital bill. If financial assistance is needed, please contact unit  or ;  or  CANNOT provide pharmacy voucher for patients co-pays  5.  Patients can then  the prescription on their way out of the hospital at discharge, or pharmacy can deliver to the bedside if staff is available. (payment due at time of pick-up or delivery - cash, check, or card accepted)     Able to afford home medications/ co-pay costs: Yes    ADLS:  Current PT AM-PAC Score:   /24  Current OT AM-PAC Score:   /24 DISCHARGE Disposition: Home- No Services Needed    LOC at discharge: Not Applicable  LONNIE Completed: Not Indicated    Notification completed in HENS/PAS?:  Not Applicable    IMM Completed:   Not Indicated    Transportation:  Transportation PLAN for discharge: family   Mode of Transport: [address] ordered at discharge: Not [address]: Not Applicable  Orders faxed: No    Durable Medical Equipment:  DME Provider:    Equipment obtained during hospitalization: none    Home Oxygen and Respiratory Equipment:  Oxygen needed at discharge?: Not [address]: Not Applicable  Portable tank available for discharge?: Not Indicated    Dialysis:  Dialysis patient: No    Dialysis Center:  Not Applicable    Hospice Services:  Location: Not Applicable  Agency: Not Applicable    Consents signed: Not Indicated    Referrals made at [facility] for outpatient continued care:  Not Applicable    Additional CM Notes:  CM met with pt. Denies needs at home. Has transportation home. The Plan for Transition of Care is related to the following treatment goals of Atrial fibrillation with RVR ([facility] [address] [I48.91]    The Patient and/or patient representative Gil Rodriguez and his family were provided with a choice of provider and agrees with the discharge plan Not Indicated    Freedom of choice list was provided with basic dialogue that supports the patient's individualized plan of care/goals and shares the quality data associated with the providers.  Not Indicated    Care Transitions patient: No    Stevo Crow RN  [facility]  Case Management Department  Ph: [phone]

## 2021-01-08 NOTE — PROGRESS NOTES
Physician Progress Note      Ruben Osuna  CSN #:                  [id number]  :                       1965  ADMIT DATE:       2021 10:14 PM  [address] [place] DATE:  RESPONDING  PROVIDER #:        Malaika Brush MD          QUERY TEXT:    Dear All Providers,    Patient admitted with AFIB w/RVR, noted to have MARY with BMI of 49.42. If   possible, please document in progress notes and discharge summary if you are   evaluating and/or treating any of the following: The medical record reflects the following:  Risk Factors: hx morbid obesity, MARY with home CPAP, HTN  Clinical Indicators: BMI 49.42. Noted documentation of MARY:Consult RT for   CPAP therapy. Patient reports compliance at home. Treatment:, CPAP, Cardiac diet, I & O per nursing  Options provided:  -- Morbid Obesity with BMI 49.42  -- Other - I will add my own diagnosis  -- Disagree - Not applicable / Not valid  -- Disagree - Clinically unable to determine / Unknown  -- Refer to Clinical Documentation Reviewer    PROVIDER RESPONSE TEXT:    This patient has Morbid Obesity with BMI 49.42.     Query created by: Lanora Dubin on 2021 11:12 AM      Electronically signed by:  Malaika Brush MD 2021 10:46 AM

## 2021-01-08 NOTE — PROGRESS NOTES
Patient had uneventful night. Alert & orientedx4. BP elevated overnight, BP meds given. MD aware. No c/o pain. Questions/concerns addressed at this time. Call light within reach. Bed low and in locked position. Will continue to monitor and assess patient needs.

## 2021-01-08 NOTE — DISCHARGE INSTR - COC
Continuity of Care Form    Patient Name: Ramses Pierce   :  1965  MRN:  [id number]    Admit date:  2021  Discharge date:  ***    Code Status Order: Full Code   Advance Directives:   Advance Care Flowsheet Documentation     Date/Time Healthcare Directive Type of Healthcare Directive Copy in [address] Agent's Name Healthcare Agent's Phone Number    21 [phone]  No, patient does not have an advance directive for healthcare treatment -- -- -- -- --          Admitting Physician:  Chery Hahn MD  PCP: No primary care provider on file.     Discharging Nurse: [facility] Unit/Room#: 5080/4043-56  Discharging Unit Phone Number: ***    Emergency Contact:   Extended Emergency Contact Information  Primary Emergency Contact: Shaun [address] Phone: [phone]  Work Phone: [phone]  Mobile Phone: [phone]  Relation: Parent  Secondary Emergency Contact: Shanice Banks  Address: [facility]           [id number]  Home Phone: [phone]  Work Phone: [phone]  Mobile Phone: [phone]  Relation: Child    Past Surgical History:  Past Surgical History:   Procedure Laterality Date    CARDIAC CATHETERIZATION      KNEE ARTHROSCOPY Right     NOSE SURGERY      Repair from Fx       Immunization History:   Immunization History   Administered Date(s) Administered    Influenza Virus Vaccine 11/10/2008, 2011, 10/06/2014, 2018    Influenza Whole 11/10/2008    Influenza, Quadv, IM, PF (6 mo and older Fluzone, Flulaval, Fluarix, and 3 yrs and older Afluria) 10/27/2020    Pneumococcal Polysaccharide (Azrpoekac37) 2018    Td, unspecified formulation 2010    Tdap (Boostrix, Adacel) 2010       Active Problems:  Patient Active Problem List   Diagnosis Code    Obstructive sleep apnea syndrome G47.33    Obesity E66.9    Depressive disorder F32.9    Lumbar strain S39.012A    Paresthesia R20.2  Mixed hyperlipidemia E78.2    Heart failure with normal ejection fraction ([facility]) I50.30    Hypertensive urgency I16.0    Atrial fibrillation ([facility]) I48.91    H/O angiography Z92.89    Atrial fibrillation with rapid ventricular response (HCC) I48.91    Essential hypertension I10    Atrial fibrillation with RVR ([facility]) I48.91       Isolation/Infection:   Isolation          No Isolation        Patient Infection Status     None to display          Nurse Assessment:  Last Vital Signs: BP (!) 185/124   Pulse 66   Temp 97.9 °F (36.6 °C) (Oral)   Resp 17   Wt (!) 325 lb (147.4 kg)   SpO2 98%   BMI 49.42 kg/m²     Last documented pain score (0-10 scale): Pain Level: 0  Last Weight:   Wt Readings from Last 1 Encounters:   01/06/21 (!) 325 lb (147.4 kg)     Mental Status:  {IP PT MENTAL STATUS:20030}    IV Access:  { LONNIE IV ACCESS:629106511}    Nursing Mobility/ADLs:  Walking   {P DME QRKW:430477917}  Transfer  {[facility] DME RMXS:818706984}  Bathing  {P DME DIVT:001203395}  Dressing  {[facility] DME BGIE:271794646}  Toileting  {[facility] DME NFVX:460243086}  Feeding  {[facility] DME FKHM:934137952}  Med Admin  {P DME MZTK:248337545}  Med Delivery   { LONNIE MED Delivery:903831028}    Wound Care Documentation and Therapy:        Elimination:  Continence:   · Bowel: {YES / LX:45444}  · Bladder: {YES / FV:04537}  Urinary Catheter: {Urinary Catheter:952637246}   Colostomy/Ileostomy/Ileal Conduit: {YES / DO:10508}       Date of Last BM: ***    Intake/Output Summary (Last 24 hours) at 1/8/2021 1215  Last data filed at 1/7/2021 1728  Gross per 24 hour   Intake 168.18 ml   Output    Net 168.18 ml     I/O last 3 completed shifts: In: 557 [P.O.:240;  I.V.:317]  Out: -     Safety Concerns:     [address] Safety Concerns:522123937}    Impairments/Disabilities:      [address] Impairments/Disabilities:337340364}    Nutrition Therapy:  Current Nutrition Therapy:   [address] Diet List:507209498}    Routes of Feeding: {CHP DME Other Feedings:250389080} Liquids: {Slp liquid thickness:02310}  Daily Fluid Restriction: {CHP DME Yes amt example:777603243}  Last Modified Barium Swallow with Video (Video Swallowing Test): {Done Not Done RBKL:608540121}    Treatments at the Time of Hospital Discharge:   Respiratory Treatments: ***  Oxygen Therapy:  {Therapy; copd oxygen:28666}  Ventilator:    {[facility] Vent JTVY:032872389}    Rehab Therapies: {THERAPEUTIC INTERVENTION:6359187151}  Weight Bearing Status/Restrictions: {[facility] Weight Bearin}  Other Medical Equipment (for information only, NOT a DME order):  {EQUIPMENT:919726053}  Other Treatments: ***    Patient's personal belongings (please select all that are sent with patient):  {CHP DME Belongings:422693190}    RN SIGNATURE:  {Esignature:603626085}    CASE MANAGEMENT/SOCIAL WORK SECTION    Inpatient Status Date: ***    Readmission Risk Assessment Score:  Readmission Risk              Risk of Unplanned Readmission:        13           Discharging to Facility/ Agency   · Name:   · Address:  · Phone:  · Fax:    Dialysis Facility (if applicable)   · Name:  · Address:  · Dialysis Schedule:  · Phone:  · Fax:    / signature: {Esignature:757060379}    PHYSICIAN SECTION    Prognosis: {Prognosis:8193901569}    Condition at Discharge: [address] [place] [address] Patient Condition:171867077}    Rehab Potential (if transferring to Rehab): {Prognosis:7651884653}    Recommended Labs or Other Treatments After Discharge: ***    Physician Certification: I certify the above information and transfer of Mitchell Diamond  is necessary for the continuing treatment of the diagnosis listed and that he requires {Admit to Appropriate Level of Care:05428} for {GREATER/LESS:557885480} 30 days.      Update Admission H&P: {CHP DME Changes in WFA:230792562}    PHYSICIAN SIGNATURE:  {Esignature:268165037}

## 2021-01-08 NOTE — PROGRESS NOTES
D/c order received. Pt discharged to home. IV removed. Personal belongings sent with patient. Patient transported to friends personal vehicle via wheelchair.

## 2021-01-08 NOTE — DISCHARGE INSTR - DIET
? Good nutrition is important when healing from an illness, injury, or surgery. Follow any nutrition recommendations given to you during your hospital stay. ? If you were given an oral nutrition supplement while in the hospital, continue to take this supplement at home. You can take it with meals, in-between meals, and/or before bedtime. These supplements can be purchased at most local grocery stores, pharmacies, and chain [employer]-stores. ? If you have any questions about your diet or nutrition, call the hospital and ask for the dietitian. Resume home activity.

## 2021-01-08 NOTE — DISCHARGE SUMMARY
Hospital Medicine Discharge Summary      Patient ID: Keron Bolton      Patient's PCP: No primary care provider on file. Admit Date: 1/6/2021     Discharge Date: 1/8/2021      Admitting Physician: Haider Walton MD    Discharge Physician: Haider Walton MD     Discharge Diagnoses: Active Hospital Problems    Diagnosis Date Noted    Atrial fibrillation with RVR [facility]) [I48.91] 01/07/2021         The patient was seen and examined on day of discharge and this discharge summary is in conjunction with any daily progress note from day of discharge. Hospital Course: The patient is a 48 y.o. male with medical history of HTN, MARY and a fib/flutter, who presents to [facility] with sudden onset of palpitations and chest discomfort. Patient was sitting watching TV when it started. He denies any specific triggers. While in the ER patient was found to be in a fib with RVR to 150's. He was having symptomatic chest pain, diaphoresis, palpitations. He was given IV diltiazem, metoprolol, then amiodarone drip was added. Patient converted from a fib to flutter. He continued to have chest pains and ultimately was cardioverted in ER with propofol sedation to sinus rhythm. He was seen by EP cardiology and started on flecainide. Patient continued to be in sinus rhythm. He was discharged to home. Plan to schedule ablation as outpatient in the near future.        Consults:     IP CONSULT TO CARDIOLOGY  IP CONSULT TO CARDIOLOGY  IP CONSULT TO HOSPITALIST  IP CONSULT TO CARDIOLOGY  IP CONSULT TO CARDIOLOGY    Disposition:Home     Discharged Condition: Stable    Code Status: Prior    Activity: activity as tolerated    Diet: cardiac diet    Follow Up: Primary Care Physician in one week    Exam:     General appearance: No apparent distress, appears stated age and cooperative. Lungs: Clear to ascultation, bilaterally without Rales/Wheezes/Rhonchi with good respiratory effort. Heart: Regular rate and rhythm with Normal S1/S2 without  murmurs, rubs or gallops, point of maximum impulse non-displaced  Abdomen: Soft, non-tender or non-distended without rigidity or guarding and positive bowel sounds all four quadrants. Extremities: No clubbing, cyanosis, or edema bilaterally. Skin: Skin color, texture, turgor normal.  Neurologic: Alert and oriented X 3,   grossly non-focal.  Mental status: Alert, oriented, thought content appropriate      Labs:  For convenience and continuity at follow-up the following most recent labs are provided:    CBC:   Lab Results   Component Value Date    WBC 9.1 01/06/2021    HGB 15.7 01/06/2021    HCT 47.8 01/06/2021     01/06/2021       RENAL:   Lab Results   Component Value Date     01/08/2021    K 3.9 01/08/2021    K 4.0 01/06/2021     01/08/2021    CO2 26 01/08/2021    BUN 18 01/08/2021    CREATININE 1.0 01/08/2021           Discharge Medications:   Discharge Medication List as of 1/8/2021 12:15 PM           Details   flecainide (TAMBOCOR) 100 MG tablet Take 1 tablet by mouth every 12 hours, Disp-60 tablet, R-3Print              Details   carvedilol (COREG) 25 MG tablet Take 1 tablet by mouth 2 times daily (with meals), Disp-60 tablet,R-3Normal      furosemide (LASIX) 20 MG tablet Take 1 tablet by mouth daily as needed (edema, weight gain >3lbs in day), Disp-60 tablet,R-3Normal      apixaban (ELIQUIS) 5 MG TABS tablet Take 1 tablet by mouth 2 times daily, Disp-60 tablet,R-3Print      losartan (COZAAR) 100 MG tablet TAKE 1 TABLET BY MOUTH DAILY, Disp-30 tablet,R-0Normal      sertraline (ZOLOFT) 50 MG tablet TAKE 1 TABLET BY MOUTH DAILY, Disp-30 tablet,R-0Normal      spironolactone (ALDACTONE) 25 MG tablet TAKE 1 TABLET BY MOUTH DAILY, Disp-30 tablet,R-0Normal      atorvastatin (LIPITOR) 10 MG tablet TAKE 1 TABLET BY MOUTH DAILY, Disp-30 tablet,R-0Normal hydrALAZINE (APRESOLINE) 25 MG tablet Take 3 tablets by mouth 4 times daily, Disp-360 tablet, R-3Normal                Time Spent on discharge is more than 30 minutes in the examination, evaluation, counseling and review of medications and discharge plan. Signed:  Jose Luis Palmer MD   1/8/2021      Thank you No primary care provider on file. for the opportunity to be involved in this patient's care. If you have any questions or concerns please feel free to contact me at [phone].

## 2021-01-11 ENCOUNTER — PREP FOR PROCEDURE (OUTPATIENT)
Dept: CARDIOLOGY CLINIC | Age: 56
End: 2021-01-11

## 2021-01-11 ENCOUNTER — TELEPHONE (OUTPATIENT)
Dept: CARDIOLOGY CLINIC | Age: 56
End: 2021-01-11

## 2021-01-11 ENCOUNTER — PATIENT MESSAGE (OUTPATIENT)
Dept: CARDIOLOGY CLINIC | Age: 56
End: 2021-01-11

## 2021-01-11 DIAGNOSIS — I48.0 PAROXYSMAL ATRIAL FIBRILLATION (HCC): Primary | ICD-10-CM

## 2021-01-11 RX ORDER — SODIUM CHLORIDE 0.9 % (FLUSH) 0.9 %
10 SYRINGE (ML) INJECTION EVERY 12 HOURS SCHEDULED
Status: CANCELLED | OUTPATIENT
Start: 2021-01-11

## 2021-01-11 RX ORDER — SODIUM CHLORIDE 9 MG/ML
INJECTION, SOLUTION INTRAVENOUS CONTINUOUS
Status: CANCELLED | OUTPATIENT
Start: 2021-01-11

## 2021-01-11 RX ORDER — SODIUM CHLORIDE 0.9 % (FLUSH) 0.9 %
10 SYRINGE (ML) INJECTION PRN
Status: CANCELLED | OUTPATIENT
Start: 2021-01-11

## 2021-01-11 NOTE — TELEPHONE ENCOUNTER
Spoke with patient and scheduled ablation. Reviewed pre-op instructions and testing. Patient verbalized understanding.

## 2021-01-11 NOTE — TELEPHONE ENCOUNTER
The patient called requesting a return to work letter. Please call the patient at [phone] to let him know when he can pick the letter up.

## 2021-01-11 NOTE — TELEPHONE ENCOUNTER
Electrophysiology Study (EPS) and Atrial Fibrillation (AFib) Ablation    Date of Procedure: 2/1/21    Time of Arrival: 0930    Cardiologist performing procedure: Dr. Rosita Julien    · Arrive at [facility]. through the main entrance. Check in at the Outpatient Diagnostic desk on the 1st floor. · Do not eat or drink anything after midnight the night before the procedure. · You may brush your teeth and rinse the morning of the procedure. · Take ALL of your routine medications the morning of the procedure. However, if you are taking diabetic medications, please HOLD on the day of the procedure (including insulin). If you take Lantus insulin, take HALF of your usual dose the night before. · Do NOT stop taking aspirin, Plavix, coumadin, Eliquis, Xarelto, or Pradaxa (any blood thinners). However, do not take blood thinner the morning of the procedure. · Do not apply any lotion, powder, or deodorant the morning of the procedure. · Please bring a list of your medications to the hospital with you. · You must have someone available to drive you home the same day. It is recommended that you do not drive for 24 hours after the procedure. You will also need someone with you at home the night of the procedure. · Please get tested for COVID-19 on 1/27/21 at [facility]. testing center (located outside the hospital). They are open 8 am - 3 pm.     · CT scan of the chest is due before the procedure. Please call [phone] to schedule on 1/27/21. This may be done the same day as the COVID testing. · Lab work is due before the procedure. You may do this the same day as the CT scan and COVID testing. · If you are unable to make this appointment, please call [facility] at [phone].

## 2021-01-12 ENCOUNTER — PATIENT MESSAGE (OUTPATIENT)
Dept: INTERNAL MEDICINE CLINIC | Age: 56
End: 2021-01-12

## 2021-01-12 DIAGNOSIS — E78.2 MIXED HYPERLIPIDEMIA: ICD-10-CM

## 2021-01-12 DIAGNOSIS — I10 ESSENTIAL HYPERTENSION: ICD-10-CM

## 2021-01-12 DIAGNOSIS — F34.1 DYSTHYMIA: ICD-10-CM

## 2021-01-12 RX ORDER — FUROSEMIDE 20 MG/1
20 TABLET ORAL DAILY PRN
Qty: 60 TABLET | Refills: 3 | Status: ON HOLD | OUTPATIENT
Start: 2021-01-12 | End: 2022-06-28 | Stop reason: HOSPADM

## 2021-01-12 RX ORDER — HYDRALAZINE HYDROCHLORIDE 25 MG/1
75 TABLET, FILM COATED ORAL 3 TIMES DAILY
Qty: 810 TABLET | Refills: 0 | Status: SHIPPED | OUTPATIENT
Start: 2021-01-12 | End: 2021-08-17 | Stop reason: SDUPTHER

## 2021-01-12 RX ORDER — SPIRONOLACTONE 25 MG/1
25 TABLET ORAL DAILY
Qty: 90 TABLET | Refills: 0 | Status: SHIPPED | OUTPATIENT
Start: 2021-01-12 | End: 2021-08-17 | Stop reason: SDUPTHER

## 2021-01-12 RX ORDER — LOSARTAN POTASSIUM 100 MG/1
100 TABLET ORAL DAILY
Qty: 90 TABLET | Refills: 0 | Status: SHIPPED | OUTPATIENT
Start: 2021-01-12 | End: 2021-08-17 | Stop reason: SDUPTHER

## 2021-01-12 RX ORDER — ATORVASTATIN CALCIUM 10 MG/1
10 TABLET, FILM COATED ORAL DAILY
Qty: 90 TABLET | Refills: 0 | Status: SHIPPED | OUTPATIENT
Start: 2021-01-12 | End: 2021-08-17 | Stop reason: SDUPTHER

## 2021-01-12 NOTE — TELEPHONE ENCOUNTER
From: Gin Prior  To: JARET Caballero CNP  Sent: 1/12/2021 9:01 AM EST  Subject: Prescription Question    Good morning I need to have my prescriptions refilled but I am unable to request them thru [address] ..

## 2021-01-27 ENCOUNTER — TELEPHONE (OUTPATIENT)
Dept: CARDIOLOGY CLINIC | Age: 56
End: 2021-01-27

## 2021-01-28 ENCOUNTER — OFFICE VISIT (OUTPATIENT)
Dept: PRIMARY CARE CLINIC | Age: 56
End: 2021-01-28
Payer: COMMERCIAL

## 2021-01-28 ENCOUNTER — HOSPITAL ENCOUNTER (OUTPATIENT)
Dept: CT IMAGING | Age: 56
Discharge: HOME | End: 2021-01-28
Payer: COMMERCIAL

## 2021-01-28 DIAGNOSIS — I48.0 PAROXYSMAL ATRIAL FIBRILLATION (HCC): ICD-10-CM

## 2021-01-28 DIAGNOSIS — Z01.818 PREOP EXAMINATION: Primary | ICD-10-CM

## 2021-01-28 PROCEDURE — G8417 CALC BMI ABV UP PARAM F/U: HCPCS | Performed by: NURSE PRACTITIONER

## 2021-01-28 PROCEDURE — G8428 CUR MEDS NOT DOCUMENT: HCPCS | Performed by: NURSE PRACTITIONER

## 2021-01-28 PROCEDURE — 6360000004 HC RX CONTRAST MEDICATION: Performed by: INTERNAL MEDICINE

## 2021-01-28 PROCEDURE — 71275 CT ANGIOGRAPHY CHEST: CPT

## 2021-01-28 PROCEDURE — 99211 OFF/OP EST MAY X REQ PHY/QHP: CPT | Performed by: NURSE PRACTITIONER

## 2021-01-28 RX ADMIN — IOPAMIDOL 80 ML: 755 INJECTION, SOLUTION INTRAVENOUS at 13:45

## 2021-01-29 ENCOUNTER — CARE COORDINATION (OUTPATIENT)
Dept: CARE COORDINATION | Age: 56
End: 2021-01-29

## 2021-01-29 LAB
ANION GAP SERPL CALCULATED.3IONS-SCNC: 11 MMOL/L (ref 3–16)
BUN BLDV-MCNC: 17 MG/DL (ref 7–20)
CALCIUM SERPL-MCNC: 9.7 MG/DL (ref 8.3–10.6)
CHLORIDE BLD-SCNC: 102 MMOL/L (ref 99–110)
CO2: 26 MMOL/L (ref 21–32)
CREAT SERPL-MCNC: 0.9 MG/DL (ref 0.9–1.3)
GFR AFRICAN AMERICAN: >60
GFR NON-AFRICAN AMERICAN: >60
GLUCOSE BLD-MCNC: 94 MG/DL (ref 70–99)
HCT VFR BLD CALC: 46.9 % (ref 40.5–52.5)
HEMOGLOBIN: 15.1 G/DL (ref 13.5–17.5)
INR BLD: 1.3 (ref 0.86–1.14)
MCH RBC QN AUTO: 27.2 PG (ref 26–34)
MCHC RBC AUTO-ENTMCNC: 32.1 G/DL (ref 31–36)
MCV RBC AUTO: 84.7 FL (ref 80–100)
PDW BLD-RTO: 15.5 % (ref 12.4–15.4)
PLATELET # BLD: 183 K/UL (ref 135–450)
PMV BLD AUTO: 10.4 FL (ref 5–10.5)
POTASSIUM SERPL-SCNC: 4.6 MMOL/L (ref 3.5–5.1)
PROTHROMBIN TIME: 15.1 SEC (ref 10–13.2)
RBC # BLD: 5.55 M/UL (ref 4.2–5.9)
SARS-COV-2, NAA: NOT DETECTED
SODIUM BLD-SCNC: 139 MMOL/L (ref 136–145)
WBC # BLD: 8.2 K/UL (ref 4–11)

## 2021-01-29 NOTE — CARE COORDINATION
Situation: assigned for outreach to extend invitation for added support via Care Coordination      Left vm in outreach to invite pt to participate in Care Coordination. Reviewed this added support is made available through PCP at NO COST to pt. Encouraged return callback to review ways in which [facility] helps support pt achieve pt centered goals. Provided & repeated direct callback to reach ACM. See also MyChart message sent.

## 2021-02-02 ENCOUNTER — ANESTHESIA EVENT (OUTPATIENT)
Dept: CARDIAC CATH/INVASIVE PROCEDURES | Age: 56
End: 2021-02-02

## 2021-02-03 ENCOUNTER — ANESTHESIA (OUTPATIENT)
Dept: CARDIAC CATH/INVASIVE PROCEDURES | Age: 56
End: 2021-02-03

## 2021-02-03 ENCOUNTER — HOSPITAL ENCOUNTER (OUTPATIENT)
Dept: CARDIAC CATH/INVASIVE PROCEDURES | Age: 56
Discharge: HOME | End: 2021-02-05
Payer: COMMERCIAL

## 2021-02-03 VITALS
DIASTOLIC BLOOD PRESSURE: 76 MMHG | TEMPERATURE: 98.1 F | OXYGEN SATURATION: 99 % | SYSTOLIC BLOOD PRESSURE: 149 MMHG | RESPIRATION RATE: 14 BRPM

## 2021-02-03 VITALS — BODY MASS INDEX: 47.74 KG/M2 | WEIGHT: 315 LBS | TEMPERATURE: 98.1 F | HEIGHT: 68 IN

## 2021-02-03 LAB
EKG ATRIAL RATE: 69 BPM
EKG DIAGNOSIS: NORMAL
EKG P AXIS: 6 DEGREES
EKG P-R INTERVAL: 150 MS
EKG Q-T INTERVAL: 450 MS
EKG QRS DURATION: 96 MS
EKG QTC CALCULATION (BAZETT): 482 MS
EKG R AXIS: -56 DEGREES
EKG T AXIS: 135 DEGREES
EKG VENTRICULAR RATE: 69 BPM
POC ACT LR: 283 SEC
POC ACT LR: 318 SEC
POC ACT LR: 330 SEC
POC ACT LR: 369 SEC

## 2021-02-03 PROCEDURE — 2580000003 HC RX 258: Performed by: ANESTHESIOLOGY

## 2021-02-03 PROCEDURE — 2709999900 HC NON-CHARGEABLE SUPPLY

## 2021-02-03 PROCEDURE — 6370000000 HC RX 637 (ALT 250 FOR IP): Performed by: INTERNAL MEDICINE

## 2021-02-03 PROCEDURE — 93623 PRGRMD STIMJ&PACG IV RX NFS: CPT

## 2021-02-03 PROCEDURE — 93623 PRGRMD STIMJ&PACG IV RX NFS: CPT | Performed by: INTERNAL MEDICINE

## 2021-02-03 PROCEDURE — 3700000000 HC ANESTHESIA ATTENDED CARE

## 2021-02-03 PROCEDURE — 93010 ELECTROCARDIOGRAM REPORT: CPT | Performed by: INTERNAL MEDICINE

## 2021-02-03 PROCEDURE — 6360000002 HC RX W HCPCS: Performed by: ANESTHESIOLOGY

## 2021-02-03 PROCEDURE — 2580000003 HC RX 258: Performed by: NURSE ANESTHETIST, CERTIFIED REGISTERED

## 2021-02-03 PROCEDURE — 6360000002 HC RX W HCPCS: Performed by: NURSE ANESTHETIST, CERTIFIED REGISTERED

## 2021-02-03 PROCEDURE — C1730 CATH, EP, 19 OR FEW ELECT: HCPCS

## 2021-02-03 PROCEDURE — 93662 INTRACARDIAC ECG (ICE): CPT | Performed by: INTERNAL MEDICINE

## 2021-02-03 PROCEDURE — 93613 INTRACARDIAC EPHYS 3D MAPG: CPT | Performed by: INTERNAL MEDICINE

## 2021-02-03 PROCEDURE — 2500000003 HC RX 250 WO HCPCS: Performed by: NURSE ANESTHETIST, CERTIFIED REGISTERED

## 2021-02-03 PROCEDURE — C1894 INTRO/SHEATH, NON-LASER: HCPCS

## 2021-02-03 PROCEDURE — 93005 ELECTROCARDIOGRAM TRACING: CPT | Performed by: INTERNAL MEDICINE

## 2021-02-03 PROCEDURE — 93613 INTRACARDIAC EPHYS 3D MAPG: CPT

## 2021-02-03 PROCEDURE — 93622 COMP EP EVAL L VENTR PAC&REC: CPT | Performed by: INTERNAL MEDICINE

## 2021-02-03 PROCEDURE — 93662 INTRACARDIAC ECG (ICE): CPT

## 2021-02-03 PROCEDURE — 93655 ICAR CATH ABLTJ DSCRT ARRHYT: CPT | Performed by: INTERNAL MEDICINE

## 2021-02-03 PROCEDURE — C1759 CATH, INTRA ECHOCARDIOGRAPHY: HCPCS

## 2021-02-03 PROCEDURE — 2720000010 HC SURG SUPPLY STERILE

## 2021-02-03 PROCEDURE — 93655 ICAR CATH ABLTJ DSCRT ARRHYT: CPT

## 2021-02-03 PROCEDURE — 2500000003 HC RX 250 WO HCPCS

## 2021-02-03 PROCEDURE — 3700000001 HC ADD 15 MINUTES (ANESTHESIA)

## 2021-02-03 PROCEDURE — 93622 COMP EP EVAL L VENTR PAC&REC: CPT

## 2021-02-03 PROCEDURE — 6360000002 HC RX W HCPCS

## 2021-02-03 PROCEDURE — 93656 COMPRE EP EVAL ABLTJ ATR FIB: CPT | Performed by: INTERNAL MEDICINE

## 2021-02-03 PROCEDURE — C1732 CATH, EP, DIAG/ABL, 3D/VECT: HCPCS

## 2021-02-03 PROCEDURE — 85347 COAGULATION TIME ACTIVATED: CPT

## 2021-02-03 PROCEDURE — 93656 COMPRE EP EVAL ABLTJ ATR FIB: CPT

## 2021-02-03 RX ORDER — SODIUM CHLORIDE 9 MG/ML
INJECTION, SOLUTION INTRAVENOUS CONTINUOUS
Status: DISCONTINUED | OUTPATIENT
Start: 2021-02-03 | End: 2021-02-06 | Stop reason: HOSPADM

## 2021-02-03 RX ORDER — SODIUM CHLORIDE 0.9 % (FLUSH) 0.9 %
10 SYRINGE (ML) INJECTION EVERY 12 HOURS SCHEDULED
Status: DISCONTINUED | OUTPATIENT
Start: 2021-02-03 | End: 2021-02-06 | Stop reason: HOSPADM

## 2021-02-03 RX ORDER — FENTANYL CITRATE 50 UG/ML
50 INJECTION, SOLUTION INTRAMUSCULAR; INTRAVENOUS EVERY 5 MIN PRN
Status: DISCONTINUED | OUTPATIENT
Start: 2021-02-03 | End: 2021-02-06 | Stop reason: HOSPADM

## 2021-02-03 RX ORDER — SODIUM CHLORIDE 0.9 % (FLUSH) 0.9 %
10 SYRINGE (ML) INJECTION PRN
Status: DISCONTINUED | OUTPATIENT
Start: 2021-02-03 | End: 2021-02-06 | Stop reason: HOSPADM

## 2021-02-03 RX ORDER — ONDANSETRON 2 MG/ML
4 INJECTION INTRAMUSCULAR; INTRAVENOUS
Status: ACTIVE | OUTPATIENT
Start: 2021-02-03 | End: 2021-02-03

## 2021-02-03 RX ORDER — PROPOFOL 10 MG/ML
INJECTION, EMULSION INTRAVENOUS PRN
Status: DISCONTINUED | OUTPATIENT
Start: 2021-02-03 | End: 2021-02-03 | Stop reason: SDUPTHER

## 2021-02-03 RX ORDER — SODIUM CHLORIDE 0.9 % (FLUSH) 0.9 %
10 SYRINGE (ML) INJECTION EVERY 12 HOURS SCHEDULED
Status: DISCONTINUED | OUTPATIENT
Start: 2021-02-03 | End: 2021-02-03 | Stop reason: SDUPTHER

## 2021-02-03 RX ORDER — LABETALOL 20 MG/4 ML (5 MG/ML) INTRAVENOUS SYRINGE
5 EVERY 10 MIN PRN
Status: DISCONTINUED | OUTPATIENT
Start: 2021-02-03 | End: 2021-02-06 | Stop reason: HOSPADM

## 2021-02-03 RX ORDER — SUCCINYLCHOLINE/SOD CL,ISO/PF 100 MG/5ML
SYRINGE (ML) INTRAVENOUS PRN
Status: DISCONTINUED | OUTPATIENT
Start: 2021-02-03 | End: 2021-02-03 | Stop reason: SDUPTHER

## 2021-02-03 RX ORDER — EPHEDRINE SULFATE 50 MG/ML
INJECTION INTRAVENOUS PRN
Status: DISCONTINUED | OUTPATIENT
Start: 2021-02-03 | End: 2021-02-03 | Stop reason: SDUPTHER

## 2021-02-03 RX ORDER — ROCURONIUM BROMIDE 10 MG/ML
INJECTION, SOLUTION INTRAVENOUS PRN
Status: DISCONTINUED | OUTPATIENT
Start: 2021-02-03 | End: 2021-02-03 | Stop reason: SDUPTHER

## 2021-02-03 RX ORDER — SODIUM CHLORIDE 0.9 % (FLUSH) 0.9 %
10 SYRINGE (ML) INJECTION PRN
Status: DISCONTINUED | OUTPATIENT
Start: 2021-02-03 | End: 2021-02-03 | Stop reason: SDUPTHER

## 2021-02-03 RX ORDER — HYDRALAZINE HYDROCHLORIDE 20 MG/ML
5 INJECTION INTRAMUSCULAR; INTRAVENOUS EVERY 10 MIN PRN
Status: DISCONTINUED | OUTPATIENT
Start: 2021-02-03 | End: 2021-02-06 | Stop reason: HOSPADM

## 2021-02-03 RX ORDER — FENTANYL CITRATE 50 UG/ML
INJECTION, SOLUTION INTRAMUSCULAR; INTRAVENOUS PRN
Status: DISCONTINUED | OUTPATIENT
Start: 2021-02-03 | End: 2021-02-03 | Stop reason: SDUPTHER

## 2021-02-03 RX ORDER — PROTAMINE SULFATE 10 MG/ML
INJECTION, SOLUTION INTRAVENOUS PRN
Status: DISCONTINUED | OUTPATIENT
Start: 2021-02-03 | End: 2021-02-03 | Stop reason: SDUPTHER

## 2021-02-03 RX ORDER — ACETAMINOPHEN 325 MG/1
650 TABLET ORAL EVERY 4 HOURS PRN
Status: DISCONTINUED | OUTPATIENT
Start: 2021-02-03 | End: 2021-02-06 | Stop reason: HOSPADM

## 2021-02-03 RX ORDER — VASOPRESSIN 20 U/ML
INJECTION PARENTERAL PRN
Status: DISCONTINUED | OUTPATIENT
Start: 2021-02-03 | End: 2021-02-03 | Stop reason: SDUPTHER

## 2021-02-03 RX ORDER — LIDOCAINE HYDROCHLORIDE 20 MG/ML
INJECTION, SOLUTION EPIDURAL; INFILTRATION; INTRACAUDAL; PERINEURAL PRN
Status: DISCONTINUED | OUTPATIENT
Start: 2021-02-03 | End: 2021-02-03 | Stop reason: SDUPTHER

## 2021-02-03 RX ORDER — SODIUM CHLORIDE, SODIUM LACTATE, POTASSIUM CHLORIDE, CALCIUM CHLORIDE 600; 310; 30; 20 MG/100ML; MG/100ML; MG/100ML; MG/100ML
INJECTION, SOLUTION INTRAVENOUS CONTINUOUS
Status: DISCONTINUED | OUTPATIENT
Start: 2021-02-03 | End: 2021-02-06 | Stop reason: HOSPADM

## 2021-02-03 RX ORDER — PROMETHAZINE HYDROCHLORIDE 25 MG/ML
6.25 INJECTION, SOLUTION INTRAMUSCULAR; INTRAVENOUS
Status: ACTIVE | OUTPATIENT
Start: 2021-02-03 | End: 2021-02-03

## 2021-02-03 RX ORDER — HEPARIN SODIUM 1000 [USP'U]/ML
INJECTION, SOLUTION INTRAVENOUS; SUBCUTANEOUS PRN
Status: DISCONTINUED | OUTPATIENT
Start: 2021-02-03 | End: 2021-02-03 | Stop reason: SDUPTHER

## 2021-02-03 RX ORDER — ONDANSETRON 2 MG/ML
INJECTION INTRAMUSCULAR; INTRAVENOUS PRN
Status: DISCONTINUED | OUTPATIENT
Start: 2021-02-03 | End: 2021-02-03 | Stop reason: SDUPTHER

## 2021-02-03 RX ORDER — FENTANYL CITRATE 50 UG/ML
25 INJECTION, SOLUTION INTRAMUSCULAR; INTRAVENOUS EVERY 5 MIN PRN
Status: DISCONTINUED | OUTPATIENT
Start: 2021-02-03 | End: 2021-02-06 | Stop reason: HOSPADM

## 2021-02-03 RX ORDER — MEPERIDINE HYDROCHLORIDE 25 MG/ML
12.5 INJECTION INTRAMUSCULAR; INTRAVENOUS; SUBCUTANEOUS EVERY 5 MIN PRN
Status: DISCONTINUED | OUTPATIENT
Start: 2021-02-03 | End: 2021-02-06 | Stop reason: HOSPADM

## 2021-02-03 RX ORDER — HEPARIN SODIUM 10000 [USP'U]/100ML
INJECTION, SOLUTION INTRAVENOUS CONTINUOUS PRN
Status: DISCONTINUED | OUTPATIENT
Start: 2021-02-03 | End: 2021-02-03 | Stop reason: SDUPTHER

## 2021-02-03 RX ORDER — FUROSEMIDE 10 MG/ML
INJECTION INTRAMUSCULAR; INTRAVENOUS PRN
Status: DISCONTINUED | OUTPATIENT
Start: 2021-02-03 | End: 2021-02-03 | Stop reason: SDUPTHER

## 2021-02-03 RX ORDER — OXYCODONE HYDROCHLORIDE AND ACETAMINOPHEN 5; 325 MG/1; MG/1
1 TABLET ORAL
Status: ACTIVE | OUTPATIENT
Start: 2021-02-03 | End: 2021-02-03

## 2021-02-03 RX ADMIN — PHENYLEPHRINE HYDROCHLORIDE 80 MCG: 10 INJECTION, SOLUTION INTRAMUSCULAR; INTRAVENOUS; SUBCUTANEOUS at 09:58

## 2021-02-03 RX ADMIN — PHENYLEPHRINE HYDROCHLORIDE 20 MCG/MIN: 10 INJECTION INTRAVENOUS at 09:36

## 2021-02-03 RX ADMIN — VASOPRESSIN 1 UNITS: 20 INJECTION INTRAVENOUS at 09:33

## 2021-02-03 RX ADMIN — ISOPROTERENOL HYDROCHLORIDE 10 MCG/MIN: 0.2 INJECTION, SOLUTION INTRAMUSCULAR; INTRAVENOUS at 11:14

## 2021-02-03 RX ADMIN — HEPARIN SODIUM 2000 UNITS: 1000 INJECTION, SOLUTION INTRAVENOUS; SUBCUTANEOUS at 10:36

## 2021-02-03 RX ADMIN — HEPARIN SODIUM 5000 UNITS: 1000 INJECTION, SOLUTION INTRAVENOUS; SUBCUTANEOUS at 10:13

## 2021-02-03 RX ADMIN — VASOPRESSIN 1 UNITS: 20 INJECTION INTRAVENOUS at 09:34

## 2021-02-03 RX ADMIN — ROCURONIUM BROMIDE 15 MG: 10 INJECTION INTRAVENOUS at 11:17

## 2021-02-03 RX ADMIN — HYDRALAZINE HYDROCHLORIDE 5 MG: 20 INJECTION, SOLUTION INTRAMUSCULAR; INTRAVENOUS at 16:04

## 2021-02-03 RX ADMIN — EPHEDRINE SULFATE 10 MG: 50 INJECTION INTRAVENOUS at 09:31

## 2021-02-03 RX ADMIN — ROCURONIUM BROMIDE 25 MG: 10 INJECTION INTRAVENOUS at 10:51

## 2021-02-03 RX ADMIN — SUGAMMADEX 200 MG: 100 INJECTION, SOLUTION INTRAVENOUS at 11:27

## 2021-02-03 RX ADMIN — HEPARIN SODIUM 2000 UNITS: 1000 INJECTION, SOLUTION INTRAVENOUS; SUBCUTANEOUS at 10:51

## 2021-02-03 RX ADMIN — ONDANSETRON 4 MG: 2 INJECTION INTRAMUSCULAR; INTRAVENOUS at 11:25

## 2021-02-03 RX ADMIN — FENTANYL CITRATE 20 MCG: 50 INJECTION, SOLUTION INTRAMUSCULAR; INTRAVENOUS at 09:47

## 2021-02-03 RX ADMIN — SODIUM CHLORIDE, SODIUM LACTATE, POTASSIUM CHLORIDE, AND CALCIUM CHLORIDE: .6; .31; .03; .02 INJECTION, SOLUTION INTRAVENOUS at 11:34

## 2021-02-03 RX ADMIN — Medication 120 MG: at 09:24

## 2021-02-03 RX ADMIN — LIDOCAINE HYDROCHLORIDE 100 MG: 20 INJECTION, SOLUTION EPIDURAL; INFILTRATION; INTRACAUDAL; PERINEURAL at 09:21

## 2021-02-03 RX ADMIN — PROTAMINE SULFATE 40 MG: 10 INJECTION, SOLUTION INTRAVENOUS at 11:25

## 2021-02-03 RX ADMIN — APIXABAN 5 MG: 5 TABLET, FILM COATED ORAL at 15:39

## 2021-02-03 RX ADMIN — PROPOFOL 200 MG: 10 INJECTION, EMULSION INTRAVENOUS at 09:23

## 2021-02-03 RX ADMIN — FUROSEMIDE 20 MG: 10 INJECTION, SOLUTION INTRAMUSCULAR; INTRAVENOUS at 11:25

## 2021-02-03 RX ADMIN — SODIUM CHLORIDE, SODIUM LACTATE, POTASSIUM CHLORIDE, AND CALCIUM CHLORIDE: .6; .31; .03; .02 INJECTION, SOLUTION INTRAVENOUS at 07:47

## 2021-02-03 RX ADMIN — HEPARIN SODIUM 2000 UNITS/HR: 10000 INJECTION, SOLUTION INTRAVENOUS at 10:13

## 2021-02-03 RX ADMIN — HEPARIN SODIUM 10000 UNITS: 1000 INJECTION, SOLUTION INTRAVENOUS; SUBCUTANEOUS at 09:52

## 2021-02-03 RX ADMIN — FENTANYL CITRATE 30 MCG: 50 INJECTION, SOLUTION INTRAMUSCULAR; INTRAVENOUS at 10:21

## 2021-02-03 RX ADMIN — FENTANYL CITRATE 50 MCG: 50 INJECTION, SOLUTION INTRAMUSCULAR; INTRAVENOUS at 09:21

## 2021-02-03 ASSESSMENT — PULMONARY FUNCTION TESTS
PIF_VALUE: 1
PIF_VALUE: 24
PIF_VALUE: 23
PIF_VALUE: 0
PIF_VALUE: 23
PIF_VALUE: 0
PIF_VALUE: 24
PIF_VALUE: 23
PIF_VALUE: 24
PIF_VALUE: 23
PIF_VALUE: 24
PIF_VALUE: 23
PIF_VALUE: 24
PIF_VALUE: 2
PIF_VALUE: 24
PIF_VALUE: 23
PIF_VALUE: 1
PIF_VALUE: 22
PIF_VALUE: 21
PIF_VALUE: 24
PIF_VALUE: 27
PIF_VALUE: 23
PIF_VALUE: 24
PIF_VALUE: 23
PIF_VALUE: 23
PIF_VALUE: 24
PIF_VALUE: 23
PIF_VALUE: 1
PIF_VALUE: 23
PIF_VALUE: 22
PIF_VALUE: 2
PIF_VALUE: 23
PIF_VALUE: 1
PIF_VALUE: 23
PIF_VALUE: 26
PIF_VALUE: 22
PIF_VALUE: 0
PIF_VALUE: 24
PIF_VALUE: 1
PIF_VALUE: 24
PIF_VALUE: 23
PIF_VALUE: 23
PIF_VALUE: 24
PIF_VALUE: 1
PIF_VALUE: 23
PIF_VALUE: 24
PIF_VALUE: 23
PIF_VALUE: 24
PIF_VALUE: 23
PIF_VALUE: 23
PIF_VALUE: 24
PIF_VALUE: 1
PIF_VALUE: 24
PIF_VALUE: 24
PIF_VALUE: 23
PIF_VALUE: 24
PIF_VALUE: 23
PIF_VALUE: 23
PIF_VALUE: 24
PIF_VALUE: 23
PIF_VALUE: 4
PIF_VALUE: 23
PIF_VALUE: 23

## 2021-02-03 ASSESSMENT — LIFESTYLE VARIABLES: SMOKING_STATUS: 0

## 2021-02-03 NOTE — ANESTHESIA PRE PROCEDURE
Department of Anesthesiology  Preprocedure Note       Name:  Delfino Elias   Age:  48 y.o.  :  1965                                          MRN:  [id number]         Date:  2/3/2021      Surgeon: * No surgeons listed *    Procedure: * No procedures listed *    Medications prior to admission:   Prior to Admission medications    Medication Sig Start Date End Date Taking?  Authorizing Provider   furosemide (LASIX) 20 MG tablet Take 1 tablet by mouth daily as needed (edema, weight gain >3lbs in day) 21  Yes JARET Rios CNP   losartan (COZAAR) 100 MG tablet Take 1 tablet by mouth daily 21 Yes JARET Rios CNP   sertraline (ZOLOFT) 50 MG tablet Take 1 tablet by mouth daily 21 Yes JARET Rios CNP   spironolactone (ALDACTONE) 25 MG tablet Take 1 tablet by mouth daily 21 Yes JARET Rios CNP   atorvastatin (LIPITOR) 10 MG tablet Take 1 tablet by mouth daily 21 Yes JARET Rios CNP   hydrALAZINE (APRESOLINE) 25 MG tablet Take 3 tablets by mouth 3 times daily 21 Yes JARET Mariscal CNP   flecainide (TAMBOCOR) 100 MG tablet Take 1 tablet by mouth every 12 hours 21  Yes Echo Sebastian MD   carvedilol (COREG) 25 MG tablet Take 1 tablet by mouth 2 times daily (with meals) 10/27/20  Yes Obdulio Barone MD   apixaban (ELIQUIS) 5 MG TABS tablet Take 1 tablet by mouth 2 times daily 10/26/20   JARET Murillo CNP       Current medications:    Current Outpatient Medications   Medication Sig Dispense Refill    furosemide (LASIX) 20 MG tablet Take 1 tablet by mouth daily as needed (edema, weight gain >3lbs in day) 60 tablet 3    losartan (COZAAR) 100 MG tablet Take 1 tablet by mouth daily 90 tablet 0    sertraline (ZOLOFT) 50 MG tablet Take 1 tablet by mouth daily 90 tablet 0    spironolactone (ALDACTONE) 25 MG tablet Take 1 tablet by mouth daily 90 tablet 0    atorvastatin (LIPITOR) 10 MG tablet Take 1 tablet by mouth daily 90 tablet 0    hydrALAZINE (APRESOLINE) 25 MG tablet Take 3 tablets by mouth 3 times daily 810 tablet 0    flecainide (TAMBOCOR) 100 MG tablet Take 1 tablet by mouth every 12 hours 60 tablet 3    carvedilol (COREG) 25 MG tablet Take 1 tablet by mouth 2 times daily (with meals) 60 tablet 3    apixaban (ELIQUIS) 5 MG TABS tablet Take 1 tablet by mouth 2 times daily 60 tablet 3     Current Facility-Administered Medications   Medication Dose Route Frequency Provider Last Rate Last Admin    sodium chloride flush 0.9 % injection 10 mL  10 mL Intravenous 2 times per day Paola Sri Lankan, DO        sodium chloride flush 0.9 % injection 10 mL  10 mL Intravenous PRN Paola Sri Lankan, DO        0.9 % sodium chloride infusion   Intravenous Continuous Paola Sri Lankan, DO        lactated ringers infusion   Intravenous Continuous Paola Sri [place]ankan, DO   New Bag at 02/03/21 0747    0.9 % sodium chloride infusion   Intravenous Continuous Ladan Lentz MD           Allergies:     Allergies   Allergen Reactions    Lisinopril      cough       Problem List:    Patient Active Problem List   Diagnosis Code    Obstructive sleep apnea syndrome G47.33    Obesity E66.9    Depressive disorder F32.9    Lumbar strain S39.012A    Paresthesia R20.2    Mixed hyperlipidemia E78.2    Heart failure with normal ejection fraction (HCC) I50.30    Hypertensive urgency I16.0    Atrial fibrillation (HCC) I48.91    H/O angiography Z92.89    Atrial fibrillation with rapid ventricular response (HCC) I48.91    Essential hypertension I10    Atrial fibrillation with RVR (HCC) I48.91       Past Medical History:        Diagnosis Date    Carpal tunnel syndrome     Chronic kidney disease     Hyperlipidemia     Hypertension     Obesity, unspecified     MARY (obstructive sleep apnea)        Past Surgical History:        Procedure Laterality Date    CARDIAC CATHETERIZATION  KNEE ARTHROSCOPY Right     NOSE SURGERY      Repair from Fx       Social History:    Social History     Tobacco Use    Smoking status: Never Smoker    Smokeless tobacco: Never Used   Substance Use Topics    Alcohol use: Yes     Comment: occasionally                                Counseling given: Not Answered      Vital Signs (Current):   Vitals:    02/03/21 0651 02/03/21 0654   Temp:  98.1 °F (36.7 °C)   TempSrc: Oral Oral   Weight: (!) 320 lb (145.2 kg)    Height: 5' 8\" (1.727 m)                                               BP Readings from Last 3 Encounters:   01/08/21 (!) 163/104   10/27/20 116/62   03/02/20 (!) 142/100       NPO Status:                                                                                 BMI:   Wt Readings from Last 3 Encounters:   02/03/21 (!) 320 lb (145.2 kg)   01/06/21 (!) 325 lb (147.4 kg)   10/25/20 (!) 319 lb 10.7 oz (145 kg)     Body mass index is 48.66 kg/m². CBC:   Lab Results   Component Value Date    WBC 8.2 01/28/2021    RBC 5.55 01/28/2021    HGB 15.1 01/28/2021    HCT 46.9 01/28/2021    MCV 84.7 01/28/2021    RDW 15.5 01/28/2021     01/28/2021       CMP:   Lab Results   Component Value Date     01/28/2021    K 4.6 01/28/2021    K 4.0 01/06/2021     01/28/2021    CO2 26 01/28/2021    BUN 17 01/28/2021    CREATININE 0.9 01/28/2021    GFRAA >60 01/28/2021    GFRAA >60 09/18/2012    AGRATIO 1.7 10/27/2020    LABGLOM >60 01/28/2021    GLUCOSE 94 01/28/2021    GLUCOSE 100 06/17/2011    PROT 6.9 01/07/2021    PROT 7.9 09/18/2012    CALCIUM 9.7 01/28/2021    BILITOT 0.4 01/07/2021    ALKPHOS 80 01/07/2021    AST 46 01/07/2021    ALT 25 01/07/2021       POC Tests: No results for input(s): POCGLU, POCNA, POCK, POCCL, POCBUN, POCHEMO, POCHCT in the last 72 hours.     Coags:   Lab Results   Component Value Date    PROTIME 15.1 01/28/2021    INR 1.30 01/28/2021    APTT 33.0 10/25/2020       HCG (If Applicable): No results found for: PREGTESTUR, PREGSERUM, HCG, HCGQUANT     ABGs: No results found for: PHART, PO2ART, JUO0CXK, YQY3XHV, BEART, V5UZPBQQ     Type & Screen (If Applicable):  No results found for: LABABO, LABRH    Drug/Infectious Status (If Applicable):  No results found for: HIV, HEPCAB    COVID-19 Screening (If Applicable):   Lab Results   Component Value Date    COVID19 NOT DETECTED 01/28/2021         Anesthesia Evaluation  Patient summary reviewed and Nursing notes reviewed no history of anesthetic complications:   Airway: Mallampati: II  TM distance: >3 FB   Neck ROM: limited  Mouth opening: > = 3 FB Dental: normal exam         Pulmonary:   (+) sleep apnea: on CPAP,      (-) not a current smoker                           Cardiovascular:    (+) hypertension:,         Rhythm: regular  Rate: normal                 ROS comment: 2/2020  Technically difficult examination with Definity. Left ventricular cavity size is normal. There is mild to moderate concentric left ventricular hypertrophy. Overall left ventricular systolic function appears normal with an ejection fraction of 55-60%. Neuro/Psych:   (+) depression/anxiety             GI/Hepatic/Renal:   (+) morbid obesity          Endo/Other:    (+) : arthritis: OA., .                 Abdominal:           Vascular:                                        Anesthesia Plan      general     ASA 3       Induction: intravenous. MIPS: Prophylactic antiemetics administered. Anesthetic plan and risks discussed with patient. Plan discussed with CRNA.                   Elvis Barrera DO   2/3/2021

## 2021-02-03 NOTE — PROCEDURES
[facility]     Electrophysiology Procedure Note       Date of Procedure: 2/3/2021  Patient's Name: Delfino Elias  YOB: 1965   Medical Record Number: [id number]  Referring Physician: JARET Rios CNP  Procedure Performed by: Harleen Adams MD    Procedure performed:  · Electrophysiology study with radiofrequency ablation of atrial fibrillation and pulmonary vein isolation   · Typical atrial flutter ablation  · 3-D electroanatomical mapping of the left atrium    · Transseptal puncture through an intact septum under intracardiac ultrasound guidance   · Intracardiac echocardiography. · Left ventricular pacing and recording  · Drug infusion with an attempt to induce arrhythmia and pacing  · Anesthesia: General anesthesia provided by the Anesthesia service  · EBL < 30 cc      Indications for procedure: Symptomatic atrial fibrillation, failed antiarrhythmic therapy and cardioversion in the past     Delfino Elias is a 48 y.o. male who has a history of persistent atrial fibrillation who is symptomatic with symptoms of dyspnea with minimal exertion and fatigue who has failed antiarrhythmic therapy in the past is now here for an ablation for persistent atrial fibrillation. Details of Procedure: The risks, benefits and alternatives of the ablation procedure were discussed with the patient. The risks including, but not limited to, the risks of bleeding, infection, radiation exposure, injury to vascular, cardiac and surrounding structures (including pneumothorax), stroke, cardiac perforation, tamponade, need for emergent open heart surgery, need for pacemaker implantation, esophageal injury and fistula, myocardial infarction and death were discussed in detail. The patient opted to proceed with the ablation. Written informed consent was signed and placed in the chart. Patient was brought to the EP lab in a fasting non-sedate state.  Patient underwent general anesthesia by anesthesia team. The patient was monitored continuously with ECG, pulse oximetry, blood pressure monitoring, and direct observation. The patient was in NSR upon arriving in the EP laboratory. After subcutaneous infiltration with Lidocaine, sheaths were placed in RFV without difficulty as follows:    8.5Fr Blackburn medium curve sheath to the RFV for 3.5 mm tip Thermocool ST/SF D/F curve catheter and Pentaray catheter  7Fr sheath for the decapolar CS catheter  9Fr sheath for the ICE catheter    An intracardiac echocardiography catheter was advanced to the right atrium. The ICE catheter was placed in the RV and lack of a pericardial effusion was documented. After confirming that, a heparin bolus was given and a heparin drip was started. The ACT was maintained with a goal of 350-400 seconds for the duration of the procedure. Additional boluses of heparin during the procedure to keep the ACT about 350 sec. Intracardiac echocardiography was performed and a Cartosound map of the interatrial septum, coronary sinus, root of the aorta and left atrium was created. Using Pentaray catheter, a three-dimensional electro-anatomic mapping of the right atrium, right atrial appendage, SVC, IVC, coronary sinus and interatrial septum was performed. The decapolar catheter was advanced to the coronary sinus. Also an esophageal temperature probe ([employer] Temperature Probe 12Fr) that was tied with sutures to an accompanying [employer] quadripolar 6 [place] 5-5-5 electrode spacing catheter was advanced into the esophagus for real-time mapping of the esophagus and careful monitoring of the esophageal temperature during ablation. Ablation was stopped if the temperature was rising for more than 0.5 °C.     Blackburn medium curve sheath was carefully placed in SVC, over the Pentaray catheter with EAM and ICE guidance. The dilator and a trans-septal Blackburn C-1 NRG 98 cms needle was carefully advanced to the SVC.   The entire assembly was carefully pulled down into the right atrium. Using 3D EAM and ICE guidance, a trans-septal catheterization was performed without difficulty. LA pressure was measured and was 23/10 mm Hg. A Penta-ray catheter was advanced to the left atrium via [place] sheath. Using PentaRay catheter 3D electro anatomic mapping of the left atrium was performed. Phrenic nerve was mapped using high output pacing. Esophagus was mapped using Carto sound map and a mapping catheter in the esophagus. Left atrial appendage was also mapped. Baseline signals were recorded. Voltage mapping was also created. Then Pentaray catheter was removed and a 3.5 mm tip Thermocool ST/SF D/F curve catheter was advanced to the left atrium via [place] sheath. During sinus rhythm, we found that all four pulmonary veins (left superior, left inferior, right superior, and right inferior) had PV fascicles, the triggers for the atrial fibrillation. Then wide area circumferential ablation for the left sided pulmonary veins along with dilma ablation were performed which resulted in electrical isolation of these pulmonary veins and eradication of the PV fascicles. Similarly, wide area circumferential ablations for the right sided pulmonary veins along with dilma ablation were performed which resulted in electrical isolation of these pulmonary veins and eradication of the PV fascicles. Using [employer]-smart touch module, care was taken to maintain the contact force between 8gms and 20gms during all the ablative lesions. Entrance block developed during RF energy delivery. Entrance and exit block were demonstrated in all 4 pulmonary veins. Maximum output (20mA) pacing in the L antrum and R antrum were performed and showed dissociation from the rest of the left atrium. This was checked circumferentially around the antrums and verified many times. Adenosine was used in all 4 pulmonary veins to rule out any dormant conduction. During adenosine infusion, ablation catheter was placed into the left ventricle and pacing from the left ventricular was performed to obtain VA block. With adenosine, there is hemodynamic response and there is no evidence of document conduction. IV Isuprel up to a maximum of 10mcg/min was infused post ablation, for 10 minutes to check for any triggers for AF induction. No triggers noted. We then performed an EP study and programmed stimulation using our CS catheter and ablation cathter to assess the cardiac conduction system and to attempt to induce atrial tachydysrhythmia. His bundle potentials was recorded and pacing was performed from right atrium, coronary sinus and LV apex with the following results:     Sinus cycle length was 965 msec  AL interval was 155 msec  QRS duration was 95 msec  QT interval was 446 msec  AH interval was 65 msec  HV interval was 54 msec  Pacing from left atrium, 1:1 conduction over AV node with (AV wenckebach) was 320 msec  Pacing from LV apex, 1:1 retrograde conduction over AV node (VA wenckebach) was 350 msec    With aggressive pacing from the proximal coronary sinus, he was easily induced into atrial flutter with a cycle length of 257 ms.  CS activation was proximal to distal.  Activation mapping was performed and this was consistent with CTI dependent, counterclockwise flutter. Hence, we decided to proceed with CTI ablation. All catheters and sheaths were removed to the right atrium. The heparin was stopped. The Thermocool Smart Touch ablation catheter was advanced over the cavotricuspid isthmus to the tricuspid annulus and ablation was performed along the CTI. During ablation, his atrial flutter terminated into normal sinus rhythm. Further ablation was continued from this area, all the way to the inferior vena cava. Bidirectional block across the CTI was confirmed with differential pacing and activation mapping.  Bidirectional block was confirmed by noting trans-isthmus conduction time of 136 ms from the CS to a focus just lateral to the line, and a conduction time of 112 ms from the CS to a focus more lateral to the ablated line. Similarly, when pacing from the high lateral RA, the conduction time to the CS was 118 ms compared to a transit time of 134 ms just lateral to the ablated line to the CS. The ICE catheter was placed in the RV. There was no pericardial effusion. Protamine 40 mg was given. When the ACT was appropriate, catheters and sheaths were removed and hemostasis was obtained via Vascade MVP venous closure device. No complications noted. Conclusion:     - Pre- and post-procedure diagnoses were persistent atrial fibrillation   - Pulmonary vein isolations using wide area circumferential radiofrequency ablation   - Typical flutter ablation  No dormant conduction with Adenosine  Isuprel infusion, with an attempt to induce arrhythmia    Plan:  Resume [address] with Eliquis  Continue flecainide  Continue Lasix  Bedrest for 2 hours  Observation for 4 hours  D/C after 4 hours, if stable. Thank you for allowing us to participate in the care of your patient. If you have any questions or concerns, please do not hesitate to contact me.     Alia Rankin MD   Cardiac Electrophysiology  [address] [place] [address] [phone]  Karthik

## 2021-02-03 NOTE — ANESTHESIA POSTPROCEDURE EVALUATION
Department of Anesthesiology  Postprocedure Note    Patient: Mitchell Diamond  MRN: [id number]  YOB: 1965  Date of evaluation: 2/3/2021  Time:  1:29 PM     Procedure Summary     Date: 02/03/21 Room / Location: [facility] Cath Lab    Anesthesia Start: 0906 Anesthesia Stop: 0606    Procedure: [facility] A-FIB ABLATION W ANES Diagnosis:     Scheduled Providers: Mario Lees DO Responsible Provider: Mario Lees DO    Anesthesia Type: general ASA Status: 3          Anesthesia Type: general    Anca Phase I:      Anca Phase II:      Last vitals: Reviewed and per EMR flowsheets.        Anesthesia Post Evaluation    Patient location during evaluation: PACU  Patient participation: complete - patient participated  Level of consciousness: awake and alert  Pain score: 0  Airway patency: patent  Nausea & Vomiting: no nausea and no vomiting  Complications: no  Cardiovascular status: hemodynamically stable  Respiratory status: acceptable  Hydration status: stable

## 2021-02-03 NOTE — INTERVAL H&P NOTE
Update History & Physical    The patient's History and Physical of January 7, by Dr. Fernando Scott was reviewed with the patient and I examined the patient. There was no change. The surgical site was confirmed by the patient and me. Plan: The risks, benefits, expected outcome, and alternative to the recommended procedure have been discussed with the patient. Patient understands and wants to proceed with the procedure.      Electronically signed by Salazar Campbell MD on 2/3/2021 at 9:13 AM

## 2021-02-03 NOTE — PRE SEDATION
Sedation Pre-Procedure Note    Patient Name: Princess Dean   YOB: 1965  Room/Bed: Room/bed info not found  Medical Record Number: [id number]  Date: 2/3/2021   Time: 9:14 AM       Indication: Atrial fibrillation    Consent: I have discussed with the patient and/or the patient representative the indication, alternatives, and the possible risks and/or complications of the planned procedure and the anesthesia methods. The patient and/or patient representative appear to understand and agree to proceed. Vital Signs:   Vitals:    02/03/21 0654   Temp: 98.1 °F (36.7 °C)       Past Medical History:   has a past medical history of Carpal tunnel syndrome, Chronic kidney disease, Hyperlipidemia, Hypertension, Obesity, unspecified, and MARY (obstructive sleep apnea). Past Surgical History:   has a past surgical history that includes Nose surgery; Knee arthroscopy (Right); and Cardiac catheterization. Medications:   Scheduled Meds:    sodium chloride flush  10 mL Intravenous 2 times per day     Continuous Infusions:    sodium chloride      lactated ringers      sodium chloride       PRN Meds: sodium chloride flush  Home Meds:   Prior to Admission medications    Medication Sig Start Date End Date Taking?  Authorizing Provider   furosemide (LASIX) 20 MG tablet Take 1 tablet by mouth daily as needed (edema, weight gain >3lbs in day) 1/12/21  Yes JARET James CNP   losartan (COZAAR) 100 MG tablet Take 1 tablet by mouth daily 1/12/21 4/12/21 Yes JARET James CNP   sertraline (ZOLOFT) 50 MG tablet Take 1 tablet by mouth daily 1/12/21 4/12/21 Yes JARET Lr CNP   spironolactone (ALDACTONE) 25 MG tablet Take 1 tablet by mouth daily 1/12/21 4/12/21 Yes JARET James CNP   atorvastatin (LIPITOR) 10 MG tablet Take 1 tablet by mouth daily 1/12/21 4/12/21 Yes JARET rL CNP   hydrALAZINE (APRESOLINE) 25 MG tablet Take 3 tablets by mouth 3 times daily 1/12/21 4/12/21 Yes JARET Box - CNP   flecainide (TAMBOCOR) 100 MG tablet Take 1 tablet by mouth every 12 hours 1/8/21  Yes Haider Walton MD   carvedilol (COREG) 25 MG tablet Take 1 tablet by mouth 2 times daily (with meals) 10/27/20  Yes Cheri Preston MD   apixaban (ELIQUIS) 5 MG TABS tablet Take 1 tablet by mouth 2 times daily 10/26/20   JARET Castro - CNP     Coumadin Use Last 7 Days:  no  Antiplatelet drug therapy use last 7 days: no  Other anticoagulant use last 7 days: yes -Eliquis  Additional Medication Information: None      Pre-Sedation Documentation and Exam:   I have personally completed a history, physical exam & review of systems for this patient (see notes). Vital signs have been reviewed (see flow sheet for vitals).     Mallampati Airway Assessment:  Mallampati Class I - (soft palate, fauces, uvula & anterior/posterior tonsillar pillars are visible)    Prior History of Anesthesia Complications:   none    ASA Classification:  Class 2 - A normal healthy patient with mild systemic disease    Sedation/ Anesthesia Plan:   general    Medications Planned:   Per anesthesia team    Patient is an appropriate candidate for plan of sedation: yes    Electronically signed by Kay Sullivan MD on 2/3/2021 at 9:14 AM

## 2021-02-05 NOTE — PROGRESS NOTES
[facility]   Electrophysiology  Office Visit  Date: 2/11/2021    Chief Complaint   Patient presents with    Atrial Fibrillation    Atrial Flutter    Hypertension    Congestive Heart Failure       Cardiac HX: Leanna Tanner is a 48 y.o. man  with a h/o HTN, HLD, MARY, obesity, NAIDA, s/p PCI, HFpEF, pAF, LHC (2020) showed normal cors, dx'd with AF (2/19/20) when admitted for CP and palpitations, placed on Eliquis, recurrent AF, s/p RFA/PVI of AF/AFL (2/3/2021, Dr. Seb Huynh). Interval History/HPI: Patient is here to follow-up for paroxysmal AF. Patient had had 3 episodes of atrial fibrillation with RVR. He had been placed on flecainide in January 2021. He underwent catheter ablation of atrial fibrillation and atrial flutter on 2/3/2021. He presents in NSR today. He states he had a slight sore throat post procedure, no chest discomfort, no difficulty swallowing and right groin was stable. Overall he feels well. He states he has changed his diet he has lost a little bit of weight. He has been taking the Lasix 20 mg daily since the procedure. He does not feel fluid overloaded. He denies shortness of breath, PND, orthopnea or lower extremity edema. He has not felt any breakthrough of his atrial fibrillation. His blood pressure is well controlled in the office today.     Home medications:   Current Outpatient Medications on File Prior to Visit   Medication Sig Dispense Refill    furosemide (LASIX) 20 MG tablet Take 1 tablet by mouth daily as needed (edema, weight gain >3lbs in day) 60 tablet 3    losartan (COZAAR) 100 MG tablet Take 1 tablet by mouth daily 90 tablet 0    sertraline (ZOLOFT) 50 MG tablet Take 1 tablet by mouth daily 90 tablet 0    spironolactone (ALDACTONE) 25 MG tablet Take 1 tablet by mouth daily 90 tablet 0    atorvastatin (LIPITOR) 10 MG tablet Take 1 tablet by mouth daily 90 tablet 0    hydrALAZINE (APRESOLINE) 25 MG tablet Take 3 tablets by mouth 3 times daily 810 tablet 0    flecainide (TAMBOCOR) 100 MG tablet Take 1 tablet by mouth every 12 hours 60 tablet 3    carvedilol (COREG) 25 MG tablet Take 1 tablet by mouth 2 times daily (with meals) 60 tablet 3    apixaban (ELIQUIS) 5 MG TABS tablet Take 1 tablet by mouth 2 times daily 60 tablet 3     No current facility-administered medications on file prior to visit. Past Medical History:   Diagnosis Date    Carpal tunnel syndrome     Chronic kidney disease     Hyperlipidemia     Hypertension     Obesity, unspecified     MARY (obstructive sleep apnea)         Past Surgical History:   Procedure Laterality Date    CARDIAC CATHETERIZATION      KNEE ARTHROSCOPY Right     NOSE SURGERY      Repair from Fx       Allergies   Allergen Reactions    Lisinopril      cough       Social History:  Reviewed. reports that he has never smoked. He has never used smokeless tobacco. He reports current alcohol use. He reports that he does not use drugs. Family History:  Reviewed. family history includes Diabetes in his mother; Stroke in his maternal uncle. Review of System:    · Constitutional: No fevers, chills. · Eyes: No visual changes or diplopia. No scleral icterus. · ENT: No Headaches. No mouth sores or sore throat. · Cardiovascular: No for chest pain, No for dyspnea on exertion, No for palpitations or No for loss of consciousness. No cough, hemoptysis, No for pleuritic pain, or phlebitis. · Respiratory: No for cough or wheezing. No hematemesis. · Gastrointestinal: No abdominal pain, blood in stools. · Genitourinary: No dysuria, or hematuria. · Musculoskeletal: No gait disturbance,    · Integumentary: No rash or pruritis. · Neurological: No headache, change in muscle strength, numbness or tingling. · Psychiatric: No anxiety, or depression. · Endocrine: No temperature intolerance. No excessive thirst, fluid intake, or urination.    · Hem/Lymph: No abnormal bruising or bleeding, blood clots or swollen lymph nodes. · Allergic/Immunologic: No nasal congestion or hives. Physical Examination:  Vitals:    02/11/21 1352   BP: (!) 150/84   Pulse:    Temp:          Wt Readings from Last 3 Encounters:   02/11/21 (!) 309 lb 3.2 oz (140.3 kg)   02/03/21 (!) 320 lb (145.2 kg)   01/06/21 (!) 325 lb (147.4 kg)       · Constitutional: Oriented. No distress. · Head: Normocephalic and atraumatic. · Mouth/Throat: Oropharynx is clear and moist.   · Eyes: Conjunctivae clear without jaunduice. PERRL. · Neck: Neck supple. No rigidity. No JVD present. · Cardiovascular: Normal rate, regular rhythm, S1&S2. · Pulmonary/Chest: Bilateral respiratory sounds. No wheezes, No rhonchi. · Abdominal: Soft. Bowel sounds present. No distension, No tenderness. · Musculoskeletal: No tenderness. No edema    · Lymphadenopathy: Has no cervical adenopathy. · Neurological: Alert and oriented. Cranial nerve appears intact, No Gross deficit   · Skin: Skin is warm and dry. No rash noted. · Psychiatric: Has a normal mood, affect and behavior     Labs:  Reviewed. No results for input(s): NA, K, CL, CO2, PHOS, BUN, CREATININE in the last 72 hours. Invalid input(s): CA,  TSH  No results for input(s): WBC, HGB, HCT, MCV, PLT in the last 72 hours. Lab Results   Component Value Date    TROPONINI 0.04 01/07/2021     Lab Results   Component Value Date    BNP 26.0 12/13/2015     Lab Results   Component Value Date    PROTIME 15.1 01/28/2021    PROTIME 12.0 10/25/2020    PROTIME 11.7 11/10/2017    INR 1.30 01/28/2021    INR 1.03 10/25/2020    INR 1.04 11/10/2017     Lab Results   Component Value Date    CHOL 135 10/27/2020    HDL 26 10/27/2020    TRIG 158 10/27/2020       ECG: Personally reviewed: NSR, HR 64, , , QTc 459    ECHO:  2.19.2020  Summary   Technically difficult examination with Definity. Left ventricular cavity size is normal.   There is mild to moderate concentric left ventricular hypertrophy.    Overall left ventricular systolic function appears normal with an ejection   fraction of 55-60%. No regional wall motion abnormalities are noted. Diastolic filling parameters suggest grade II diastolic dysfunction. Bi-atrial enlargement. Trace mitral and tricuspid regurgitation. Estimated pulmonary artery systolic pressure is 25 mmHg assuming a right   atrial pressure of 15 mmHg. There is trivial pericardial fluid present. Stress Test: 2.20.2020  Summary    Overall findings represent a high risk scan.    2 day protocol stress test.   Balinda Calico is a moderate sized area of decreased perfusion during stress    involving the mid and basal lateral and inferolateral cardiac segments.    Above findings could represent ischemia.    Normal LV size and systolic function.  ECG: Non-diagnostic EKG response due to failure to reach target heart rate. Cardiac Angiography: n/a    Problem List:   Patient Active Problem List    Diagnosis Date Noted    Typical atrial flutter ([address]     Atrial fibrillation with RVR ([address] 01/07/2021    H/O angiography 10/26/2020    Atrial fibrillation with rapid ventricular response ([address]     Essential hypertension     Atrial fibrillation ([address] 02/27/2020    Hypertensive urgency 02/18/2020    Heart failure with normal ejection fraction ([address] 10/29/2018    Mixed hyperlipidemia 02/19/2018    Paresthesia 06/07/2016    Lumbar strain 01/04/2016    Depressive disorder 08/19/2013    Obesity 07/15/2011    Obstructive sleep apnea syndrome 11/16/2010        Assessment:   1. Paroxysmal atrial fibrillation (HCC)    2. Chronic heart failure with preserved ejection fraction ([address]    3. Benign essential HTN        Cardiac HX: Sherry Gracia is a 48 y.o. man  with a h/o HTN, HLD, MARY on CPAP, obesity, NAIDA, s/p PCI, HFpEF, pAF, LHC (2020) showed normal cors, dx'd with AF (2/19/20) when admitted for CP and palpitations, placed on Eliquis, recurrent AF, s/p RFA/PVI of AF/AFL (2/3/2021, Dr. Elicia Valerio). [id number] 1. TSH 5.12 (1.7.2021). AF  - In NSR - no breakthrough per patient  - S/p RFA of AF/AFL 2/3/2021  - On Eliquis 5mg BID - no s/s bleeding - continue, reinforced not missing any doses during 90 day healing phase  - On flecainide 100 mg twice daily -   - On carvedilol 25 mg twice daily  - Protonix 40 mg x 1 month  - F/u with Dr. Jerson Umanzor in 3 months  - ECG ordered and results personally reviewed     HFpEF  - EF 55 to 60%  - Lasix prn weight gain 3# or more    HTN  - Well-controlled in the office today  - Lifestyle modification  diet, exercise, weight loss    EF of 82-85%  ACEi for systolic HF  ASA and Statin for CAD  Anticoagulation for AF and heart failure  No Tobacco use. All questions and concerns were addressed to the patient/family. Alternatives to my treatment were discussed. The note was completed using EMR. Every effort was made to ensure accuracy; however, inadvertent computerized transcription errors may be present. Patient received education regarding their diagnosis, treatment and medications while in the office today.       Phyllis Maradiaga [address] [facility]

## 2021-02-11 ENCOUNTER — OFFICE VISIT (OUTPATIENT)
Dept: CARDIOLOGY CLINIC | Age: 56
End: 2021-02-11
Payer: COMMERCIAL

## 2021-02-11 VITALS
BODY MASS INDEX: 46.86 KG/M2 | SYSTOLIC BLOOD PRESSURE: 150 MMHG | HEIGHT: 68 IN | DIASTOLIC BLOOD PRESSURE: 84 MMHG | WEIGHT: 309.2 LBS | TEMPERATURE: 98.6 F | HEART RATE: 64 BPM

## 2021-02-11 DIAGNOSIS — I50.32 CHRONIC HEART FAILURE WITH PRESERVED EJECTION FRACTION (HCC): ICD-10-CM

## 2021-02-11 DIAGNOSIS — I48.0 PAROXYSMAL ATRIAL FIBRILLATION (HCC): Primary | ICD-10-CM

## 2021-02-11 DIAGNOSIS — I10 BENIGN ESSENTIAL HTN: ICD-10-CM

## 2021-02-11 PROCEDURE — G8427 DOCREV CUR MEDS BY ELIG CLIN: HCPCS | Performed by: NURSE PRACTITIONER

## 2021-02-11 PROCEDURE — 1036F TOBACCO NON-USER: CPT | Performed by: NURSE PRACTITIONER

## 2021-02-11 PROCEDURE — 99214 OFFICE O/P EST MOD 30 MIN: CPT | Performed by: NURSE PRACTITIONER

## 2021-02-11 PROCEDURE — G8482 FLU IMMUNIZE ORDER/ADMIN: HCPCS | Performed by: NURSE PRACTITIONER

## 2021-02-11 PROCEDURE — 93000 ELECTROCARDIOGRAM COMPLETE: CPT | Performed by: NURSE PRACTITIONER

## 2021-02-11 PROCEDURE — 3017F COLORECTAL CA SCREEN DOC REV: CPT | Performed by: NURSE PRACTITIONER

## 2021-02-11 PROCEDURE — G8417 CALC BMI ABV UP PARAM F/U: HCPCS | Performed by: NURSE PRACTITIONER

## 2021-02-11 RX ORDER — PANTOPRAZOLE SODIUM 40 MG/1
40 TABLET, DELAYED RELEASE ORAL DAILY
Qty: 30 TABLET | Refills: 0 | Status: SHIPPED | OUTPATIENT
Start: 2021-02-11 | End: 2021-03-13

## 2021-03-02 ENCOUNTER — TELEPHONE (OUTPATIENT)
Dept: CARDIOLOGY CLINIC | Age: 56
End: 2021-03-02

## 2021-03-02 NOTE — TELEPHONE ENCOUNTER
LM for pt to cb with return to work date for his [facility] paperwork klaudia had sitting on my desk since 2.17.21. Have been waiting on a call from him to submit.

## 2021-04-09 DIAGNOSIS — I10 ESSENTIAL HYPERTENSION: ICD-10-CM

## 2021-04-09 DIAGNOSIS — F34.1 DYSTHYMIA: ICD-10-CM

## 2021-04-09 DIAGNOSIS — E78.2 MIXED HYPERLIPIDEMIA: ICD-10-CM

## 2021-04-12 RX ORDER — SPIRONOLACTONE 25 MG/1
25 TABLET ORAL DAILY
Qty: 90 TABLET | Refills: 0 | OUTPATIENT
Start: 2021-04-12 | End: 2021-07-11

## 2021-04-12 RX ORDER — LOSARTAN POTASSIUM 100 MG/1
100 TABLET ORAL DAILY
Qty: 90 TABLET | Refills: 0 | OUTPATIENT
Start: 2021-04-12 | End: 2021-07-11

## 2021-04-12 RX ORDER — HYDRALAZINE HYDROCHLORIDE 25 MG/1
75 TABLET, FILM COATED ORAL 3 TIMES DAILY
Qty: 810 TABLET | Refills: 0 | OUTPATIENT
Start: 2021-04-12 | End: 2021-07-11

## 2021-04-12 RX ORDER — ATORVASTATIN CALCIUM 10 MG/1
10 TABLET, FILM COATED ORAL DAILY
Qty: 90 TABLET | Refills: 0 | OUTPATIENT
Start: 2021-04-12 | End: 2021-07-11

## 2021-05-05 RX ORDER — PANTOPRAZOLE SODIUM 40 MG/1
40 TABLET, DELAYED RELEASE ORAL DAILY
Qty: 30 TABLET | Refills: 0 | OUTPATIENT
Start: 2021-05-05 | End: 2021-06-04

## 2021-05-05 NOTE — TELEPHONE ENCOUNTER
Requested Prescriptions     Pending Prescriptions Disp Refills    pantoprazole (PROTONIX) 40 MG tablet 30 tablet 0     Sig: Take 1 tablet by mouth daily          Number: 30    Refills: 5    Last Office Visit: 2/11/2021     Next Office Visit: 5/25/2021     Last Refill: 2.11.2021    Last Labs: [phone]

## 2021-05-07 RX ORDER — CARVEDILOL 25 MG/1
25 TABLET ORAL 2 TIMES DAILY WITH MEALS
Qty: 60 TABLET | Refills: 3 | Status: SHIPPED | OUTPATIENT
Start: 2021-05-07 | End: 2021-07-15 | Stop reason: SDUPTHER

## 2021-05-31 NOTE — CARE COORDINATION
Occupational Therapy  Visit Type: initial evaluation  Precautions:  Medical precautions:  fall risk; standard precautions.  Universal precautions: eye goggles and face mask    Pertinent medical history and current hospital course: Myeloid Malignancy  Lines:     Basic: capped IV  Safety Measures: chair alarm    SUBJECTIVE    Patient is an 86 year old female who is Iranian speaking only.  Patient states that she has been feeling weak for the last few days.  Patients Hgb was low so she was planning to receive 1 unit of blood transfused after OT session and xray.  Patient / Family Goal: return home    OBJECTIVE   Level of consciousness: lethargic    Oriented to person, place, time and situation   Patient activity tolerance: 1 to 2 activity to rest  Strength (out of 5 unless otherwise indicated)  5/5: LUE, RUE  Balance  Sitting:    Static:  supervision     Dynamic:  supervision  Standing - Firm Surface - Eyes Open:     Static: minimal assist    Dynamic:  minimal assist      Transfers:    Assistive devices: gait belt and 2-wheeled walker    Sit to stand: minimal assist    Stand to sit: minimal assist    Training completed:    Tasks: sit to stand and stand to sit    Education details: body mechanics and patient safety  Functional Ambulation:    Assistance:moderate assist (took 3 steps then patient said she needed to sit back down and fell back into the recliner.)   Assistive device:2-wheeled walker      Education details: body mechanics, patient safety and patient requires additional training  Activities of Daily Living (ADLs):  Grooming/Oral Hygiene:     Grooming assist: set up    Oral hygiene assist: set up    Position: sitting at sink and wheelchair    Assist needed for: set up, teeth care, wash/dry face, supervision/safety and increased time to complete  Lower Body Dressing:     Assist: moderate assist    Position: chair    Assist needed for: increased time to complete, don/doff right sock, don/doff left sock,  Patient Excluded from Care Coordination?  Yes     The patient will be excluded from Care Coordination for the following reason: Patient unable to contact to enroll supervision/safety and set up      Interventions    Training provided: ADL training, activity tolerance, balance retraining, compensatory techniques, functional ambulation, positioning, IADL training, safety training and transfer training  Skilled input: verbal instruction/cues and tactile instruction/cues  Verbal Consent: Writer verbally educated and received verbal consent for hand placement, positioning of patient, and techniques to be performed today from patient for clothing adjustments for techniques and therapist position for techniques as described above and how they are pertinent to the patient's plan of care.        ASSESSMENT    Impairments: activity tolerance, balance, strength and safety awareness  Functional Limitations: bathing, toileting, functional transfers, IADLs, dressing and showering  Personal Occupations Profile Affected: bathing/showering, functional mobility/transfers, toileting/toilet hygiene, lower body dressing, upper body dressing    Patient seen on [facility] nursing unit. Pt admitted with fatigue, generalized weakness, anemia. Past medical history significant for but not limited to: DM II, HTN and Myelod Malignancy.    Patient lives alone in apartment. For safe return to prior living situation the patient needs to be modified independent  for ADLs, modified independent  for functional transfers and modified independent  for toilet transfers.     Occupational therapy evaluation focused on prior level of function, current level of function, strength, functional mobility tasks such as sit to stand transfers, oral hygiene and grooming tasks, . Patient is currently functioning at moderate assist  for ADLs, moderate assist  for functional transfers and moderate assist  for toilet transfers.    Patient presents below baseline  which was moderate assist  for ADLs, moderate assist  for functional transfers and moderate assist  for toilet transfers. Patient currently lacks assistance to return to their  previous living situation.  Patient reportedly has 4 hours a day for PCW.        Discharge Recommendations:                        Skilled therapy is required to address these limitations in attempt to maximize the patient's independence.  Progress: improving as expected  Clinical decision making: Low - Patient has few limitations (1-3), comorbidities and/or complexities, as noted in problem focused assessment noted above, that impact their occupational profile.  Resulting in few treatment options and no task modification consistent with low clinical decision making complexity.    End of Session:   Location: in wheelchair (patient was set up in the wheelchair to go to xray)  Safety measures: call light within reach and equipment intact    PLAN  Suggestions for next session as indicated: Plan: progress standing tolerance and balance, ADLs seated EOB or sinkside.           Interventions: ADL retraining, balance, compensatory techniques, patient education, transfer training, use of adaptive equipment, equipment eval/education, compensatory technique education, activity tolerance training, bed mobility training, continued evaluation, functional transfer training, IADL, patient/family training, safety training and therapeutic activity  Agreement to plan and goals: patient agrees with goals and treatment plan      GOALS  Review Date: 6/8/2021  Long Term Goals: (to be met by time of discharge from hospital)  Grooming: Patient will complete grooming tasks modified independent.  Upper body dressing: Patient will complete upper body dressing modified independent.  Lower body dressing: Patient will complete lower body dressing modified independent.  Toileting: Patient will complete toileting modified independent.  Bathing: Patient will complete bathingmodified independent Toilet transfer: Patient will complete toilet transfer with standard walker and gait belt, modified independent.   Tub/shower transfer: Patient will complete  tub/shower transfer with modified independent.   Home setting transfer: Patient will complete home setting transfers with modified independent.         Documented in the chart in the following areas: Prior Level of Function. Pain. Assessment. Plan. Patient Education.      Therapy procedure time and total treatment time can be found documented on the Time Entry flowsheet    Sherita Roland MS,OTR/L  Pager [phone]  For immediate therapy needs please call [phone]

## 2021-06-02 ENCOUNTER — CLINICAL DOCUMENTATION (OUTPATIENT)
Dept: OTHER | Age: 56
End: 2021-06-02

## 2021-07-15 RX ORDER — CARVEDILOL 25 MG/1
25 TABLET ORAL 2 TIMES DAILY WITH MEALS
Qty: 180 TABLET | Refills: 3 | Status: SHIPPED | OUTPATIENT
Start: 2021-07-15 | End: 2022-10-10 | Stop reason: SDUPTHER

## 2021-07-15 NOTE — TELEPHONE ENCOUNTER
Requested Prescriptions     Pending Prescriptions Disp Refills    carvedilol (COREG) 25 MG tablet 180 tablet 3     Sig: Take 1 tablet by mouth 2 times daily (with meals)                  Last Office Visit: 2/11/2021     Next Office Visit: 8/24/2021

## 2021-08-16 ENCOUNTER — TELEPHONE (OUTPATIENT)
Dept: CARDIOLOGY CLINIC | Age: 56
End: 2021-08-16

## 2021-08-17 DIAGNOSIS — F34.1 DYSTHYMIA: ICD-10-CM

## 2021-08-17 DIAGNOSIS — E78.2 MIXED HYPERLIPIDEMIA: ICD-10-CM

## 2021-08-17 DIAGNOSIS — I10 ESSENTIAL HYPERTENSION: ICD-10-CM

## 2021-08-17 RX ORDER — HYDRALAZINE HYDROCHLORIDE 25 MG/1
75 TABLET, FILM COATED ORAL 3 TIMES DAILY
Qty: 810 TABLET | Refills: 0 | Status: SHIPPED | OUTPATIENT
Start: 2021-08-17 | End: 2021-11-10

## 2021-08-17 RX ORDER — ATORVASTATIN CALCIUM 10 MG/1
10 TABLET, FILM COATED ORAL DAILY
Qty: 90 TABLET | Refills: 0 | Status: SHIPPED | OUTPATIENT
Start: 2021-08-17 | End: 2021-11-10

## 2021-08-17 RX ORDER — SPIRONOLACTONE 25 MG/1
25 TABLET ORAL DAILY
Qty: 90 TABLET | Refills: 0 | Status: SHIPPED | OUTPATIENT
Start: 2021-08-17 | End: 2021-11-10

## 2021-08-17 RX ORDER — LOSARTAN POTASSIUM 100 MG/1
100 TABLET ORAL DAILY
Qty: 90 TABLET | Refills: 0 | Status: SHIPPED | OUTPATIENT
Start: 2021-08-17 | End: 2021-11-10

## 2021-09-16 ENCOUNTER — TELEPHONE (OUTPATIENT)
Dept: CARDIOLOGY CLINIC | Age: 56
End: 2021-09-16

## 2021-09-16 NOTE — TELEPHONE ENCOUNTER
Tried calling mobile to R/S N/S appt from 9.14.21, no vm picks up to . Will try again later.     Sent [employer] message

## 2021-11-10 DIAGNOSIS — E78.2 MIXED HYPERLIPIDEMIA: ICD-10-CM

## 2021-11-10 DIAGNOSIS — I10 ESSENTIAL HYPERTENSION: ICD-10-CM

## 2021-11-10 DIAGNOSIS — F34.1 DYSTHYMIA: ICD-10-CM

## 2021-11-10 RX ORDER — LOSARTAN POTASSIUM 100 MG/1
100 TABLET ORAL DAILY
Qty: 90 TABLET | Refills: 0 | Status: ON HOLD | OUTPATIENT
Start: 2021-11-10 | End: 2022-06-28 | Stop reason: HOSPADM

## 2021-11-10 RX ORDER — SPIRONOLACTONE 25 MG/1
25 TABLET ORAL DAILY
Qty: 90 TABLET | Refills: 0 | Status: ON HOLD | OUTPATIENT
Start: 2021-11-10 | End: 2022-06-28 | Stop reason: HOSPADM

## 2021-11-10 RX ORDER — HYDRALAZINE HYDROCHLORIDE 25 MG/1
TABLET, FILM COATED ORAL
Qty: 810 TABLET | Refills: 0 | Status: SHIPPED | OUTPATIENT
Start: 2021-11-10 | End: 2022-07-11 | Stop reason: SDUPTHER

## 2021-11-10 RX ORDER — ATORVASTATIN CALCIUM 10 MG/1
10 TABLET, FILM COATED ORAL DAILY
Qty: 90 TABLET | Refills: 0 | Status: SHIPPED | OUTPATIENT
Start: 2021-11-10 | End: 2022-02-08

## 2021-11-15 ENCOUNTER — CLINICAL DOCUMENTATION (OUTPATIENT)
Dept: OTHER | Age: 56
End: 2021-11-15

## 2022-06-22 ENCOUNTER — HOSPITAL ENCOUNTER (INPATIENT)
Age: 57
LOS: 5 days | Discharge: HOME | DRG: 281 | End: 2022-06-28
Attending: EMERGENCY MEDICINE | Admitting: INTERNAL MEDICINE
Payer: COMMERCIAL

## 2022-06-22 DIAGNOSIS — I48.91 ATRIAL FIBRILLATION WITH RVR (HCC): Primary | ICD-10-CM

## 2022-06-22 DIAGNOSIS — I10 ESSENTIAL HYPERTENSION: ICD-10-CM

## 2022-06-22 DIAGNOSIS — R77.8 ELEVATED TROPONIN: ICD-10-CM

## 2022-06-22 LAB
ANION GAP SERPL CALCULATED.3IONS-SCNC: 19 MMOL/L (ref 3–16)
BASOPHILS ABSOLUTE: 0.1 K/UL (ref 0–0.2)
BASOPHILS RELATIVE PERCENT: 1 %
BUN BLDV-MCNC: 17 MG/DL (ref 7–20)
CALCIUM SERPL-MCNC: 9.7 MG/DL (ref 8.3–10.6)
CHLORIDE BLD-SCNC: 99 MMOL/L (ref 99–110)
CO2: 21 MMOL/L (ref 21–32)
CREAT SERPL-MCNC: 1 MG/DL (ref 0.9–1.3)
EKG ATRIAL RATE: 187 BPM
EKG DIAGNOSIS: NORMAL
EKG Q-T INTERVAL: 282 MS
EKG QRS DURATION: 88 MS
EKG QTC CALCULATION (BAZETT): 492 MS
EKG R AXIS: -63 DEGREES
EKG T AXIS: 115 DEGREES
EKG VENTRICULAR RATE: 183 BPM
EOSINOPHILS ABSOLUTE: 0.1 K/UL (ref 0–0.6)
EOSINOPHILS RELATIVE PERCENT: 0.9 %
GFR AFRICAN AMERICAN: >60
GFR NON-AFRICAN AMERICAN: >60
GLUCOSE BLD-MCNC: 142 MG/DL (ref 70–99)
HCT VFR BLD CALC: 48.7 % (ref 40.5–52.5)
HEMOGLOBIN: 16.2 G/DL (ref 13.5–17.5)
LYMPHOCYTES ABSOLUTE: 3.5 K/UL (ref 1–5.1)
LYMPHOCYTES RELATIVE PERCENT: 33.4 %
MAGNESIUM: 1.8 MG/DL (ref 1.8–2.4)
MCH RBC QN AUTO: 28.4 PG (ref 26–34)
MCHC RBC AUTO-ENTMCNC: 33.3 G/DL (ref 31–36)
MCV RBC AUTO: 85.1 FL (ref 80–100)
MONOCYTES ABSOLUTE: 0.7 K/UL (ref 0–1.3)
MONOCYTES RELATIVE PERCENT: 7.1 %
NEUTROPHILS ABSOLUTE: 6.1 K/UL (ref 1.7–7.7)
NEUTROPHILS RELATIVE PERCENT: 57.6 %
PDW BLD-RTO: 15.6 % (ref 12.4–15.4)
PLATELET # BLD: 189 K/UL (ref 135–450)
PMV BLD AUTO: 9.8 FL (ref 5–10.5)
POTASSIUM REFLEX MAGNESIUM: 3.5 MMOL/L (ref 3.5–5.1)
PRO-BNP: 926 PG/ML (ref 0–124)
RBC # BLD: 5.72 M/UL (ref 4.2–5.9)
SODIUM BLD-SCNC: 139 MMOL/L (ref 136–145)
TROPONIN: 0.02 NG/ML
TROPONIN: 0.1 NG/ML
WBC # BLD: 10.5 K/UL (ref 4–11)

## 2022-06-22 PROCEDURE — 96367 TX/PROPH/DG ADDL SEQ IV INF: CPT

## 2022-06-22 PROCEDURE — 2580000003 HC RX 258: Performed by: STUDENT IN AN ORGANIZED HEALTH CARE EDUCATION/TRAINING PROGRAM

## 2022-06-22 PROCEDURE — 96375 TX/PRO/DX INJ NEW DRUG ADDON: CPT

## 2022-06-22 PROCEDURE — 93005 ELECTROCARDIOGRAM TRACING: CPT | Performed by: INTERNAL MEDICINE

## 2022-06-22 PROCEDURE — 96365 THER/PROPH/DIAG IV INF INIT: CPT

## 2022-06-22 PROCEDURE — 84484 ASSAY OF TROPONIN QUANT: CPT

## 2022-06-22 PROCEDURE — 83735 ASSAY OF MAGNESIUM: CPT

## 2022-06-22 PROCEDURE — 36415 COLL VENOUS BLD VENIPUNCTURE: CPT

## 2022-06-22 PROCEDURE — 85025 COMPLETE CBC W/AUTO DIFF WBC: CPT

## 2022-06-22 PROCEDURE — 99285 EMERGENCY DEPT VISIT HI MDM: CPT

## 2022-06-22 PROCEDURE — 96376 TX/PRO/DX INJ SAME DRUG ADON: CPT

## 2022-06-22 PROCEDURE — 93005 ELECTROCARDIOGRAM TRACING: CPT | Performed by: STUDENT IN AN ORGANIZED HEALTH CARE EDUCATION/TRAINING PROGRAM

## 2022-06-22 PROCEDURE — 2500000003 HC RX 250 WO HCPCS

## 2022-06-22 PROCEDURE — 83880 ASSAY OF NATRIURETIC PEPTIDE: CPT

## 2022-06-22 PROCEDURE — 2500000003 HC RX 250 WO HCPCS: Performed by: STUDENT IN AN ORGANIZED HEALTH CARE EDUCATION/TRAINING PROGRAM

## 2022-06-22 PROCEDURE — 5A2204Z RESTORATION OF CARDIAC RHYTHM, SINGLE: ICD-10-PCS | Performed by: STUDENT IN AN ORGANIZED HEALTH CARE EDUCATION/TRAINING PROGRAM

## 2022-06-22 PROCEDURE — 96366 THER/PROPH/DIAG IV INF ADDON: CPT

## 2022-06-22 PROCEDURE — 80048 BASIC METABOLIC PNL TOTAL CA: CPT

## 2022-06-22 PROCEDURE — 6360000002 HC RX W HCPCS: Performed by: STUDENT IN AN ORGANIZED HEALTH CARE EDUCATION/TRAINING PROGRAM

## 2022-06-22 RX ORDER — ETOMIDATE 2 MG/ML
INJECTION INTRAVENOUS
Status: COMPLETED
Start: 2022-06-22 | End: 2022-06-22

## 2022-06-22 RX ORDER — HEPARIN SODIUM 1000 [USP'U]/ML
60 INJECTION, SOLUTION INTRAVENOUS; SUBCUTANEOUS ONCE
Status: DISCONTINUED | OUTPATIENT
Start: 2022-06-22 | End: 2022-06-22

## 2022-06-22 RX ORDER — HEPARIN SODIUM 1000 [USP'U]/ML
4000 INJECTION, SOLUTION INTRAVENOUS; SUBCUTANEOUS ONCE
Status: COMPLETED | OUTPATIENT
Start: 2022-06-22 | End: 2022-06-23

## 2022-06-22 RX ORDER — MAGNESIUM SULFATE IN WATER 40 MG/ML
2000 INJECTION, SOLUTION INTRAVENOUS ONCE
Status: COMPLETED | OUTPATIENT
Start: 2022-06-22 | End: 2022-06-22

## 2022-06-22 RX ORDER — HEPARIN SODIUM 1000 [USP'U]/ML
60 INJECTION, SOLUTION INTRAVENOUS; SUBCUTANEOUS PRN
Status: DISCONTINUED | OUTPATIENT
Start: 2022-06-22 | End: 2022-06-22

## 2022-06-22 RX ORDER — HEPARIN SODIUM 1000 [USP'U]/ML
2000 INJECTION, SOLUTION INTRAVENOUS; SUBCUTANEOUS PRN
Status: DISCONTINUED | OUTPATIENT
Start: 2022-06-22 | End: 2022-06-28 | Stop reason: HOSPADM

## 2022-06-22 RX ORDER — LABETALOL HYDROCHLORIDE 5 MG/ML
20 INJECTION, SOLUTION INTRAVENOUS ONCE
Status: COMPLETED | OUTPATIENT
Start: 2022-06-22 | End: 2022-06-22

## 2022-06-22 RX ORDER — METOPROLOL TARTRATE 5 MG/5ML
5 INJECTION INTRAVENOUS ONCE
Status: COMPLETED | OUTPATIENT
Start: 2022-06-22 | End: 2022-06-22

## 2022-06-22 RX ORDER — HEPARIN SODIUM 1000 [USP'U]/ML
4000 INJECTION, SOLUTION INTRAVENOUS; SUBCUTANEOUS PRN
Status: DISCONTINUED | OUTPATIENT
Start: 2022-06-22 | End: 2022-06-28 | Stop reason: HOSPADM

## 2022-06-22 RX ORDER — METOPROLOL TARTRATE 5 MG/5ML
5 INJECTION INTRAVENOUS ONCE
Status: DISCONTINUED | OUTPATIENT
Start: 2022-06-22 | End: 2022-06-22 | Stop reason: ALTCHOICE

## 2022-06-22 RX ORDER — ETOMIDATE 2 MG/ML
0.15 INJECTION INTRAVENOUS ONCE
Status: COMPLETED | OUTPATIENT
Start: 2022-06-22 | End: 2022-06-22

## 2022-06-22 RX ORDER — HEPARIN SODIUM 1000 [USP'U]/ML
30 INJECTION, SOLUTION INTRAVENOUS; SUBCUTANEOUS PRN
Status: DISCONTINUED | OUTPATIENT
Start: 2022-06-22 | End: 2022-06-22

## 2022-06-22 RX ORDER — HEPARIN SODIUM 10000 [USP'U]/100ML
5-30 INJECTION, SOLUTION INTRAVENOUS CONTINUOUS
Status: DISPENSED | OUTPATIENT
Start: 2022-06-22 | End: 2022-06-26

## 2022-06-22 RX ORDER — POTASSIUM CHLORIDE 7.45 MG/ML
10 INJECTION INTRAVENOUS
Status: COMPLETED | OUTPATIENT
Start: 2022-06-22 | End: 2022-06-23

## 2022-06-22 RX ADMIN — POTASSIUM CHLORIDE 10 MEQ: 10 INJECTION, SOLUTION INTRAVENOUS at 22:03

## 2022-06-22 RX ADMIN — MAGNESIUM SULFATE HEPTAHYDRATE 2000 MG: 2 INJECTION, SOLUTION INTRAVENOUS at 22:04

## 2022-06-22 RX ADMIN — METOPROLOL TARTRATE 5 MG: 5 INJECTION INTRAVENOUS at 19:43

## 2022-06-22 RX ADMIN — LABETALOL 20 MG/4 ML (5 MG/ML) INTRAVENOUS SYRINGE 20 MG: at 20:00

## 2022-06-22 RX ADMIN — METOPROLOL TARTRATE 5 MG: 5 INJECTION INTRAVENOUS at 20:21

## 2022-06-22 RX ADMIN — ETOMIDATE 22 MG: 2 INJECTION INTRAVENOUS at 21:09

## 2022-06-22 RX ADMIN — POTASSIUM CHLORIDE 10 MEQ: 10 INJECTION, SOLUTION INTRAVENOUS at 23:00

## 2022-06-22 RX ADMIN — METOPROLOL TARTRATE 5 MG: 5 INJECTION INTRAVENOUS at 19:31

## 2022-06-22 RX ADMIN — AMIODARONE HYDROCHLORIDE 150 MG: 50 INJECTION, SOLUTION INTRAVENOUS at 19:37

## 2022-06-22 RX ADMIN — ETOMIDATE 22 MG: 2 INJECTION, SOLUTION INTRAVENOUS at 21:09

## 2022-06-22 ASSESSMENT — ENCOUNTER SYMPTOMS
CHEST TIGHTNESS: 1
SHORTNESS OF BREATH: 1
EYES NEGATIVE: 1
ALLERGIC/IMMUNOLOGIC NEGATIVE: 1

## 2022-06-22 ASSESSMENT — PAIN - FUNCTIONAL ASSESSMENT: PAIN_FUNCTIONAL_ASSESSMENT: NONE - DENIES PAIN

## 2022-06-22 NOTE — ED PROVIDER NOTES
[address] [facility] RESIDENT NOTE       Date of evaluation: 6/22/2022    Chief Complaint     Shortness of Breath      History of Present Illness     Brigitte Morales is a 55 y.o. male with history of atrial fibrillation with RVR status post ablation approximately 1 year ago (Feb 2021 by Dr. Dl Hahn), obstructive sleep apnea, hypertension who presents to the emergency department with sudden onset of palpitations and chest pressure. The patient reports that at exactly 630 he noticed a sudden onset of palpitations, tried to go to the bathroom and bear down which had some improvement in the palpitations but when they did not completely resolve he called EMS. He states that this feels similar to his prior episodes of A. fib with RVR. He has not had any A. fib with RVR since his ablation, and was taken off his anticoagulation because of this. He did take his Coreg this morning. Denies a history of heart failure (last EF noted to be 50% in February 2020). Review of Systems     Review of Systems   Constitutional: Negative. HENT: Negative. Eyes: Negative. Respiratory: Positive for chest tightness and shortness of breath. Cardiovascular: Positive for chest pain. Negative for leg swelling. Endocrine: Negative. Genitourinary: Negative. Musculoskeletal: Negative. Skin: Negative. Allergic/Immunologic: Negative. Neurological: Negative. Hematological: Negative. Past Medical, Surgical, Family, and Social History     He has a past medical history of Carpal tunnel syndrome, Chronic kidney disease, Hyperlipidemia, Hypertension, Obesity, unspecified, and MARY (obstructive sleep apnea). He has a past surgical history that includes Nose surgery; Knee arthroscopy (Right); and Cardiac catheterization. His family history includes Diabetes in his mother; Stroke in his maternal uncle. He reports that he has never smoked.  He has never used smokeless tobacco. He reports current alcohol use. He reports that he does not use drugs. Medications     Previous Medications    APIXABAN (ELIQUIS) 5 MG TABS TABLET    Take 1 tablet by mouth 2 times daily    ATORVASTATIN (LIPITOR) 10 MG TABLET    TAKE 1 TABLET BY MOUTH DAILY    CARVEDILOL (COREG) 25 MG TABLET    Take 1 tablet by mouth 2 times daily (with meals)    FLECAINIDE (TAMBOCOR) 100 MG TABLET    Take 1 tablet by mouth every 12 hours    FUROSEMIDE (LASIX) 20 MG TABLET    Take 1 tablet by mouth daily as needed (edema, weight gain >3lbs in day)    HYDRALAZINE (APRESOLINE) 25 MG TABLET    TAKE 3 TABLETS BY MOUTH THREE TIMES DAILY    LOSARTAN (COZAAR) 100 MG TABLET    TAKE 1 TABLET BY MOUTH DAILY    PANTOPRAZOLE (PROTONIX) 40 MG TABLET    Take 1 tablet by mouth daily    SERTRALINE (ZOLOFT) 50 MG TABLET    TAKE 1 TABLET BY MOUTH DAILY    SPIRONOLACTONE (ALDACTONE) 25 MG TABLET    TAKE 1 TABLET BY MOUTH DAILY       Allergies     He is allergic to lisinopril. Physical Exam     INITIAL VITALS: BP: (!) 185/143, Temp: 98.3 °F (36.8 °C), Heart Rate: (!) 185, Resp: 22, SpO2: 95 %   Physical Exam      DiagnosticResults     EKG   Interpreted in conjunction with emergencydepartment physician. EKG @ 19:13 - Atrial fibrillation with RVR with a rate of 183. QTc within normal limits. There are ST depressions noted in the lateral leads. There is ST elevation noted in aVR. EKG  Cardioversion shows normal sinus rhythm with a rate in the high 60s. There is 1 to 2 mm ST elevation in lead aVR and T wave inversions in 1, aVL. Compared to January 2021 EKG morphology is similar including ST elevation in aVR and T wave inversions.     RADIOLOGY:  No orders to display       LABS:   Results for orders placed or performed during the hospital encounter of 06/22/22   CBC with Auto Differential   Result Value Ref Range    WBC 10.5 4.0 - 11.0 K/uL    RBC 5.72 4.20 - 5.90 M/uL    Hemoglobin 16.2 13.5 - 17.5 g/dL    Hematocrit 48.7 40.5 - 52.5 %    MCV 85.1 80.0 - 100.0 fL    MCH 28.4 26.0 - 34.0 pg    MCHC 33.3 31.0 - 36.0 g/dL    RDW 15.6 (H) 12.4 - 15.4 %    Platelets 292 193 - 955 K/uL    MPV 9.8 5.0 - 10.5 fL    Neutrophils % 57.6 %    Lymphocytes % 33.4 %    Monocytes % 7.1 %    Eosinophils % 0.9 %    Basophils % 1.0 %    Neutrophils Absolute 6.1 1.7 - 7.7 K/uL    Lymphocytes Absolute 3.5 1.0 - 5.1 K/uL    Monocytes Absolute 0.7 0.0 - 1.3 K/uL    Eosinophils Absolute 0.1 0.0 - 0.6 K/uL    Basophils Absolute 0.1 0.0 - 0.2 K/uL   Basic Metabolic Panel w/ Reflex to MG   Result Value Ref Range    Sodium 139 136 - 145 mmol/L    Potassium reflex Magnesium 3.5 3.5 - 5.1 mmol/L    Chloride 99 99 - 110 mmol/L    CO2 21 21 - 32 mmol/L    Anion Gap 19 (H) 3 - 16    Glucose 142 (H) 70 - 99 mg/dL    BUN 17 7 - 20 mg/dL    CREATININE 1.0 0.9 - 1.3 mg/dL    GFR Non-African American >60 >60    GFR African American >60 >60    Calcium 9.7 8.3 - 10.6 mg/dL   Troponin   Result Value Ref Range    Troponin 0.02 (H) <0.01 ng/mL   Brain Natriuretic Peptide   Result Value Ref Range    Pro- (H) 0 - 124 pg/mL   Magnesium   Result Value Ref Range    Magnesium 1.80 1.80 - 2.40 mg/dL   Troponin   Result Value Ref Range    Troponin 0.10 (H) <0.01 ng/mL   EKG 12 Lead   Result Value Ref Range    Ventricular Rate 183 BPM    Atrial Rate 187 BPM    QRS Duration 88 ms    Q-T Interval 282 ms    QTc Calculation (Bazett) 492 ms    R Axis -63 degrees    T Axis 115 degrees    Diagnosis       EKG performed in ER and to be interpreted by ER physician. Confirmed by MD, ER ([id number]),  Isael Proctor ([phone]) on 6/22/2022 7:37:59 PM         RECENT VITALS:  BP: (!) 163/97, Temp: 98.3 °F (36.8 °C), Heart Rate: 68,Resp: 26, SpO2: 98 %     Procedures     Cardiovert / Defib    Date/Time: 6/22/2022 8:54 PM  Performed by: Elizabeth Lopez MD  Authorized by: Angel Castillo MD     Consent:     Consent obtained:  Verbal    Consent given by:  Patient    Risks discussed:  Cutaneous burn, death, induced arrhythmia and pain    Alternatives discussed:  Rate-control medication  Universal protocol:     Procedure explained and questions answered to patient or proxy's satisfaction: yes      Required blood products, implants, devices, and special equipment available: yes      Immediately prior to procedure a time out was called: yes      Patient identity confirmed:  Verbally with patient and arm band  Pre-procedure details:     Cardioversion basis:  Elective    Rhythm:  Atrial fibrillation  Attempt one:     Cardioversion mode:  Synchronous    Waveform:  Monophasic    Shock (Joules):  100    Shock outcome:  Conversion to normal sinus rhythm  Post-procedure details:     Patient status:  Awake    Patient tolerance of procedure: Tolerated well, no immediate complications  Comments:      Patient given moderate sedation with etomidate any milligrams IV with adequate sedation effect. Patient was monitored on continuous end-tidal CO2 monitoring. Elevated the procedure without hypoxia. ED Course     Nursing Notes, Past Medical Hx, Past Surgical Hx, Social Hx, Allergies, and Family Hx were reviewed.     The patient was given the followingmedications:  Orders Placed This Encounter   Medications    metoprolol (LOPRESSOR) injection 5 mg    amiodarone (CORDARONE) 150 mg in dextrose 5 % 100 mL bolus    DISCONTD: metoprolol (LOPRESSOR) injection 5 mg    labetalol (NORMODYNE;TRANDATE) injection 20 mg    metoprolol (LOPRESSOR) injection 5 mg    etomidate (AMIDATE) injection 22 mg    etomidate (AMIDATE) 2 MG/ML injection     Toi Hatch: cabinet override    potassium chloride 10 mEq/100 mL IVPB (Peripheral Line)    magnesium sulfate 2000 mg in 50 mL IVPB premix    DISCONTD: heparin (porcine) injection 8,770 Units    DISCONTD: heparin (porcine) injection 8,770 Units    DISCONTD: heparin (porcine) injection 4,380 Units    heparin 25,000 units in dextrose 5% 250 mL (premix) infusion    heparin (porcine) injection 4,000 Units    heparin (porcine) injection 4,000 Units    heparin (porcine) injection 2,000 Units       CONSULTS:  IP CONSULT TO CARDIOLOGY  IP CONSULT TO HOSPITALIST    MEDICAL DECISION MAKING / ASSESSMENT / Brice Familia is a 55 y.o. male a history of atrial fibrillation status post ablation presents to the emergency department sudden onset of chest discomfort and palpitations. Patient knows exact onset of symptoms was at 6:30 PM, he is not currently anticoagulated as he has been out of A. fib since the ablation. We attempted both chemical cardioversion and rate control with beta-blockers as well as amiodarone 150 mg. Unfortunately, we are unable to obtain either rate or rib rhythm control although the patient remained hemodynamically stable. But the attending physician and I discussed with the patient the risk of electrical cardioversion as well as giving sedating medication such as etomidate. The patient understands that there is potential risk of embolization of thrombus with risk of stroke as well as risk of degeneration of rhythm to cardiac arrest.  The patient and his ex-wife at bedside verbalized understanding and would like to proceed with electrical cardioversion. Patient underwent synchronized cardioversion, see separate procedure note for details and was converted to sinus rhythm. Repeat EKG in normal sinus rhythm was similar to prior EKGs from January 2021. Patient does have some hypokalemia and hypomagnesemia which were replaced via IV. His initial troponin is mildly elevated 0.02, repeat drawn at 4 hours from symptom onset is elevated to 0.1. Patient does have mildly elevated BNP to approximately 1000, which appears to be the patient's baseline. He does not have any crackles to suggest acute volume overload at this time or lower extremity swelling. Discussed the case with cardiology who recommends initiating heparin drip and admission.   Case discussed with hospitalist who will admit. Patient agreeable. This patient was also evaluated by the attending physician. All care plans werediscussed and agreed upon. Clinical Impression     1. Atrial fibrillation with RVR (HCC)    2. Elevated troponin        Disposition     PATIENT REFERRED TO:  No follow-up provider specified.     DISCHARGE MEDICATIONS:  New Prescriptions    No medications on file       DISPOSITION Decision To Admit 06/22/2022 11:39:24 PM        Harleen Patel MD  Resident  06/23/22 6648

## 2022-06-22 NOTE — ED NOTES
Pt placed on zoll pads at this time.       Yana Jimenez, [facility]  06/22/22 0258

## 2022-06-22 NOTE — ED PROVIDER NOTES
ED Attending Attestation Note     Date of evaluation: 6/22/2022    This patient was seen by the resident. I have seen and examined the patient, agree with the workup, evaluation, management and diagnosis. The care plan has been discussed. I have reviewed the ECG and concur with the resident's interpretation. My assessment reveals a history of atrial fibrillation who was cardioverted about a year ago followed by ablation who presents today with sudden onset of tachydysrhythmia that he could feel. 1 hour prior to arrival.  On my exam currently patient is in rapid atrial fibrillation. He received metoprolol 5 mg x 3 every 5 minutes and he is also quite hypertensive 210/110 therefore 20 labetalol also be given. We gave him 150 of amiodarone. Because of the acute onset we we will attempt to chemically convert him with the amio but may need to cardiovert with electricity which he has had in the past.    Patient continued to be tachycardic despite metoprolol and labetalol for blood pressure. His troponin was slightly elevated. His EKG with the rapid atrial fibrillation in combination of his troponin felt patient should be urgently cardioverted. He received potassium and magnesium supplementation here in the ED. He received etomidate for sedation followed by synchronized cardioversion 100 J with result back into a sinus rhythm. His old EKG as compared to the new 1 showed no acute changes although they also T wave inversions in his lateral leads. There is also elevation in aVR which is on the old EKGs also and he has had a cardiac cath within the last year or so. Due to the immediate potential for life-threatening deterioration due to atrial fibrillation sudden onset, I spent 35 minutes providing critical care.   This time excludes time spent performing procedures but includes time spent on direct patient care, history retrieval, review of the chart, and discussions with patient, family, and consultant(s).        Laura Augustin MD  06/22/22 1945       Laura Augustin MD  06/22/22 2126

## 2022-06-22 NOTE — ED NOTES
Pt presents to the ED for SOB and tachycardia. Pt has hx of a-fib and got an ablation approx 1 year go. Pt states he had sudden onset of SOB. Squad arrived pt was tachycardic in the 180s, BPS hypertensive. Pt is A&Ox4 at this time. RN to obtain additional access and EKG.       Yana Robertson 87, [address]  06/22/22 5979

## 2022-06-23 ENCOUNTER — APPOINTMENT (OUTPATIENT)
Dept: CT IMAGING | Age: 57
DRG: 281 | End: 2022-06-23
Payer: COMMERCIAL

## 2022-06-23 LAB
ANION GAP SERPL CALCULATED.3IONS-SCNC: 14 MMOL/L (ref 3–16)
ANTI-XA UNFRAC HEPARIN: 0.14 IU/ML (ref 0.3–0.7)
ANTI-XA UNFRAC HEPARIN: 0.17 IU/ML (ref 0.3–0.7)
ANTI-XA UNFRAC HEPARIN: <0.1 IU/ML (ref 0.3–0.7)
APTT: 28.5 SEC (ref 23–34.3)
BUN BLDV-MCNC: 17 MG/DL (ref 7–20)
CALCIUM SERPL-MCNC: 9.1 MG/DL (ref 8.3–10.6)
CHLORIDE BLD-SCNC: 103 MMOL/L (ref 99–110)
CO2: 22 MMOL/L (ref 21–32)
CREAT SERPL-MCNC: 0.9 MG/DL (ref 0.9–1.3)
EKG ATRIAL RATE: 73 BPM
EKG ATRIAL RATE: 79 BPM
EKG DIAGNOSIS: NORMAL
EKG DIAGNOSIS: NORMAL
EKG P AXIS: 2 DEGREES
EKG P AXIS: 6 DEGREES
EKG P-R INTERVAL: 142 MS
EKG P-R INTERVAL: 152 MS
EKG Q-T INTERVAL: 402 MS
EKG Q-T INTERVAL: 438 MS
EKG QRS DURATION: 102 MS
EKG QRS DURATION: 96 MS
EKG QTC CALCULATION (BAZETT): 442 MS
EKG QTC CALCULATION (BAZETT): 502 MS
EKG R AXIS: -46 DEGREES
EKG R AXIS: -65 DEGREES
EKG T AXIS: 127 DEGREES
EKG T AXIS: 151 DEGREES
EKG VENTRICULAR RATE: 73 BPM
EKG VENTRICULAR RATE: 79 BPM
GFR AFRICAN AMERICAN: >60
GFR NON-AFRICAN AMERICAN: >60
GLUCOSE BLD-MCNC: 144 MG/DL (ref 70–99)
INR BLD: 1.14 (ref 0.87–1.14)
MAGNESIUM: 2.2 MG/DL (ref 1.8–2.4)
POTASSIUM REFLEX MAGNESIUM: 3.4 MMOL/L (ref 3.5–5.1)
PROTHROMBIN TIME: 14.5 SEC (ref 11.7–14.5)
SODIUM BLD-SCNC: 139 MMOL/L (ref 136–145)
T4 FREE: 1.5 NG/DL (ref 0.9–1.8)
TROPONIN: 0.12 NG/ML
TROPONIN: 0.14 NG/ML
TROPONIN: 0.22 NG/ML
TSH REFLEX: 4.93 UIU/ML (ref 0.27–4.2)

## 2022-06-23 PROCEDURE — 82088 ASSAY OF ALDOSTERONE: CPT

## 2022-06-23 PROCEDURE — 84244 ASSAY OF RENIN: CPT

## 2022-06-23 PROCEDURE — 6370000000 HC RX 637 (ALT 250 FOR IP)

## 2022-06-23 PROCEDURE — 6360000002 HC RX W HCPCS

## 2022-06-23 PROCEDURE — 84443 ASSAY THYROID STIM HORMONE: CPT

## 2022-06-23 PROCEDURE — 1200000000 HC SEMI PRIVATE

## 2022-06-23 PROCEDURE — 70450 CT HEAD/BRAIN W/O DYE: CPT

## 2022-06-23 PROCEDURE — 85520 HEPARIN ASSAY: CPT

## 2022-06-23 PROCEDURE — C8923 2D TTE W OR W/O FOL W/CON,CO: HCPCS

## 2022-06-23 PROCEDURE — 99223 1ST HOSP IP/OBS HIGH 75: CPT | Performed by: NURSE PRACTITIONER

## 2022-06-23 PROCEDURE — 80048 BASIC METABOLIC PNL TOTAL CA: CPT

## 2022-06-23 PROCEDURE — 84484 ASSAY OF TROPONIN QUANT: CPT

## 2022-06-23 PROCEDURE — 6370000000 HC RX 637 (ALT 250 FOR IP): Performed by: INTERNAL MEDICINE

## 2022-06-23 PROCEDURE — 84439 ASSAY OF FREE THYROXINE: CPT

## 2022-06-23 PROCEDURE — 6360000004 HC RX CONTRAST MEDICATION

## 2022-06-23 PROCEDURE — 36415 COLL VENOUS BLD VENIPUNCTURE: CPT

## 2022-06-23 PROCEDURE — 85730 THROMBOPLASTIN TIME PARTIAL: CPT

## 2022-06-23 PROCEDURE — 83735 ASSAY OF MAGNESIUM: CPT

## 2022-06-23 PROCEDURE — 85610 PROTHROMBIN TIME: CPT

## 2022-06-23 PROCEDURE — 83036 HEMOGLOBIN GLYCOSYLATED A1C: CPT

## 2022-06-23 PROCEDURE — 2580000003 HC RX 258

## 2022-06-23 PROCEDURE — 93005 ELECTROCARDIOGRAM TRACING: CPT | Performed by: STUDENT IN AN ORGANIZED HEALTH CARE EDUCATION/TRAINING PROGRAM

## 2022-06-23 PROCEDURE — 93010 ELECTROCARDIOGRAM REPORT: CPT | Performed by: INTERNAL MEDICINE

## 2022-06-23 PROCEDURE — 6360000002 HC RX W HCPCS: Performed by: STUDENT IN AN ORGANIZED HEALTH CARE EDUCATION/TRAINING PROGRAM

## 2022-06-23 RX ORDER — SODIUM CHLORIDE 0.9 % (FLUSH) 0.9 %
5-40 SYRINGE (ML) INJECTION EVERY 12 HOURS SCHEDULED
Status: DISCONTINUED | OUTPATIENT
Start: 2022-06-23 | End: 2022-06-28 | Stop reason: HOSPADM

## 2022-06-23 RX ORDER — SPIRONOLACTONE 25 MG/1
25 TABLET ORAL DAILY
Status: DISCONTINUED | OUTPATIENT
Start: 2022-06-23 | End: 2022-06-23

## 2022-06-23 RX ORDER — SODIUM CHLORIDE 0.9 % (FLUSH) 0.9 %
5-40 SYRINGE (ML) INJECTION PRN
Status: DISCONTINUED | OUTPATIENT
Start: 2022-06-23 | End: 2022-06-28 | Stop reason: HOSPADM

## 2022-06-23 RX ORDER — ACETAMINOPHEN 650 MG/1
650 SUPPOSITORY RECTAL EVERY 6 HOURS PRN
Status: DISCONTINUED | OUTPATIENT
Start: 2022-06-23 | End: 2022-06-28 | Stop reason: HOSPADM

## 2022-06-23 RX ORDER — POLYETHYLENE GLYCOL 3350 17 G/17G
17 POWDER, FOR SOLUTION ORAL DAILY PRN
Status: DISCONTINUED | OUTPATIENT
Start: 2022-06-23 | End: 2022-06-28 | Stop reason: HOSPADM

## 2022-06-23 RX ORDER — PANTOPRAZOLE SODIUM 40 MG/1
40 TABLET, DELAYED RELEASE ORAL
Status: DISCONTINUED | OUTPATIENT
Start: 2022-06-23 | End: 2022-06-28 | Stop reason: HOSPADM

## 2022-06-23 RX ORDER — ACETAMINOPHEN 325 MG/1
650 TABLET ORAL EVERY 6 HOURS PRN
Status: DISCONTINUED | OUTPATIENT
Start: 2022-06-23 | End: 2022-06-28 | Stop reason: HOSPADM

## 2022-06-23 RX ORDER — ATORVASTATIN CALCIUM 10 MG/1
10 TABLET, FILM COATED ORAL DAILY
Status: DISCONTINUED | OUTPATIENT
Start: 2022-06-23 | End: 2022-06-28 | Stop reason: HOSPADM

## 2022-06-23 RX ORDER — LOSARTAN POTASSIUM 50 MG/1
100 TABLET ORAL DAILY
Status: DISCONTINUED | OUTPATIENT
Start: 2022-06-23 | End: 2022-06-23

## 2022-06-23 RX ORDER — SODIUM CHLORIDE 9 MG/ML
INJECTION, SOLUTION INTRAVENOUS PRN
Status: DISCONTINUED | OUTPATIENT
Start: 2022-06-23 | End: 2022-06-28 | Stop reason: HOSPADM

## 2022-06-23 RX ORDER — VALSARTAN 320 MG/1
320 TABLET ORAL NIGHTLY
Status: DISCONTINUED | OUTPATIENT
Start: 2022-06-23 | End: 2022-06-27

## 2022-06-23 RX ORDER — ONDANSETRON 2 MG/ML
4 INJECTION INTRAMUSCULAR; INTRAVENOUS EVERY 6 HOURS PRN
Status: DISCONTINUED | OUTPATIENT
Start: 2022-06-23 | End: 2022-06-28 | Stop reason: HOSPADM

## 2022-06-23 RX ORDER — ONDANSETRON 4 MG/1
4 TABLET, ORALLY DISINTEGRATING ORAL EVERY 8 HOURS PRN
Status: DISCONTINUED | OUTPATIENT
Start: 2022-06-23 | End: 2022-06-28 | Stop reason: HOSPADM

## 2022-06-23 RX ORDER — CARVEDILOL 25 MG/1
25 TABLET ORAL 2 TIMES DAILY WITH MEALS
Status: DISCONTINUED | OUTPATIENT
Start: 2022-06-23 | End: 2022-06-24

## 2022-06-23 RX ORDER — FUROSEMIDE 40 MG/1
20 TABLET ORAL DAILY PRN
Status: CANCELLED | OUTPATIENT
Start: 2022-06-23

## 2022-06-23 RX ORDER — POTASSIUM CHLORIDE 20 MEQ/1
40 TABLET, EXTENDED RELEASE ORAL ONCE
Status: COMPLETED | OUTPATIENT
Start: 2022-06-23 | End: 2022-06-23

## 2022-06-23 RX ORDER — CHLORTHALIDONE 25 MG/1
50 TABLET ORAL DAILY
Status: DISCONTINUED | OUTPATIENT
Start: 2022-06-23 | End: 2022-06-27

## 2022-06-23 RX ADMIN — SPIRONOLACTONE 25 MG: 25 TABLET, FILM COATED ORAL at 08:55

## 2022-06-23 RX ADMIN — HEPARIN SODIUM 7 UNITS/KG/HR: 10000 INJECTION, SOLUTION INTRAVENOUS at 02:00

## 2022-06-23 RX ADMIN — HEPARIN SODIUM 4000 UNITS: 1000 INJECTION INTRAVENOUS; SUBCUTANEOUS at 06:48

## 2022-06-23 RX ADMIN — SODIUM CHLORIDE, PRESERVATIVE FREE 10 ML: 5 INJECTION INTRAVENOUS at 20:22

## 2022-06-23 RX ADMIN — LOSARTAN POTASSIUM 100 MG: 50 TABLET, FILM COATED ORAL at 08:55

## 2022-06-23 RX ADMIN — PERFLUTREN 1.65 MG: 6.52 INJECTION, SUSPENSION INTRAVENOUS at 09:45

## 2022-06-23 RX ADMIN — CHLORTHALIDONE 50 MG: 25 TABLET ORAL at 12:29

## 2022-06-23 RX ADMIN — HEPARIN SODIUM 4000 UNITS: 1000 INJECTION INTRAVENOUS; SUBCUTANEOUS at 02:08

## 2022-06-23 RX ADMIN — PANTOPRAZOLE SODIUM 40 MG: 40 TABLET, DELAYED RELEASE ORAL at 12:29

## 2022-06-23 RX ADMIN — VALSARTAN 320 MG: 320 TABLET, FILM COATED ORAL at 20:22

## 2022-06-23 RX ADMIN — SODIUM CHLORIDE, PRESERVATIVE FREE 5 ML: 5 INJECTION INTRAVENOUS at 08:56

## 2022-06-23 RX ADMIN — HEPARIN SODIUM 13 UNITS/KG/HR: 10000 INJECTION, SOLUTION INTRAVENOUS at 13:29

## 2022-06-23 RX ADMIN — POTASSIUM CHLORIDE 40 MEQ: 1500 TABLET, EXTENDED RELEASE ORAL at 04:46

## 2022-06-23 RX ADMIN — CARVEDILOL 25 MG: 25 TABLET, FILM COATED ORAL at 08:55

## 2022-06-23 RX ADMIN — SERTRALINE 50 MG: 50 TABLET, FILM COATED ORAL at 08:55

## 2022-06-23 RX ADMIN — HEPARIN SODIUM 2000 UNITS: 1000 INJECTION INTRAVENOUS; SUBCUTANEOUS at 13:29

## 2022-06-23 RX ADMIN — HEPARIN SODIUM 13 UNITS/KG/HR: 10000 INJECTION, SOLUTION INTRAVENOUS at 20:10

## 2022-06-23 RX ADMIN — HYDRALAZINE HYDROCHLORIDE 75 MG: 50 TABLET, FILM COATED ORAL at 04:47

## 2022-06-23 RX ADMIN — ATORVASTATIN CALCIUM 10 MG: 10 TABLET, FILM COATED ORAL at 08:55

## 2022-06-23 ASSESSMENT — LIFESTYLE VARIABLES
HOW OFTEN DO YOU HAVE A DRINK CONTAINING ALCOHOL: 2-4 TIMES A MONTH
HOW MANY STANDARD DRINKS CONTAINING ALCOHOL DO YOU HAVE ON A TYPICAL DAY: 3 OR 4

## 2022-06-23 NOTE — ED NOTES
Time out complete at this time by ED R3 for pt to be sync-cardioverted d/t a-fib with RVR. Pt has not responded to medicinal interventions. Airway back-up plans are prep'd.  Pt remains on ED monitor with stable VS.      Oneita Sonia, [place]  06/22/22 6510

## 2022-06-23 NOTE — ED NOTES
Pt successfully sync-cardioverted at this time with 100 Joules. Continuous EKG obtained during procedure. ED Attending Didi Meza and ED R3 Jason at bedside.       Yana Jimenez, [facility]  06/22/22 2117

## 2022-06-23 NOTE — PROGRESS NOTES
Patient to have stress test in am, secure message sent to Dr Chris Munoz regarding administration of beta blocker, per Dr Chris Munoz, hold coreg prior to stress test.

## 2022-06-23 NOTE — H&P
Internal Medicine  PGY 1  History & Physical      CC  Chest pressure with racing heart     History Obtained From:  patient    HISTORY OF PRESENT ILLNESS:  Sebastien Azevedo is a 56 yo male with PMH of Atrial fibrillation with RVR status post cardioversion and ablation in 2/21 by Dr Jelani Mars, obstructive sleep apnea uses CPAP at home, HTN who presented to the ED with complains of chest discomfort, palpitation and diaphoresis going on since 1830 yesterday. He felt like a pressure like sensation over his chest with racing heart which didn't get better with bearing down. It was also accompanied with diaphoresis, he initially thought this to be because of being tired but then when it didn't get better so he decide to call the EMS. Patient has been asymptomatic since ablation last year. Patients last ECHO is in 2020 with EF of 50%. He has been compliant of all his medication but hasnt followed up with cardiology since after intervention. He has been taken off of elliquis and flecaine since then. On arrival to the ED patient was found to be in A fib w RVR rate in the high 180s, SBP of 180s s/p patient was given metoprolol for rate control and then amiodarone. Patient was then electrically cardioverted in the ED after sedation with etomidate, and cardiology was consulted. Labs were remarkable of K of 3.5 and Mag of 1.8 which were repleted. Troponin was elevated to 0.02 which trended up to 0.1. post cardioversion patient heart rate came down to 70s with mild improvement in his BP, decision was made to admit the patient for cardiology evaluation.      Past Medical History:        Diagnosis Date    Carpal tunnel syndrome     Chronic kidney disease     Hyperlipidemia     Hypertension     Obesity, unspecified     MARY (obstructive sleep apnea)    ·     Past Surgical History:        Procedure Laterality Date    CARDIAC CATHETERIZATION      KNEE ARTHROSCOPY Right     NOSE SURGERY      Repair from Fx   ·     Medications Priorto Admission:    · Not in a hospital admission. Allergies:  Lisinopril    Social History:   · TOBACCO:   reports that he has never smoked. He has never used smokeless tobacco.  · ETOH:   reports current alcohol use. · DRUGS : none   · Patient currently lives with family at home   ·   Family History:       Problem Relation Age of Onset    Diabetes Mother     Stroke Maternal Uncle    ·     Review of Systems    ROS: A 10 point review of systems was conducted, significant findings as noted in HPI. Physical Exam  Constitutional:       Appearance: He is obese. HENT:      Head: Normocephalic. Right Ear: External ear normal.      Left Ear: External ear normal.      Nose: Nose normal.      Mouth/Throat:      Mouth: Mucous membranes are moist.   Eyes:      Pupils: Pupils are equal, round, and reactive to light. Cardiovascular:      Rate and Rhythm: Normal rate and regular rhythm. Pulses: Normal pulses. Heart sounds: Normal heart sounds. Pulmonary:      Effort: Pulmonary effort is normal.   Abdominal:      General: There is no distension. Palpations: Abdomen is soft. Tenderness: There is no abdominal tenderness. Musculoskeletal:      Right lower leg: No edema. Left lower leg: No edema. Skin:     General: Skin is warm. Neurological:      Mental Status: He is alert and oriented to person, place, and time. Psychiatric:         Thought Content: Thought content normal.       Physical exam:       Vitals:    06/22/22 2252   BP: (!) 163/97   Pulse: 68   Resp: 26   Temp:    SpO2: 98%       DATA:    Labs:  CBC:   Recent Labs     06/22/22 2003   WBC 10.5   HGB 16.2   HCT 48.7          BMP:   Recent Labs     06/22/22 2003      K 3.5   CL 99   CO2 21   BUN 17   CREATININE 1.0   GLUCOSE 142*     LFT's: No results for input(s): AST, ALT, ALB, BILITOT, ALKPHOS in the last 72 hours.   Troponin:   Recent Labs     06/22/22 2003 06/22/22  2255   TROPONINI 0.02* 0.10* BNP:No results for input(s): BNP in the last 72 hours. ABGs: No results for input(s): PHART, ZOO9VJB, PO2ART in the last 72 hours. INR:   Recent Labs     06/23/22  0033   INR 1.14       U/A:No results for input(s): NITRITE, COLORU, PHUR, LABCAST, WBCUA, RBCUA, MUCUS, TRICHOMONAS, YEAST, BACTERIA, CLARITYU, SPECGRAV, LEUKOCYTESUR, UROBILINOGEN, BILIRUBINUR, BLOODU, GLUCOSEU, AMORPHOUS in the last 72 hours.     Invalid input(s): Everett Fulton    No orders to display           ASSESSMENT AND PLAN:    54 yo male with PMH of Atrial fibrillation with RVR status post cardioversion and ablation in 2/21 by Dr Jethro Stovall, obstructive sleep apnea uses CPAP at home, HTN who presented to the ED with complains of chest discomfort, palpitation and diaphoresis going on since 1830 yesterday    Atrial fibrillation with RVR s/p cardioversion   -consulted cardiology Dr Jethro Stovall   -on heparin gtt  -ordered ECHO   -on continuous telemetry  -replete electrolytes as needed     Elevated troponin   Likely 2/2 demand ischemia   -will trend troponin Q6hrs   -on continuous telemetry   -will monitor for now     Chronic medical conditions   HTN-continue home coreg, hydralazine, losartan and aldactone   HLD-continue home lipitor 10 mg PO daily   Mood disorder-continue home zoloft 50 mg PO daily     Will discuss with attending physician Dr.    Code Status:Full code  FEN: full code   PPX: heparin   DISPO: Christina Delgadillo MD  6/23/2022,  1:31 AM

## 2022-06-23 NOTE — FLOWSHEET NOTE
06/23/22 1113   Encounter Summary   Encounter Overview/Reason  Initial Encounter   Service Provided For: Patient   Referral/Consult From: Nurse   Support System   Jennifer Espinosa RN)   Last Encounter  06/23/22  (6/23,everardo )   Begin Time 1100   End Time  1114   Total Time Calculated 14 min   Encounter    Type Initial Screen/Assessment   Advance Care Planning   Type   [facility] docs provide for consideration )   Assessment/Intervention/Outcome   Assessment Calm;Peaceful   Intervention Active listening;Empowerment;Nurtured Hope   Outcome Comfort;Encouraged; Optimistic;Peace; Receptive   Staff Gen Preciado MA

## 2022-06-23 NOTE — PROGRESS NOTES
Progress Note    Admit Date: 6/22/2022  Day: 0   Diet: Diet NPO    CC: CP    Interval history: Trop continued ti rise 0.02->.0.10->0. 22. Pt seen at bedside and examined, states all symptoms have resolved and he feels at his baseline. Was previously on eliquis but got to the point he could not afford. Has been on heparin gtt. Cards to see today and echo pending. BP in 789G systolic which is appropriately lowered from 250 SBP at presentation. Denied CP, SOB, palpitation, N/V/D, diaphoresis    HPI:   \"54 yo male with PMH of Atrial fibrillation with RVR status post cardioversion and ablation in 2/21 by Dr Devyn Ladd, obstructive sleep apnea uses CPAP at home, HTN who presented to the ED with complains of chest discomfort, palpitation and diaphoresis\"    Medications:     Scheduled Meds:   atorvastatin  10 mg Oral Daily    carvedilol  25 mg Oral BID WC    hydrALAZINE  75 mg Oral 3 times per day    losartan  100 mg Oral Daily    spironolactone  25 mg Oral Daily    sertraline  50 mg Oral Daily    pantoprazole  40 mg Oral QAM AC    sodium chloride flush  5-40 mL IntraVENous 2 times per day     Continuous Infusions:   sodium chloride      heparin (PORCINE) Infusion 11 Units/kg/hr (06/23/22 0649)     PRN Meds:sodium chloride flush, sodium chloride, ondansetron **OR** ondansetron, polyethylene glycol, acetaminophen **OR** acetaminophen, heparin (porcine), heparin (porcine)    Objective:   Vitals:   T-max:  Patient Vitals for the past 8 hrs:   BP Temp Temp src Pulse Resp SpO2   06/23/22 0747 (!) 199/109 98.1 °F (36.7 °C) Oral 78 19 97 %   06/23/22 0432 (!) 187/137 98.2 °F (36.8 °C) Oral 68 20 99 %       Intake/Output Summary (Last 24 hours) at 6/23/2022 1123  Last data filed at 6/23/2022 1010  Gross per 24 hour   Intake 1340 ml   Output 1200 ml   Net 140 ml       Review of Systems  10 point ROS completed and negative unless listed in HPI    Physical Exam  Constitutional:       Appearance: He is obese.    HENT:      Head: Normocephalic. Right Ear: External ear normal.      Left Ear: External ear normal.      Nose: Nose normal.      Mouth/Throat:      Mouth: Mucous membranes are moist.   Eyes:      Pupils: Pupils are equal, round, and reactive to light. Cardiovascular:      Rate and Rhythm: Normal rate and regular rhythm. Pulses: Normal pulses. Heart sounds: Normal heart sounds. Pulmonary:      Effort: Pulmonary effort is normal.   Abdominal:      General: There is no distension. Palpations: Abdomen is soft. Tenderness: There is no abdominal tenderness. Musculoskeletal:      Right lower leg: No edema. Left lower leg: No edema. Skin:     General: Skin is warm. Neurological:      Mental Status: He is alert and oriented to person, place, and time. Psychiatric:         Thought Content: Thought content normal.     LABS:    CBC:   Recent Labs     06/22/22 2003   WBC 10.5   HGB 16.2   HCT 48.7      MCV 85.1     Renal:    Recent Labs     06/22/22 2003 06/23/22  0542    139   K 3.5 3.4*   CL 99 103   CO2 21 22   BUN 17 17   CREATININE 1.0 0.9   GLUCOSE 142* 144*   CALCIUM 9.7 9.1   MG 1.80 2.20   ANIONGAP 19* 14     Hepatic: No results for input(s): AST, ALT, BILITOT, BILIDIR, PROT, LABALBU, ALKPHOS in the last 72 hours. Troponin:   Recent Labs     06/22/22 2003 06/22/22  2255 06/23/22  0542   TROPONINI 0.02* 0.10* 0.22*     BNP: No results for input(s): BNP in the last 72 hours. Lipids: No results for input(s): CHOL, HDL in the last 72 hours. Invalid input(s): LDLCALCU, TRIGLYCERIDE  ABGs:  No results for input(s): PHART, RVO6WDL, PO2ART, AIV0UAJ, BEART, THGBART, R9RTJEWR, UIP5ABX in the last 72 hours. INR:   Recent Labs     06/23/22  0033   INR 1.14     Lactate: No results for input(s): LACTATE in the last 72 hours. Cultures:  -----------------------------------------------------------------  RAD:   CT HEAD WO CONTRAST   Final Result      No acute intracranial abnormality. Assessment/Plan:   56 yo male with PMH of Atrial fibrillation with RVR status post cardioversion and ablation in 2/21 by Dr Felix Stone, obstructive sleep apnea uses CPAP at home, HTN who presented to the ED with complains of chest discomfort, palpitation and diaphoresis     Atrial fibrillation with RVR s/p cardioversion in ED  Repeat EKG this AM NSR  -consulted cardiology, Dr Felix Stone   -on heparin gtt  -ECHO pending  -on continuous telemetry  -replete electrolytes as needed      Elevated troponin   Likely 2/2 demand ischemia in setting of above. Pt already on heparin gtt  -will trend troponin Q6hrs   -on continuous telemetry   - repeat EKG this AM     Concern for HTN emergency  Troponin elevation explained by above, no focal deficits  - head CT, wnl  - continue to slowly monitor and lower pressures    Chronic medical conditions   HTN-continue home coreg, hydralazine, losartan and aldactone   HLD-continue home lipitor 10 mg PO daily   Mood disorder-continue home zoloft 50 mg PO daily     Code Status: Full  FEN: NPO pending cards recs  PPX: heparin gtt  DISPO: Jamie Moore MD, PGY1  06/23/22  11:23 AM    This patient has been staffed and discussed with Sunshine Madison MD.     Patient seen and examined, labs and imaging studies reviewed, agree with assessment and plan as outlined above. Continue with current care and plan. Discussed case with patients nurse, discussed case with care team, discussed plan.       Sunshine Madison MD [address]

## 2022-06-23 NOTE — CONSULTS
[facility]   Electrophysiology Nurse Practitioner  Consult Rounding    Date: 6/23/2022    Reason for Consultation/ Chief Complaint: AF/RVR    Patient interviewed, examined and pertinent medical data and imaging studies reviewed. Refer to the NP's note for details of clinical findings, assessment and plan with which I agree. Corrections and additions as noted:    Patient came with A. fib and RVR  Cardioverted to normal sinus rhythm, in the ER  Positive troponin  Patient also had chest pain when he had A. fib with RVR  Echo within normal limits  Lexiscan  If there is no reversible ischemia, then can be discharged and [phone]    History Obtained From: Patient and medical record. Cardiac Hx: Jerrica Prado is a 55 y.o. man with PMH HTN, HLD, MARY, obesity, NAIDA, s/p PCI, HFpEF, pAF, LHC (2020) showed nor cors, recurrent AF, s/p RFA/PVI of AF/AFL (2/3/21, Dr. Leta Ocampo) who p/w palpitations, chest discomfort, found to be in AF/RVR, underwent DCCV to NSR in ER    HPI: Pt presented to the hospital with complaints palpitations, chest discomfort. Found to be in AF/RVR and patient underwent DCCV to NSR in the ER. Pt has hx of AF and underwent RFA/PVI of AF/AFL last year with Dr. Leta Ocampo. He was seen 1 week after the procedure however he did not show for subsequent appointments. He stopped taking Eliquis as it was cost prohibitive. Also stopped taking flecainide several months ago as he thought he didn't need to take anymore. Troponin trending up during admission 0.02, 0.10, 0.22. No further complaints of CP, palpitations. States he might have been dehydrated the last few days from working outside precipitating the AF event. Pt went down for echo this am, results pending. Noted to have elevated BP's during stay and had CT head which was negative for acute disease. Compliant with CPAP. No c/o SOB, dizziness, LH, BLE edema. Home medications:   No current facility-administered medications on file prior to encounter.      Current Outpatient Medications on File Prior to Encounter   Medication Sig Dispense Refill    sertraline (ZOLOFT) 50 MG tablet TAKE 1 TABLET BY MOUTH DAILY 90 tablet 0    hydrALAZINE (APRESOLINE) 25 MG tablet TAKE 3 TABLETS BY MOUTH THREE TIMES DAILY 810 tablet 0    losartan (COZAAR) 100 MG tablet TAKE 1 TABLET BY MOUTH DAILY 90 tablet 0    atorvastatin (LIPITOR) 10 MG tablet TAKE 1 TABLET BY MOUTH DAILY 90 tablet 0    spironolactone (ALDACTONE) 25 MG tablet TAKE 1 TABLET BY MOUTH DAILY 90 tablet 0    carvedilol (COREG) 25 MG tablet Take 1 tablet by mouth 2 times daily (with meals) 180 tablet 3    apixaban (ELIQUIS) 5 MG TABS tablet Take 1 tablet by mouth 2 times daily (Patient not taking: Reported on 6/23/2022) 60 tablet 3    pantoprazole (PROTONIX) 40 MG tablet Take 1 tablet by mouth daily 30 tablet 0    furosemide (LASIX) 20 MG tablet Take 1 tablet by mouth daily as needed (edema, weight gain >3lbs in day) (Patient not taking: Reported on 6/23/2022) 60 tablet 3    flecainide (TAMBOCOR) 100 MG tablet Take 1 tablet by mouth every 12 hours (Patient not taking: Reported on 6/23/2022) 60 tablet 3       Scheduled Meds:   atorvastatin  10 mg Oral Daily    carvedilol  25 mg Oral BID WC    hydrALAZINE  75 mg Oral 3 times per day    losartan  100 mg Oral Daily    spironolactone  25 mg Oral Daily    sertraline  50 mg Oral Daily    pantoprazole  40 mg Oral QAM AC    sodium chloride flush  5-40 mL IntraVENous 2 times per day     Continuous Infusions:   sodium chloride      heparin (PORCINE) Infusion 11 Units/kg/hr (06/23/22 0649)     PRN Meds:sodium chloride flush, sodium chloride, ondansetron **OR** ondansetron, polyethylene glycol, acetaminophen **OR** acetaminophen, heparin (porcine), heparin (porcine)       Past Medical History:   Diagnosis Date    Carpal tunnel syndrome     Chronic kidney disease     Hyperlipidemia     Hypertension     Obesity, unspecified     MARY (obstructive sleep apnea)         Past Surgical History:   Procedure Laterality Date    CARDIAC CATHETERIZATION      KNEE ARTHROSCOPY Right     NOSE SURGERY      Repair from Fx       Allergies   Allergen Reactions    Lisinopril      cough       Social History:  Reviewed. reports that he has never smoked. He has never used smokeless tobacco. He reports current alcohol use. He reports that he does not use drugs. Family History:  Reviewed. family history includes Diabetes in his mother; Stroke in his maternal uncle. Review of System:    Constitutional: No fevers, chills. Eyes: No visual changes or diplopia. No scleral icterus. ENT: No Headaches. No mouth sores or sore throat. Cardiovascular: No for chest pain, No for dyspnea on exertion, No for palpitations or No for loss of consciousness. No cough, hemoptysis, No for pleuritic pain, or phlebitis. Respiratory: No for cough or wheezing. No hematemesis. Gastrointestinal: No abdominal pain, blood in stools. Musculoskeletal: No gait disturbance,    Integumentary: No rash or pruritis. Neurological: No headache, change in muscle strength, numbness or tingling. Psychiatric: No anxiety, or depression. Physical Examination:  Vitals:    22 0747   BP: (!) 199/109   Pulse: 78   Resp: 19   Temp: 98.1 °F (36.7 °C)   SpO2: 97%      In: 1340 [P.O.:240;  I.V.:100]  Out: -    Wt Readings from Last 3 Encounters:   22 (!) 322 lb (146.1 kg)   21 (!) 309 lb 3.2 oz (140.3 kg)   21 (!) 320 lb (145.2 kg)     Temp  Av.2 °F (36.8 °C)  Min: 98.1 °F (36.7 °C)  Max: 98.3 °F (36.8 °C)  Pulse  Av.8  Min: 67  Max: 185  BP  Min: 124/99  Max: 254/228  SpO2  Av.4 %  Min: 92 %  Max: 100 %    Intake/Output Summary (Last 24 hours) at 2022 0979  Last data filed at 2022 0523  Gross per 24 hour   Intake 1340 ml   Output --   Net 1340 ml Constitutional: Oriented. No distress. Head: Normocephalic and atraumatic. Neck: Neck supple. No rigidity. No JVD present. Cardiovascular: Normal rate, regular rhythm, S1&S2. Pulmonary/Chest: Bilateral respiratory sounds. No wheezes, No rhonchi. Musculoskeletal: No tenderness. No edema    Neurological: Alert and oriented. Skin: Skin is warm and dry. No rash noted. Psychiatric: Has a normal mood, affect and behavior     Labs:  Reviewed. Recent Labs     06/22/22 2003 06/23/22  0542    139   K 3.5 3.4*   CL 99 103   CO2 21 22   BUN 17 17   CREATININE 1.0 0.9     Recent Labs     06/22/22 2003   WBC 10.5   HGB 16.2   HCT 48.7   MCV 85.1        Lab Results   Component Value Date    TROPONINI 0.22 06/23/2022     Lab Results   Component Value Date    BNP 26.0 12/13/2015     Lab Results   Component Value Date    PROTIME 14.5 06/23/2022    PROTIME 15.1 01/28/2021    PROTIME 12.0 10/25/2020    INR 1.14 06/23/2022    INR 1.30 01/28/2021    INR 1.03 10/25/2020     Lab Results   Component Value Date    CHOL 135 10/27/2020    HDL 26 10/27/2020    TRIG 158 10/27/2020       Telemetry: Personally reviewed: NSR, rates controlled    Radiography: Personally reviewed: n/a    ECG: Personally reviewed: NSR, HR 79, , QRS 96, QTc 502    ECHO:  2/2020   Summary   Technically difficult examination with Definity. Left ventricular cavity size is normal.   There is mild to moderate concentric left ventricular hypertrophy. Overall left ventricular systolic function appears normal with an ejection   fraction of 55-60%. No regional wall motion abnormalities are noted. Diastolic filling parameters suggest grade II diastolic dysfunction. Bi-atrial enlargement. Trace mitral and tricuspid regurgitation. Estimated pulmonary artery systolic pressure is 25 mmHg assuming a right   atrial pressure of 15 mmHg. There is trivial pericardial fluid present.     Stress Test: 2/2020      Summary    Overall findings represent a high risk scan.    2 day protocol stress test.    There is a moderate sized area of decreased perfusion during stress    involving the mid and basal lateral and inferolateral cardiac segments. Above findings could represent ischemia. Normal LV size and systolic function. ECG: Non-diagnostic EKG response due to failure to reach target heart rate. Cardiac Angiography:2/20/2020, Dr. Fishman Bound:  No evidence   coronary artery disease  Ole left renal stent patent. Normal LV function      Plan:  Primary prevention  Medical management    Problem List:   Patient Active Problem List    Diagnosis Date Noted    Typical atrial flutter ([address]     Atrial fibrillation with RVR ([address] 01/07/2021    H/O angiography 10/26/2020    Atrial fibrillation with rapid ventricular response ([address]     Essential hypertension     Atrial fibrillation ([address] 02/27/2020    Hypertensive urgency 02/18/2020    Heart failure with normal ejection fraction ([address] 10/29/2018    Mixed hyperlipidemia 02/19/2018    Paresthesia 06/07/2016    Lumbar strain 01/04/2016    Depressive disorder 08/19/2013    Obesity 07/15/2011    Obstructive sleep apnea syndrome 11/16/2010        Assessment:   1. Atrial fibrillation with RVR ([facility])    2. Elevated troponin    3. HTN    AF/RVR  - In NSR, rates controlled, no AF seen on tele  - S/p RFA/PVI of AF (2/2021, Dr. Devyn Ladd)  - S/p DCCV to NSR (6/22/22) in ER  [facility]  - On heparin gtt- continue for now  - Discussed with patient [address] options, he is not interested in coumadin.  He is interested in taking Eliquis or Xarelto even though it may be more expensive  - On coreg 25 mg BID  - Will hold off on resuming flecainide in light of troponin elevations  - TSH pending   - Echo pending  - Keep K >4.0, Mg>2.0, replacements ordered  - MARY- wears CPAP  - Will d/w Dr. Devyn Ladd  - ECG ordered and results personally reviewed     Elevated Troponin  - Trop 0.02, 0.10, 0.22  - Lateral T wave inversions/ AVR ST wave elevation on EKG seen on previous EKG's  - [facility] 2/2020 showed no evidence CAD  - Echo pending  - Lexiscan    HTN  - BP elevated  - On coreg 25 mg BID, hydralazine 75 mg TID, losartan 100 mg QD, spironolactone 25 mg QD    All questions and concerns were addressed to the patient/family. Alternatives to my treatment were discussed. The note was completed using EMR. Every effort was made to ensure accuracy; however, inadvertent computerized transcription errors may be present.      Abhinav Ortiz, LATOYA  [address]

## 2022-06-24 ENCOUNTER — APPOINTMENT (OUTPATIENT)
Dept: CT IMAGING | Age: 57
DRG: 281 | End: 2022-06-24
Payer: COMMERCIAL

## 2022-06-24 LAB
ANION GAP SERPL CALCULATED.3IONS-SCNC: 13 MMOL/L (ref 3–16)
ANTI-XA UNFRAC HEPARIN: 0.4 IU/ML (ref 0.3–0.7)
ANTI-XA UNFRAC HEPARIN: <0.1 IU/ML (ref 0.3–0.7)
BASOPHILS ABSOLUTE: 0 K/UL (ref 0–0.2)
BASOPHILS RELATIVE PERCENT: 0.5 %
BUN BLDV-MCNC: 15 MG/DL (ref 7–20)
CALCIUM SERPL-MCNC: 9.7 MG/DL (ref 8.3–10.6)
CHLORIDE BLD-SCNC: 97 MMOL/L (ref 99–110)
CO2: 23 MMOL/L (ref 21–32)
CORTISOL TOTAL: 8.4 UG/DL
CREAT SERPL-MCNC: 0.9 MG/DL (ref 0.9–1.3)
EOSINOPHILS ABSOLUTE: 0.2 K/UL (ref 0–0.6)
EOSINOPHILS RELATIVE PERCENT: 1.7 %
ESTIMATED AVERAGE GLUCOSE: 116.9 MG/DL
GFR AFRICAN AMERICAN: >60
GFR NON-AFRICAN AMERICAN: >60
GLUCOSE BLD-MCNC: 105 MG/DL (ref 70–99)
HBA1C MFR BLD: 5.7 %
HCT VFR BLD CALC: 47.8 % (ref 40.5–52.5)
HEMOGLOBIN: 15.9 G/DL (ref 13.5–17.5)
LV EF: 37 %
LVEF MODALITY: NORMAL
LYMPHOCYTES ABSOLUTE: 1.9 K/UL (ref 1–5.1)
LYMPHOCYTES RELATIVE PERCENT: 21.1 %
MAGNESIUM: 2.1 MG/DL (ref 1.8–2.4)
MCH RBC QN AUTO: 27.7 PG (ref 26–34)
MCHC RBC AUTO-ENTMCNC: 33.3 G/DL (ref 31–36)
MCV RBC AUTO: 83.4 FL (ref 80–100)
MONOCYTES ABSOLUTE: 0.6 K/UL (ref 0–1.3)
MONOCYTES RELATIVE PERCENT: 6.8 %
NEUTROPHILS ABSOLUTE: 6.4 K/UL (ref 1.7–7.7)
NEUTROPHILS RELATIVE PERCENT: 69.9 %
PDW BLD-RTO: 15.3 % (ref 12.4–15.4)
PLATELET # BLD: 194 K/UL (ref 135–450)
PMV BLD AUTO: 9.5 FL (ref 5–10.5)
POTASSIUM REFLEX MAGNESIUM: 3.6 MMOL/L (ref 3.5–5.1)
RBC # BLD: 5.73 M/UL (ref 4.2–5.9)
SODIUM BLD-SCNC: 133 MMOL/L (ref 136–145)
WBC # BLD: 9.1 K/UL (ref 4–11)

## 2022-06-24 PROCEDURE — 6370000000 HC RX 637 (ALT 250 FOR IP): Performed by: INTERNAL MEDICINE

## 2022-06-24 PROCEDURE — 82088 ASSAY OF ALDOSTERONE: CPT

## 2022-06-24 PROCEDURE — 3430000000 HC RX DIAGNOSTIC RADIOPHARMACEUTICAL: Performed by: NURSE PRACTITIONER

## 2022-06-24 PROCEDURE — 78452 HT MUSCLE IMAGE SPECT MULT: CPT

## 2022-06-24 PROCEDURE — 93017 CV STRESS TEST TRACING ONLY: CPT

## 2022-06-24 PROCEDURE — 99233 SBSQ HOSP IP/OBS HIGH 50: CPT | Performed by: NURSE PRACTITIONER

## 2022-06-24 PROCEDURE — 36415 COLL VENOUS BLD VENIPUNCTURE: CPT

## 2022-06-24 PROCEDURE — 85520 HEPARIN ASSAY: CPT

## 2022-06-24 PROCEDURE — 82533 TOTAL CORTISOL: CPT

## 2022-06-24 PROCEDURE — 84244 ASSAY OF RENIN: CPT

## 2022-06-24 PROCEDURE — 6360000004 HC RX CONTRAST MEDICATION: Performed by: INTERNAL MEDICINE

## 2022-06-24 PROCEDURE — 74175 CTA ABDOMEN W/CONTRAST: CPT

## 2022-06-24 PROCEDURE — 6370000000 HC RX 637 (ALT 250 FOR IP): Performed by: STUDENT IN AN ORGANIZED HEALTH CARE EDUCATION/TRAINING PROGRAM

## 2022-06-24 PROCEDURE — 2580000003 HC RX 258

## 2022-06-24 PROCEDURE — 83735 ASSAY OF MAGNESIUM: CPT

## 2022-06-24 PROCEDURE — 6360000002 HC RX W HCPCS: Performed by: STUDENT IN AN ORGANIZED HEALTH CARE EDUCATION/TRAINING PROGRAM

## 2022-06-24 PROCEDURE — 6370000000 HC RX 637 (ALT 250 FOR IP)

## 2022-06-24 PROCEDURE — 1200000000 HC SEMI PRIVATE

## 2022-06-24 PROCEDURE — 80048 BASIC METABOLIC PNL TOTAL CA: CPT

## 2022-06-24 PROCEDURE — 6360000002 HC RX W HCPCS

## 2022-06-24 PROCEDURE — 6360000002 HC RX W HCPCS: Performed by: NURSE PRACTITIONER

## 2022-06-24 PROCEDURE — 85025 COMPLETE CBC W/AUTO DIFF WBC: CPT

## 2022-06-24 PROCEDURE — A9502 TC99M TETROFOSMIN: HCPCS | Performed by: NURSE PRACTITIONER

## 2022-06-24 RX ORDER — HYDRALAZINE HYDROCHLORIDE 20 MG/ML
5 INJECTION INTRAMUSCULAR; INTRAVENOUS ONCE
Status: DISCONTINUED | OUTPATIENT
Start: 2022-06-24 | End: 2022-06-24

## 2022-06-24 RX ORDER — CARVEDILOL 25 MG/1
25 TABLET ORAL 2 TIMES DAILY WITH MEALS
Status: DISCONTINUED | OUTPATIENT
Start: 2022-06-24 | End: 2022-06-28 | Stop reason: HOSPADM

## 2022-06-24 RX ORDER — HYDRALAZINE HYDROCHLORIDE 20 MG/ML
10 INJECTION INTRAMUSCULAR; INTRAVENOUS ONCE
Status: COMPLETED | OUTPATIENT
Start: 2022-06-24 | End: 2022-06-24

## 2022-06-24 RX ORDER — NIFEDIPINE 30 MG/1
30 TABLET, FILM COATED, EXTENDED RELEASE ORAL ONCE
Status: COMPLETED | OUTPATIENT
Start: 2022-06-24 | End: 2022-06-24

## 2022-06-24 RX ORDER — NIFEDIPINE 60 MG/1
60 TABLET, EXTENDED RELEASE ORAL DAILY
Status: DISCONTINUED | OUTPATIENT
Start: 2022-06-24 | End: 2022-06-24

## 2022-06-24 RX ORDER — NIFEDIPINE 30 MG/1
90 TABLET, FILM COATED, EXTENDED RELEASE ORAL DAILY
Status: DISCONTINUED | OUTPATIENT
Start: 2022-06-25 | End: 2022-06-28 | Stop reason: HOSPADM

## 2022-06-24 RX ORDER — SPIRONOLACTONE 50 MG/1
50 TABLET, FILM COATED ORAL DAILY
Status: DISCONTINUED | OUTPATIENT
Start: 2022-06-24 | End: 2022-06-28 | Stop reason: HOSPADM

## 2022-06-24 RX ADMIN — ATORVASTATIN CALCIUM 10 MG: 10 TABLET, FILM COATED ORAL at 11:33

## 2022-06-24 RX ADMIN — SODIUM CHLORIDE, PRESERVATIVE FREE 10 ML: 5 INJECTION INTRAVENOUS at 20:24

## 2022-06-24 RX ADMIN — NIFEDIPINE 60 MG: 60 TABLET, FILM COATED, EXTENDED RELEASE ORAL at 11:33

## 2022-06-24 RX ADMIN — IOPAMIDOL 80 ML: 755 INJECTION, SOLUTION INTRAVENOUS at 16:13

## 2022-06-24 RX ADMIN — VALSARTAN 320 MG: 320 TABLET, FILM COATED ORAL at 20:24

## 2022-06-24 RX ADMIN — HEPARIN SODIUM 15 UNITS/KG/HR: 10000 INJECTION, SOLUTION INTRAVENOUS at 12:34

## 2022-06-24 RX ADMIN — NIFEDIPINE 30 MG: 30 TABLET, EXTENDED RELEASE ORAL at 13:11

## 2022-06-24 RX ADMIN — TETROFOSMIN 30 MILLICURIE: 1.38 INJECTION, POWDER, LYOPHILIZED, FOR SOLUTION INTRAVENOUS at 09:35

## 2022-06-24 RX ADMIN — PANTOPRAZOLE SODIUM 40 MG: 40 TABLET, DELAYED RELEASE ORAL at 06:04

## 2022-06-24 RX ADMIN — HEPARIN SODIUM 15 UNITS/KG/HR: 10000 INJECTION, SOLUTION INTRAVENOUS at 11:21

## 2022-06-24 RX ADMIN — HEPARIN SODIUM 15 UNITS/KG/HR: 10000 INJECTION, SOLUTION INTRAVENOUS at 23:07

## 2022-06-24 RX ADMIN — HYDRALAZINE HYDROCHLORIDE 10 MG: 20 INJECTION INTRAMUSCULAR; INTRAVENOUS at 01:48

## 2022-06-24 RX ADMIN — CARVEDILOL 25 MG: 25 TABLET, FILM COATED ORAL at 13:11

## 2022-06-24 RX ADMIN — CHLORTHALIDONE 50 MG: 25 TABLET ORAL at 11:33

## 2022-06-24 RX ADMIN — HYDRALAZINE HYDROCHLORIDE 10 MG: 20 INJECTION INTRAMUSCULAR; INTRAVENOUS at 11:52

## 2022-06-24 RX ADMIN — HEPARIN SODIUM 2000 UNITS: 1000 INJECTION INTRAVENOUS; SUBCUTANEOUS at 11:17

## 2022-06-24 RX ADMIN — SODIUM CHLORIDE, PRESERVATIVE FREE 5 ML: 5 INJECTION INTRAVENOUS at 11:33

## 2022-06-24 RX ADMIN — SERTRALINE 50 MG: 50 TABLET, FILM COATED ORAL at 11:33

## 2022-06-24 RX ADMIN — SPIRONOLACTONE 50 MG: 50 TABLET ORAL at 11:33

## 2022-06-24 RX ADMIN — REGADENOSON 0.4 MG: 0.08 INJECTION, SOLUTION INTRAVENOUS at 09:33

## 2022-06-24 ASSESSMENT — PAIN SCALES - GENERAL
PAINLEVEL_OUTOF10: 0
PAINLEVEL_OUTOF10: 0

## 2022-06-24 NOTE — PROGRESS NOTES
Progress Note    Admit Date: 6/22/2022  Day: 1   Diet: Diet NPO Exceptions are: Sips of Water with Meds    CC: CP    Interval history:   Pt seen at bedside and examined, states all symptoms have resolved and he feels at his baseline. BP still elevated in 200s SBP and patient going down for stress today. CT in 2018 with adrenal adenoma, feel we will need further workup before greatly increasing his meds.     Denied CP, SOB, palpitation, N/V/D, diaphoresis    HPI:   \"56 yo male with PMH of Atrial fibrillation with RVR status post cardioversion and ablation in 2/21 by Dr Devyn Nair, obstructive sleep apnea uses CPAP at home, HTN who presented to the ED with complains of chest discomfort, palpitation and diaphoresis\"    Medications:     Scheduled Meds:   spironolactone  50 mg Oral Daily    NIFEdipine  60 mg Oral Daily    atorvastatin  10 mg Oral Daily    [Held by provider] carvedilol  25 mg Oral BID WC    sertraline  50 mg Oral Daily    pantoprazole  40 mg Oral QAM AC    sodium chloride flush  5-40 mL IntraVENous 2 times per day    chlorthalidone  50 mg Oral Daily    valsartan  320 mg Oral Nightly     Continuous Infusions:   sodium chloride      heparin (PORCINE) Infusion 15 Units/kg/hr (06/23/22 2325)     PRN Meds:technetium tetrofosmin, technetium tetrofosmin, regadenoson, sodium chloride flush, sodium chloride, ondansetron **OR** ondansetron, polyethylene glycol, acetaminophen **OR** acetaminophen, heparin (porcine), heparin (porcine)    Objective:   Vitals:   T-max:  Patient Vitals for the past 8 hrs:   BP Temp Temp src Pulse Resp SpO2 Weight   06/24/22 0559 (!) 205/122 -- -- 67 18 95 % (!) 305 lb 9.6 oz (138.6 kg)   06/24/22 0317 (!) 185/110 97.7 °F (36.5 °C) Oral 73 18 97 % --   06/24/22 0143 (!) 205/110 -- -- -- -- -- --       Intake/Output Summary (Last 24 hours) at 6/24/2022 0933  Last data filed at 6/24/2022 0559  Gross per 24 hour   Intake 490 ml   Output 1900 ml   Net -1410 ml       Review of Systems  10 point ROS completed and negative unless listed in HPI    Physical Exam  Constitutional:       Appearance: He is obese. HENT:      Head: Normocephalic. Right Ear: External ear normal.      Left Ear: External ear normal.      Nose: Nose normal.      Mouth/Throat:      Mouth: Mucous membranes are moist.   Eyes:      Pupils: Pupils are equal, round, and reactive to light. Cardiovascular:      Rate and Rhythm: Normal rate and regular rhythm. Pulses: Normal pulses. Heart sounds: Normal heart sounds. Pulmonary:      Effort: Pulmonary effort is normal.   Abdominal:      General: There is no distension. Palpations: Abdomen is soft. Tenderness: There is no abdominal tenderness. Musculoskeletal:      Right lower leg: No edema. Left lower leg: No edema. Skin:     General: Skin is warm. Neurological:      Mental Status: He is alert and oriented to person, place, and time. Psychiatric:         Thought Content: Thought content normal.     LABS:    CBC:   Recent Labs     06/22/22 2003   WBC 10.5   HGB 16.2   HCT 48.7      MCV 85.1     Renal:    Recent Labs     06/22/22 2003 06/23/22  0542    139   K 3.5 3.4*   CL 99 103   CO2 21 22   BUN 17 17   CREATININE 1.0 0.9   GLUCOSE 142* 144*   CALCIUM 9.7 9.1   MG 1.80 2.20   ANIONGAP 19* 14     Hepatic: No results for input(s): AST, ALT, BILITOT, BILIDIR, PROT, LABALBU, ALKPHOS in the last 72 hours. Troponin:   Recent Labs     06/23/22  0542 06/23/22  1138 06/23/22  1232   TROPONINI 0.22* 0.12* 0.14*     BNP: No results for input(s): BNP in the last 72 hours. Lipids: No results for input(s): CHOL, HDL in the last 72 hours. Invalid input(s): LDLCALCU, TRIGLYCERIDE  ABGs:  No results for input(s): PHART, KRQ9RAB, PO2ART, FNL0RAG, BEART, THGBART, S5JTUORF, SKR5NCZ in the last 72 hours.     INR:   Recent Labs     06/23/22  0033   INR 1.14     Lactate: No results for input(s): LACTATE in the last 72 hours.  Cultures:  -----------------------------------------------------------------  RAD:   CT HEAD WO CONTRAST   Final Result      No acute intracranial abnormality. NM MYOCARDIAL SPECT REST EXERCISE OR RX    (Results Pending)   CTA ABDOMEN W CONTRAST    (Results Pending)       Assessment/Plan:   54 yo male with PMH of Atrial fibrillation with RVR status post cardioversion and ablation in 2/21 by Dr Mau Loving, obstructive sleep apnea uses CPAP at home, HTN who presented to the ED with complains of chest discomfort, palpitation and diaphoresis     Atrial fibrillation with RVR s/p cardioversion in ED  Repeat EKG this AM NSR. LVH on echo  -consulted cardiology, Dr Mau Loving   -on heparin gtt  -Stress today  -on continuous telemetry  -replete electrolytes as needed      Concern for HTN emergency  Uncontrolled hypertension  Troponin elevation explained by above, no focal deficits  - coreg (held for stress), started on chlorthalidone, valsartan yesterday  - continue to slowly monitor and lower pressures  - nephrology cs  - renin/aldosteron  - catecholamines     Elevated troponin   Likely 2/2 demand ischemia in setting of above. Pt already on heparin gtt      Chronic medical conditions   HLD-continue home lipitor 10 mg PO daily   Mood disorder-continue home zoloft 50 mg PO daily     Code Status: Full  FEN: NPO  PPX: heparin gtt  DISPO: Marques Cowart MD, PGY1  06/24/22  9:33 AM    This patient has been staffed and discussed with Dino Cox MD.     Patient seen and examined, labs and imaging studies reviewed, agree with assessment and plan as outlined above. Continue with current care and plan. Discussed case with patients nurse, discussed case with care team, discussed plan.       MD Blanca Mcfarland

## 2022-06-24 NOTE — PROGRESS NOTES
Return from stress test. /115. HR 70. Denies chest pain, shortness of breath, palpitations, or headache at this time. Notified Dr. Robe Gonzalez and asked for PRN antihypertensive. AM medications administered. Heparin gtt infusing as ordered.

## 2022-06-24 NOTE — PLAN OF CARE
Problem: Respiratory - Adult  Goal: Achieves optimal ventilation and oxygenation  6/24/2022 1530 by Judie Madden RN  Note: Lungs clear and diminished to bases bilaterally. Denies shortness of breath. Problem: Cardiovascular - Adult  Goal: Maintains optimal cardiac output and hemodynamic stability  6/24/2022 1530 by Judie Madden RN  Note: BP elevated throughout this shift. MD's aware. See MAR for medication changes and orders. Following administration of coreg and additional dose of nifedipine, /96. Denies chest pain, shortness of breath, dizziness, palpitations, or headache. NSR on telemetry.

## 2022-06-24 NOTE — PROGRESS NOTES
IV hydralazine administered at 1152. Manual BP is now 220/113. Notified Dr. Waldo Weber and Dr. Camacho Mejía.

## 2022-06-24 NOTE — CARE COORDINATION
CM  Attempted to meet  With pt  At  Bedside  , pt  Off  Unit  At  CT scan<  CM met  Briefly with  Pt mother  Who was at  Bedside  And  Waiting for him to return , she  States  He is Indep at baseline  And  No current services  Or  DME  . Plans to return home  When stable. No d/c needs anticipated  At this time . Patient admitted  For  A fib  RVR ; pt  On hep gtt: cardiology following  ,  Lexiscan test,  -   Nephro consulted  For  elev  BP and CT scan  Pending . CTA ABDOMEN W CONTRAST   Cont to follow     Electronically signed by Kenny Novak RN on 6/24/2022 at 4:45 PM     Kenny Novak  RN Case Manager  [facility]  [address] [place] [address].   [facility] [address] [phone]  Fax [phone]

## 2022-06-24 NOTE — PLAN OF CARE
Problem: Cardiovascular - Adult  Goal: Maintains optimal cardiac output and hemodynamic stability  Outcome: Progressing  Note: Pt's vital signs normal with the exception of an elevated BP. Physician's aware, see MAR for scheduled and PRN meds administered. Problem: Respiratory - Adult  Goal: Achieves optimal ventilation and oxygenation  Outcome: Adequate for Discharge  Note: Pt's SpO2 above 94% on room air, with no signs of respiratory distress. Will continue to monitor. Problem: Cardiovascular - Adult  Goal: Absence of cardiac dysrhythmias or at baseline  Outcome: Adequate for Discharge  Note: Pt has been normal sinus rhythm on telemetry. Will continue to monitor.

## 2022-06-24 NOTE — CONSULTS
Interval History and plan:      Blood pressure is still elevated. Good urine output. Plan:        Check renin, aldosterone, cortisol and metanephrines again. CTA of renal arteries as he has history of renal artery stenosis. Continue Diovan. Continue chlorthalidone and add nifedipine XL. Add spironolactone once labs are sent. Assessment :     Accelerated hypertension: He has a few admissions in the past due to high blood pressure. He has history of renal artery stenosis status post stenting of the right kidney in 2018. Secondary work-up including aldosterone, cortisol and metanephrines were normal in 2015. Renin activity was 1.3 with aldosterone of 11.4. He does have obstructive sleep apnea and uses CPAP. GFR is normal.  He is hypokalemic. CTA in 2020 showed left-sided renal artery stent which is patent. Adrenal glands were okay. Prior CT scans showed left-sided adrenal adenoma. Atrial fibrillation, management as per cardiology. Echocardiogram with EF of 55 to 60%. [facility] would like to thank Ron Knight MD   for opportunity to serve this patient      Please call with questions at-   24 Hrs Answering service [phone] or  7 am- 5 pm via Perfect serve or cell phone  Lorna Castaneda MD          CC/reason for consult :     Uncontrolled hypertension     HPI :     Jose Luis Gamble is a 55 y.o. male presented to   the hospital on 6/22/2022 with chest pressure. He has past medical history of chronic kidney disease stage III, hyperlipidemia, hypertension, obesity, obstructive sleep apnea. He also history of atrial fibrillation status post cardioversion and ablation in February 2021. He uses CPAP at home. He presented with chest comfort, palpitations and diaphoresis. When he arrived in the ER he was found to be in A. fib with RVR. His heart rate was in 180s. He was also hypertensive.   In the ER he was cardioverted and cardiology was consulted. On arrival potassium was 3.5 and magnesium was 1.8. He was admitted to medicine for further management. I was called for uncontrolled hypertension and possibility of secondary hypertension. ROS:     Seen with-resident    positives in bold   Constitutional:  fever, chills, weakness, weight change, fatigue  Skin:  rash, pruritus, hair loss, bruising, dry skin, petechiae  Head, Face, Neck   headaches, swelling,  cervical adenopathy  Respiratory: shortness of breath, cough, or wheezing  Cardiovascular: chest pain, palpitations, dizzy, edema  Gastrointestinal: nausea, vomiting, diarrhea, constipation,belly pain    Yellow skin, blood in stool  Musculoskeletal:  back pain, muscle weakness, gait problems,       joint pain or swelling. Genitourinary:  dysuria, poor urine flow, flank pain, blood in urine  Neurologic:  vertigo, TIA'S, syncope, seizures, focal weakness  Psychosocial:  insomnia, anxiety, or depression. Additional positive findings:                    All other remaining systems are negative or unable to obtain        PMH/PSH/SH/Family History:     Past Medical History:   Diagnosis Date    Carpal tunnel syndrome     Chronic kidney disease     Hyperlipidemia     Hypertension     Obesity, unspecified     MARY (obstructive sleep apnea)        Past Surgical History:   Procedure Laterality Date    CARDIAC CATHETERIZATION      KNEE ARTHROSCOPY Right     NOSE SURGERY      Repair from         reports that he has never smoked. He has never used smokeless tobacco. He reports current alcohol use. He reports that he does not use drugs. family history includes Diabetes in his mother; Stroke in his maternal uncle.          Medication:     Current Facility-Administered Medications: technetium tetrofosmin (Tc-MYOVIEW) injection 10 millicurie, 10 millicurie, IntraVENous, ONCE PRN  technetium tetrofosmin (Tc-MYOVIEW) injection 30 millicurie, 30 millicurie, IntraVENous, ONCE PRN  regadenoson (LEXISCAN) injection 0.4 mg, 0.4 mg, IntraVENous, ONCE PRN  atorvastatin (LIPITOR) tablet 10 mg, 10 mg, Oral, Daily  [Held by provider] carvedilol (COREG) tablet 25 mg, 25 mg, Oral, BID WC  sertraline (ZOLOFT) tablet 50 mg, 50 mg, Oral, Daily  pantoprazole (PROTONIX) tablet 40 mg, 40 mg, Oral, QAM AC  sodium chloride flush 0.9 % injection 5-40 mL, 5-40 mL, IntraVENous, 2 times per day  sodium chloride flush 0.9 % injection 5-40 mL, 5-40 mL, IntraVENous, PRN  0.9 % sodium chloride infusion, , IntraVENous, PRN  ondansetron (ZOFRAN-ODT) disintegrating tablet 4 mg, 4 mg, Oral, Q8H PRN **OR** ondansetron (ZOFRAN) injection 4 mg, 4 mg, IntraVENous, Q6H PRN  polyethylene glycol (GLYCOLAX) packet 17 g, 17 g, Oral, Daily PRN  acetaminophen (TYLENOL) tablet 650 mg, 650 mg, Oral, Q6H PRN **OR** acetaminophen (TYLENOL) suppository 650 mg, 650 mg, Rectal, Q6H PRN  chlorthalidone (HYGROTON) tablet 50 mg, 50 mg, Oral, Daily  valsartan (DIOVAN) tablet 320 mg, 320 mg, Oral, Nightly  heparin 25,000 units in dextrose 5% 250 mL (premix) infusion, 5-30 Units/kg/hr, IntraVENous, Continuous  heparin (porcine) injection 4,000 Units, 4,000 Units, IntraVENous, PRN  heparin (porcine) injection 2,000 Units, 2,000 Units, IntraVENous, PRN       Vitals :     Vitals:    06/24/22 0559   BP: (!) 205/122   Pulse: 67   Resp: 18   Temp:    SpO2: 95%       I & O :       Intake/Output Summary (Last 24 hours) at 6/24/2022 1974  Last data filed at 6/24/2022 0559  Gross per 24 hour   Intake 490 ml   Output 1900 ml   Net -1410 ml        Physical Examination :     General appearance: Anxious- no, distressed- no, in good spirits-  He is  HEENT: Lips- normal, teeth- ok , oral mucosa- moist  Neck : Mass- no, appears symmetrical, JVD- not visible  Respiratory: Respiratory effort-  normal, wheeze- no, crackles -   Cardiovascular:  Ausculation- No M/R/G, Edema none  Abdomen: visible mass- no, distention- no, scar- no, tenderness- no hepatosplenomegaly-  no  Musculoskeletal:  clubbing no,cyanosis- no , digital ischemia- no                           muscle strength- grossly normal , tone - grossly normal  Skin: rashes- no , ulcers- no, induration- no, tightening - no  Psychiatric:  Judgement and insight- normal           AAO X 3  Additional finding:      LABS:     Recent Labs     06/22/22 2003   WBC 10.5   HGB 16.2   HCT 48.7        Recent Labs     06/22/22 2003 06/23/22  0542    139   K 3.5 3.4*   CL 99 103   CO2 21 22   BUN 17 17   CREATININE 1.0 0.9   GLUCOSE 142* 144*   MG 1.80 2.20        Nephrology  [address] [facility], [address]  Office: [phone]  Fax: [phone]

## 2022-06-24 NOTE — PROGRESS NOTES
Electrophysiology - PROGRESS NOTE    Admit Date: 6/22/2022     Chief Complaint: AF/RVR     Interval History:   Patient seen and examined and notes reviewed. Patient is being followed for AF/RVR. No acute events overnight. BP still elevated, meds being adjusted by primary team, nephrology following. Completed day one of his stress test today, will complete 2nd portion tomorrow. No major complaints. Remains in NSR.      In: 56 [P.O.:490]  Out: 2950    Wt Readings from Last 2 Encounters:   06/24/22 (!) 305 lb 9.6 oz (138.6 kg)   02/11/21 (!) 309 lb 3.2 oz (140.3 kg)         Data:   Scheduled Meds:   Scheduled Meds:   spironolactone  50 mg Oral Daily    [START ON 6/25/2022] NIFEdipine  90 mg Oral Daily    carvedilol  25 mg Oral BID WC    atorvastatin  10 mg Oral Daily    sertraline  50 mg Oral Daily    pantoprazole  40 mg Oral QAM AC    sodium chloride flush  5-40 mL IntraVENous 2 times per day    chlorthalidone  50 mg Oral Daily    valsartan  320 mg Oral Nightly     Continuous Infusions:   sodium chloride      heparin (PORCINE) Infusion 15 Units/kg/hr (06/24/22 1234)     PRN Meds:.technetium tetrofosmin, sodium chloride flush, sodium chloride, ondansetron **OR** ondansetron, polyethylene glycol, acetaminophen **OR** acetaminophen, heparin (porcine), heparin (porcine)  Continuous Infusions:   sodium chloride      heparin (PORCINE) Infusion 15 Units/kg/hr (06/24/22 1234)       Intake/Output Summary (Last 24 hours) at 6/24/2022 1330  Last data filed at 6/24/2022 1311  Gross per 24 hour   Intake 250 ml   Output 1750 ml   Net -1500 ml       CBC:   Lab Results   Component Value Date    WBC 9.1 06/24/2022    HGB 15.9 06/24/2022     06/24/2022     BMP:  Lab Results   Component Value Date     06/24/2022    K 3.6 06/24/2022    CL 97 06/24/2022    CO2 23 06/24/2022    BUN 15 06/24/2022    CREATININE 0.9 06/24/2022    GLUCOSE 105 06/24/2022 GLUCOSE 100 06/17/2011     INR:   Lab Results   Component Value Date    INR 1.14 06/23/2022    INR 1.30 01/28/2021    INR 1.03 10/25/2020        CARDIAC LABS  ENZYMES:  Recent Labs     06/23/22  0542 06/23/22  1138 06/23/22  1232   TROPONINI 0.22* 0.12* 0.14*     FASTING LIPID PANEL:  Lab Results   Component Value Date    HDL 26 10/27/2020    LDLCALC 77 10/27/2020    TRIG 158 10/27/2020    TSH 2.98 02/24/2018     LIVER PROFILE:  Lab Results   Component Value Date    AST 46 01/07/2021    AST 38 10/27/2020    ALT 25 01/07/2021    ALT 25 10/27/2020       -----------------------------------------------------------------  Telemetry: Personally reviewed  NSR    Objective:   Vitals: BP (!) 220/113   Pulse 73   Temp 97.4 °F (36.3 °C) (Oral)   Resp 17   Ht 5' 8\" (1.727 m)   Wt (!) 305 lb 9.6 oz (138.6 kg)   SpO2 95%   BMI 46.47 kg/m²   General appearance: alert, appears stated age and cooperative, No acute distress   Skin: Skin color, texture, turgor normal. No rashes or ecchymosis. Neck: no JVD, supple, symmetrical, trachea midline   Lungs: , no accessory muscle use, no respiratory distress  Heart: RRR  Extremities: No edema, DP +  Psychiatric: normal insight and affect    Patient Active Problem List:     Obstructive sleep apnea syndrome     Obesity     Depressive disorder     Lumbar strain     Paresthesia     Mixed hyperlipidemia     Heart failure with normal ejection fraction (HCC)     Hypertensive urgency     Atrial fibrillation (HCC)     H/O angiography     Atrial fibrillation with rapid ventricular response (HCC)     Essential hypertension     Atrial fibrillation with RVR (HCC)     Typical atrial flutter (HCC)        Assessment & Plan:    1. AF/RVR  2.  HTN  3. Elevated Trop    Dorere Buckley Is a 55 y.o. man with PMH HTN, HLD, MARY, obesity, NAIDA, s/p PCI, HFpEF, pAF, LHC (2020) showed nor cors, recurrent AF, s/p RFA/PVI of AF/AFL (2/3/21, Dr. Mayra Malloy) who p/w palpitations, chest discomfort, found to be in AF/RVR, underwent DCCV to NSR in ER     AF/RVR  - In NSR, rates controlled, no AF seen on tele  - S/p RFA/PVI of AF (2/2021, Dr. Darrius Paniagua)  - S/p DCCV to NSR (6/22/22) in ER  - [facility] at least 1  - On heparin gtt- continue for now  - Discussed with patient [address] options, he is not interested in coumadin.  He is interested in taking Eliquis or Xarelto even though it may be more expensive  - On coreg 25 mg BID  - If stress test negative can resume flecainide  - Keep K >4.0, Mg>2.0, replacements ordered  - MARY- wears CPAP  - ECG ordered and results personally reviewed      Elevated Troponin  - Trop 0.02, 0.10, 0.22  - Lateral T wave inversions/ AVR ST wave elevation on EKG seen on previous EKG's  - [facility] 2/2020 showed no evidence CAD  - Echo showed normal EF, no RWMA  - Lexiscan day 1 of 2 complete     HTN  - BP elevated  - On Coreg 25 mg twice daily, chlorthalidone 50 mg daily, nifedipine 90 mg daily, spironolactone 50 mg daily, valsartan 320 mg daily  - Neph and primary team following    Electronically signed by JARET Villasenor - CNP on 6/24/22 at 1:30 PM EDT    Aldo Roberson

## 2022-06-25 LAB
ANION GAP SERPL CALCULATED.3IONS-SCNC: 15 MMOL/L (ref 3–16)
ANTI-XA UNFRAC HEPARIN: 0.44 IU/ML (ref 0.3–0.7)
BASOPHILS ABSOLUTE: 0 K/UL (ref 0–0.2)
BASOPHILS RELATIVE PERCENT: 0.4 %
BUN BLDV-MCNC: 23 MG/DL (ref 7–20)
CALCIUM SERPL-MCNC: 9.7 MG/DL (ref 8.3–10.6)
CHLORIDE BLD-SCNC: 97 MMOL/L (ref 99–110)
CHOLESTEROL, TOTAL: 141 MG/DL (ref 0–199)
CO2: 24 MMOL/L (ref 21–32)
CREAT SERPL-MCNC: 1.5 MG/DL (ref 0.9–1.3)
EOSINOPHILS ABSOLUTE: 0.1 K/UL (ref 0–0.6)
EOSINOPHILS RELATIVE PERCENT: 1.7 %
GFR AFRICAN AMERICAN: 59
GFR NON-AFRICAN AMERICAN: 48
GLUCOSE BLD-MCNC: 144 MG/DL (ref 70–99)
HCT VFR BLD CALC: 46.3 % (ref 40.5–52.5)
HDLC SERPL-MCNC: 34 MG/DL (ref 40–60)
HEMOGLOBIN: 15.6 G/DL (ref 13.5–17.5)
LDL CHOLESTEROL CALCULATED: 82 MG/DL
LYMPHOCYTES ABSOLUTE: 1.8 K/UL (ref 1–5.1)
LYMPHOCYTES RELATIVE PERCENT: 22.9 %
MAGNESIUM: 1.9 MG/DL (ref 1.8–2.4)
MCH RBC QN AUTO: 28.1 PG (ref 26–34)
MCHC RBC AUTO-ENTMCNC: 33.6 G/DL (ref 31–36)
MCV RBC AUTO: 83.6 FL (ref 80–100)
MONOCYTES ABSOLUTE: 0.5 K/UL (ref 0–1.3)
MONOCYTES RELATIVE PERCENT: 6.3 %
NEUTROPHILS ABSOLUTE: 5.5 K/UL (ref 1.7–7.7)
NEUTROPHILS RELATIVE PERCENT: 68.7 %
PDW BLD-RTO: 15.4 % (ref 12.4–15.4)
PLATELET # BLD: 193 K/UL (ref 135–450)
PMV BLD AUTO: 9.6 FL (ref 5–10.5)
POTASSIUM REFLEX MAGNESIUM: 3.3 MMOL/L (ref 3.5–5.1)
RBC # BLD: 5.54 M/UL (ref 4.2–5.9)
SODIUM BLD-SCNC: 136 MMOL/L (ref 136–145)
TRIGL SERPL-MCNC: 127 MG/DL (ref 0–150)
VLDLC SERPL CALC-MCNC: 25 MG/DL
WBC # BLD: 8 K/UL (ref 4–11)

## 2022-06-25 PROCEDURE — 99233 SBSQ HOSP IP/OBS HIGH 50: CPT | Performed by: NURSE PRACTITIONER

## 2022-06-25 PROCEDURE — 80048 BASIC METABOLIC PNL TOTAL CA: CPT

## 2022-06-25 PROCEDURE — A9502 TC99M TETROFOSMIN: HCPCS | Performed by: NURSE PRACTITIONER

## 2022-06-25 PROCEDURE — 6370000000 HC RX 637 (ALT 250 FOR IP): Performed by: INTERNAL MEDICINE

## 2022-06-25 PROCEDURE — 1200000000 HC SEMI PRIVATE

## 2022-06-25 PROCEDURE — 2580000003 HC RX 258

## 2022-06-25 PROCEDURE — 36415 COLL VENOUS BLD VENIPUNCTURE: CPT

## 2022-06-25 PROCEDURE — 3430000000 HC RX DIAGNOSTIC RADIOPHARMACEUTICAL: Performed by: NURSE PRACTITIONER

## 2022-06-25 PROCEDURE — 6370000000 HC RX 637 (ALT 250 FOR IP): Performed by: STUDENT IN AN ORGANIZED HEALTH CARE EDUCATION/TRAINING PROGRAM

## 2022-06-25 PROCEDURE — 6360000002 HC RX W HCPCS

## 2022-06-25 PROCEDURE — 80061 LIPID PANEL: CPT

## 2022-06-25 PROCEDURE — 2580000003 HC RX 258: Performed by: NURSE PRACTITIONER

## 2022-06-25 PROCEDURE — 85025 COMPLETE CBC W/AUTO DIFF WBC: CPT

## 2022-06-25 PROCEDURE — 83735 ASSAY OF MAGNESIUM: CPT

## 2022-06-25 PROCEDURE — 6370000000 HC RX 637 (ALT 250 FOR IP)

## 2022-06-25 PROCEDURE — 82306 VITAMIN D 25 HYDROXY: CPT

## 2022-06-25 RX ORDER — SODIUM CHLORIDE 0.9 % (FLUSH) 0.9 %
10 SYRINGE (ML) INJECTION 2 TIMES DAILY
Status: DISCONTINUED | OUTPATIENT
Start: 2022-06-25 | End: 2022-06-28 | Stop reason: HOSPADM

## 2022-06-25 RX ORDER — SODIUM CHLORIDE 9 MG/ML
INJECTION, SOLUTION INTRAVENOUS PRN
Status: CANCELLED | OUTPATIENT
Start: 2022-06-27

## 2022-06-25 RX ORDER — POTASSIUM CHLORIDE 20 MEQ/1
40 TABLET, EXTENDED RELEASE ORAL ONCE
Status: COMPLETED | OUTPATIENT
Start: 2022-06-25 | End: 2022-06-25

## 2022-06-25 RX ORDER — SODIUM CHLORIDE 0.9 % (FLUSH) 0.9 %
5-40 SYRINGE (ML) INJECTION EVERY 12 HOURS SCHEDULED
Status: CANCELLED | OUTPATIENT
Start: 2022-06-27

## 2022-06-25 RX ORDER — SODIUM CHLORIDE 0.9 % (FLUSH) 0.9 %
5-40 SYRINGE (ML) INJECTION PRN
Status: CANCELLED | OUTPATIENT
Start: 2022-06-27

## 2022-06-25 RX ADMIN — NIFEDIPINE 90 MG: 30 TABLET, EXTENDED RELEASE ORAL at 09:27

## 2022-06-25 RX ADMIN — CHLORTHALIDONE 50 MG: 25 TABLET ORAL at 09:27

## 2022-06-25 RX ADMIN — VALSARTAN 320 MG: 320 TABLET, FILM COATED ORAL at 20:27

## 2022-06-25 RX ADMIN — CARVEDILOL 25 MG: 25 TABLET, FILM COATED ORAL at 09:32

## 2022-06-25 RX ADMIN — POTASSIUM CHLORIDE 40 MEQ: 1500 TABLET, EXTENDED RELEASE ORAL at 17:21

## 2022-06-25 RX ADMIN — SODIUM CHLORIDE, PRESERVATIVE FREE 10 ML: 5 INJECTION INTRAVENOUS at 09:32

## 2022-06-25 RX ADMIN — SPIRONOLACTONE 50 MG: 50 TABLET ORAL at 09:27

## 2022-06-25 RX ADMIN — SODIUM CHLORIDE, PRESERVATIVE FREE 10 ML: 5 INJECTION INTRAVENOUS at 20:27

## 2022-06-25 RX ADMIN — HEPARIN SODIUM 15 UNITS/KG/HR: 10000 INJECTION, SOLUTION INTRAVENOUS at 23:06

## 2022-06-25 RX ADMIN — SERTRALINE 50 MG: 50 TABLET, FILM COATED ORAL at 09:28

## 2022-06-25 RX ADMIN — Medication 10 ML: at 07:39

## 2022-06-25 RX ADMIN — HEPARIN SODIUM 15 UNITS/KG/HR: 10000 INJECTION, SOLUTION INTRAVENOUS at 11:48

## 2022-06-25 RX ADMIN — CARVEDILOL 25 MG: 25 TABLET, FILM COATED ORAL at 17:06

## 2022-06-25 RX ADMIN — ATORVASTATIN CALCIUM 10 MG: 10 TABLET, FILM COATED ORAL at 20:27

## 2022-06-25 RX ADMIN — TETROFOSMIN 30 MILLICURIE: 1.38 INJECTION, POWDER, LYOPHILIZED, FOR SOLUTION INTRAVENOUS at 07:39

## 2022-06-25 NOTE — PROGRESS NOTES
Progress Note    Admit Date: 6/22/2022  Day: 2   Diet: ADULT DIET; Regular; Low Sodium (2 gm)  Diet NPO Exceptions are: Sips of Water with Meds    CC: CP    Interval history:   BP under much better control. Dillon Castillo. Pt was seen at bedside today AM, resting comfortably. Does not have any new complaints. Ambulating in the room, feels steady on her feet. Endorses appetite. Having normal BM and is voiding as usual.  Denies F/C, N/V, CP, SOB, HA, vision changes, weakness in arms and feet. AM labs has not been drawn until 1247hrs  Called labs. Per lab, failed first attempt and second try, patient was not in the room  Will try again    Medications:     Scheduled Meds:   sodium chloride flush  10 mL IntraVENous BID    spironolactone  50 mg Oral Daily    NIFEdipine  90 mg Oral Daily    carvedilol  25 mg Oral BID WC    atorvastatin  10 mg Oral Daily    sertraline  50 mg Oral Daily    pantoprazole  40 mg Oral QAM AC    sodium chloride flush  5-40 mL IntraVENous 2 times per day    chlorthalidone  50 mg Oral Daily    valsartan  320 mg Oral Nightly     Continuous Infusions:   sodium chloride      heparin (PORCINE) Infusion 15 Units/kg/hr (06/25/22 1148)     PRN Meds:sodium chloride flush, sodium chloride, ondansetron **OR** ondansetron, polyethylene glycol, acetaminophen **OR** acetaminophen, heparin (porcine), heparin (porcine)    Objective:   Vitals:   T-max:  Patient Vitals for the past 8 hrs:   BP Temp Temp src Pulse Resp SpO2 Weight   06/25/22 1114 122/78 -- -- 67 16 98 % --   06/25/22 0920 (!) 163/102 98.2 °F (36.8 °C) Oral 69 16 98 % --   06/25/22 0645 -- -- -- -- -- -- (!) 302 lb 9 oz (137.2 kg)       Intake/Output Summary (Last 24 hours) at 6/25/2022 1252  Last data filed at 6/25/2022 0700  Gross per 24 hour   Intake 740 ml   Output 1750 ml   Net -1010 ml       Review of Systems  10 point ROS completed and negative unless listed in HPI    Physical Exam  Constitutional:       Appearance: He is obese. HENT:      Head: Normocephalic. Right Ear: External ear normal.      Left Ear: External ear normal.      Nose: Nose normal.      Mouth/Throat:      Mouth: Mucous membranes are moist.   Eyes:      Pupils: Pupils are equal, round, and reactive to light. Cardiovascular:      Rate and Rhythm: Normal rate and regular rhythm. Pulses: Normal pulses. Heart sounds: Normal heart sounds. Pulmonary:      Effort: Pulmonary effort is normal.   Abdominal:      General: There is no distension. Palpations: Abdomen is soft. Tenderness: There is no abdominal tenderness. Musculoskeletal:      Right lower leg: No edema. Left lower leg: No edema. Skin:     General: Skin is warm. Neurological:      Mental Status: He is alert and oriented to person, place, and time. Psychiatric:         Thought Content: Thought content normal.     LABS:    CBC:   Recent Labs     06/22/22 2003 06/24/22  1115   WBC 10.5 9.1   HGB 16.2 15.9   HCT 48.7 47.8    194   MCV 85.1 83.4     Renal:    Recent Labs     06/22/22 2003 06/23/22  0542 06/24/22  1115    139 133*   K 3.5 3.4* 3.6   CL 99 103 97*   CO2 21 22 23   BUN 17 17 15   CREATININE 1.0 0.9 0.9   GLUCOSE 142* 144* 105*   CALCIUM 9.7 9.1 9.7   MG 1.80 2.20 2.10   ANIONGAP 19* 14 13     Hepatic: No results for input(s): AST, ALT, BILITOT, BILIDIR, PROT, LABALBU, ALKPHOS in the last 72 hours. Troponin:   Recent Labs     06/23/22  0542 06/23/22  1138 06/23/22  1232   TROPONINI 0.22* 0.12* 0.14*     BNP: No results for input(s): BNP in the last 72 hours. Lipids: No results for input(s): CHOL, HDL in the last 72 hours. Invalid input(s): LDLCALCU, TRIGLYCERIDE  ABGs:  No results for input(s): PHART, QVK3TXH, PO2ART, MJH9VWO, BEART, THGBART, T7SKOVWV, DCQ8CKR in the last 72 hours.     INR:   Recent Labs     06/23/22  0033   INR 1.14     Lactate: No results for input(s): LACTATE in the last 72 hours.  Cultures:  -----------------------------------------------------------------  RAD:   CTA ABDOMEN W CONTRAST   Final Result      No significant renal artery stenosis. Patent stent in the left main renal artery. NM MYOCARDIAL SPECT REST EXERCISE OR RX   Final Result      CT HEAD WO CONTRAST   Final Result      No acute intracranial abnormality. Assessment/Plan:   54 yo male with PMH of Atrial fibrillation with RVR status post cardioversion and ablation in 2/21 by Dr Mickey Cosme, obstructive sleep apnea uses CPAP at home, HTN who presented to the ED with complains of chest discomfort, palpitation and diaphoresis    AM labs are pending. Contacted LAB, will attempt again. NSTEMI  Pt asx  On heparin gtt  High risk stress test  Cards following  LHC on monday     Atrial fibrillation with RVR s/p cardioversion in ED  Continue to be in NSR  On heparin gtt. Coreg 25  Tele  Cardiology following     HTN emergency  Uncontrolled hypertension  BP much better controlled on coreg 25 BID, nifedipine 90 QD, chlorthalidone 50QD, Diovan 320, aldactone 50  Nephrology following  CTA renal negative for stenosis    Chronic medical conditions   HLD-continue home lipitor 10 mg PO daily   Mood disorder-continue home zoloft 50 mg PO daily   MARY: home CPAP    Code Status: Full  FEN: ADULT DIET; Regular;  Low Sodium (2 gm)  Diet NPO Exceptions are: Sips of Water with Meds  PPX: heparin gtt  DISPO: Yrn Corbin MD, PGY2  06/25/22  12:52 PM    This patient has been staffed and discussed with Sonam Florence MD.

## 2022-06-25 NOTE — PROGRESS NOTES
[facility]   Cardiology  Note   Pt of Dr Bhupendra Colon MD, Zuleyka Mead RN, FNP APRN CVNP  Date: 6/25/2022  Admit Date: 6/22/2022       CC:AF/RVR      PMH: HTN, HLD, MARY, obesity, NAIDA, s/p PCI, HFpEF, pAF, LHC (2020) showed nor cors, recurrent AF, s/p RFA/PVI of AF/AFL (2/3/21, Dr. Eleazar Lacy) who p/w palpitations, chest discomfort, found to be in AF/RVR, underwent DCCV to NSR in ER obesity     Interval Hx /  Subjective: Today, he is leaving for stress test   In NSR VSS   No new complaints today. No major events overnight. if stress test negative can resume flecainide  On hep gtt & plan to [facility] / [facility]  XA inhibitor before discharge    lexiscan today       Patient seen and examined. Clinical notes reviewed.  Telemetry reviewed / Pertinent labs, diagnostic, device, and imaging results reviewed as a part of this visit  I spent a total of 35 minutes and greater than 50% of the time was spent counseling with patient  coordinating care regarding her diagnosis, treatments and plan of care      Scheduled Meds:   sodium chloride flush  10 mL IntraVENous BID    spironolactone  50 mg Oral Daily    NIFEdipine  90 mg Oral Daily    carvedilol  25 mg Oral BID WC    atorvastatin  10 mg Oral Daily    sertraline  50 mg Oral Daily    pantoprazole  40 mg Oral QAM AC    sodium chloride flush  5-40 mL IntraVENous 2 times per day    chlorthalidone  50 mg Oral Daily    valsartan  320 mg Oral Nightly       Vitals:    06/25/22 0920   BP: (!) 163/102   Pulse: 69   Resp: 16   Temp: 98.2 °F (36.8 °C)   SpO2: 98%      In: 740 [P.O.:740]  Out: 1750    Wt Readings from Last 3 Encounters:   06/25/22 (!) 302 lb 9 oz (137.2 kg)   02/11/21 (!) 309 lb 3.2 oz (140.3 kg)   02/03/21 (!) 320 lb (145.2 kg)       Intake/Output Summary (Last 24 hours) at 6/25/2022 0958  Last data filed at 6/25/2022 0700  Gross per 24 hour   Intake 740 ml   Output 1750 ml   Net -1010 ml       Telemetry: Personally Reviewed    · Constitutional: Cooperative and in no apparent distress, and appears well nourished  · Skin: Warm and pink; no pallor, cyanosis, clubbing, or bruising   · HEENT: Symmetric and normocephalic. PERRL, EOM intact. Conjunctiva pink with clear sclera. Mucus membranes moist.   · Cardiovascular: Regular rate and rhythm. S1/S2 present   · Respiratory: Respirations symmetric and unlabored. Lungs clear to auscultation bilaterally, no wheezing, crackles, or rhonchi  · Gastrointestinal: Abdomen soft and round. Bowel sounds normoactive without tenderness or masses. · Musculoskeletal: Bilateral upper and lower extremity strength 5/5 with full ROM  · Neurologic/Psych: Awake and orientated to person, place and time. Calm affect, appropriate mood    Patient Active Problem List    Diagnosis Date Noted    Typical atrial flutter ([address]     Atrial fibrillation with RVR ([address] 01/07/2021    H/O angiography 10/26/2020    Atrial fibrillation with rapid ventricular response ([address]     Essential hypertension     Atrial fibrillation ([address] 02/27/2020    Hypertensive urgency 02/18/2020    Heart failure with normal ejection fraction ([address] 10/29/2018    Mixed hyperlipidemia 02/19/2018    Paresthesia 06/07/2016    Lumbar strain 01/04/2016    Depressive disorder 08/19/2013    Obesity 07/15/2011    Obstructive sleep apnea syndrome 11/16/2010      Assessment     AF/RVR  - In NSR, rates controlled, no AF seen on tele  - S/p RFA/PVI of AF (2/2021, Dr. Jose Neal)  - S/p DCCV to NSR (6/22/22) in ER  - [facility] at least 1  - On heparin gtt- continue for now  - Discussed with patient [facility] options, he is not interested in coumadin.  He is interested in taking Eliquis or Xarelto even though it may be more expensive  - On coreg 25 mg BID  - If stress test negative can resume flecainide  - Keep K >4.0, Mg>2.0, replacements ordered  - T4 wl mag wnl     MARY  -wears CPAP     Elevated Troponin  - Trop 0.02, 0.10, 0.22  - Lateral T wave inversions/ AVR ST wave elevation on

## 2022-06-25 NOTE — PROGRESS NOTES
Interval History and plan: Follow BP trend   UOP fair    Plan:        , Renin, aldosterone, cortisol and metanephrines again. CTA of renal arteries as he has history of renal artery stenosis repeat CT negative for stenosis patent stent   Continue Diovan. Continue chlorthalidone and add nifedipine XL. Added spironolactone once labs are sent. Assessment :     Accelerated hypertension: He has a few admissions in the past due to high blood pressure. He has history of renal artery stenosis status post stenting of the right kidney in 2018. Secondary work-up including aldosterone, cortisol and metanephrines were normal in 2015. Renin activity was 1.3 with aldosterone of 11.4. He does have obstructive sleep apnea and uses CPAP. GFR is normal.  He is hypokalemic. CTA in 2020 showed left-sided renal artery stent which is patent. Adrenal glands were okay. Prior CT scans showed left-sided adrenal adenoma. Atrial fibrillation, management as per cardiology. Echocardiogram with EF of 55 to 60%. [facility] would like to thank Marily Anne MD   for opportunity to serve this patient      Please call with questions at-   24 Hrs Answering service [phone] or  7 am- 5 pm via Perfect serve or cell phone  Vickey Farfan MD          CC/reason for consult :     Uncontrolled hypertension     HPI :     Bekah Li is a 55 y.o. male presented to   the hospital on 6/22/2022 with chest pressure. He has past medical history of chronic kidney disease stage III, hyperlipidemia, hypertension, obesity, obstructive sleep apnea. He also history of atrial fibrillation status post cardioversion and ablation in February 2021. He uses CPAP at home. He presented with chest comfort, palpitations and diaphoresis. When he arrived in the ER he was found to be in A. fib with RVR. His heart rate was in 180s. He was also hypertensive.   In the ER he was cardioverted and cardiology was consulted. On arrival potassium was 3.5 and magnesium was 1.8. He was admitted to medicine for further management. I was called for uncontrolled hypertension and possibility of secondary hypertension. ROS:     Seen with-resident    positives in bold   Constitutional:  fever, chills, weakness, weight change, fatigue  Skin:  rash, pruritus, hair loss, bruising, dry skin, petechiae  Head, Face, Neck   headaches, swelling,  cervical adenopathy  Respiratory: shortness of breath, cough, or wheezing  Cardiovascular: chest pain, palpitations, dizzy, edema  Gastrointestinal: nausea, vomiting, diarrhea, constipation,belly pain    Yellow skin, blood in stool  Musculoskeletal:  back pain, muscle weakness, gait problems,       joint pain or swelling. Genitourinary:  dysuria, poor urine flow, flank pain, blood in urine  Neurologic:  vertigo, TIA'S, syncope, seizures, focal weakness  Psychosocial:  insomnia, anxiety, or depression. Additional positive findings:                    All other remaining systems are negative or unable to obtain        PMH/PSH/SH/Family History:     Past Medical History:   Diagnosis Date    Carpal tunnel syndrome     Chronic kidney disease     Hyperlipidemia     Hypertension     Obesity, unspecified     MARY (obstructive sleep apnea)        Past Surgical History:   Procedure Laterality Date    CARDIAC CATHETERIZATION      KNEE ARTHROSCOPY Right     NOSE SURGERY      Repair from [facility]        reports that he has never smoked. He has never used smokeless tobacco. He reports current alcohol use. He reports that he does not use drugs. family history includes Diabetes in his mother; Stroke in his maternal uncle.          Medication:     Current Facility-Administered Medications: sodium chloride flush 0.9 % injection 10 mL, 10 mL, IntraVENous, BID  spironolactone (ALDACTONE) tablet 50 mg, 50 mg, Oral, Daily  NIFEdipine (ADALAT CC) extended release tablet 90 mg, 90 mg, Oral, Daily  carvedilol (COREG) tablet 25 mg, 25 mg, Oral, BID WC  atorvastatin (LIPITOR) tablet 10 mg, 10 mg, Oral, Daily  sertraline (ZOLOFT) tablet 50 mg, 50 mg, Oral, Daily  pantoprazole (PROTONIX) tablet 40 mg, 40 mg, Oral, QAM AC  sodium chloride flush 0.9 % injection 5-40 mL, 5-40 mL, IntraVENous, 2 times per day  sodium chloride flush 0.9 % injection 5-40 mL, 5-40 mL, IntraVENous, PRN  0.9 % sodium chloride infusion, , IntraVENous, PRN  ondansetron (ZOFRAN-ODT) disintegrating tablet 4 mg, 4 mg, Oral, Q8H PRN **OR** ondansetron (ZOFRAN) injection 4 mg, 4 mg, IntraVENous, Q6H PRN  polyethylene glycol (GLYCOLAX) packet 17 g, 17 g, Oral, Daily PRN  acetaminophen (TYLENOL) tablet 650 mg, 650 mg, Oral, Q6H PRN **OR** acetaminophen (TYLENOL) suppository 650 mg, 650 mg, Rectal, Q6H PRN  chlorthalidone (HYGROTON) tablet 50 mg, 50 mg, Oral, Daily  valsartan (DIOVAN) tablet 320 mg, 320 mg, Oral, Nightly  heparin 25,000 units in dextrose 5% 250 mL (premix) infusion, 5-30 Units/kg/hr, IntraVENous, Continuous  heparin (porcine) injection 4,000 Units, 4,000 Units, IntraVENous, PRN  heparin (porcine) injection 2,000 Units, 2,000 Units, IntraVENous, PRN       Vitals :     Vitals:    06/25/22 0920   BP: (!) 163/102   Pulse: 69   Resp: 16   Temp: 98.2 °F (36.8 °C)   SpO2: 98%       I & O :       Intake/Output Summary (Last 24 hours) at 6/25/2022 0943  Last data filed at 6/25/2022 0700  Gross per 24 hour   Intake 740 ml   Output 1750 ml   Net -1010 ml        Physical Examination :     General appearance: Anxious- no, distressed- no, in good spirits-  He is  HEENT: Lips- normal, teeth- ok , oral mucosa- moist  Neck : Mass- no, appears symmetrical, JVD- not visible  Respiratory: Respiratory effort-  normal, wheeze- no, crackles -   Cardiovascular:  Ausculation- No M/R/G, Edema none  Abdomen: visible mass- no, distention- no, scar- no, tenderness- no                            hepatosplenomegaly-  no  Musculoskeletal: clubbing no,cyanosis- no , digital ischemia- no                           muscle strength- grossly normal , tone - grossly normal  Skin: rashes- no , ulcers- no, induration- no, tightening - no  Psychiatric:  Judgement and insight- normal           AAO X 3  Additional finding:      LABS:     Recent Labs     06/22/22 2003 06/24/22  1115   WBC 10.5 9.1   HGB 16.2 15.9   HCT 48.7 47.8    194     Recent Labs     06/22/22 2003 06/23/22  0542 06/24/22  1115    139 133*   K 3.5 3.4* 3.6   CL 99 103 97*   CO2 21 22 23   BUN 17 17 15   CREATININE 1.0 0.9 0.9   GLUCOSE 142* 144* 105*   MG 1.80 2.20 2.10        Nephrology  [address] [facility], [address] [place]  Office: [phone]  Fax: [phone]

## 2022-06-25 NOTE — PLAN OF CARE
Problem: Discharge Planning  Goal: Discharge to home or other facility with appropriate resources  Outcome: Progressing  Note: Patient plans to return home when stable. Problem: Respiratory - Adult  Goal: Achieves optimal ventilation and oxygenation  6/25/2022 0228 by Dl Garcia RN  Note: Patient on room air. 02 sat %. Patient denies SOB. Lung sound clear and diminished. Will continue to monitor. Problem: Cardiovascular - Adult  Goal: Maintains optimal cardiac output and hemodynamic stability  6/25/2022 0228 by Dl Garcia RN  Outcome: Progressing  Note: B/p WNL. Patient is NSR on telemetry. Hep ggt still infusing. Will continue to monitor. Problem: Safety - Adult  Goal: Free from fall injury  Outcome: Progressing     Problem: Pain  Goal: Verbalizes/displays adequate comfort level or baseline comfort level  Outcome: Progressing  Note: Patient denies pain at this time. Will continue to monitor comfort.

## 2022-06-25 NOTE — PLAN OF CARE
Problem: Pain  Goal: Verbalizes/displays adequate comfort level or baseline comfort level  Outcome: Progressing  Patient denies any chest pain. Patient instructed to call if any pain arises. Problem: Cardiovascular - Adult  Goal: Maintains optimal cardiac output and hemodynamic stability  Outcome: Progressing   Patient went for a stress test today which was abnormal.  Planning for a heart cath on Monday. Patient to remain on Heparin drip.

## 2022-06-26 LAB
ALDOSTERONE/RENIN ACTIVITY CALCULATION: 31 RATIO
ALDOSTERONE: 6.2 NG/DL
ANION GAP SERPL CALCULATED.3IONS-SCNC: 14 MMOL/L (ref 3–16)
ANTI-XA UNFRAC HEPARIN: 0.73 IU/ML (ref 0.3–0.7)
ANTI-XA UNFRAC HEPARIN: 0.75 IU/ML (ref 0.3–0.7)
BASOPHILS ABSOLUTE: 0 K/UL (ref 0–0.2)
BASOPHILS RELATIVE PERCENT: 0.4 %
BUN BLDV-MCNC: 29 MG/DL (ref 7–20)
CALCIUM SERPL-MCNC: 9.5 MG/DL (ref 8.3–10.6)
CHLORIDE BLD-SCNC: 97 MMOL/L (ref 99–110)
CO2: 21 MMOL/L (ref 21–32)
CREAT SERPL-MCNC: 1.3 MG/DL (ref 0.9–1.3)
EOSINOPHILS ABSOLUTE: 0.1 K/UL (ref 0–0.6)
EOSINOPHILS RELATIVE PERCENT: 1.6 %
GFR AFRICAN AMERICAN: >60
GFR NON-AFRICAN AMERICAN: 57
GLUCOSE BLD-MCNC: 111 MG/DL (ref 70–99)
HCT VFR BLD CALC: 47.9 % (ref 40.5–52.5)
HEMOGLOBIN: 15.8 G/DL (ref 13.5–17.5)
LYMPHOCYTES ABSOLUTE: 1.8 K/UL (ref 1–5.1)
LYMPHOCYTES RELATIVE PERCENT: 25.3 %
MAGNESIUM: 2.1 MG/DL (ref 1.8–2.4)
MCH RBC QN AUTO: 28.1 PG (ref 26–34)
MCHC RBC AUTO-ENTMCNC: 32.9 G/DL (ref 31–36)
MCV RBC AUTO: 85.3 FL (ref 80–100)
MONOCYTES ABSOLUTE: 0.6 K/UL (ref 0–1.3)
MONOCYTES RELATIVE PERCENT: 8.8 %
NEUTROPHILS ABSOLUTE: 4.5 K/UL (ref 1.7–7.7)
NEUTROPHILS RELATIVE PERCENT: 63.9 %
PDW BLD-RTO: 15.3 % (ref 12.4–15.4)
PLATELET # BLD: 188 K/UL (ref 135–450)
PMV BLD AUTO: 9.7 FL (ref 5–10.5)
POTASSIUM REFLEX MAGNESIUM: 4 MMOL/L (ref 3.5–5.1)
RBC # BLD: 5.62 M/UL (ref 4.2–5.9)
RENIN ACTIVITY: 0.2 NG/ML/HR
SODIUM BLD-SCNC: 132 MMOL/L (ref 136–145)
VITAMIN D 25-HYDROXY: 25 NG/ML
WBC # BLD: 7 K/UL (ref 4–11)

## 2022-06-26 PROCEDURE — 6360000002 HC RX W HCPCS

## 2022-06-26 PROCEDURE — 36415 COLL VENOUS BLD VENIPUNCTURE: CPT

## 2022-06-26 PROCEDURE — 6370000000 HC RX 637 (ALT 250 FOR IP): Performed by: STUDENT IN AN ORGANIZED HEALTH CARE EDUCATION/TRAINING PROGRAM

## 2022-06-26 PROCEDURE — 6370000000 HC RX 637 (ALT 250 FOR IP): Performed by: INTERNAL MEDICINE

## 2022-06-26 PROCEDURE — 1200000000 HC SEMI PRIVATE

## 2022-06-26 PROCEDURE — 6370000000 HC RX 637 (ALT 250 FOR IP)

## 2022-06-26 PROCEDURE — 2580000003 HC RX 258: Performed by: NURSE PRACTITIONER

## 2022-06-26 PROCEDURE — 85025 COMPLETE CBC W/AUTO DIFF WBC: CPT

## 2022-06-26 PROCEDURE — 83735 ASSAY OF MAGNESIUM: CPT

## 2022-06-26 PROCEDURE — 85520 HEPARIN ASSAY: CPT

## 2022-06-26 PROCEDURE — 2580000003 HC RX 258

## 2022-06-26 PROCEDURE — 80048 BASIC METABOLIC PNL TOTAL CA: CPT

## 2022-06-26 PROCEDURE — 99233 SBSQ HOSP IP/OBS HIGH 50: CPT | Performed by: NURSE PRACTITIONER

## 2022-06-26 RX ORDER — SODIUM CHLORIDE 9 MG/ML
INJECTION, SOLUTION INTRAVENOUS CONTINUOUS
Status: DISCONTINUED | OUTPATIENT
Start: 2022-06-26 | End: 2022-06-26

## 2022-06-26 RX ORDER — SODIUM CHLORIDE 9 MG/ML
INJECTION, SOLUTION INTRAVENOUS CONTINUOUS
Status: DISCONTINUED | OUTPATIENT
Start: 2022-06-27 | End: 2022-06-28 | Stop reason: HOSPADM

## 2022-06-26 RX ADMIN — HEPARIN SODIUM 14 UNITS/KG/HR: 10000 INJECTION, SOLUTION INTRAVENOUS at 10:52

## 2022-06-26 RX ADMIN — VALSARTAN 320 MG: 320 TABLET, FILM COATED ORAL at 21:00

## 2022-06-26 RX ADMIN — SODIUM CHLORIDE, PRESERVATIVE FREE 10 ML: 5 INJECTION INTRAVENOUS at 21:02

## 2022-06-26 RX ADMIN — ATORVASTATIN CALCIUM 10 MG: 10 TABLET, FILM COATED ORAL at 08:37

## 2022-06-26 RX ADMIN — SODIUM CHLORIDE, PRESERVATIVE FREE 10 ML: 5 INJECTION INTRAVENOUS at 08:38

## 2022-06-26 RX ADMIN — NIFEDIPINE 90 MG: 30 TABLET, EXTENDED RELEASE ORAL at 08:38

## 2022-06-26 RX ADMIN — SERTRALINE 50 MG: 50 TABLET, FILM COATED ORAL at 08:38

## 2022-06-26 RX ADMIN — Medication 10 ML: at 21:00

## 2022-06-26 RX ADMIN — CARVEDILOL 25 MG: 25 TABLET, FILM COATED ORAL at 08:37

## 2022-06-26 RX ADMIN — CARVEDILOL 25 MG: 25 TABLET, FILM COATED ORAL at 16:46

## 2022-06-26 RX ADMIN — CHLORTHALIDONE 50 MG: 25 TABLET ORAL at 08:37

## 2022-06-26 RX ADMIN — SPIRONOLACTONE 50 MG: 50 TABLET ORAL at 08:37

## 2022-06-26 RX ADMIN — PANTOPRAZOLE SODIUM 40 MG: 40 TABLET, DELAYED RELEASE ORAL at 06:32

## 2022-06-26 RX ADMIN — SODIUM CHLORIDE: 9 INJECTION, SOLUTION INTRAVENOUS at 23:36

## 2022-06-26 NOTE — PROGRESS NOTES
[facility]   Cardiology  Note   Pt of Dr Thelma Laws MD, Dom Guido RN, FNP APRN CVNP  Date: 6/26/2022  Admit Date: 6/22/2022       CC:AF/RVR      PMH: HTN, HLD, MARY, obesity, NAIDA, s/p PCI, HFpEF, pAF, LHC (2020) showed nor cors, recurrent AF, s/p RFA/PVI of AF/AFL (2/3/21, Dr. Isa Marrero) who p/w palpitations, chest discomfort, found to be in AF/RVR, underwent DCCV to NSR in ER obesity   Hx NAIDA sp stent r kidney 2018 neph following     6/25/2022 stress test today  if stress test negative can resume flecainide  On hep gtt & plan to [facility] [place] / [facility]  XA inhibitor before discharge   in NSR VSS      6/26/2022  Discussed abnormal stress test & LHC planned for tomorrow  remains in NSR VSS   On hep gtt & plan to [facility] [place] / [facility]  XA inhibitor before discharge       Patient seen and examined. Clinical notes reviewed. Telemetry reviewed / Pertinent labs, diagnostic, device, and imaging results reviewed as a part of this visit  I spent a total of 35 minutes and greater than 50% of the time was spent counseling with patient  coordinating care regarding her diagnosis, treatments and plan of care      Scheduled Meds:   sodium chloride flush  10 mL IntraVENous BID    spironolactone  50 mg Oral Daily    NIFEdipine  90 mg Oral Daily    carvedilol  25 mg Oral BID WC    atorvastatin  10 mg Oral Daily    sertraline  50 mg Oral Daily    pantoprazole  40 mg Oral QAM AC    sodium chloride flush  5-40 mL IntraVENous 2 times per day    chlorthalidone  50 mg Oral Daily    valsartan  320 mg Oral Nightly       Vitals:    06/26/22 0836   BP: 128/81   Pulse: 68   Resp: 18   Temp: 97.6 °F (36.4 °C)   SpO2: 98%      In: 1020 [P.O.:1010;  I.V.:10]  Out: 1500    Wt Readings from Last 3 Encounters:   06/26/22 (!) 302 lb 2 oz (137 kg)   02/11/21 (!) 309 lb 3.2 oz (140.3 kg)   02/03/21 (!) 320 lb (145.2 kg)       Intake/Output Summary (Last 24 hours) at 6/26/2022 1154  Last data filed at 6/26/2022 1046  Gross per 24 hour   Intake 1020 ml   Output 1200 ml   Net -180 ml       Telemetry: Personally Reviewed    · Constitutional: Cooperative and in no apparent distress, and appears well nourished  · Skin: Warm and pink; no pallor, cyanosis, clubbing, or bruising   · HEENT: Symmetric and normocephalic. · Cardiovascular: Regular rate and rhythm. S1/S2 present   · Respiratory: Respirations symmetric and unlabored. Lungs clear to auscultation bilaterally, no wheezing, crackles, or rhonchi  · Gastrointestinal: Abdomen soft and round. Bowel sounds normoactive without tenderness or masses. · Musculoskeletal: Bilateral upper and lower extremity strength 5/5 with full ROM  · Neurologic/Psych: Awake and orientated to person, place and time. Calm affect, appropriate mood    Patient Active Problem List    Diagnosis Date Noted    Typical atrial flutter ([address]     Atrial fibrillation with RVR ([address] 01/07/2021    H/O angiography 10/26/2020    Atrial fibrillation with rapid ventricular response ([address]     Essential hypertension     Atrial fibrillation ([address] 02/27/2020    Hypertensive urgency 02/18/2020    Heart failure with normal ejection fraction ([address] 10/29/2018    Mixed hyperlipidemia 02/19/2018    Paresthesia 06/07/2016    Lumbar strain 01/04/2016    Depressive disorder 08/19/2013    Obesity 07/15/2011    Obstructive sleep apnea syndrome 11/16/2010      Assessment     AF/RVR  - In NSR, rates controlled, no AF seen on tele  - S/p RFA/PVI of AF (2/2021, Dr. Tamra Gonzalez)  - S/p DCCV to NSR (6/22/22) in ER  - [facility] at least 1  - On heparin gtt- continue for now  - Discussed with patient [address] options, he is not interested in coumadin.  He is interested in taking Eliquis or Xarelto even though it may be more expensive  - On coreg 25 mg BID  - If stress test negative can resume flecainide  - Keep K >4.0, Mg>2.0, replacements ordered  - T4 wl mag wnl     MARY  -wears CPAP     Elevated Troponin  - Trop 0.02, 0.10, 0.22  - Lateral T wave inversions/ AVR ST wave elevation on EKG seen on previous EKG's  - [facility] 2/2020 showed no evidence CAD  - Echo showed normal EF, no RWMA    Obesity  Body mass index is 45.94 kg/m². Diet activity [place] recommended     HLD  On statin   LDL 77 in 22 / recheck level     Uncontrolled b/p / neph consulted   sCr 1.5>>1.3   Per neph recommendations   Follow Renin, aldosterone, cortisol and metanephrines again. CTA of renal arteries as he has history of renal artery stenosis repeat CT negative for stenosis patent stent   Continue Diovan. Continue chlorthalidone and added nifedipine XL. Cont aldactone   BP better  Creatinine fluctuating likely meds related and BP control related. If anything creatinine is better than yesterday. Continue with current medications. Proceed with cardiac cath tomorrow with IV fluid hydration    Cardiac meds Lipitor coreg [place] aldactone adalat diovan hep gtt     Plan   Discussed abnormal stress test & LHC planned for tomorrow  remains in NSR    On hep gtt & plan to [facility] / [facility]  XA inhibitor before discharge      Discussed with Dr Duke Lynn and Dr Mynor Lim for [facility] in am    Start NS IV at 50mg hr at midnight   sCr 1.5>>1.3    NPO at midnight     Thank you for allowing to us to participate in the care of Irish Dickson.   Asif RAYGOZA-CNP-CVNP    [address]

## 2022-06-26 NOTE — PLAN OF CARE
Problem: Discharge Planning  Goal: Discharge to home or other facility with appropriate resources  Outcome: Progressing  Note: Patient plans to return home when stable. Problem: Respiratory - Adult  Goal: Achieves optimal ventilation and oxygenation  6/25/2022 0228 by Michelle Marte RN  Note: Patient on room air. 02 sat %. Patient denies SOB. Lung sound clear and diminished. Will continue to monitor. Problem: Cardiovascular - Adult  Goal: Maintains optimal cardiac output and hemodynamic stability  6/25/2022 0228 by Michelle Marte RN  Outcome: Progressing  Note: B/p WNL. Patient is NSR on telemetry. Hep ggt still infusing. Plan to have a heart cath on Monday. Will continue to monitor. Problem: Safety - Adult  Goal: Free from fall injury  Outcome: Progressing     Problem: Pain  Goal: Verbalizes/displays adequate comfort level or baseline comfort level  Outcome: Progressing  Note: Patient denies pain at this time. VSS. Patient in bed, watching television with no complaints. Will continue to monitor comfort.

## 2022-06-26 NOTE — PROGRESS NOTES
Ear: External ear normal.      Left Ear: External ear normal.      Nose: Nose normal.      Mouth/Throat:      Mouth: Mucous membranes are moist.   Eyes:      Pupils: Pupils are equal, round, and reactive to light. Cardiovascular:      Rate and Rhythm: Normal rate and regular rhythm. Pulses: Normal pulses. Heart sounds: Normal heart sounds. Pulmonary:      Effort: Pulmonary effort is normal.   Abdominal:      General: There is no distension. Palpations: Abdomen is soft. Tenderness: There is no abdominal tenderness. Musculoskeletal:      Right lower leg: No edema. Left lower leg: No edema. Skin:     General: Skin is warm. Neurological:      Mental Status: He is alert and oriented to person, place, and time. Psychiatric:         Thought Content: Thought content normal.     LABS:    CBC:   Recent Labs     06/24/22  1115 06/25/22  1342   WBC 9.1 8.0   HGB 15.9 15.6   HCT 47.8 46.3    193   MCV 83.4 83.6     Renal:    Recent Labs     06/24/22  1115 06/25/22  1342   * 136   K 3.6 3.3*   CL 97* 97*   CO2 23 24   BUN 15 23*   CREATININE 0.9 1.5*   GLUCOSE 105* 144*   CALCIUM 9.7 9.7   MG 2.10 1.90   ANIONGAP 13 15     Hepatic: No results for input(s): AST, ALT, BILITOT, BILIDIR, PROT, LABALBU, ALKPHOS in the last 72 hours. Troponin:   Recent Labs     06/23/22  1138 06/23/22  1232   TROPONINI 0.12* 0.14*     BNP: No results for input(s): BNP in the last 72 hours. Lipids:   Recent Labs     06/25/22  1342   CHOL 141   HDL 34*     ABGs:  No results for input(s): PHART, OGM2JWP, PO2ART, UVF5NLZ, BEART, THGBART, I3EMHBPY, LWS0DBG in the last 72 hours. INR:   No results for input(s): INR in the last 72 hours. Lactate: No results for input(s): LACTATE in the last 72 hours. Cultures:  -----------------------------------------------------------------  RAD:   CTA ABDOMEN W CONTRAST   Final Result      No significant renal artery stenosis.  Patent stent in the left main renal artery. NM MYOCARDIAL SPECT REST EXERCISE OR RX   Final Result      CT HEAD WO CONTRAST   Final Result      No acute intracranial abnormality. Assessment/Plan:   54 yo male with PMH of Atrial fibrillation with RVR status post cardioversion and ablation in 2/21 by Dr Mickey Cosme, obstructive sleep apnea uses CPAP at home, HTN who presented to the ED with complains of chest discomfort, palpitation and diaphoresis    NSTEMI  Pt asx  -On heparin gtt, will stop prior to [facility]  -High risk stress test  -Cards following  -LHC on monday , npo midnight    Atrial fibrillation with RVR s/p cardioversion in ED  Continue to be in NSR  -On heparin gtt.   -Coreg 25  -Tele  -Cardiology following     HTN emergency - resolved  Uncontrolled hypertension  - BP much better controlled on coreg 25 BID, nifedipine 90 QD, chlorthalidone 50QD, Diovan 320, aldactone 50   - given increase in Cr and LHC in am will continue meds as above as BP control likely will benefit kidney function  -Nephrology following    JOSELYN  HTN emergency on admission, contrast with imaging studies, Increase to 1.5 Cr yesterday, 1.3 today. - BMP monitor  - IOs  - nephrology following  - gentle fluids    Chronic medical conditions   HLD-continue home lipitor 10 mg PO daily   Mood disorder-continue home zoloft 50 mg PO daily   MARY: home CPAP    Code Status: Full  FEN: ADULT DIET; Regular;  Low Sodium (2 gm)  Diet NPO Exceptions are: Sips of Water with Meds  PPX: heparin gtt  DISPO: Hernando Angulo MD, PGY1  06/26/22  6:42 AM    This patient has been staffed and discussed with Sonam Florence MD.

## 2022-06-26 NOTE — PROGRESS NOTES
Interval History and plan: Follow BP trend   UOP fair    Plan:        Follow Renin, aldosterone, cortisol and metanephrines again. CTA of renal arteries as he has history of renal artery stenosis repeat CT negative for stenosis patent stent   Continue Diovan. Continue chlorthalidone and added nifedipine XL. Cont aldactone   BP better  Creatinine fluctuating likely meds related and BP control related. If anything creatinine is better than yesterday. Continue with current medications. Proceed with cardiac cath tomorrow with IV fluid hydration                   Assessment :     Accelerated hypertension: He has a few admissions in the past due to high blood pressure. He has history of renal artery stenosis status post stenting of the right kidney in 2018. Secondary work-up including aldosterone, cortisol and metanephrines were normal in 2015. Renin activity was 1.3 with aldosterone of 11.4. He does have obstructive sleep apnea and uses CPAP. GFR is normal.  He is hypokalemic. CTA in 2020 showed left-sided renal artery stent which is patent. Adrenal glands were okay. Prior CT scans showed left-sided adrenal adenoma. Atrial fibrillation, management as per cardiology. Echocardiogram with EF of 55 to 60%. [facility] would like to thank Sonam Florence MD   for opportunity to serve this patient      Please call with questions at-   24 Hrs Answering service [phone] or  7 am- 5 pm via Perfect serve or cell phone  George Cole MD          CC/reason for consult :     Uncontrolled hypertension     HPI :     Modesta Johnson is a 55 y.o. male presented to   the hospital on 6/22/2022 with chest pressure. He has past medical history of chronic kidney disease stage III, hyperlipidemia, hypertension, obesity, obstructive sleep apnea. He also history of atrial fibrillation status post cardioversion and ablation in February 2021. He uses CPAP at home.   He presented with chest comfort, palpitations and diaphoresis. When he arrived in the ER he was found to be in A. fib with RVR. His heart rate was in 180s. He was also hypertensive. In the ER he was cardioverted and cardiology was consulted. On arrival potassium was 3.5 and magnesium was 1.8. He was admitted to medicine for further management. I was called for uncontrolled hypertension and possibility of secondary hypertension. ROS:     Seen with-resident    positives in bold   Constitutional:  fever, chills, weakness, weight change, fatigue  Skin:  rash, pruritus, hair loss, bruising, dry skin, petechiae  Head, Face, Neck   headaches, swelling,  cervical adenopathy  Respiratory: shortness of breath, cough, or wheezing  Cardiovascular: chest pain, palpitations, dizzy, edema  Gastrointestinal: nausea, vomiting, diarrhea, constipation,belly pain    Yellow skin, blood in stool  Musculoskeletal:  back pain, muscle weakness, gait problems,       joint pain or swelling. Genitourinary:  dysuria, poor urine flow, flank pain, blood in urine  Neurologic:  vertigo, TIA'S, syncope, seizures, focal weakness  Psychosocial:  insomnia, anxiety, or depression. Additional positive findings:                    All other remaining systems are negative or unable to obtain        PMH/PSH/SH/Family History:     Past Medical History:   Diagnosis Date    Carpal tunnel syndrome     Chronic kidney disease     Hyperlipidemia     Hypertension     Obesity, unspecified     MARY (obstructive sleep apnea)        Past Surgical History:   Procedure Laterality Date    CARDIAC CATHETERIZATION      KNEE ARTHROSCOPY Right     NOSE SURGERY      Repair from         reports that he has never smoked. He has never used smokeless tobacco. He reports current alcohol use. He reports that he does not use drugs. family history includes Diabetes in his mother; Stroke in his maternal uncle.          Medication:     Current Facility-Administered Medications: sodium chloride flush 0.9 % injection 10 mL, 10 mL, IntraVENous, BID  spironolactone (ALDACTONE) tablet 50 mg, 50 mg, Oral, Daily  NIFEdipine (ADALAT CC) extended release tablet 90 mg, 90 mg, Oral, Daily  carvedilol (COREG) tablet 25 mg, 25 mg, Oral, BID WC  atorvastatin (LIPITOR) tablet 10 mg, 10 mg, Oral, Daily  sertraline (ZOLOFT) tablet 50 mg, 50 mg, Oral, Daily  pantoprazole (PROTONIX) tablet 40 mg, 40 mg, Oral, QAM AC  sodium chloride flush 0.9 % injection 5-40 mL, 5-40 mL, IntraVENous, 2 times per day  sodium chloride flush 0.9 % injection 5-40 mL, 5-40 mL, IntraVENous, PRN  0.9 % sodium chloride infusion, , IntraVENous, PRN  ondansetron (ZOFRAN-ODT) disintegrating tablet 4 mg, 4 mg, Oral, Q8H PRN **OR** ondansetron (ZOFRAN) injection 4 mg, 4 mg, IntraVENous, Q6H PRN  polyethylene glycol (GLYCOLAX) packet 17 g, 17 g, Oral, Daily PRN  acetaminophen (TYLENOL) tablet 650 mg, 650 mg, Oral, Q6H PRN **OR** acetaminophen (TYLENOL) suppository 650 mg, 650 mg, Rectal, Q6H PRN  chlorthalidone (HYGROTON) tablet 50 mg, 50 mg, Oral, Daily  valsartan (DIOVAN) tablet 320 mg, 320 mg, Oral, Nightly  heparin 25,000 units in dextrose 5% 250 mL (premix) infusion, 5-30 Units/kg/hr, IntraVENous, Continuous  heparin (porcine) injection 4,000 Units, 4,000 Units, IntraVENous, PRN  heparin (porcine) injection 2,000 Units, 2,000 Units, IntraVENous, PRN       Vitals :     Vitals:    06/26/22 0836   BP: 128/81   Pulse: 68   Resp: 18   Temp: 97.6 °F (36.4 °C)   SpO2: 98%       I & O :       Intake/Output Summary (Last 24 hours) at 6/26/2022 1002  Last data filed at 6/26/2022 1855  Gross per 24 hour   Intake 780 ml   Output 1200 ml   Net -420 ml        Physical Examination :     General appearance: Anxious- no, distressed- no, in good spirits-  He is  HEENT: Lips- normal, teeth- ok , oral mucosa- moist  Neck : Mass- no, appears symmetrical, JVD- not visible  Respiratory: Respiratory effort-  normal, wheeze- no, crackles -   Cardiovascular:  Ausculation- No M/R/G, Edema none  Abdomen: visible mass- no, distention- no, scar- no, tenderness- no                            hepatosplenomegaly-  no  Musculoskeletal:  clubbing no,cyanosis- no , digital ischemia- no                           muscle strength- grossly normal , tone - grossly normal  Skin: rashes- no , ulcers- no, induration- no, tightening - no  Psychiatric:  Judgement and insight- normal           AAO X 3  Additional finding:      LABS:     Recent Labs     06/24/22  1115 06/25/22  1342 06/26/22  0725   WBC 9.1 8.0 7.0   HGB 15.9 15.6 15.8   HCT 47.8 46.3 47.9    193 188     Recent Labs     06/24/22  1115 06/25/22  1342 06/26/22  0725   * 136 132*   K 3.6 3.3* 4.0   CL 97* 97* 97*   CO2 23 24 21   BUN 15 23* 29*   CREATININE 0.9 1.5* 1.3   GLUCOSE 105* 144* 111*   MG 2.10 1.90 2.10        Nephrology  [facility], [address]  Office: [phone]  Fax: [phone]

## 2022-06-26 NOTE — PLAN OF CARE
Problem: Cardiovascular - Adult  Goal: Maintains optimal cardiac output and hemodynamic stability  Outcome: Progressing  Goal: Absence of cardiac dysrhythmias or at baseline  Outcome: Progressing   Patient on heparin gtt. Patient has been NSR. Vital signs stable. Problem: Safety - Adult  Goal: Free from fall injury  Outcome: Progressing   Patient bed is locked and low. Call light and belongings are in reach. Patient calls our appropriately. Problem: Pain  Goal: Verbalizes/displays adequate comfort level or baseline comfort level  Outcome: Progressing   Patient has not verbalized any pain during shift.

## 2022-06-27 LAB
ANION GAP SERPL CALCULATED.3IONS-SCNC: 13 MMOL/L (ref 3–16)
ANTI-XA UNFRAC HEPARIN: 0.22 IU/ML (ref 0.3–0.7)
ANTI-XA UNFRAC HEPARIN: <0.1 IU/ML (ref 0.3–0.7)
APTT: 29.2 SEC (ref 23–34.3)
BASOPHILS ABSOLUTE: 0 K/UL (ref 0–0.2)
BASOPHILS RELATIVE PERCENT: 0.6 %
BUN BLDV-MCNC: 36 MG/DL (ref 7–20)
CALCIUM SERPL-MCNC: 9.8 MG/DL (ref 8.3–10.6)
CHLORIDE BLD-SCNC: 99 MMOL/L (ref 99–110)
CO2: 24 MMOL/L (ref 21–32)
CREAT SERPL-MCNC: 1.7 MG/DL (ref 0.9–1.3)
EKG ATRIAL RATE: 187 BPM
EKG DIAGNOSIS: NORMAL
EKG Q-T INTERVAL: 282 MS
EKG QRS DURATION: 88 MS
EKG QTC CALCULATION (BAZETT): 492 MS
EKG R AXIS: -63 DEGREES
EKG T AXIS: 115 DEGREES
EKG VENTRICULAR RATE: 183 BPM
EOSINOPHILS ABSOLUTE: 0.2 K/UL (ref 0–0.6)
EOSINOPHILS RELATIVE PERCENT: 1.9 %
GFR AFRICAN AMERICAN: 51
GFR NON-AFRICAN AMERICAN: 42
GLUCOSE BLD-MCNC: 111 MG/DL (ref 70–99)
HCT VFR BLD CALC: 46 % (ref 40.5–52.5)
HCT VFR BLD CALC: 48.5 % (ref 40.5–52.5)
HEMOGLOBIN: 15.1 G/DL (ref 13.5–17.5)
HEMOGLOBIN: 16.3 G/DL (ref 13.5–17.5)
INR BLD: 1.07 (ref 0.87–1.14)
LYMPHOCYTES ABSOLUTE: 2.2 K/UL (ref 1–5.1)
LYMPHOCYTES RELATIVE PERCENT: 26.1 %
MAGNESIUM: 2.2 MG/DL (ref 1.8–2.4)
MCH RBC QN AUTO: 27.8 PG (ref 26–34)
MCH RBC QN AUTO: 28.1 PG (ref 26–34)
MCHC RBC AUTO-ENTMCNC: 32.7 G/DL (ref 31–36)
MCHC RBC AUTO-ENTMCNC: 33.6 G/DL (ref 31–36)
MCV RBC AUTO: 83.8 FL (ref 80–100)
MCV RBC AUTO: 84.8 FL (ref 80–100)
MONOCYTES ABSOLUTE: 0.7 K/UL (ref 0–1.3)
MONOCYTES RELATIVE PERCENT: 7.9 %
NEUTROPHILS ABSOLUTE: 5.5 K/UL (ref 1.7–7.7)
NEUTROPHILS RELATIVE PERCENT: 63.5 %
PDW BLD-RTO: 15.2 % (ref 12.4–15.4)
PDW BLD-RTO: 15.6 % (ref 12.4–15.4)
PLATELET # BLD: 191 K/UL (ref 135–450)
PLATELET # BLD: 191 K/UL (ref 135–450)
PMV BLD AUTO: 9.8 FL (ref 5–10.5)
PMV BLD AUTO: 9.8 FL (ref 5–10.5)
POTASSIUM REFLEX MAGNESIUM: 4.3 MMOL/L (ref 3.5–5.1)
PROTHROMBIN TIME: 13.8 SEC (ref 11.7–14.5)
RBC # BLD: 5.42 M/UL (ref 4.2–5.9)
RBC # BLD: 5.78 M/UL (ref 4.2–5.9)
SODIUM BLD-SCNC: 136 MMOL/L (ref 136–145)
WBC # BLD: 6.8 K/UL (ref 4–11)
WBC # BLD: 8.6 K/UL (ref 4–11)

## 2022-06-27 PROCEDURE — 6370000000 HC RX 637 (ALT 250 FOR IP): Performed by: INTERNAL MEDICINE

## 2022-06-27 PROCEDURE — 83735 ASSAY OF MAGNESIUM: CPT

## 2022-06-27 PROCEDURE — 85027 COMPLETE CBC AUTOMATED: CPT

## 2022-06-27 PROCEDURE — 85520 HEPARIN ASSAY: CPT

## 2022-06-27 PROCEDURE — 2580000003 HC RX 258

## 2022-06-27 PROCEDURE — 85025 COMPLETE CBC W/AUTO DIFF WBC: CPT

## 2022-06-27 PROCEDURE — 36415 COLL VENOUS BLD VENIPUNCTURE: CPT

## 2022-06-27 PROCEDURE — 6360000002 HC RX W HCPCS: Performed by: STUDENT IN AN ORGANIZED HEALTH CARE EDUCATION/TRAINING PROGRAM

## 2022-06-27 PROCEDURE — 6370000000 HC RX 637 (ALT 250 FOR IP)

## 2022-06-27 PROCEDURE — 1200000000 HC SEMI PRIVATE

## 2022-06-27 PROCEDURE — 80048 BASIC METABOLIC PNL TOTAL CA: CPT

## 2022-06-27 PROCEDURE — 99233 SBSQ HOSP IP/OBS HIGH 50: CPT | Performed by: NURSE PRACTITIONER

## 2022-06-27 PROCEDURE — 85730 THROMBOPLASTIN TIME PARTIAL: CPT

## 2022-06-27 PROCEDURE — 6370000000 HC RX 637 (ALT 250 FOR IP): Performed by: STUDENT IN AN ORGANIZED HEALTH CARE EDUCATION/TRAINING PROGRAM

## 2022-06-27 PROCEDURE — 85610 PROTHROMBIN TIME: CPT

## 2022-06-27 PROCEDURE — 6360000002 HC RX W HCPCS

## 2022-06-27 RX ORDER — VALSARTAN 160 MG/1
160 TABLET ORAL NIGHTLY
Status: DISCONTINUED | OUTPATIENT
Start: 2022-06-27 | End: 2022-06-28 | Stop reason: HOSPADM

## 2022-06-27 RX ORDER — HEPARIN SODIUM 10000 [USP'U]/100ML
5-30 INJECTION, SOLUTION INTRAVENOUS CONTINUOUS
Status: DISPENSED | OUTPATIENT
Start: 2022-06-27 | End: 2022-06-27

## 2022-06-27 RX ORDER — CHLORTHALIDONE 25 MG/1
25 TABLET ORAL DAILY
Status: DISCONTINUED | OUTPATIENT
Start: 2022-06-28 | End: 2022-06-28 | Stop reason: HOSPADM

## 2022-06-27 RX ADMIN — SODIUM CHLORIDE, PRESERVATIVE FREE 10 ML: 5 INJECTION INTRAVENOUS at 20:48

## 2022-06-27 RX ADMIN — ATORVASTATIN CALCIUM 10 MG: 10 TABLET, FILM COATED ORAL at 09:14

## 2022-06-27 RX ADMIN — CARVEDILOL 25 MG: 25 TABLET, FILM COATED ORAL at 09:17

## 2022-06-27 RX ADMIN — SPIRONOLACTONE 50 MG: 50 TABLET ORAL at 09:14

## 2022-06-27 RX ADMIN — HEPARIN SODIUM 14 UNITS/KG/HR: 10000 INJECTION, SOLUTION INTRAVENOUS at 18:25

## 2022-06-27 RX ADMIN — HEPARIN SODIUM 2000 UNITS: 1000 INJECTION INTRAVENOUS; SUBCUTANEOUS at 18:24

## 2022-06-27 RX ADMIN — HEPARIN SODIUM 12 UNITS/KG/HR: 10000 INJECTION, SOLUTION INTRAVENOUS at 12:51

## 2022-06-27 RX ADMIN — NIFEDIPINE 90 MG: 30 TABLET, EXTENDED RELEASE ORAL at 09:13

## 2022-06-27 RX ADMIN — CHLORTHALIDONE 50 MG: 25 TABLET ORAL at 09:14

## 2022-06-27 RX ADMIN — VALSARTAN 160 MG: 160 TABLET, FILM COATED ORAL at 20:48

## 2022-06-27 ASSESSMENT — PAIN SCALES - GENERAL
PAINLEVEL_OUTOF10: 0

## 2022-06-27 NOTE — PROGRESS NOTES
Interval History and plan:      BP is better now   Urine is 1100 ml  Creatinine is higher 1.7    Plan:    Renin is 0.2 and aldosterone is 6.2 ( unlikely to be hyperaldosteronism) . Cortisol is OK    Repeat CT negative for RA stenosis showed patent stent   Continue current BP medicines    BP better  Creatinine fluctuating likely meds related and BP control related. ( hemodynamic)   IVF at a higher rate. Assessment :     Accelerated hypertension: He has a few admissions in the past due to high blood pressure. He has history of renal artery stenosis status post stenting of the right kidney in 2018. Secondary work-up including aldosterone, cortisol and metanephrines were normal in 2015. Renin activity was 1.3 with aldosterone of 11.4. He does have obstructive sleep apnea and uses CPAP. GFR is normal.  He is hypokalemic. CTA in 2020 showed left-sided renal artery stent which is patent. Adrenal glands were okay. Prior CT scans showed left-sided adrenal adenoma. Atrial fibrillation, management as per cardiology. Echocardiogram with EF of 55 to 60%. [facility] would like to thank Carolina Trammell MD   for opportunity to serve this patient      Please call with questions at-   24 Hrs Answering service [phone] or  7 am- 5 pm via Perfect serve or cell phone  Curtis Morton MD          CC/reason for consult :     Uncontrolled hypertension     HPI :     Cain Bear is a 55 y.o. male presented to   the hospital on 6/22/2022 with chest pressure. He has past medical history of chronic kidney disease stage III, hyperlipidemia, hypertension, obesity, obstructive sleep apnea. He also history of atrial fibrillation status post cardioversion and ablation in February 2021. He uses CPAP at home. He presented with chest comfort, palpitations and diaphoresis. When he arrived in the ER he was found to be in A. fib with RVR.   His heart rate was in 180s.  He was also hypertensive. In the ER he was cardioverted and cardiology was consulted. On arrival potassium was 3.5 and magnesium was 1.8. He was admitted to medicine for further management. I was called for uncontrolled hypertension and possibility of secondary hypertension. ROS:     Seen with-resident    positives in bold   Constitutional:  fever, chills, weakness, weight change, fatigue  Skin:  rash, pruritus, hair loss, bruising, dry skin, petechiae  Head, Face, Neck   headaches, swelling,  cervical adenopathy  Respiratory: shortness of breath, cough, or wheezing  Cardiovascular: chest pain, palpitations, dizzy, edema  Gastrointestinal: nausea, vomiting, diarrhea, constipation,belly pain    Yellow skin, blood in stool  Musculoskeletal:  back pain, muscle weakness, gait problems,       joint pain or swelling. Genitourinary:  dysuria, poor urine flow, flank pain, blood in urine  Neurologic:  vertigo, TIA'S, syncope, seizures, focal weakness  Psychosocial:  insomnia, anxiety, or depression. Additional positive findings:                    All other remaining systems are negative or unable to obtain        PMH/PSH/SH/Family History:     Past Medical History:   Diagnosis Date    Carpal tunnel syndrome     Chronic kidney disease     Hyperlipidemia     Hypertension     Obesity, unspecified     MARY (obstructive sleep apnea)        Past Surgical History:   Procedure Laterality Date    CARDIAC CATHETERIZATION      KNEE ARTHROSCOPY Right     NOSE SURGERY      Repair from [facility]        reports that he has never smoked. He has never used smokeless tobacco. He reports current alcohol use. He reports that he does not use drugs. family history includes Diabetes in his mother; Stroke in his maternal uncle.          Medication:     Current Facility-Administered Medications: 0.9 % sodium chloride infusion, , IntraVENous, Continuous  sodium chloride flush 0.9 % injection 10 mL, 10 mL, IntraVENous, BID  spironolactone (ALDACTONE) tablet 50 mg, 50 mg, Oral, Daily  NIFEdipine (ADALAT CC) extended release tablet 90 mg, 90 mg, Oral, Daily  carvedilol (COREG) tablet 25 mg, 25 mg, Oral, BID WC  atorvastatin (LIPITOR) tablet 10 mg, 10 mg, Oral, Daily  sertraline (ZOLOFT) tablet 50 mg, 50 mg, Oral, Daily  pantoprazole (PROTONIX) tablet 40 mg, 40 mg, Oral, QAM AC  sodium chloride flush 0.9 % injection 5-40 mL, 5-40 mL, IntraVENous, 2 times per day  sodium chloride flush 0.9 % injection 5-40 mL, 5-40 mL, IntraVENous, PRN  0.9 % sodium chloride infusion, , IntraVENous, PRN  ondansetron (ZOFRAN-ODT) disintegrating tablet 4 mg, 4 mg, Oral, Q8H PRN **OR** ondansetron (ZOFRAN) injection 4 mg, 4 mg, IntraVENous, Q6H PRN  polyethylene glycol (GLYCOLAX) packet 17 g, 17 g, Oral, Daily PRN  acetaminophen (TYLENOL) tablet 650 mg, 650 mg, Oral, Q6H PRN **OR** acetaminophen (TYLENOL) suppository 650 mg, 650 mg, Rectal, Q6H PRN  chlorthalidone (HYGROTON) tablet 50 mg, 50 mg, Oral, Daily  valsartan (DIOVAN) tablet 320 mg, 320 mg, Oral, Nightly  heparin (porcine) injection 4,000 Units, 4,000 Units, IntraVENous, PRN  heparin (porcine) injection 2,000 Units, 2,000 Units, IntraVENous, PRN       Vitals :     Vitals:    06/27/22 0912   BP: (!) 153/90   Pulse: 62   Resp: 18   Temp: 97.7 °F (36.5 °C)   SpO2: 97%       I & O :       Intake/Output Summary (Last 24 hours) at 6/27/2022 2335  Last data filed at 6/27/2022 0912  Gross per 24 hour   Intake 390 ml   Output 1100 ml   Net -710 ml        Physical Examination :     General appearance: Anxious- no, distressed- no, in good spirits-  He is  HEENT: Lips- normal, teeth- ok , oral mucosa- moist  Neck : Mass- no, appears symmetrical, JVD- not visible  Respiratory: Respiratory effort-  normal, wheeze- no, crackles -   Cardiovascular:  Ausculation- No M/R/G, Edema none  Abdomen: visible mass- no, distention- no, scar- no, tenderness- no                            hepatosplenomegaly- no  Musculoskeletal:  clubbing no,cyanosis- no , digital ischemia- no                           muscle strength- grossly normal , tone - grossly normal  Skin: rashes- no , ulcers- no, induration- no, tightening - no  Psychiatric:  Judgement and insight- normal           AAO X 3  Additional finding:      LABS:     Recent Labs     06/25/22  1342 06/26/22  0725 06/27/22  0705   WBC 8.0 7.0 8.6   HGB 15.6 15.8 16.3   HCT 46.3 47.9 48.5    188 191     Recent Labs     06/25/22  1342 06/26/22  0725 06/27/22  0705    132* 136   K 3.3* 4.0 4.3   CL 97* 97* 99   CO2 24 21 24   BUN 23* 29* 36*   CREATININE 1.5* 1.3 1.7*   GLUCOSE 144* 111* 111*   MG 1.90 2.10 2.20        Nephrology  [address] [place] [address] [address] [facility]  Office: [phone]  Fax: [phone]

## 2022-06-27 NOTE — CARE COORDINATION
CM  Cont to follow for  D/c planning / needs ; Patient admitted  For  A fib  RVR ; pt  On hep gtt: cardiology following  , Abnormal  Lexiscan test,    Plan  For  LHC today . NPO     -   Nephro consulted / following   For  elev  BP     Electronically signed by Lovell Favre, RN on 6/27/2022 at 9:29 AM     Lovell Favre RN Case Manager  [facility]  [address] [place] [address].   [facility] [id number]  [phone]  Fax [phone]

## 2022-06-27 NOTE — PROGRESS NOTES
[facility]   Cardiology  Note   Pt of Dr Daniel Santana MD, Arina Richard RN, FNP APRN CVNP  Date: 6/27/2022  Admit Date: 6/22/2022       CC:AF/RVR      PMH: HTN, HLD, MARY, obesity, NAIDA, s/p PCI, HFpEF, pAF, LHC (2020) showed nor cors, recurrent AF, s/p RFA/PVI of AF/AFL (2/3/21, Dr. Jose Neal) who p/w palpitations, chest discomfort, found to be in AF/RVR, underwent DCCV to NSR in ER obesity   Hx NAIDA sp stent r kidney 2018 neph following     6/25/2022 stress test today  if stress test negative can resume flecainide  On hep gtt & plan to [facility] [place] / NOAC  XA inhibitor before discharge   in NSR VSS      6/26/2022  Discussed abnormal stress test & LHC planned for tomorrow  remains in NSR VSS   On hep gtt & plan to [facility] [place] / NOAC  XA inhibitor before discharge      6/27/2022  sCr 1.3>1.7 / discussed with pt the LHC on hold and we may decide LHC may not be necessary since the stress test is very similar to last one   He is asymptomatic and remains in NSR / he may have a diet   Dr Tricia Ramos managing his JANNET/CRI   Discussed with Dr Tricia Ramos hold aldactone decrease Diovan dose   He is on hep gtt. & if we decide not to do to [facility]  He may go back on [facility] [place] /  EP will mange this      Patient seen and examined. Clinical notes reviewed.  Telemetry reviewed / Pertinent labs, diagnostic, device, and imaging results reviewed as a part of this visit  I spent a total of 35 minutes and greater than 50% of the time was spent counseling with patient  coordinating care regarding her diagnosis, treatments and plan of care      Scheduled Meds:   valsartan  160 mg Oral Nightly    [START ON 6/28/2022] chlorthalidone  25 mg Oral Daily    sodium chloride flush  10 mL IntraVENous BID    [Held by provider] spironolactone  50 mg Oral Daily    NIFEdipine  90 mg Oral Daily    carvedilol  25 mg Oral BID WC    atorvastatin  10 mg Oral Daily    sertraline  50 mg Oral Daily    pantoprazole  40 mg Oral QAM AC    sodium chloride flush  5-40 mL IntraVENous 2 times per day       Vitals:    06/27/22 1241   BP: 110/73   Pulse: 55   Resp: 17   Temp: 97.6 °F (36.4 °C)   SpO2: 95%      In: 400 [P.O.:390; I.V.:10]  Out: 1100    Wt Readings from Last 3 Encounters:   06/27/22 (!) 303 lb 2 oz (137.5 kg)   02/11/21 (!) 309 lb 3.2 oz (140.3 kg)   02/03/21 (!) 320 lb (145.2 kg)       Intake/Output Summary (Last 24 hours) at 6/27/2022 1245  Last data filed at 6/27/2022 0912  Gross per 24 hour   Intake 150 ml   Output 1100 ml   Net -950 ml       Telemetry: Personally Reviewed    · Constitutional: Cooperative and in no apparent distress, and appears well nourished  · Skin: Warm and pink; no pallor, cyanosis, clubbing, or bruising   · HEENT: Symmetric and normocephalic. · Cardiovascular: Regular rate and rhythm. S1/S2 present   · Respiratory: Respirations symmetric and unlabored. Lungs clear to auscultation bilaterally, no wheezing, crackles, or rhonchi  · Gastrointestinal: Abdomen soft and round. Bowel sounds normoactive without tenderness or masses. · Musculoskeletal: Bilateral upper and lower extremity strength 5/5 with full ROM  · Neurologic/Psych: Awake and orientated to person, place and time.  Calm affect, appropriate mood    Patient Active Problem List    Diagnosis Date Noted    Typical atrial flutter ([address]     Atrial fibrillation with RVR ([address] 01/07/2021    H/O angiography 10/26/2020    Atrial fibrillation with rapid ventricular response ([address]     Essential hypertension     Atrial fibrillation ([address] 02/27/2020    Hypertensive urgency 02/18/2020    Heart failure with normal ejection fraction ([address] 10/29/2018    Mixed hyperlipidemia 02/19/2018    Paresthesia 06/07/2016    Lumbar strain 01/04/2016    Depressive disorder 08/19/2013    Obesity 07/15/2011    Obstructive sleep apnea syndrome 11/16/2010      Assessment     AF/RVR  - In NSR, rates controlled, no AF seen on tele  - S/p RFA/PVI of AF (2/2021, Dr. Mau Loving)  - S/p DCCV to NSR (6/22/22) in [place] at least 1  - On heparin gtt- continue for now  - Discussed with patient [facility] options, he is not interested in coumadin. He is interested in taking Eliquis or Xarelto even though it may be more expensive  - On coreg 25 mg BID  - If stress test negative can resume flecainide  - Keep K >4.0, Mg>2.0, replacements ordered  - T4 wl mag wnl       MARY  -wears CPAP     Elevated Troponin / 6/25/2022 abnormal stress test similar to last stress test    - Trop 0.02, 0.10, 0.22  - Lateral T wave inversions/ AVR ST wave elevation on EKG seen on previous EKG's  - [facility] 2/2020 showed no evidence CAD  - Echo showed normal EF, no RWMA    Obesity  Body mass index is 46.09 kg/m². Diet activity [place] recommended     HLD  On statin   LDL 77 in 22 / recheck level     Uncontrolled b/p / neph consulted and managing   sCr 1.5>>1.3   Per neph recommendations   Follow Renin, aldosterone, cortisol and metanephrines again/ neg  CTA of renal arteries as he has history of renal artery stenosis repeat CT negative for stenosis patent stent     Cardiac meds Lipitor coreg hygroton adalat diovan hep gtt     Plan   6/27/2022  sCr 1.3>1.7 / discussed with pt the [facility] on hold   He is asymptomatic and remains in NSR / he may have a diet   Dr Celeste Gan managing his JANNET/CRI / getting IV NS    Discussed with Dr Celeste Gan hold aldactone decrease Diovan dose   He is on hep gtt. & if we decide not to do to [address] [place] [address]  He may go back on [facility] /  EP will mange this      Thank you for allowing to us to participate in the care of Lionel Washington.   Ander RAYGOZA-CNP-CVNP    [facility] [place]

## 2022-06-27 NOTE — DISCHARGE SUMMARY
INTERNAL MEDICINE DEPARTMENT AT [address] [place] [address]  DISCHARGE SUMMARY    Patient ID: Mana Mao                                             Patient's PCP: Raeann Casper, APRN - CNP                                          Admitting Physician: Lynette Menjivar MD  Admit Date: 6/22/2022   Discharge Physician: Axel Tilley MD MD  Discharge Date: 6/28/2022     DISCHARGE DIAGNOSES:  Patient Active Problem List   Diagnosis Code    Obstructive sleep apnea syndrome G47.33    Obesity E66.9    Depressive disorder F32.9    Lumbar strain S39.012A    Paresthesia R20.2    Mixed hyperlipidemia E78.2    Heart failure with normal ejection fraction (HCC) I50.30    Hypertensive urgency I16.0    Atrial fibrillation ([address] I48.91    H/O angiography Z92.89    Atrial fibrillation with rapid ventricular response (HCC) I48.91    Essential hypertension I10    Atrial fibrillation with RVR (HCC) I48.91    Typical atrial flutter (HCC) I48.3    Elevated troponin R77.8    Chest pain R07.9    Abnormal myocardial perfusion study R94.39       Hospital Course:    Mana Mao is a 56 yo male with PMH of Atrial fibrillation with RVR status post cardioversion and ablation in 2/21 by Dr Bhavna Sanon, obstructive sleep apnea uses CPAP at home, HTN who presented to the ED with complains of chest discomfort, palpitation and diaphoresis. He was found to be in A fib RVR prior to admission and cardioverted in the ED to NSR. He was admitted for further workup and management of his A fib, troponemia and hypertensive emergency as discussed below:    A fib RVR  Was cardioverted in the ED prior to admission. Continued Coreg 25. EP followed while inpatient. He remained NSR throughout admission    NSTEMI  Pt with elevation of troponin. No chest pain once admitted and A fib cardioverted. He was taken for stress test which was high risk scan similar to prior. He remained on heparin gtt.  Patient taken to cath lab with interventional cardiology that showed 60% EF, \"Mild [id number]-[id number]-oqt LAD disease; otherwise, no significantly obstructive coronary artery disease\"    JOSELYN  Pt with hypertension and imaging requiring contrast. We sent urine studies and gave IVF and Cr improved    Hypertensive emergency  In the setting of above and generally poorly controlled htn. Better controlled on better controlled on coreg 25 BID, nifedipine 90 QD, chlorthalidone 50QD, Diovan 320, aldactone 50. Given JOSELYN these were adjusted throughout admission. Pt will go home on medications listed below    Chronic medical conditions   HLD-continue home lipitor 10 mg PO daily   Mood disorder-continue home zoloft 50 mg PO daily   MARY: home CPAP    Given Cath findings, return to NSR, sustained, and BP control. Pt can discharge. Physical Exam:  BP (!) 152/92   Pulse 56   Temp 97.6 °F (36.4 °C) (Oral)   Resp 15   Ht 5' 8\" (1.727 m)   Wt (!) 302 lb 14.6 oz (137.4 kg)   SpO2 94%   BMI 46.06 kg/m²   Wt Readings from Last 3 Encounters:   06/28/22 (!) 302 lb 14.6 oz (137.4 kg)   02/11/21 (!) 309 lb 3.2 oz (140.3 kg)   02/03/21 (!) 320 lb (145.2 kg)     Body mass index is 46.06 kg/m². · Gen - Alert, no signs of distress, appears stated age and cooperative  · HEENT - NC/AT  · Eye - Normal external eye, conjunctiva, lids cornea, PERLLA, no nystagmus  · Ears - Normal TM's bilaterally. Normal auditory canals and external ears. Non-tender  · Nose - Normal external nose, mucus membranes and septum  · Pharynx - Dental Hygiene adequate. Normal buccal mucosa. Normal pharynx  · Neck - Supple, no masses, nodes, nodules or enlargement. No adenopathy, no carotid bruit, no JVD. · CVS - Regular rate. Regular rhythm. No mumur or rub. No edema  · Resp - No accessory muscle use. No crackles. No wheezing. No rhonchi  · GI - Non-tender. Non-distended. No masses. No organmegaly. Normal bowel sounds  · Skin - Warm and dry. No nodule on exposed extremities.  No rash on exposed extremities  · Lymph - No cervical LAD. No supraclavicular LAD. · MSK - No cyanosis. No joint deformity. No clubbing  · Neuro - Awake. Follows commands. CN II-XII Grossly Intact. Sensation intact. Strength 5/5 UE and LE. Reflexes 1+ UE and LE angelito. Downgoing plantar angelito. Normal Gait. Finger-to-nose and rapidly alternating movements intact. Normal pain response  · Psych - Oriented to person, place, time. No anxiety or agitation. Consults: cardiology and nephrology  Significant Diagnostic Studies:  Cardiac stress test, [facility]    LABS:  CBC:   Recent Labs     06/27/22  0705 06/27/22  1222 06/28/22  0841   WBC 8.6 6.8 7.0   HGB 16.3 15.1 15.0   HCT 48.5 46.0 45.7   MCV 83.8 84.8 85.0    191 185     Lab Results   Component Value Date    TSH 2.98 02/24/2018     No results found for: IRON, TIBC, FERRITIN, FOLATE, [id number], PTH  BMP:  Recent Labs     06/26/22  0725 06/26/22  0725 06/27/22  0705 06/28/22  0840   *  --  136 136  137   K 4.0   < > 4.3 4.3  4.3   CL 97*  --  99 102  103   CO2 21  --  24 19*  21   BUN 29*  --  36* 34*  34*   CREATININE 1.3  --  1.7* 1.5*  1.4*   GLUCOSE 111*  --  111* 109*  106*   CALCIUM 9.5  --  9.8 9.8  9.7   MG 2.10  --  2.20 2.00   PHOS  --   --   --  3.6    < > = values in this interval not displayed.      LFT's:  Lab Results   Component Value Date    AST 46 (H) 01/07/2021    ALT 25 01/07/2021    BILITOT 0.4 01/07/2021    ALKPHOS 80 01/07/2021     No results found for: OCCULTBLD  Troponin:  Lab Results   Component Value Date    TROPONINI 0.14 (H) 06/23/2022    TROPONINI 0.12 (H) 06/23/2022    TROPONINI 0.22 (H) 06/23/2022     BNP:  Lab Results   Component Value Date    BNP 26.0 12/13/2015     Lipids:  Lab Results   Component Value Date    CHOL 141 06/25/2022    HDL 34 (L) 06/25/2022    LDLCALC 82 06/25/2022    TRIG 127 06/25/2022     Lab Results   Component Value Date    LABA1C 5.7 06/23/2022     ABGs:  No results found for: PHART, MIL2JSP, PO2ART  INR:  Lab Results Component Value Date    INR 1.02 06/28/2022    INR 1.07 06/27/2022    INR 1.14 06/23/2022     U/A:  Lab Results   Component Value Date    COLORU Yellow 10/26/2020    PHUR 6.0 10/26/2020    WBCUA 3-5 10/26/2020    RBCUA 0-2 10/26/2020    MUCUS 2+ (A) 08/22/2013    BACTERIA Rare (A) 10/26/2020    CLARITYU Clear 10/26/2020    SPECGRAV >=1.030 10/26/2020    LEUKOCYTESUR Negative 10/26/2020    UROBILINOGEN 0.2 10/26/2020    BILIRUBINUR Negative 10/26/2020    BLOODU Negative 10/26/2020    GLUCOSEU Negative 10/26/2020        Treatments: IV hydration and heparin gtt  Disposition: home  Discharged Condition: Stable  Follow Up: Primary Care Physician in one week, card follow up      DISCHARGE MEDICATION:     Medication List      START taking these medications    aspirin 81 MG EC tablet  Take 1 tablet by mouth daily     chlorthalidone 25 MG tablet  Commonly known as: HYGROTON  Take 1 tablet by mouth daily  Start taking on: June 29, 2022     NIFEdipine 30 MG extended release tablet  Commonly known as: ADALAT CC  Take 3 tablets by mouth daily  Start taking on: June 29, 2022     valsartan 160 MG tablet  Commonly known as: DIOVAN  Take 1 tablet by mouth at bedtime        CONTINUE taking these medications    apixaban 5 MG Tabs tablet  Commonly known as: ELIQUIS  Take 1 tablet by mouth 2 times daily     atorvastatin 10 MG tablet  Commonly known as: LIPITOR  TAKE 1 TABLET BY MOUTH DAILY     carvedilol 25 MG tablet  Commonly known as: COREG  Take 1 tablet by mouth 2 times daily (with meals)     flecainide 100 MG tablet  Commonly known as: TAMBOCOR  Take 1 tablet by mouth every 12 hours     hydrALAZINE 25 MG tablet  Commonly known as: APRESOLINE  TAKE 3 TABLETS BY MOUTH THREE TIMES DAILY     pantoprazole 40 MG tablet  Commonly known as: Protonix  Take 1 tablet by mouth daily     sertraline 50 MG tablet  Commonly known as: ZOLOFT  TAKE 1 TABLET BY MOUTH DAILY        STOP taking these medications    furosemide 20 MG tablet  Commonly known as: LASIX     losartan 100 MG tablet  Commonly known as: COZAAR     spironolactone 25 MG tablet  Commonly known as: ALDACTONE           Where to Get Your Medications      These medications were sent to [place], [address]  [facility]. Sheila Ross [phone] - F [phone] [address] [place] [address]    Phone: [phone]   · aspirin 81 MG EC tablet  · chlorthalidone 25 MG tablet  · NIFEdipine 30 MG extended release tablet  · valsartan 160 MG tablet     These medications were sent to [facility]  ([address] [place] [address], [address] [place] [address] [place], [address] [facility]    Phone: [phone]   · apixaban 5 MG Tabs tablet       Activity: activity as tolerated  Diet: cardiac diet  Wound Care: none needed    Time Spent on discharge is more than 45 minutes    Signed:  Joce Jacobsen MD,  MD  Internal Medicine  [facility]  6/28/2022, 6:47 PM

## 2022-06-27 NOTE — PLAN OF CARE
Problem: Discharge Planning  Goal: Discharge to home or other facility with appropriate resources  Outcome: Progressing  Note: Patient plans to return home when stable. Problem: Respiratory - Adult  Goal: Achieves optimal ventilation and oxygenation  6/25/2022 0228 by Yazan Kaur RN  Note: Patient on room air. 02 sat %. Patient denies SOB. Lung sound clear and diminished in the bases. Will continue to monitor. Problem: Cardiovascular - Adult  Goal: Maintains optimal cardiac output and hemodynamic stability  6/25/2022 0228 by Yazan Kaur RN  Outcome: Progressing  Note: B/p WNL. Patient is NSR to SB on telemetry. Hep ggt d/adrianne this evening. IV fluids started at midnight. Plan to have a heart cath on Monday. Will continue to monitor. Problem: Safety - Adult  Goal: Free from fall injury  Outcome: Progressing     Problem: Pain  Goal: Verbalizes/displays adequate comfort level or baseline comfort level  Outcome: Progressing  Note: Patient denies pain at this time. VSS. Patient in bed, with personal belongings in reach. Brakes activated on bed. Will continue to monitor comfort.

## 2022-06-27 NOTE — PROGRESS NOTES
Progress Note    Admit Date: 6/22/2022  Day: 4   Diet: Diet NPO Exceptions are: Sips of Water with Meds    CC: CP    Interval history:   BP under control. Odilonkian Moore. Pt was seen at bedside today AM, resting comfortably. Does not have any new complaints. . Pt labs with increase in Cr to 1.7 after gentle fluids, nephrology following. Plan was for [facility] today with cardiology. Pt NPO. Denies F/C, N/V, CP, SOB, HA, vision changes, weakness in arms and feet. Update: no LHC today per cards. Nephrology increased his IVF rate. Medications:     Scheduled Meds:   valsartan  160 mg Oral Nightly    [START ON 6/28/2022] chlorthalidone  25 mg Oral Daily    sodium chloride flush  10 mL IntraVENous BID    [Held by provider] spironolactone  50 mg Oral Daily    NIFEdipine  90 mg Oral Daily    carvedilol  25 mg Oral BID WC    atorvastatin  10 mg Oral Daily    sertraline  50 mg Oral Daily    pantoprazole  40 mg Oral QAM AC    sodium chloride flush  5-40 mL IntraVENous 2 times per day     Continuous Infusions:   sodium chloride 50 mL/hr at 06/26/22 2336    sodium chloride       PRN Meds:sodium chloride flush, sodium chloride, ondansetron **OR** ondansetron, polyethylene glycol, acetaminophen **OR** acetaminophen, heparin (porcine), heparin (porcine)    Objective:   Vitals:   T-max:  Patient Vitals for the past 8 hrs:   BP Temp Temp src Pulse Resp SpO2 Weight   06/27/22 0912 (!) 153/90 97.7 °F (36.5 °C) Oral 62 18 97 % --   06/27/22 0527 -- -- -- -- -- -- (!) 303 lb 2 oz (137.5 kg)   06/27/22 0515 (!) 143/82 97.7 °F (36.5 °C) Oral 58 16 98 % --       Intake/Output Summary (Last 24 hours) at 6/27/2022 1046  Last data filed at 6/27/2022 0912  Gross per 24 hour   Intake 150 ml   Output 1100 ml   Net -950 ml       Review of Systems  10 point ROS completed and negative unless listed in HPI    Physical Exam  Constitutional:       Appearance: He is obese. HENT:      Head: Normocephalic.       Right Ear: External ear normal. Left Ear: External ear normal.      Nose: Nose normal.      Mouth/Throat:      Mouth: Mucous membranes are moist.   Eyes:      Pupils: Pupils are equal, round, and reactive to light. Cardiovascular:      Rate and Rhythm: Normal rate and regular rhythm. Pulses: Normal pulses. Heart sounds: Normal heart sounds. Pulmonary:      Effort: Pulmonary effort is normal.   Abdominal:      General: There is no distension. Palpations: Abdomen is soft. Tenderness: There is no abdominal tenderness. Musculoskeletal:      Right lower leg: No edema. Left lower leg: No edema. Skin:     General: Skin is warm. Neurological:      Mental Status: He is alert and oriented to person, place, and time. Psychiatric:         Thought Content: Thought content normal.     LABS:    CBC:   Recent Labs     06/25/22  1342 06/26/22  0725 06/27/22  0705   WBC 8.0 7.0 8.6   HGB 15.6 15.8 16.3   HCT 46.3 47.9 48.5    188 191   MCV 83.6 85.3 83.8     Renal:    Recent Labs     06/25/22  1342 06/26/22  0725 06/27/22  0705    132* 136   K 3.3* 4.0 4.3   CL 97* 97* 99   CO2 24 21 24   BUN 23* 29* 36*   CREATININE 1.5* 1.3 1.7*   GLUCOSE 144* 111* 111*   CALCIUM 9.7 9.5 9.8   MG 1.90 2.10 2.20   ANIONGAP 15 14 13     Hepatic: No results for input(s): AST, ALT, BILITOT, BILIDIR, PROT, LABALBU, ALKPHOS in the last 72 hours. Troponin:   No results for input(s): TROPONINI in the last 72 hours. BNP: No results for input(s): BNP in the last 72 hours. Lipids:   Recent Labs     06/25/22  1342   CHOL 141   HDL 34*     ABGs:  No results for input(s): PHART, QLC0QPN, PO2ART, SMY9KGX, BEART, THGBART, Q1PTNNHJ, JPL3FXP in the last 72 hours. INR:   No results for input(s): INR in the last 72 hours. Lactate: No results for input(s): LACTATE in the last 72 hours.   Cultures:  -----------------------------------------------------------------  RAD:   CTA ABDOMEN W CONTRAST   Final Result      No significant renal artery stenosis. Patent stent in the left main renal artery. NM MYOCARDIAL SPECT REST EXERCISE OR RX   Final Result      CT HEAD WO CONTRAST   Final Result      No acute intracranial abnormality. Assessment/Plan:   56 yo male with PMH of Atrial fibrillation with RVR status post cardioversion and ablation in 2/21 by Dr Mickey Cosme, obstructive sleep apnea uses CPAP at home, HTN who presented to the ED with complains of chest discomfort, palpitation and diaphoresis    NSTEMI  Pt asx  -Was on heparin gtt, stopped prior to [facility]  -High risk stress test  -Cards following  - cards holding off for LHC today, if that is the case he can eat     Atrial fibrillation with RVR s/p cardioversion in ED  Continue to be in NSR  -On heparin gtt. (held for potential procedure)  -Coreg 25  -Tele  -Cardiology following     JOSELYN  HTN emergency on admission, contrast with imaging studies, worse 1.7 today. - BMP monitor  - IOs  - nephrology following   - gentle fluids, increase rate per nephro    HTN emergency - resolved  Uncontrolled hypertension  - BP much better controlled on coreg 25 BID, nifedipine 90 QD, chlorthalidone 50QD, Diovan 320, aldactone 50  -Nephrology following    Chronic medical conditions   HLD-continue home lipitor 10 mg PO daily   Mood disorder-continue home zoloft 50 mg PO daily   MARY: home CPAP    Code Status: Full  FEN: Diet NPO Exceptions are: Sips of Water with Meds, no cath can likely eat  PPX: on heparin gtt which was held overnight, will discuss with cards  DISPO: Hernando Angulo MD, PGY1  06/27/22  10:46 AM    This patient has been staffed and discussed with Mel Alejandro MD.   Patient seen and examined, labs and imaging studies reviewed, agree with assessment and plan as outlined above. Continue with current care and plan. Discussed case with patients nurse, discussed case with care team, discussed plan.       Mel Alejandro MD [address]

## 2022-06-27 NOTE — PROGRESS NOTES
Electrophysiology - PROGRESS NOTE    Admit Date: 6/22/2022     Chief Complaint: AF/RVR     Interval History:   Patient seen and examined and notes reviewed. Patient is being followed for AF/RVR. Patient been working outside when he started to feel heart racing or palpitations. He came to the ED and was found to have AF/RVR. He did convert to normal sinus rhythm in the ED. His troponin was positive at 0.22. He also complained of chest discomfort when he had AF with RVR. Echo showed an EF of 55 to 60% with a trace pericardial effusion. He underwent an MPI that showed a small sized mild intensity basal to mid inferior wall reversible defect suggestive of ischemia. There was diffuse hypokinesis especially of the apex and inferior wall, severe LV dilatation with moderate systolic dysfunction. His creatinine did go up to 1.7 and LHC has been placed on hold. Patient is currently receiving IV fluids. He thinks he may have been dehydrated when he was working outside. He had not had any breakthrough of atrial fibrillation since his catheter ablation in 2021.     In: 400 [P.O.:390; I.V.:10]  Out: 1100    Wt Readings from Last 2 Encounters:   06/27/22 (!) 303 lb 2 oz (137.5 kg)   02/11/21 (!) 309 lb 3.2 oz (140.3 kg)       Data:   Scheduled Meds:   Scheduled Meds:   valsartan  160 mg Oral Nightly    [START ON 6/28/2022] chlorthalidone  25 mg Oral Daily    sodium chloride flush  10 mL IntraVENous BID    [Held by provider] spironolactone  50 mg Oral Daily    NIFEdipine  90 mg Oral Daily    carvedilol  25 mg Oral BID WC    atorvastatin  10 mg Oral Daily    sertraline  50 mg Oral Daily    pantoprazole  40 mg Oral QAM AC    sodium chloride flush  5-40 mL IntraVENous 2 times per day     Continuous Infusions:   heparin (PORCINE) Infusion      sodium chloride 100 mL/hr at 06/27/22 1035    sodium chloride       PRN Meds:.sodium chloride flush, sodium chloride, ondansetron **OR** ondansetron, polyethylene glycol, acetaminophen **OR** acetaminophen, heparin (porcine), heparin (porcine)  Continuous Infusions:   heparin (PORCINE) Infusion      sodium chloride 100 mL/hr at 06/27/22 1035    sodium chloride         Intake/Output Summary (Last 24 hours) at 6/27/2022 1202  Last data filed at 6/27/2022 0912  Gross per 24 hour   Intake 150 ml   Output 1100 ml   Net -950 ml       CBC:   Lab Results   Component Value Date    WBC 8.6 06/27/2022    HGB 16.3 06/27/2022     06/27/2022     BMP:  Lab Results   Component Value Date     06/27/2022    K 4.3 06/27/2022    CL 99 06/27/2022    CO2 24 06/27/2022    BUN 36 06/27/2022    CREATININE 1.7 06/27/2022    GLUCOSE 111 06/27/2022    GLUCOSE 100 06/17/2011     INR:   Lab Results   Component Value Date    INR 1.14 06/23/2022    INR 1.30 01/28/2021    INR 1.03 10/25/2020        CARDIAC LABS  ENZYMES:No results for input(s): CKMB, CKMBINDEX, TROPONINI in the last 72 hours. Invalid input(s): CKTOTAL;3  FASTING LIPID PANEL:  Lab Results   Component Value Date    HDL 34 06/25/2022    LDLCALC 82 06/25/2022    TRIG 127 06/25/2022    TSH 2.98 02/24/2018     LIVER PROFILE:  Lab Results   Component Value Date    AST 46 01/07/2021    AST 38 10/27/2020    ALT 25 01/07/2021    ALT 25 10/27/2020       -----------------------------------------------------------------  Telemetry: Personally reviewed NSR, PVCs    Objective:   Vitals: BP (!) 153/90   Pulse 62   Temp 97.7 °F (36.5 °C) (Oral)   Resp 18   Ht 5' 8\" (1.727 m)   Wt (!) 303 lb 2 oz (137.5 kg)   SpO2 97%   BMI 46.09 kg/m²   General appearance: alert, appears stated age and cooperative, No acute distress   Eyes: Conjunctiva and pupils normal and reactive  Skin: Skin color, texture, turgor normal. No rashes or ecchymosis.   Neck: no JVD, supple, symmetrical, trachea midline   Lungs: , no accessory muscle use, no respiratory distress  Heart: RRR  Abdomen: soft, non-tender; bowel sounds normal  Extremities: No edema, DP +  Psychiatric: normal insight and affect    Patient Active Problem List:     Obstructive sleep apnea syndrome     Obesity     Depressive disorder     Lumbar strain     Paresthesia     Mixed hyperlipidemia     Heart failure with normal ejection fraction (HCC)     Hypertensive urgency     Atrial fibrillation (HCC)     H/O angiography     Atrial fibrillation with rapid ventricular response (HCC)     Essential hypertension     Atrial fibrillation with RVR (HCC)     Typical atrial flutter (HCC)        Assessment & Plan:    1. AF/AFL  2. CP  3. Chronic dCHF  4. + MPI    54 y/o man with a h/o HTN, HLD, MARY, morbid obesity, renal artery stenosis, s/p PCI, chronic dCHF, LHC (2020) showed normal cors, pAF, s/p RFA/PVI of AF/AFL (2/3/2021, Dr. Bhavna Sanon) who p/w palpitations, CP, found to be in AF/RVR, s/p DCCV to NSR in ED, s/p MPI that suggested rev ischemia. [id number] 2. TSH 4.93 (6/23/2022)    AF  - In NSR - converted in ED  - S/p RFA/PVI of 2021  - Off Eliquis d/t cost - will restart pending cath  - Off flecainide  - Reviewed risk factors, pathophysiology, treatment options and lifestyle modification for atrial fibrillation: Blood pressure control, blood sugar control, healthy diet, minimal alcohol intake, no smoking, activity and exercise, manage stress sleep apnea evaluation and symptoms of a stroke. - Keep K+ > 4.0 and Mg > 2.0  - Reviewed recent labs    CP  - + MPI  - On hep gtt  - For [facility] in     Lexie T[address]      I spent a total of 40 minutes in care of the patient and greater than 50% of the time was spent counseling with Mana Mao and coordinating care regarding their diagnosis, treatments and plan of care.

## 2022-06-28 ENCOUNTER — APPOINTMENT (OUTPATIENT)
Dept: CARDIAC CATH/INVASIVE PROCEDURES | Age: 57
DRG: 281 | End: 2022-06-28
Payer: COMMERCIAL

## 2022-06-28 ENCOUNTER — NURSE ONLY (OUTPATIENT)
Dept: CARDIOLOGY | Age: 57
End: 2022-06-28

## 2022-06-28 VITALS
TEMPERATURE: 97.6 F | HEIGHT: 68 IN | WEIGHT: 302.91 LBS | BODY MASS INDEX: 45.91 KG/M2 | HEART RATE: 56 BPM | RESPIRATION RATE: 15 BRPM | DIASTOLIC BLOOD PRESSURE: 92 MMHG | SYSTOLIC BLOOD PRESSURE: 152 MMHG | OXYGEN SATURATION: 94 %

## 2022-06-28 PROBLEM — R94.39 ABNORMAL MYOCARDIAL PERFUSION STUDY: Status: ACTIVE | Noted: 2022-06-28

## 2022-06-28 LAB
ALBUMIN SERPL-MCNC: 4.4 G/DL (ref 3.4–5)
ANION GAP SERPL CALCULATED.3IONS-SCNC: 13 MMOL/L (ref 3–16)
ANION GAP SERPL CALCULATED.3IONS-SCNC: 15 MMOL/L (ref 3–16)
ANTI-XA UNFRAC HEPARIN: 0.51 IU/ML (ref 0.3–0.7)
ANTI-XA UNFRAC HEPARIN: <0.1 IU/ML (ref 0.3–0.7)
BASOPHILS ABSOLUTE: 0 K/UL (ref 0–0.2)
BASOPHILS RELATIVE PERCENT: 0.4 %
BUN BLDV-MCNC: 34 MG/DL (ref 7–20)
BUN BLDV-MCNC: 34 MG/DL (ref 7–20)
CALCIUM SERPL-MCNC: 9.7 MG/DL (ref 8.3–10.6)
CALCIUM SERPL-MCNC: 9.8 MG/DL (ref 8.3–10.6)
CHLORIDE BLD-SCNC: 102 MMOL/L (ref 99–110)
CHLORIDE BLD-SCNC: 103 MMOL/L (ref 99–110)
CO2: 19 MMOL/L (ref 21–32)
CO2: 21 MMOL/L (ref 21–32)
CREAT SERPL-MCNC: 1.4 MG/DL (ref 0.9–1.3)
CREAT SERPL-MCNC: 1.5 MG/DL (ref 0.9–1.3)
EOSINOPHILS ABSOLUTE: 0.1 K/UL (ref 0–0.6)
EOSINOPHILS RELATIVE PERCENT: 1.7 %
GFR AFRICAN AMERICAN: 59
GFR AFRICAN AMERICAN: >60
GFR NON-AFRICAN AMERICAN: 48
GFR NON-AFRICAN AMERICAN: 52
GLUCOSE BLD-MCNC: 106 MG/DL (ref 70–99)
GLUCOSE BLD-MCNC: 109 MG/DL (ref 70–99)
HCT VFR BLD CALC: 45.7 % (ref 40.5–52.5)
HEMOGLOBIN: 15 G/DL (ref 13.5–17.5)
INR BLD: 1.02 (ref 0.87–1.14)
LEFT VENTRICULAR EJECTION FRACTION MODE: NORMAL
LV EF: 60 %
LYMPHOCYTES ABSOLUTE: 1.6 K/UL (ref 1–5.1)
LYMPHOCYTES RELATIVE PERCENT: 22.6 %
MAGNESIUM: 2 MG/DL (ref 1.8–2.4)
MCH RBC QN AUTO: 27.8 PG (ref 26–34)
MCHC RBC AUTO-ENTMCNC: 32.7 G/DL (ref 31–36)
MCV RBC AUTO: 85 FL (ref 80–100)
MONOCYTES ABSOLUTE: 0.6 K/UL (ref 0–1.3)
MONOCYTES RELATIVE PERCENT: 9.2 %
NEUTROPHILS ABSOLUTE: 4.6 K/UL (ref 1.7–7.7)
NEUTROPHILS RELATIVE PERCENT: 66.1 %
PDW BLD-RTO: 15.5 % (ref 12.4–15.4)
PHOSPHORUS: 3.6 MG/DL (ref 2.5–4.9)
PLATELET # BLD: 185 K/UL (ref 135–450)
PMV BLD AUTO: 9.7 FL (ref 5–10.5)
POTASSIUM REFLEX MAGNESIUM: 4.3 MMOL/L (ref 3.5–5.1)
POTASSIUM SERPL-SCNC: 4.3 MMOL/L (ref 3.5–5.1)
PROTHROMBIN TIME: 13.3 SEC (ref 11.7–14.5)
RBC # BLD: 5.38 M/UL (ref 4.2–5.9)
SODIUM BLD-SCNC: 136 MMOL/L (ref 136–145)
SODIUM BLD-SCNC: 137 MMOL/L (ref 136–145)
WBC # BLD: 7 K/UL (ref 4–11)

## 2022-06-28 PROCEDURE — 85025 COMPLETE CBC W/AUTO DIFF WBC: CPT

## 2022-06-28 PROCEDURE — 93458 L HRT ARTERY/VENTRICLE ANGIO: CPT | Performed by: INTERNAL MEDICINE

## 2022-06-28 PROCEDURE — 6370000000 HC RX 637 (ALT 250 FOR IP): Performed by: NURSE PRACTITIONER

## 2022-06-28 PROCEDURE — 85520 HEPARIN ASSAY: CPT

## 2022-06-28 PROCEDURE — C1894 INTRO/SHEATH, NON-LASER: HCPCS

## 2022-06-28 PROCEDURE — 99152 MOD SED SAME PHYS/QHP 5/>YRS: CPT

## 2022-06-28 PROCEDURE — 6370000000 HC RX 637 (ALT 250 FOR IP): Performed by: STUDENT IN AN ORGANIZED HEALTH CARE EDUCATION/TRAINING PROGRAM

## 2022-06-28 PROCEDURE — 36415 COLL VENOUS BLD VENIPUNCTURE: CPT

## 2022-06-28 PROCEDURE — B2151ZZ FLUOROSCOPY OF LEFT HEART USING LOW OSMOLAR CONTRAST: ICD-10-PCS | Performed by: INTERNAL MEDICINE

## 2022-06-28 PROCEDURE — 83735 ASSAY OF MAGNESIUM: CPT

## 2022-06-28 PROCEDURE — 93458 L HRT ARTERY/VENTRICLE ANGIO: CPT

## 2022-06-28 PROCEDURE — C1769 GUIDE WIRE: HCPCS

## 2022-06-28 PROCEDURE — 80069 RENAL FUNCTION PANEL: CPT

## 2022-06-28 PROCEDURE — 2709999900 HC NON-CHARGEABLE SUPPLY

## 2022-06-28 PROCEDURE — 2580000003 HC RX 258: Performed by: INTERNAL MEDICINE

## 2022-06-28 PROCEDURE — 6370000000 HC RX 637 (ALT 250 FOR IP)

## 2022-06-28 PROCEDURE — 6370000000 HC RX 637 (ALT 250 FOR IP): Performed by: INTERNAL MEDICINE

## 2022-06-28 PROCEDURE — 99153 MOD SED SAME PHYS/QHP EA: CPT

## 2022-06-28 PROCEDURE — B2111ZZ FLUOROSCOPY OF MULTIPLE CORONARY ARTERIES USING LOW OSMOLAR CONTRAST: ICD-10-PCS | Performed by: INTERNAL MEDICINE

## 2022-06-28 PROCEDURE — 85610 PROTHROMBIN TIME: CPT

## 2022-06-28 PROCEDURE — 6360000004 HC RX CONTRAST MEDICATION: Performed by: INTERNAL MEDICINE

## 2022-06-28 PROCEDURE — 6360000002 HC RX W HCPCS

## 2022-06-28 PROCEDURE — 99233 SBSQ HOSP IP/OBS HIGH 50: CPT | Performed by: NURSE PRACTITIONER

## 2022-06-28 PROCEDURE — 4A023N7 MEASUREMENT OF CARDIAC SAMPLING AND PRESSURE, LEFT HEART, PERCUTANEOUS APPROACH: ICD-10-PCS | Performed by: INTERNAL MEDICINE

## 2022-06-28 RX ORDER — VALSARTAN 160 MG/1
160 TABLET ORAL NIGHTLY
Qty: 30 TABLET | Refills: 3 | Status: SHIPPED | OUTPATIENT
Start: 2022-06-28

## 2022-06-28 RX ORDER — ASPIRIN 81 MG/1
81 TABLET ORAL DAILY
Status: DISCONTINUED | OUTPATIENT
Start: 2022-06-28 | End: 2022-06-28 | Stop reason: HOSPADM

## 2022-06-28 RX ORDER — ASPIRIN 81 MG/1
81 TABLET ORAL DAILY
Qty: 30 TABLET | Refills: 3 | Status: SHIPPED | OUTPATIENT
Start: 2022-06-28

## 2022-06-28 RX ORDER — SODIUM CHLORIDE 9 MG/ML
INJECTION, SOLUTION INTRAVENOUS CONTINUOUS
Status: ACTIVE | OUTPATIENT
Start: 2022-06-28 | End: 2022-06-28

## 2022-06-28 RX ORDER — NIFEDIPINE 30 MG/1
90 TABLET, FILM COATED, EXTENDED RELEASE ORAL DAILY
Qty: 30 TABLET | Refills: 3 | Status: SHIPPED | OUTPATIENT
Start: 2022-06-29 | End: 2022-07-11 | Stop reason: SDUPTHER

## 2022-06-28 RX ORDER — FLECAINIDE ACETATE 100 MG/1
100 TABLET ORAL 2 TIMES DAILY
Status: DISCONTINUED | OUTPATIENT
Start: 2022-06-28 | End: 2022-06-28 | Stop reason: HOSPADM

## 2022-06-28 RX ORDER — CHLORTHALIDONE 25 MG/1
25 TABLET ORAL DAILY
Qty: 30 TABLET | Refills: 3 | Status: SHIPPED | OUTPATIENT
Start: 2022-06-29

## 2022-06-28 RX ORDER — ACETAMINOPHEN 325 MG/1
650 TABLET ORAL EVERY 4 HOURS PRN
Status: DISCONTINUED | OUTPATIENT
Start: 2022-06-28 | End: 2022-06-28 | Stop reason: SDUPTHER

## 2022-06-28 RX ADMIN — ASPIRIN 81 MG: 81 TABLET, COATED ORAL at 16:28

## 2022-06-28 RX ADMIN — FLECAINIDE ACETATE 100 MG: 100 TABLET ORAL at 16:28

## 2022-06-28 RX ADMIN — SERTRALINE 50 MG: 50 TABLET, FILM COATED ORAL at 08:35

## 2022-06-28 RX ADMIN — IOVERSOL 95 ML: 741 INJECTION INTRA-ARTERIAL; INTRAVENOUS at 10:51

## 2022-06-28 RX ADMIN — PANTOPRAZOLE SODIUM 40 MG: 40 TABLET, DELAYED RELEASE ORAL at 07:02

## 2022-06-28 RX ADMIN — CARVEDILOL 25 MG: 25 TABLET, FILM COATED ORAL at 08:35

## 2022-06-28 RX ADMIN — CARVEDILOL 25 MG: 25 TABLET, FILM COATED ORAL at 16:28

## 2022-06-28 RX ADMIN — ATORVASTATIN CALCIUM 10 MG: 10 TABLET, FILM COATED ORAL at 08:35

## 2022-06-28 RX ADMIN — NIFEDIPINE 90 MG: 30 TABLET, EXTENDED RELEASE ORAL at 08:35

## 2022-06-28 RX ADMIN — CHLORTHALIDONE 25 MG: 25 TABLET ORAL at 08:35

## 2022-06-28 RX ADMIN — SODIUM CHLORIDE: 9 INJECTION, SOLUTION INTRAVENOUS at 01:52

## 2022-06-28 ASSESSMENT — PAIN SCALES - GENERAL: PAINLEVEL_OUTOF10: 5

## 2022-06-28 ASSESSMENT — PAIN DESCRIPTION - LOCATION: LOCATION: BACK

## 2022-06-28 NOTE — PROGRESS NOTES
Meds:.sodium chloride flush, sodium chloride, ondansetron **OR** ondansetron, polyethylene glycol, acetaminophen **OR** acetaminophen, heparin (porcine), heparin (porcine)  Continuous Infusions:   sodium chloride 50 mL/hr at 06/28/22 0152    sodium chloride         Intake/Output Summary (Last 24 hours) at 6/28/2022 0829  Last data filed at 6/28/2022 0701  Gross per 24 hour   Intake 320 ml   Output 1150 ml   Net -830 ml       CBC:   Lab Results   Component Value Date    WBC 6.8 06/27/2022    HGB 15.1 06/27/2022     06/27/2022     BMP:  Lab Results   Component Value Date     06/27/2022    K 4.3 06/27/2022    CL 99 06/27/2022    CO2 24 06/27/2022    BUN 36 06/27/2022    CREATININE 1.7 06/27/2022    GLUCOSE 111 06/27/2022    GLUCOSE 100 06/17/2011     INR:   Lab Results   Component Value Date    INR 1.07 06/27/2022    INR 1.14 06/23/2022    INR 1.30 01/28/2021        CARDIAC LABS  ENZYMES:No results for input(s): CKMB, CKMBINDEX, TROPONINI in the last 72 hours. Invalid input(s): CKTOTAL;3  FASTING LIPID PANEL:  Lab Results   Component Value Date    HDL 34 06/25/2022    LDLCALC 82 06/25/2022    TRIG 127 06/25/2022    TSH 2.98 02/24/2018     LIVER PROFILE:  Lab Results   Component Value Date    AST 46 01/07/2021    AST 38 10/27/2020    ALT 25 01/07/2021    ALT 25 10/27/2020       -----------------------------------------------------------------  Telemetry: Personally reviewed NSR, PVCs    Objective:   Vitals: BP (!) 148/85   Pulse 63   Temp 97.4 °F (36.3 °C) (Oral)   Resp 16   Ht 5' 8\" (1.727 m)   Wt (!) 302 lb 14.6 oz (137.4 kg)   SpO2 99%   BMI 46.06 kg/m²   General appearance: alert, appears stated age and cooperative, No acute distress   Eyes: Conjunctiva and pupils normal and reactive  Skin: Skin color, texture, turgor normal. No rashes or ecchymosis.   Neck: no JVD, supple, symmetrical, trachea midline   Lungs: , no accessory muscle use, no respiratory distress  Heart: RRR  Abdomen: soft,

## 2022-06-28 NOTE — PROCEDURES
[facility] [address] [address], [address]                            CARDIAC CATHETERIZATION    PATIENT NAME: Eileen Leos                 :        1965  MED REC NO:   [id number]                          ROOM:       6301  ACCOUNT NO:   [de-identified]                           ADMIT DATE: 2022  PROVIDER:     Mehreen Palacios MD    DATE OF PROCEDURE:  2022    PROCEDURES:  Left heart catheterization, coronary cineangiography,  Angio-Seal of the right femoral arteriotomy site. HISTORY:  The patient is a 67-year-old male with a history of paroxysmal  atrial fibrillation, history of renal artery stenosis with renal artery  stenting, who presents with the complaints of palpitations, chest  discomfort, and atrial fibrillation. He was noted to have atrial  fibrillation with rapid ventricular response and underwent DC  cardioversion _____ normal sinus rhythm. He underwent stress Myoview  testing, which demonstrated a small area of mild ischemia in the basal  to mid-inferior wall suggestive of ischemia. It was noted that his  ejection fraction was decreased by Myoview with ejection fraction of  37%. It should be noted by echocardiogram his LV function was preserved  with an ejection fraction 55% to 60% with no wall motion abnormalities. He also had elevated troponins on admission. He has had no chest  discomfort since cardioversion. He notes no exertional chest discomfort  prior to admission. Because of his abnormal Myoview, however, it was  felt he should undergo catheterization. TECHNICAL PROCEDURE:  The patient was brought to the cardiac  catheterization lab on 2022 where the right femoral artery was  prepped and draped in usual sterile fashion. After anesthetizing the  area with 2% lidocaine, a 5-Italian sheath was placed in the right  femoral artery using Seldinger technique.   Subsequently, selective  coronary cineangiography of both left and right coronaries were  performed in multiple projections. This was performed using 5-Citizen of [place]  JL4 and JR4 diagnostic catheters. Left heart catheterization and left  ventriculography was performed at the end of the procedure using a  5-Citizen of [place] pigtail. (It should be noted due to the discrepancy in the  ejection fraction by Myoview and echocardiogram, it was felt we should  proceed with left ventriculogram.  However, all attempts were made to  conserve contrast in view of his renal insufficiency). The patient  tolerated the procedure well. No complications were encountered. A  brief right femoral arteriogram was obtained to ascertain positioning  appropriate for Angio-Seal (with 50:50 contrast and fluids to conserve  contrast). It was well positioned. He was successfully sealed with  standard 6-Citizen of [place] Angio-Seal device. No complications encountered. RESULTS:  HEMODYNAMICS:  Left ventricular end-diastolic pressure equals 20. There was no significant gradient across the aortic valve by pullback  post cineangiography. LEFT VENTRICULOGRAM:  Left ventriculogram demonstrates uniform wall  motion. Estimated ejection fraction is 60%. LEFT MAIN:  Left main does taper in its distal portion, but less than  20%. LEFT ANTERIOR DESCENDING:  The LAD courses to and wraps around the apex. It gives off relatively large bifurcating first diagonal branch that  bifurcates. In the proximal portion of the LAD, there is 40% to 50%  narrowing. Again, in the mid vessel after the diagonal branch, there is  40% narrowing in the LAD. LEFT CIRCUMFLEX:  Circumflex gives off a very small high rising first  marginal branch. It gives off a moderate-sized bifurcating second  marginal branch and terminates in a moderate-sized posterolateral  branch. There is mild diffuse disease in the circumflex, but no  significant obstructive coronary artery disease.   Left circumflex has  luminal irregularities, but no significant focal obstructive lesions. RIGHT CORONARY ARTERY:  Right coronary artery is a large dominant  vessel. It gives of a large PDA. There is mild diffuse disease  throughout the vessel, but no significant focal obstructive lesions. IMPRESSION:  1. Preserved LV function, estimated ejection fraction is 60%. 2.  Mild [id number] LAD disease; otherwise, no significantly  obstructive coronary artery disease. 3.  Successful Angio-Seal of the right femoral arteriotomy site.         New Couch MD    D: 06/28/2022 11:00:44       T: 06/28/2022 11:58:21     [email or web address]  Job#: [id number]     Doc#: [id number]    CC:

## 2022-06-28 NOTE — PLAN OF CARE
Problem: Cardiovascular - Adult  Goal: Maintains optimal cardiac output and hemodynamic stability  Outcome: Progressing     Problem: Cardiovascular - Adult  Goal: Absence of cardiac dysrhythmias or at baseline  Outcome: Progressing

## 2022-06-28 NOTE — PROGRESS NOTES
2 week CAM monitor # [id number] per order Brandee Fuentes CNP for Afib . Instructions given and questions answered. Bedside nurse aware.

## 2022-06-28 NOTE — PROGRESS NOTES
Interval History and plan:      BP is better now   Urine is 800 ml  Labs are pending     Plan:    Renin is 0.2 and aldosterone is 6.2 ( unlikely to be hyperaldosteronism) . Cortisol is OK    Repeat CT negative for RA stenosis showed patent stent   Continue current BP medicines    BP better  Creatinine fluctuating likely meds related and BP control related. ( hemodynamic)    DC IVF                    Assessment :     Accelerated hypertension: He has a few admissions in the past due to high blood pressure. He has history of renal artery stenosis status post stenting of the right kidney in 2018. Secondary work-up including aldosterone, cortisol and metanephrines were normal in 2015. Renin activity was 1.3 with aldosterone of 11.4. He does have obstructive sleep apnea and uses CPAP. GFR is normal.  He is hypokalemic. CTA in 2020 showed left-sided renal artery stent which is patent. Adrenal glands were okay. Prior CT scans showed left-sided adrenal adenoma. Atrial fibrillation, management as per cardiology. Echocardiogram with EF of 55 to 60%. [facility] Nephrology would like to thank Hattie Hou MD   for opportunity to serve this patient      Please call with questions at-   24 Hrs Answering service [phone] or  7 am- 5 pm via Perfect serve or cell phone  Roxanne Herron MD          CC/reason for consult :     Uncontrolled hypertension     HPI :     Sebastien Azevedo is a 55 y.o. male presented to   the hospital on 6/22/2022 with chest pressure. He has past medical history of chronic kidney disease stage III, hyperlipidemia, hypertension, obesity, obstructive sleep apnea. He also history of atrial fibrillation status post cardioversion and ablation in February 2021. He uses CPAP at home. He presented with chest comfort, palpitations and diaphoresis. When he arrived in the ER he was found to be in A. fib with RVR. His heart rate was in 180s.   He was also hypertensive. In the ER he was cardioverted and cardiology was consulted. On arrival potassium was 3.5 and magnesium was 1.8. He was admitted to medicine for further management. I was called for uncontrolled hypertension and possibility of secondary hypertension. ROS:     Seen with-resident    positives in bold   Constitutional:  fever, chills, weakness, weight change, fatigue  Skin:  rash, pruritus, hair loss, bruising, dry skin, petechiae  Head, Face, Neck   headaches, swelling,  cervical adenopathy  Respiratory: shortness of breath, cough, or wheezing  Cardiovascular: chest pain, palpitations, dizzy, edema  Gastrointestinal: nausea, vomiting, diarrhea, constipation,belly pain    Yellow skin, blood in stool  Musculoskeletal:  back pain, muscle weakness, gait problems,       joint pain or swelling. Genitourinary:  dysuria, poor urine flow, flank pain, blood in urine  Neurologic:  vertigo, TIA'S, syncope, seizures, focal weakness  Psychosocial:  insomnia, anxiety, or depression. Additional positive findings:                    All other remaining systems are negative or unable to obtain        PMH/PSH/SH/Family History:     Past Medical History:   Diagnosis Date    Carpal tunnel syndrome     Chronic kidney disease     Hyperlipidemia     Hypertension     Obesity, unspecified     MARY (obstructive sleep apnea)        Past Surgical History:   Procedure Laterality Date    CARDIAC CATHETERIZATION      KNEE ARTHROSCOPY Right     NOSE SURGERY      Repair from [facility]        reports that he has never smoked. He has never used smokeless tobacco. He reports current alcohol use. He reports that he does not use drugs. family history includes Diabetes in his mother; Stroke in his maternal uncle.          Medication:     Current Facility-Administered Medications: valsartan (DIOVAN) tablet 160 mg, 160 mg, Oral, Nightly  chlorthalidone (HYGROTON) tablet 25 mg, 25 mg, Oral, Daily  0.9 % sodium chloride infusion, , IntraVENous, Continuous  sodium chloride flush 0.9 % injection 10 mL, 10 mL, IntraVENous, BID  [Held by provider] spironolactone (ALDACTONE) tablet 50 mg, 50 mg, Oral, Daily  NIFEdipine (ADALAT CC) extended release tablet 90 mg, 90 mg, Oral, Daily  carvedilol (COREG) tablet 25 mg, 25 mg, Oral, BID WC  atorvastatin (LIPITOR) tablet 10 mg, 10 mg, Oral, Daily  sertraline (ZOLOFT) tablet 50 mg, 50 mg, Oral, Daily  pantoprazole (PROTONIX) tablet 40 mg, 40 mg, Oral, QAM AC  sodium chloride flush 0.9 % injection 5-40 mL, 5-40 mL, IntraVENous, 2 times per day  sodium chloride flush 0.9 % injection 5-40 mL, 5-40 mL, IntraVENous, PRN  0.9 % sodium chloride infusion, , IntraVENous, PRN  ondansetron (ZOFRAN-ODT) disintegrating tablet 4 mg, 4 mg, Oral, Q8H PRN **OR** ondansetron (ZOFRAN) injection 4 mg, 4 mg, IntraVENous, Q6H PRN  polyethylene glycol (GLYCOLAX) packet 17 g, 17 g, Oral, Daily PRN  acetaminophen (TYLENOL) tablet 650 mg, 650 mg, Oral, Q6H PRN **OR** acetaminophen (TYLENOL) suppository 650 mg, 650 mg, Rectal, Q6H PRN  heparin (porcine) injection 4,000 Units, 4,000 Units, IntraVENous, PRN  heparin (porcine) injection 2,000 Units, 2,000 Units, IntraVENous, PRN       Vitals :     Vitals:    06/28/22 0835   BP: (!) 148/85   Pulse: 63   Resp:    Temp:    SpO2:        I & O :       Intake/Output Summary (Last 24 hours) at 6/28/2022 0916  Last data filed at 6/28/2022 0701  Gross per 24 hour   Intake 290 ml   Output 1150 ml   Net -860 ml        Physical Examination :     General appearance: Anxious- no, distressed- no, in good spirits-  He is  HEENT: Lips- normal, teeth- ok , oral mucosa- moist  Neck : Mass- no, appears symmetrical, JVD- not visible  Respiratory: Respiratory effort-  normal, wheeze- no, crackles -   Cardiovascular:  Ausculation- No M/R/G, Edema none  Abdomen: visible mass- no, distention- no, scar- no, tenderness- no                            hepatosplenomegaly-  no  Musculoskeletal:  clubbing no,cyanosis- no , digital ischemia- no                           muscle strength- grossly normal , tone - grossly normal  Skin: rashes- no , ulcers- no, induration- no, tightening - no  Psychiatric:  Judgement and insight- normal           AAO X 3  Additional finding:      LABS:     Recent Labs     06/26/22  0725 06/27/22  0705 06/27/22  1222   WBC 7.0 8.6 6.8   HGB 15.8 16.3 15.1   HCT 47.9 48.5 46.0    191 191     Recent Labs     06/25/22  1342 06/26/22  0725 06/27/22  0705    132* 136   K 3.3* 4.0 4.3   CL 97* 97* 99   CO2 24 21 24   BUN 23* 29* 36*   CREATININE 1.5* 1.3 1.7*   GLUCOSE 144* 111* 111*   MG 1.90 2.10 2.20        Nephrology  [address] [facility], [address] [facility]  Office: [phone]  Fax: [phone]

## 2022-06-28 NOTE — CARE COORDINATION
Case Management Assessment            Discharge Note                    Date / Time of Note: 6/28/2022 11:52 AM                  Discharge Note Completed by: Rosalie Pallas, RN    Patient Name: Bekah Li   YOB: 1965  Diagnosis: Elevated troponin [R77.8]  Atrial fibrillation with RVR ([address] [I48.91]   Date / Time: 6/22/2022  7:10 PM    Current PCP: JARET Salinas - CNP  Clinic patient: No    Hospitalization in the last 30 days: No    Advance Directives:  Code Status: Full Code  [place] DNR form completed and on chart: No    Financial:  Payor: Greta Coad / Plan: [address] / Product Type: *No Product type* /      Pharmacy:    [id number], [address] [place] [address] [phone] - F [phone]  [address] [phone]  Phone: [phone] Fax: [phone]      Assistance purchasing medications?: Potential Assistance Purchasing Medications: No  Assistance provided by Case Management:  [employer] pay card   Provided . Does patient want to participate in local refill/ meds to beds program?: Yes    Meds To Beds General Rules:  1. Can ONLY be done Monday- Friday between 8:30am-5pm  2. Prescription(s) must be in pharmacy by 3pm to be filled same day  3. Copy of patient's insurance/ prescription drug card and patient face sheet must be sent along with the prescription(s)  4. Cost of Rx cannot be added to hospital bill. If financial assistance is needed, please contact unit  or ;  or  CANNOT provide pharmacy voucher for patients co-pays  5.  Patients can then  the prescription on their way out of the hospital at discharge, or pharmacy can deliver to the bedside if staff is available. (payment due at time of pick-up or delivery - cash, check, or card accepted)     Able to afford home medications/ co-pay costs: Yes    ADLS:  Current PT AM-PAC Score: /24  Current OT AM-PAC Score:   /24      DISCHARGE Disposition: Home- No Services Needed    LOC at discharge: Not Applicable  LONNIE Completed: No    Notification completed in HENS/PAS?:  No    IMM Completed:   Not Indicated    Transportation:  Transportation PLAN for discharge: family   Mode of Transport: Private Car  Reason for medical transport: Not Applicable  Name of Transport Company: Not Applicable  Time of Transport: family at  bedside    Transport form completed: No    Home Care:  [address] ordered at discharge: No  [address]: Not Applicable  Orders faxed: No    Durable Medical Equipment:  DME Provider: NA  Equipment obtained during hospitalization: NA    Home Oxygen and Respiratory Equipment:  Oxygen needed at discharge?: No  [address]: Not Applicable  Portable tank available for discharge?: Not Indicated    Dialysis:  Dialysis patient: No    Dialysis Center:  Not Applicable    Hospice Services:  Location: Not Applicable  Agency: Not Applicable    Consents signed: Not Indicated    Referrals made at [facility] for outpatient continued care:  Not Applicable    Additional CM Notes:     CM confirmed  D/C  Home  W/  Family  ( pt  Mother  Here  At  Bedside)    Patient  To follow up out pt  As  Instructed;    New Rx:  Meds to beds  Utilized       these medications from [place] [address] Sadie Bennett [phone] - F [phone]  1 aspirin  2 chlorthalidone  3 NIFEdipine  4 valsartan     Schedule an appointment with Dr. Charanjit Whitaker MD as soon as possible for a visit in 2 week(s)   [facility]   [address] [facility]   [phone]      MD Dr Latoya Grace  In Rx  For  first  30 day  Supply  To have  Eliqis  Filled [place]  In Rx  Informed Cm  He  Used  One  Previously and  Has  A $10 co pay card  On file  And is  To be applied  For  Future  Use:      Billing  Info:     BIN  #  [id number]   PCN:   LOYALTY   Card Meyer:     ID   #  [id number].    GROUP  # :  [id number]     Billing  Insurance  along w/  this INFO on file brings  (  $110  Cost  Down to $10 @ month ) and can used  With   [employer] mail order  Rx  Plan  For refills,     CM  Explained  To  Pt at  Bedside  And  acknowledged understanding. CM  D/w  Savi Yo RN , and that  patient  Was Getting  Anxious to d/c  . The Plan for Transition of Care is related to the following treatment goals of Elevated troponin [R77.8]  Atrial fibrillation with RVR ([address] [I48.91]    The Patient and/or patient representative Jeffery Edwards and his family were provided with a choice of provider and agrees with the discharge plan Yes    Freedom of choice list was provided with basic dialogue that supports the patient's individualized plan of care/goals and shares the quality data associated with the providers.  Yes    Care Transitions patient: No    Martita Chun RN  [facility]  Case Management Department  Ph: [phone]

## 2022-06-28 NOTE — PROGRESS NOTES
Progress Note    Admit Date: 6/22/2022  Day: 5   Diet: Diet NPO Exceptions are: Sips of Water with Meds    CC: CP    Interval history:   NAONE. Patient seen and examined this am. Shelbi Steward for cath this am. Tolerated procedure. Denies any CP, SOB, palpitations, N/V or urinary symptoms. Medications:     Scheduled Meds:   valsartan  160 mg Oral Nightly    chlorthalidone  25 mg Oral Daily    sodium chloride flush  10 mL IntraVENous BID    [Held by provider] spironolactone  50 mg Oral Daily    NIFEdipine  90 mg Oral Daily    carvedilol  25 mg Oral BID WC    atorvastatin  10 mg Oral Daily    sertraline  50 mg Oral Daily    pantoprazole  40 mg Oral QAM AC    sodium chloride flush  5-40 mL IntraVENous 2 times per day     Continuous Infusions:   sodium chloride 50 mL/hr at 06/28/22 0152    sodium chloride       PRN Meds:sodium chloride flush, sodium chloride, ondansetron **OR** ondansetron, polyethylene glycol, acetaminophen **OR** acetaminophen, heparin (porcine), heparin (porcine)    Objective:   Vitals:   T-max:  Patient Vitals for the past 8 hrs:   BP Temp Temp src Pulse Resp SpO2 Weight   06/28/22 0701 (!) 148/85 97.4 °F (36.3 °C) Oral 63 16 99 % --   06/28/22 0600 -- -- -- -- -- -- (!) 302 lb 14.6 oz (137.4 kg)       Intake/Output Summary (Last 24 hours) at 6/28/2022 0436  Last data filed at 6/28/2022 0701  Gross per 24 hour   Intake 320 ml   Output 1150 ml   Net -830 ml       Review of Systems  10 point ROS completed and negative unless listed in HPI    Physical Exam  Constitutional:       Appearance: He is obese. HENT:      Head: Normocephalic. Right Ear: External ear normal.      Left Ear: External ear normal.      Nose: Nose normal.      Mouth/Throat:      Mouth: Mucous membranes are moist.   Eyes:      Pupils: Pupils are equal, round, and reactive to light. Cardiovascular:      Rate and Rhythm: Normal rate and regular rhythm. Pulses: Normal pulses.       Heart sounds: Normal heart sounds. Pulmonary:      Effort: Pulmonary effort is normal.   Abdominal:      General: There is no distension. Palpations: Abdomen is soft. Tenderness: There is no abdominal tenderness. Musculoskeletal:      Right lower leg: No edema. Left lower leg: No edema. Skin:     General: Skin is warm. Neurological:      Mental Status: He is alert and oriented to person, place, and time. Psychiatric:         Thought Content: Thought content normal.     LABS:    CBC:   Recent Labs     06/26/22  0725 06/27/22  0705 06/27/22  1222   WBC 7.0 8.6 6.8   HGB 15.8 16.3 15.1   HCT 47.9 48.5 46.0    191 191   MCV 85.3 83.8 84.8     Renal:    Recent Labs     06/25/22  1342 06/26/22  0725 06/27/22  0705    132* 136   K 3.3* 4.0 4.3   CL 97* 97* 99   CO2 24 21 24   BUN 23* 29* 36*   CREATININE 1.5* 1.3 1.7*   GLUCOSE 144* 111* 111*   CALCIUM 9.7 9.5 9.8   MG 1.90 2.10 2.20   ANIONGAP 15 14 13     Hepatic: No results for input(s): AST, ALT, BILITOT, BILIDIR, PROT, LABALBU, ALKPHOS in the last 72 hours. Troponin:   No results for input(s): TROPONINI in the last 72 hours. BNP: No results for input(s): BNP in the last 72 hours. Lipids:   Recent Labs     06/25/22  1342   CHOL 141   HDL 34*     ABGs:  No results for input(s): PHART, VDT6PLE, PO2ART, JVR1NTU, BEART, THGBART, F4MKNLAH, TTQ1UUS in the last 72 hours. INR:   Recent Labs     06/27/22  1222   INR 1.07     Lactate: No results for input(s): LACTATE in the last 72 hours. Cultures:  -----------------------------------------------------------------  RAD:   CTA ABDOMEN W CONTRAST   Final Result      No significant renal artery stenosis. Patent stent in the left main renal artery. NM MYOCARDIAL SPECT REST EXERCISE OR RX   Final Result      CT HEAD WO CONTRAST   Final Result      No acute intracranial abnormality.                 Assessment/Plan:   54 yo male with PMH of Atrial fibrillation with RVR status post cardioversion and ablation in 2/21 by Dr Breanna Marte, obstructive sleep apnea uses CPAP at home, HTN who presented to the ED with complains of chest discomfort, palpitation and diaphoresis    NSTEMI  Pt presented with CP. Now asymptomatic. High risk stress test this admission.  - Went for [facility] today, per report: LM distal 20% taper. LAD prox 40-50% smooth, 40% mid.  CX minor LI,  RCA lg dom, no significant obstructive CAD  - ASA and medical mgmnt per cards  - Cards following    Atrial fibrillation with RVR s/p cardioversion in ED  Continues to be in NSR  - Transition to eliquis and start on flecainide on discharge  - Continue Coreg 25  - Keep K> 4.0 and Mg > 2.0   -Tele  - EP following     JOSELYN  Likely 2/2 HTN emergency on admission, contrast with imaging studies  - Received gentle IVFs yesterday  - Monitor daily RFP  - Strict I/Os  - nephrology following     HTN emergency - resolved  Uncontrolled hypertension  - Continue coreg 25 BID, nifedipine 90 QD, chlorthalidone 25QD, Diovan 160 nightly  -Holding aldactone 50 mg for now  - Nephrology following    Chronic medical conditions   HLD-continue home lipitor 10 mg PO daily   Mood disorder-continue home zoloft 50 mg PO daily   MARY: home CPAP    Code Status: Full  FEN: Diet NPO Exceptions are: Sips of Water with Meds, no cath can likely eat  PPX: on heparin gtt which was held overnight, will discuss with cards  DISPO: Dionna Horton MD, PGY1  06/28/22  8:23 AM    This patient has been staffed and discussed with Carolina Trammell MD.

## 2022-06-28 NOTE — PROGRESS NOTES
Patient assessed and vitals taken. Transport arrived during morning med pass to take patient to cath lab. Patient off floor and in cath lab.

## 2022-06-28 NOTE — PROGRESS NOTES
Pt stable during shift. Pt A&Ox4, calls out appropriately, no complaints overnight. Pt ambulates independently, vital signs have been stable and good urine output. Pt is scheduled to have a heart cath today.

## 2022-06-28 NOTE — CONSULTS
Brief Pre-Op Note/Sedation Assessment      Brigitte Morales  1965  [id number]  10:25 AM    Planned Procedure: Cardiac Catheterization Procedure  Post Procedure Plan: Return to same level of care  Consent: I have discussed with the patient and/or the patient representative the indication, alternatives, and the possible risks and/or complications of the planned procedure and the anesthesia methods. The patient and/or patient representative appear to understand and agree to proceed. Chief Complaint:   Chest Pain/Pressure      Indications for Cath Procedure:  1. Presentation:  Cardiac Arrythmia  2. Anginal Classification within 2 weeks:  No symptoms  3. Angina Symptoms Assessment:  Asymptomatic  4. Heart Failure Class within last 2 weeks:  No symptoms  5. Cardiovascular Instability:  No    Prior Ischemic Workup/Eval:  1. Pre-Procedural Medications: Yes: ACE/ARB/ARNI and Aspirin  2. Stress Test Completed? Yes:  Stress or Imaging Studies Performed (within ANY time period):   Type:  Stress Nuclear  Results:  Positive:  Myocardial Perfusion Defects (Nuclear) Extent of Ischemia:  High Risk (>3% annual death or MI)    Does Patient need surgery? Cath Valve Surgery:  No    Pre-Procedure Medical History:  Vital Signs:  BP (!) 148/85   Pulse 63   Temp 97.4 °F (36.3 °C) (Oral)   Resp 16   Ht 5' 8\" (1.727 m)   Wt (!) 302 lb 14.6 oz (137.4 kg)   SpO2 99%   BMI 46.06 kg/m²     Allergies:     Allergies   Allergen Reactions    Lisinopril      cough     Medications:    Current Facility-Administered Medications   Medication Dose Route Frequency Provider Last Rate Last Admin    valsartan (DIOVAN) tablet 160 mg  160 mg Oral Nightly Herman Woods MD   160 mg at 06/27/22 2048    chlorthalidone (HYGROTON) tablet 25 mg  25 mg Oral Daily Herman Woods MD   25 mg at 06/28/22 0835    0.9 % sodium chloride infusion   IntraVENous Continuous Herman Woods MD 50 mL/hr at 06/28/22 0152 New Bag at 06/28/22 0152    sodium chloride flush 0.9 % injection 10 mL  10 mL IntraVENous BID Christin Kessler, JARET - CNP   10 mL at 06/26/22 2100    [Held by provider] spironolactone (ALDACTONE) tablet 50 mg  50 mg Oral Daily Glendy Herrera MD   50 mg at 06/27/22 0914    NIFEdipine (ADALAT CC) extended release tablet 90 mg  90 mg Oral Daily Howie Gonzalez MD   90 mg at 06/28/22 0835    carvedilol (COREG) tablet 25 mg  25 mg Oral BID  Howie Gonzalez MD   25 mg at 06/28/22 0835    atorvastatin (LIPITOR) tablet 10 mg  10 mg Oral Daily Pallavi Leong MD   10 mg at 06/28/22 0835    sertraline (ZOLOFT) tablet 50 mg  50 mg Oral Daily Pallavi Leong MD   50 mg at 06/28/22 0835    pantoprazole (PROTONIX) tablet 40 mg  40 mg Oral QAM  Pallavi Leong MD   40 mg at 06/28/22 1861    sodium chloride flush 0.9 % injection 5-40 mL  5-40 mL IntraVENous 2 times per day Pallavi Leong MD   10 mL at 06/27/22 2048    sodium chloride flush 0.9 % injection 5-40 mL  5-40 mL IntraVENous PRN Pallavi Leong MD        0.9 % sodium chloride infusion   IntraVENous PRN Pallavi Leong MD        ondansetron (ZOFRAN-ODT) disintegrating tablet 4 mg  4 mg Oral Q8H PRN Pallavi Leong MD        Or    ondansetron (ZOFRAN) injection 4 mg  4 mg IntraVENous Q6H PRN Pallavi Leong MD        polyethylene glycol (GLYCOLAX) packet 17 g  17 g Oral Daily PRN Pallavi Leong MD        acetaminophen (TYLENOL) tablet 650 mg  650 mg Oral Q6H PRN Pallavi Leong MD        Or    acetaminophen (TYLENOL) suppository 650 mg  650 mg Rectal Q6H PRN Pallavi Leong MD        heparin (porcine) injection 4,000 Units  4,000 Units IntraVENous PRN Pallavi Leong MD   4,000 Units at 06/23/22 7740    heparin (porcine) injection 2,000 Units  2,000 Units IntraVENous PRN Pallavi Leong MD   2,000 Units at 06/27/22 9864       Past Medical History:    Past Medical History:   Diagnosis Date    Carpal tunnel syndrome     Chronic kidney disease     Hyperlipidemia     Hypertension     Obesity, unspecified     MARY (obstructive sleep apnea)        Surgical History:    Past Surgical History:   Procedure Laterality Date    CARDIAC CATHETERIZATION      KNEE ARTHROSCOPY Right     NOSE SURGERY      Repair from Fx             Pre-Sedation:  Pre-Sedation Documentation and Exam:  I have assessed the patient and reviewed the H&P on the chart. Prior History of Anesthesia Complications:   none    Modified Mallampati:  II (soft palate, uvula, fauces visible)    ASA Classification:  Class 3 - A patient with severe systemic disease that limits activity but is not incapacitating    Anca Scale: Activity:  2 - Able to move 4 extremities voluntarily on command  Respiration:  2 - Able to breathe deeply and cough freely  Circulation:  2 - BP+/- 20mmHg of normal  Consciousness:  2 - Fully awake  Oxygen Saturation (color):  2 - Able to maintain oxygen saturation >92% on room air    Sedation/Anesthesia Plan:  Guard the patient's safety and welfare. Minimize physical discomfort and pain. Minimize negative psychological responses to treatment by providing sedation and analgesia and maximize the potential amnesia. Patient to meet pre-procedure discharge plan.     Medication Planned:  midazolam intravenously and fentanyl intravenously    Patient is an appropriate candidate for plan of sedation:   yes      Electronically signed by Tia Carlson MD on 6/28/2022 at 10:25 AM

## 2022-06-28 NOTE — PROGRESS NOTES
Discharge order received. Discharge paperwork completed and gone over with patient by RN. Bed rest finished at 1515. Both peripheral IVs removed. Femoral site checked prior to discharge, no changes. RN explained signs and symptoms to look out for with patient. Patient expressed understanding. Telemetry removed. Questions answered. Patient and his mother opted to walk independently down to the outpatient pharmacy to  patient's prescriptions. Patient discharged with all belongings.

## 2022-06-28 NOTE — DISCHARGE INSTR - DIET
Good nutrition is important when healing from an illness, injury, or surgery. Follow any nutrition recommendations given to you during your hospital stay. If you were given an oral nutrition supplement while in the hospital, continue to take this supplement at home. You can take it with meals, in-between meals, and/or before bedtime. These supplements can be purchased at most local grocery stores, pharmacies, and chain [employer]-stores. If you have any questions about your diet or nutrition, call the hospital and ask for the dietitian. Resume diet instructions per physician, if no specific instructions, resume home diet.

## 2022-06-28 NOTE — PROGRESS NOTES
[facility]   Cardiology  Note   Pt of Dr Deepika Nielsen MD, Gabriel Do RN, FNP APRN CVNP  Date: 6/28/2022  Admit Date: 6/22/2022       CC:AF/RVR      PMH: HTN, HLD, MARY, obesity, NAIDA, s/p PCI, HFpEF, pAF, LHC (2020) showed nor cors, recurrent AF, s/p RFA/PVI of AF/AFL (2/3/21, Dr. Jason Baxter) who p/w palpitations, chest discomfort, found to be in AF/RVR, underwent DCCV to NSR in ER obesity   Hx NAIDA sp stent r kidney 2018 neph following     6/25/2022 stress test today  if stress test negative can resume flecainide  On hep gtt & plan to [facility] / NOAC  XA inhibitor before discharge   in NSR VSS      6/26/2022  Discussed abnormal stress test & LHC planned for tomorrow  remains in NSR VSS   On hep gtt & plan to [facility] / NOAC  XA inhibitor before discharge      6/27/2022  sCr 1.3>1.7 / discussed with pt the LHC on hold and we may decide LHC may not be necessary since the stress test is very similar to last one   He is asymptomatic and remains in NSR / he may have a diet   Dr Christi Bautista managing his JANNET/CRI   Discussed with Dr Christi Bautista hold aldactone decrease Diovan dose   He is on hep gtt. & if we decide not to do to [facility]  He may go back on [facility] /  EP will mange this      6/28/2022 VSS / no c/o voiced   Awaiting sCr result   Planning LHC today  LHC indicated, risks benefits & alternatives has been discussed     Patient seen and examined. Clinical notes reviewed.  Telemetry reviewed / Pertinent labs, diagnostic, device, and imaging results reviewed as a part of this visit  I spent a total of 35 minutes and greater than 50% of the time was spent counseling with patient  coordinating care regarding her diagnosis, treatments and plan of care    Scheduled Meds:   valsartan  160 mg Oral Nightly    chlorthalidone  25 mg Oral Daily    sodium chloride flush  10 mL IntraVENous BID    [Held by provider] spironolactone  50 mg Oral Daily    NIFEdipine  90 mg Oral Daily    carvedilol  25 mg Oral BID WC    atorvastatin  10 mg Oral Daily    sertraline  50 mg Oral Daily    pantoprazole  40 mg Oral QAM AC    sodium chloride flush  5-40 mL IntraVENous 2 times per day       Vitals:    06/28/22 0835   BP: (!) 148/85   Pulse: 63   Resp:    Temp:    SpO2:       In: 320 [P.O.:320]  Out: 1150    Wt Readings from Last 3 Encounters:   06/28/22 (!) 302 lb 14.6 oz (137.4 kg)   02/11/21 (!) 309 lb 3.2 oz (140.3 kg)   02/03/21 (!) 320 lb (145.2 kg)       Intake/Output Summary (Last 24 hours) at 6/28/2022 0935  Last data filed at 6/28/2022 0701  Gross per 24 hour   Intake 290 ml   Output 1150 ml   Net -860 ml       Telemetry: Personally Reviewed    · Constitutional: Cooperative and in no apparent distress, and appears well nourished  · Skin: Warm and pink; no pallor, cyanosis, clubbing, or bruising   · HEENT: Symmetric and normocephalic. · Cardiovascular: Regular rate and rhythm. S1/S2 present   · Respiratory: Respirations symmetric and unlabored. Lungs clear to auscultation bilaterally, no wheezing, crackles, or rhonchi  · Gastrointestinal: Abdomen soft and round. Bowel sounds normoactive without tenderness or masses. · Musculoskeletal: Bilateral upper and lower extremity strength 5/5 with full ROM  · Neurologic/Psych: Awake and orientated to person, place and time.  Calm affect, appropriate mood    Patient Active Problem List    Diagnosis Date Noted    Elevated troponin     Chest pain     Typical atrial flutter (HCC)     Atrial fibrillation with RVR ([address] 01/07/2021    H/O angiography 10/26/2020    Atrial fibrillation with rapid ventricular response ([address]     Essential hypertension     Atrial fibrillation ([address] 02/27/2020    Hypertensive urgency 02/18/2020    Heart failure with normal ejection fraction ([address] 10/29/2018    Mixed hyperlipidemia 02/19/2018    Paresthesia 06/07/2016    Lumbar strain 01/04/2016    Depressive disorder 08/19/2013    Obesity 07/15/2011    Obstructive sleep apnea syndrome 11/16/2010      Assessment     AF/RVR  - In NSR, rates controlled, no AF seen on tele  - S/p RFA/PVI of AF (2/2021, Dr. Mickey Cosme)  - S/p DCCV to NSR (6/22/22) in ER  - [facility] at least 1  - On heparin gtt- continue for now  - Discussed with patient [address] options, he is not interested in coumadin. He is interested in taking Eliquis or Xarelto even though it may be more expensive  - On coreg 25 mg BID  - If stress test negative can resume flecainide  - Keep K >4.0, Mg>2.0, replacements ordered  - T4 wl mag wnl     MARY  -wears CPAP     Elevated Troponin / 6/25/2022 abnormal stress test similar to last stress test    - Trop 0.02, 0.10, 0.22  - Lateral T wave inversions/ AVR ST wave elevation on EKG seen on previous EKG's  - [facility] 2/2020 showed no evidence CAD  - Echo showed normal EF, no RWMA    Obesity  Body mass index is 46.06 kg/m². Diet activity [place] recommended     HLD  On statin   LDL 77 in 22 / recheck level     Uncontrolled b/p / neph consulted and managing   sCr 1.5>>1.3   Per neph recommendations   Follow Renin, aldosterone, cortisol and metanephrines again/ neg  CTA of renal arteries as he has history of renal artery stenosis repeat CT negative for stenosis patent stent     Cardiac meds Lipitor coreg hygroton adalat diovan hep gtt     Plan   Dr Dennys Rand managing his JANNET/CRI / getting IV NS    Discussed with Dr Dennys Rand hold aldactone decrease Diovan dose   He is on hep gtt / he may go back on [facility] /  EP will manage this    VSS / no c/o voiced   Awaiting sCr result   Planning [facility] today    Thank you for allowing to us to participate in the care of Modesta Johnson.   Aldo Lemus APRN-CNP-CVNP    [address]

## 2022-06-29 ENCOUNTER — TELEPHONE (OUTPATIENT)
Dept: FAMILY MEDICINE CLINIC | Age: 57
End: 2022-06-29

## 2022-06-29 LAB
ALDOSTERONE/RENIN ACTIVITY CALCULATION: 1.2 RATIO
ALDOSTERONE: 3.9 NG/DL
RENIN ACTIVITY: 3.4 NG/ML/HR

## 2022-07-11 ENCOUNTER — OFFICE VISIT (OUTPATIENT)
Dept: CARDIOLOGY CLINIC | Age: 57
End: 2022-07-11
Payer: COMMERCIAL

## 2022-07-11 VITALS
WEIGHT: 313.4 LBS | HEIGHT: 68 IN | HEART RATE: 72 BPM | DIASTOLIC BLOOD PRESSURE: 82 MMHG | SYSTOLIC BLOOD PRESSURE: 144 MMHG | BODY MASS INDEX: 47.5 KG/M2 | OXYGEN SATURATION: 96 %

## 2022-07-11 DIAGNOSIS — E78.2 MIXED HYPERLIPIDEMIA: ICD-10-CM

## 2022-07-11 DIAGNOSIS — I48.0 PAROXYSMAL ATRIAL FIBRILLATION (HCC): Primary | ICD-10-CM

## 2022-07-11 DIAGNOSIS — I10 ESSENTIAL HYPERTENSION: ICD-10-CM

## 2022-07-11 PROCEDURE — 3017F COLORECTAL CA SCREEN DOC REV: CPT | Performed by: NURSE PRACTITIONER

## 2022-07-11 PROCEDURE — 1111F DSCHRG MED/CURRENT MED MERGE: CPT | Performed by: NURSE PRACTITIONER

## 2022-07-11 PROCEDURE — G8427 DOCREV CUR MEDS BY ELIG CLIN: HCPCS | Performed by: NURSE PRACTITIONER

## 2022-07-11 PROCEDURE — G8417 CALC BMI ABV UP PARAM F/U: HCPCS | Performed by: NURSE PRACTITIONER

## 2022-07-11 PROCEDURE — 1036F TOBACCO NON-USER: CPT | Performed by: NURSE PRACTITIONER

## 2022-07-11 PROCEDURE — 99214 OFFICE O/P EST MOD 30 MIN: CPT | Performed by: NURSE PRACTITIONER

## 2022-07-11 RX ORDER — NIFEDIPINE 30 MG/1
90 TABLET, FILM COATED, EXTENDED RELEASE ORAL DAILY
Qty: 90 TABLET | Refills: 3 | Status: SHIPPED | OUTPATIENT
Start: 2022-07-11

## 2022-07-11 RX ORDER — FLECAINIDE ACETATE 100 MG/1
100 TABLET ORAL EVERY 12 HOURS SCHEDULED
Qty: 60 TABLET | Refills: 3 | Status: SHIPPED | OUTPATIENT
Start: 2022-07-11

## 2022-07-11 RX ORDER — HYDRALAZINE HYDROCHLORIDE 100 MG/1
100 TABLET, FILM COATED ORAL 2 TIMES DAILY
Qty: 60 TABLET | Refills: 3 | Status: SHIPPED | OUTPATIENT
Start: 2022-07-11

## 2022-07-11 NOTE — LETTER
[address] Cardiology [address]  Phone: [phone]  Fax: [phone]    JARET Mendez CNP        July 11, 2022     Patient: Christina Morocho   YOB: 1965   Date of Visit: 7/11/2022       To Whom It May Concern: It is my medical opinion that Merline Elena may return to full duty immediately with no restrictions. If you have any questions or concerns, please don't hesitate to call.     Sincerely,        JARET Mendez CNP

## 2022-07-11 NOTE — PROGRESS NOTES
CC follow up    HPI:  55 y.o. patient of Dr Josiah Sweet with pAF, HTN, HLD, MARY and CKD. Recent LHC demonstrated 40-50% LAD disease. He mail back CAM monitor yesterday. He denies cp, sob, LH/dizziness, palpitations, syncope or falls. No LE edema, orthopnea, PND, abdominal bloating or early satiety. No n/v/d, fever or GI/ bleeding. Denies right groin pain. Past Medical History:   Diagnosis Date    Carpal tunnel syndrome     Chronic kidney disease     Hyperlipidemia     Hypertension     Obesity, unspecified     MARY (obstructive sleep apnea)      Past Surgical History:   Procedure Laterality Date    CARDIAC CATHETERIZATION      KNEE ARTHROSCOPY Right     NOSE SURGERY      Repair from Fx     Family History   Problem Relation Age of Onset    Diabetes Mother     Stroke Maternal Uncle      Social History     Tobacco Use    Smoking status: Never Smoker    Smokeless tobacco: Never Used   Vaping Use    Vaping Use: Never used   Substance Use Topics    Alcohol use: Yes     Comment: occasionally    Drug use: No     Allergies:Lisinopril    Review of Systems  General: No changes in weight, fatigue, or night sweats. HEENT: No blurry or decreased vision. No changes in hearing, nasal discharge or sore throat. Cardiovascular:  See HPI. Respiratory: No cough, hemoptysis, or wheezing. Gastrointestinal:  No abdominal pain, hematochezia, melana, constipation, diarrhea, or history of GI ulcers. Genito-Urinary: No dysuria or hematuria. No urgency or polyuria. Musculoskeletal:  No complaints of joint pain, joint swelling or muscular weakness/soreness. Neurological:  No dizziness, headaches, numbness/tingling, speech problems or weakness. Psychological:  No anxiety or depression. Hematological and Lymphatic: No abnormal bleeding or bruising, blood clots, jaundice or swollen lymph nodes.   Endocrine:   No malaise/lethargy, palpitations, polydipsia/polyuria, temperature intolerance or unexpected weight changes  Skin:  No rashes or non-healing ulcers. Physical Exam:  BP (!) 144/82   Pulse 72   Ht 5' 8\" (1.727 m)   Wt (!) 313 lb 6.4 oz (142.2 kg)   SpO2 96%   BMI 47.65 kg/m²    General (appearance):  No acute distress  Eyes: anicteric   Neck: soft, No JVD  Ears/Nose/Mouth/Thorat: No cyanosis  CV: RRR   Respiratory:  Clear, normal effort  GI: soft, non-tender, non-distended  Skin: Warm, dry. No rashes  Neuro/Psych: Alert and oriented x 3. Appropriate behavior  Ext:  No c/c.  No edema  Pulses:  2+ radial     Weight  Wt Readings from Last 3 Encounters:   07/11/22 (!) 313 lb 6.4 oz (142.2 kg)   06/28/22 (!) 302 lb 14.6 oz (137.4 kg)   02/11/21 (!) 309 lb 3.2 oz (140.3 kg)          CBC:   Lab Results   Component Value Date    WBC 7.0 06/28/2022    HGB 15.0 06/28/2022    HCT 45.7 06/28/2022    MCV 85.0 06/28/2022     06/28/2022     BMP:  Lab Results   Component Value Date    CREATININE 1.4 (H) 06/28/2022    CREATININE 1.5 (H) 06/28/2022    BUN 34 (H) 06/28/2022    BUN 34 (H) 06/28/2022     06/28/2022     06/28/2022    K 4.3 06/28/2022    K 4.3 06/28/2022     06/28/2022     06/28/2022    CO2 21 06/28/2022    CO2 19 (L) 06/28/2022     Mag:   Lab Results   Component Value Date/Time    MG 2.00 06/28/2022 08:40 AM     LIVER PROFILE:   Lab Results   Component Value Date    ALT 25 01/07/2021    AST 46 (H) 01/07/2021    ALKPHOS 80 01/07/2021    BILITOT 0.4 01/07/2021     PT/INR:   Lab Results   Component Value Date    INR 1.02 06/28/2022    INR 1.07 06/27/2022    INR 1.14 06/23/2022    PROTIME 13.3 06/28/2022    PROTIME 13.8 06/27/2022    PROTIME 14.5 06/23/2022     Pro-BNP   Lab Results   Component Value Date/Time    PROBNP 926 06/22/2022 08:03 PM    PROBNP 1,326 01/06/2021 10:48 PM    PROBNP 1,219 10/25/2020 09:32 PM     LIPIDS:  No components found for: CHLPL  Lab Results   Component Value Date    TRIG 127 06/25/2022    TRIG 158 (H) 10/27/2020    TRIG 123 02/24/2018     Lab Results Component Value Date    HDL 34 (L) 2022    HDL 26 (L) 10/27/2020    HDL 31 (L) 2018     Lab Results   Component Value Date    LDLCALC 82 2022    LDLCALC 77 10/27/2020    LDLCALC 68 2018     Lab Results   Component Value Date    LABVLDL 25 2022    LABVLDL 32 10/27/2020    LABVLDL 25 2018     TSH:  Lab Results   Component Value Date    TSH 2.98 2018       [id number] Coronary angiogram  HEMODYNAMICS:  Left ventricular end-diastolic pressure equals 20. There was no significant gradient across the aortic valve by pullback  post cineangiography.     LEFT VENTRICULOGRAM:  Left ventriculogram demonstrates uniform wall  motion. Estimated ejection fraction is 60%.     LEFT MAIN:  Left main does taper in its distal portion, but less than  20%.     LEFT ANTERIOR DESCENDING:  The LAD courses to and wraps around the apex. It gives off relatively large bifurcating first diagonal branch that  bifurcates. In the proximal portion of the LAD, there is 40% to 50%  narrowing. Again, in the mid vessel after the diagonal branch, there is  40% narrowing in the LAD.     LEFT CIRCUMFLEX:  Circumflex gives off a very small high rising first  marginal branch. It gives off a moderate-sized bifurcating second  marginal branch and terminates in a moderate-sized posterolateral  branch. There is mild diffuse disease in the circumflex, but no  significant obstructive coronary artery disease. Left circumflex has  luminal irregularities, but no significant focal obstructive lesions.     RIGHT CORONARY ARTERY:  Right coronary artery is a large dominant  vessel. It gives of a large PDA. There is mild diffuse disease  throughout the vessel, but no significant focal obstructive lesions.     IMPRESSION:  1. Preserved LV function, estimated ejection fraction is 60%. 2.  Mild tnynbwni-oi-wed LAD disease; otherwise, no significantly  obstructive coronary artery disease.   3.  Successful Angio-Seal of the right femoral arteriotomy site. 6/24/2022 Nuc stress:     Lorettafrank Da Silva is a small size, mild intensity, basal to mid inferior wall reversible    defect suggestive of ischemia.    TID is borderline elevated at 1.24.    Severe LV dilatation with moderate systolic dysfunction.   Loretta Rekha is diffuse hypokinesis, especially of the apex and inferior wall.    Overall findings represent a high risk scan.     6/23/2022 Limited echo     Limited echo. Technically difficult examination. Definity contrast was   administered. Left ventricular cavity size is normal with severe concentric left   ventricular hypertrophy. Overall left ventricular systolic function appears normal with an estimated   ejection fraction of 55-60%. No regional wall motion abnormalities   There is a trace pericardial effusion , near left ventricle. IVC size is dilated (>2.1 cm) and collapses < 50% with respiration   consistent with elevated RA pressure (15 mmHg).     Medications:   Current Outpatient Medications   Medication Sig Dispense Refill    valsartan (DIOVAN) 160 MG tablet Take 1 tablet by mouth at bedtime 30 tablet 3    NIFEdipine (ADALAT CC) 30 MG extended release tablet Take 3 tablets by mouth daily 30 tablet 3    chlorthalidone (HYGROTON) 25 MG tablet Take 1 tablet by mouth daily 30 tablet 3    aspirin 81 MG EC tablet Take 1 tablet by mouth daily 30 tablet 3    apixaban (ELIQUIS) 5 MG TABS tablet Take 1 tablet by mouth 2 times daily 180 tablet 2    hydrALAZINE (APRESOLINE) 25 MG tablet TAKE 3 TABLETS BY MOUTH THREE TIMES DAILY 810 tablet 0    carvedilol (COREG) 25 MG tablet Take 1 tablet by mouth 2 times daily (with meals) 180 tablet 3    sertraline (ZOLOFT) 50 MG tablet TAKE 1 TABLET BY MOUTH DAILY 90 tablet 0    atorvastatin (LIPITOR) 10 MG tablet TAKE 1 TABLET BY MOUTH DAILY 90 tablet 0    pantoprazole (PROTONIX) 40 MG tablet Take 1 tablet by mouth daily 30 tablet 0    flecainide (TAMBOCOR) 100 MG tablet Take 1 tablet by mouth every 12 hours (Patient not taking: Reported on 6/23/2022) 60 tablet 3     No current facility-administered medications for this visit. Assessment:  1. Paroxysmal atrial fibrillation (HCC)    2. Essential hypertension    3.  Mixed hyperlipidemia        Plan:  HTN: poorly controlled   Valsartan 160 mg daily   Nifedipine 90 mg daily   Chlorthalidone 25 mg daily   Hydralazine changed to 100 mg po BID   Coreg 25 mg po bid  HLD   Lipitor  Minor CAD   Asa, statin   Coreg  PAF   Eliquis   Coreg   Flecainide    Follow up with EP       Reviewed most recent: CBC, BMP, LFT, Lipids, Mag, PT/INR, BNP, TSH  Reviewed most recent: ECG, Echo, Nuc stress test, C

## 2022-09-15 DIAGNOSIS — E78.2 MIXED HYPERLIPIDEMIA: ICD-10-CM

## 2022-09-16 RX ORDER — ATORVASTATIN CALCIUM 10 MG/1
10 TABLET, FILM COATED ORAL DAILY
Qty: 90 TABLET | Refills: 0 | OUTPATIENT
Start: 2022-09-16 | End: 2022-12-15

## 2022-10-10 ENCOUNTER — TELEPHONE (OUTPATIENT)
Dept: CARDIOLOGY CLINIC | Age: 57
End: 2022-10-10

## 2022-10-10 RX ORDER — CARVEDILOL 25 MG/1
25 TABLET ORAL 2 TIMES DAILY WITH MEALS
Qty: 180 TABLET | Refills: 2 | Status: SHIPPED | OUTPATIENT
Start: 2022-10-10

## 2022-12-27 ENCOUNTER — OFFICE VISIT (OUTPATIENT)
Dept: CARDIOLOGY CLINIC | Age: 57
End: 2022-12-27
Payer: COMMERCIAL

## 2022-12-27 VITALS
SYSTOLIC BLOOD PRESSURE: 228 MMHG | HEIGHT: 68 IN | HEART RATE: 76 BPM | DIASTOLIC BLOOD PRESSURE: 138 MMHG | BODY MASS INDEX: 46.98 KG/M2 | WEIGHT: 310 LBS

## 2022-12-27 DIAGNOSIS — Z01.818 PRE-OP EXAM: Primary | ICD-10-CM

## 2022-12-27 PROCEDURE — G8427 DOCREV CUR MEDS BY ELIG CLIN: HCPCS | Performed by: INTERNAL MEDICINE

## 2022-12-27 PROCEDURE — G8484 FLU IMMUNIZE NO ADMIN: HCPCS | Performed by: INTERNAL MEDICINE

## 2022-12-27 PROCEDURE — 93000 ELECTROCARDIOGRAM COMPLETE: CPT | Performed by: INTERNAL MEDICINE

## 2022-12-27 PROCEDURE — 3078F DIAST BP <80 MM HG: CPT | Performed by: INTERNAL MEDICINE

## 2022-12-27 PROCEDURE — G8417 CALC BMI ABV UP PARAM F/U: HCPCS | Performed by: INTERNAL MEDICINE

## 2022-12-27 PROCEDURE — 3074F SYST BP LT 130 MM HG: CPT | Performed by: INTERNAL MEDICINE

## 2022-12-27 PROCEDURE — 99204 OFFICE O/P NEW MOD 45 MIN: CPT | Performed by: INTERNAL MEDICINE

## 2022-12-27 RX ORDER — TAMSULOSIN HYDROCHLORIDE 0.4 MG/1
0.4 CAPSULE ORAL DAILY
COMMUNITY
Start: 2022-10-18

## 2022-12-27 RX ORDER — NIFEDIPINE 90 MG/1
90 TABLET, FILM COATED, EXTENDED RELEASE ORAL DAILY
COMMUNITY
End: 2022-12-28 | Stop reason: SDUPTHER

## 2022-12-28 RX ORDER — VALSARTAN 160 MG/1
160 TABLET ORAL 2 TIMES DAILY
Qty: 60 TABLET | Refills: 0 | Status: SHIPPED | OUTPATIENT
Start: 2022-12-28 | End: 2022-12-28 | Stop reason: SDUPTHER

## 2022-12-28 RX ORDER — NIFEDIPINE 90 MG/1
90 TABLET, EXTENDED RELEASE ORAL DAILY
Qty: 30 TABLET | Refills: 0 | Status: SHIPPED | OUTPATIENT
Start: 2022-12-28 | End: 2022-12-28 | Stop reason: SDUPTHER

## 2022-12-28 RX ORDER — VALSARTAN 160 MG/1
160 TABLET ORAL 2 TIMES DAILY
Qty: 180 TABLET | Refills: 3 | Status: SHIPPED | OUTPATIENT
Start: 2022-12-28

## 2022-12-28 RX ORDER — NIFEDIPINE 90 MG/1
90 TABLET, EXTENDED RELEASE ORAL DAILY
Qty: 90 TABLET | Refills: 3 | Status: SHIPPED | OUTPATIENT
Start: 2022-12-28

## 2022-12-28 NOTE — PROGRESS NOTES
[facility] [place] [id number]     Outpatient Cardiology Consult  Consulting Cardiologist Sandra Ruiz MD, M.D., [facility] [place]  Referring Provider:  JARET Garcia CNP    12/28/2022,9:00 AM    Chief Complaint   Patient presents with    Cardiac Clearance     CYSTO TRUSP FOR VOLUME NO BX           Asked by No admitting provider for patient encounter./JARET Jacob CNP  to evaluate and assess this patient's cardiac status cardiac clearance    History of Present Illness: Irina Tolbert is a 65 y.o. male here for cardiac clearance for anticipation of cystoscopy and biopsy for urinary retention and urinary outflow tract restrictive strictures. Does have a history of stricture and having had dilatation in the past.  He has a history of paroxysmal atrial fibrillation and hypertension. Blood pressure today is 228/113. He feels as if he is having some issues and he was proposed to have the strictures dilatation this week but he decided to wait until the new year. His creatinine is 1.5 and he has been on Eliquis for paroxysmal atrial fibrillation. At this time I found the patient's blood pressure to be quite high. He has apparently not taken some of his medicines in recent times and needed refills. We had a long discussion about the importance of good blood pressure and taking his blood pressure and try to get it down especially since he is plan to have that the surgery for urinary retention. I do suspect that a lot of his blood pressure elevation currently is in part due to that. We talked about potentially hospitalizing him at this time to get a better quicker handle on his blood pressure but he is not interested since was so close to the new year.      Urinary tract strictures and significant urinary retention  Paroxysmal atrial fibrillation on Eliquis  CKD 1.5  Hyperlipidemia  Hypertension severe    Past Medical History:   has a past medical history of Carpal tunnel syndrome, Chronic kidney disease, Hyperlipidemia, Hypertension, Obesity, unspecified, and MARY (obstructive sleep apnea). Surgical History:   has a past surgical history that includes Nose surgery; Knee arthroscopy (Right); and Cardiac catheterization. Social History:   reports that he has never smoked. He has never used smokeless tobacco. He reports current alcohol use. He reports that he does not use drugs. Family History:  family history includes Diabetes in his mother; Stroke in his maternal uncle. Home Medications:  Prior to Admission medications    Medication Sig Start Date End Date Taking? Authorizing Provider   tamsulosin (FLOMAX) 0.4 MG capsule Take 0.4 mg by mouth daily 10/18/22  Yes Historical Provider, MD   NIFEdipine (ADALAT CC) 90 MG extended release tablet Take 90 mg by mouth daily Take one tablet daily. Yes Historical Provider, MD   carvedilol (COREG) 25 MG tablet Take 1 tablet by mouth 2 times daily (with meals) 10/10/22  Yes JARET Chua - CNP   hydrALAZINE (APRESOLINE) 100 MG tablet Take 1 tablet by mouth in the morning and at bedtime 7/11/22  Yes JARET Forrest - PABLO   flecainide (TAMBOCOR) 100 MG tablet Take 1 tablet by mouth every 12 hours 7/11/22  Yes Eloy Esme APRN - PABLO   valsartan (DIOVAN) 160 MG tablet Take 1 tablet by mouth at bedtime  Patient taking differently: Take 160 mg by mouth 2 times daily Take 1 tablet twice daily.  6/28/22  Yes Domingo Dewey MD   chlorthalidone (HYGROTON) 25 MG tablet Take 1 tablet by mouth daily 6/29/22  Yes Domingo Dewey MD   aspirin 81 MG EC tablet Take 1 tablet by mouth daily 6/28/22  Yes Domingo Dewey MD   apixaban Daniel Pop) 5 MG TABS tablet Take 1 tablet by mouth 2 times daily 6/28/22 9/26/22  Kellie Henriquez MD   sertraline (ZOLOFT) 50 MG tablet TAKE 1 TABLET BY MOUTH DAILY 11/10/21 2/8/22  JARET Bragg CNP   atorvastatin (LIPITOR) 10 MG tablet TAKE 1 TABLET BY MOUTH DAILY 11/10/21 2/8/22 Carloz Fountain, APRN - CNP           Allergies:  Lisinopril     Review of Systems:   Constitutional: there has been no unanticipated weight loss. Eyes: No visual changes or diplopia. No scleral icterus. ENT: No Headaches, hearing loss or vertigo. No mouth sores or sore throat. Cardiovascular: Reviewed in HPI   Pulmonary:  no cough or sputum production. Gastrointestinal: No abdominal pain, appetite loss, blood in stools. No change in bowel or bladder habits. Genitourinary: No dysuria, trouble voiding, or hematuria. Musculoskeletal:  No gait disturbance, weakness or joint complaints. Integumentary: No rash or pruritis. Endocrine: No malaise, fatigue or temperature intolerance. Hematologic/Lymphatic: No abnormal bruising or bleeding, blood clots or swollen lymph nodes. Allergic/Immunologic: No nasal congestion or hives. All other ROS are reviewed and are unremarkable. Physical Examination:      Wt Readings from Last 3 Encounters:   12/27/22 (!) 310 lb (140.6 kg)   07/11/22 (!) 313 lb 6.4 oz (142.2 kg)   06/28/22 (!) 302 lb 14.6 oz (137.4 kg)       BP Readings from Last 3 Encounters:   12/27/22 (!) 228/138   07/11/22 (!) 144/82   06/28/22 (!) 152/92       Pulse Readings from Last 3 Encounters:   12/27/22 76   07/11/22 72   06/28/22 56       Constitutional and General Appearance:  Healthy. And alert . HEENT: eyes and ears intact. No nasal masses  THYROID: not enlarged  LUNGS:  Clear to auscultation and percussion  HEART and VASCULAR:  The apical impulses not displaced  Heart tones are crisp and normal  Cervical veins are not engorged  The carotid upstroke is normal in amplitude and contour without delay or bruit  Peripheral pulses are symmetrical and full  There is no clubbing, cyanosis of the extremities.   No peripheral edema  Femoral Arteries: 2+ and equal  Pedal Pulses: 2+ and equal   ABDOMEN[de-identified]  No masses or tenderness  Liver/Spleen: No Abnormalities Noted  NEUROLOGICAL:  . Moves all extremities to command. Cranial nerves 2-12 are in tact.   PSYCHIATRIC: alert and lucid  and oriented and appropriate  SKIN: No lesions or rashes  LYMPH NODES: none enlarged            CBC with Differential:    Lab Results   Component Value Date/Time    WBC 7.0 06/28/2022 08:41 AM    RBC 5.38 06/28/2022 08:41 AM    HGB 15.0 06/28/2022 08:41 AM    HCT 45.7 06/28/2022 08:41 AM     06/28/2022 08:41 AM    MCV 85.0 06/28/2022 08:41 AM    MCH 27.8 06/28/2022 08:41 AM    MCHC 32.7 06/28/2022 08:41 AM    RDW 15.5 06/28/2022 08:41 AM    SEGSPCT 55.2 06/17/2011 11:04 AM    LYMPHOPCT 22.6 06/28/2022 08:41 AM    MONOPCT 9.2 06/28/2022 08:41 AM    EOSPCT 2.2 06/17/2011 11:04 AM    BASOPCT 0.4 06/28/2022 08:41 AM    MONOSABS 0.6 06/28/2022 08:41 AM    LYMPHSABS 1.6 06/28/2022 08:41 AM    EOSABS 0.1 06/28/2022 08:41 AM    BASOSABS 0.0 06/28/2022 08:41 AM     BMP:    Lab Results   Component Value Date/Time     06/28/2022 08:40 AM     06/28/2022 08:40 AM    K 4.3 06/28/2022 08:40 AM    K 4.3 06/28/2022 08:40 AM     06/28/2022 08:40 AM     06/28/2022 08:40 AM    CO2 21 06/28/2022 08:40 AM    CO2 19 06/28/2022 08:40 AM    BUN 34 06/28/2022 08:40 AM    BUN 34 06/28/2022 08:40 AM    LABALBU 4.4 06/28/2022 08:40 AM    CREATININE 1.4 06/28/2022 08:40 AM    CREATININE 1.5 06/28/2022 08:40 AM    CALCIUM 9.7 06/28/2022 08:40 AM    CALCIUM 9.8 06/28/2022 08:40 AM    GFRAA >60 06/28/2022 08:40 AM    GFRAA 59 06/28/2022 08:40 AM    GFRAA >60 09/18/2012 12:42 PM    LABGLOM 52 06/28/2022 08:40 AM    LABGLOM 48 06/28/2022 08:40 AM     Uric Acid:  No components found for: URIC  PT/INR:    Lab Results   Component Value Date/Time    PROTIME 13.3 06/28/2022 08:41 AM    INR 1.02 06/28/2022 08:41 AM     Last 3 Troponin:    Lab Results   Component Value Date/Time    TROPONINI 0.14 06/23/2022 12:32 PM    TROPONINI 0.12 06/23/2022 11:38 AM    TROPONINI 0.22 06/23/2022 05:42 AM     FLP:    Lab Results   Component Value Date/Time    TRIG 127 06/25/2022 01:42 PM    HDL 34 06/25/2022 01:42 PM    LDLCALC 82 06/25/2022 01:42 PM    LABVLDL 25 06/25/2022 01:42 PM       EKG: On 12/27/2022 sinus rhythm at 76. Diffuse nonspecific ST and T wave changes. Left axis deviation. Oliverio Wood echocardiogram 6/23/2022Summary   Limited echo. Technically difficult examination. Definity contrast was   administered. Left ventricular cavity size is normal with severe concentric left   ventricular hypertrophy. Overall left ventricular systolic function appears normal with an estimated   ejection fraction of 55-60%. No regional wall motion abnormalities   There is a trace pericardial effusion , near left ventricle. IVC size is dilated (>2.1 cm) and collapses < 50% with respiration   consistent with elevated RA pressure (15 mmHg). This patient was educated using the patient point room wall mount device. Absence from smokers and smoking and diet and exercising are important. Assessment/ Plan     For cystoscopy and's dilatation of urinary tract to alleviate urinary retention scheduled for   1/4/2023. The blood pressure at this time is significantly elevated and I think a large part related to his urinary retention. Also in large part related to that he is not taking his medications properly in the last few days. He is on several medications which reportedly includes nifedipine at 30 mg 3 times daily carvedilol 25 mg twice daily hydralazine 100 mg twice daily valsartan 160 mg twice daily chlorthalidone 25 mg daily Eliquis 5 mg twice daily Lipitor 10 mg daily is not clear to me exactly how much of these medicines he is not taking. At this point he declines hospital or ER visit. We will have him take hydralazine as prescribed. Add or adjust his nifedipine to 90 mg daily. Increase valsartan to 160 mg twice daily. Chlorthalidone 25 mg daily. He is to come back in a couple days for blood pressure check.   We have very little time between that need to have his urinary retraction removed. I think that the 10 mg atorvastatin is probably sufficient at this time. Otherwise I think it is okay and necessary for him to proceed with a cystoscopy and TURP. Hopefully his blood pressure will come on down and he will allow us to properly care for him. Thanks for allowing us the opportunity  to participate in the evaluation and care of your patients.  Please call if we may assist further [phone]    This note was likely completed using voice recognition technology and may contain unintended errors  Juan Antonio Flores M.D., [facility] [place]  12/28/20229:00 AM

## 2023-07-30 ENCOUNTER — HOSPITAL ENCOUNTER (INPATIENT)
Age: 58
LOS: 2 days | Discharge: HOME | DRG: 280 | End: 2023-08-01
Attending: STUDENT IN AN ORGANIZED HEALTH CARE EDUCATION/TRAINING PROGRAM | Admitting: ANESTHESIOLOGY
Payer: COMMERCIAL

## 2023-07-30 DIAGNOSIS — I48.91 ATRIAL FIBRILLATION WITH RAPID VENTRICULAR RESPONSE (HCC): ICD-10-CM

## 2023-07-30 DIAGNOSIS — I48.0 PAROXYSMAL ATRIAL FIBRILLATION (HCC): Primary | ICD-10-CM

## 2023-07-30 LAB
ANION GAP SERPL CALCULATED.3IONS-SCNC: 15 MMOL/L (ref 3–16)
APTT BLD: 27.3 SEC (ref 22.7–35.9)
BASOPHILS # BLD: 0 K/UL (ref 0–0.2)
BASOPHILS NFR BLD: 0.5 %
BUN SERPL-MCNC: 14 MG/DL (ref 7–20)
CALCIUM SERPL-MCNC: 7.6 MG/DL (ref 8.3–10.6)
CHLORIDE SERPL-SCNC: 107 MMOL/L (ref 99–110)
CO2 SERPL-SCNC: 20 MMOL/L (ref 21–32)
CREAT SERPL-MCNC: 1 MG/DL (ref 0.9–1.3)
DEPRECATED RDW RBC AUTO: 16.1 % (ref 12.4–15.4)
EOSINOPHIL # BLD: 0.2 K/UL (ref 0–0.6)
EOSINOPHIL NFR BLD: 1.9 %
GFR SERPLBLD CREATININE-BSD FMLA CKD-EPI: >60 ML/MIN/{1.73_M2}
GLUCOSE SERPL-MCNC: 141 MG/DL (ref 70–99)
HCT VFR BLD AUTO: 49.8 % (ref 40.5–52.5)
HGB BLD-MCNC: 16.4 G/DL (ref 13.5–17.5)
INR PPP: 1.04 (ref 0.84–1.16)
LYMPHOCYTES # BLD: 2.9 K/UL (ref 1–5.1)
LYMPHOCYTES NFR BLD: 29.8 %
MAGNESIUM SERPL-MCNC: 1.4 MG/DL (ref 1.8–2.4)
MCH RBC QN AUTO: 27.8 PG (ref 26–34)
MCHC RBC AUTO-ENTMCNC: 33 G/DL (ref 31–36)
MCV RBC AUTO: 84.2 FL (ref 80–100)
MONOCYTES # BLD: 0.7 K/UL (ref 0–1.3)
MONOCYTES NFR BLD: 7 %
NEUTROPHILS # BLD: 6 K/UL (ref 1.7–7.7)
NEUTROPHILS NFR BLD: 60.8 %
NT-PROBNP SERPL-MCNC: 1423 PG/ML (ref 0–124)
PLATELET # BLD AUTO: 196 K/UL (ref 135–450)
PMV BLD AUTO: 10.6 FL (ref 5–10.5)
POTASSIUM SERPL-SCNC: 2.8 MMOL/L (ref 3.5–5.1)
PROTHROMBIN TIME: 13.6 SEC (ref 11.5–14.8)
RBC # BLD AUTO: 5.91 M/UL (ref 4.2–5.9)
SODIUM SERPL-SCNC: 142 MMOL/L (ref 136–145)
TROPONIN, HIGH SENSITIVITY: 46 NG/L (ref 0–22)
TROPONIN, HIGH SENSITIVITY: 98 NG/L (ref 0–22)
WBC # BLD AUTO: 9.9 K/UL (ref 4–11)

## 2023-07-30 PROCEDURE — 6360000002 HC RX W HCPCS

## 2023-07-30 PROCEDURE — 2700000000 HC OXYGEN THERAPY PER DAY

## 2023-07-30 PROCEDURE — 6370000000 HC RX 637 (ALT 250 FOR IP): Performed by: ANESTHESIOLOGY

## 2023-07-30 PROCEDURE — 93005 ELECTROCARDIOGRAM TRACING: CPT | Performed by: STUDENT IN AN ORGANIZED HEALTH CARE EDUCATION/TRAINING PROGRAM

## 2023-07-30 PROCEDURE — 80048 BASIC METABOLIC PNL TOTAL CA: CPT

## 2023-07-30 PROCEDURE — 2500000003 HC RX 250 WO HCPCS

## 2023-07-30 PROCEDURE — 96375 TX/PRO/DX INJ NEW DRUG ADDON: CPT

## 2023-07-30 PROCEDURE — 2500000003 HC RX 250 WO HCPCS: Performed by: STUDENT IN AN ORGANIZED HEALTH CARE EDUCATION/TRAINING PROGRAM

## 2023-07-30 PROCEDURE — 93005 ELECTROCARDIOGRAM TRACING: CPT | Performed by: ANESTHESIOLOGY

## 2023-07-30 PROCEDURE — 83735 ASSAY OF MAGNESIUM: CPT

## 2023-07-30 PROCEDURE — 2060000000 HC ICU INTERMEDIATE R&B

## 2023-07-30 PROCEDURE — 96361 HYDRATE IV INFUSION ADD-ON: CPT

## 2023-07-30 PROCEDURE — 85610 PROTHROMBIN TIME: CPT

## 2023-07-30 PROCEDURE — 83880 ASSAY OF NATRIURETIC PEPTIDE: CPT

## 2023-07-30 PROCEDURE — 84484 ASSAY OF TROPONIN QUANT: CPT

## 2023-07-30 PROCEDURE — 94761 N-INVAS EAR/PLS OXIMETRY MLT: CPT

## 2023-07-30 PROCEDURE — 2580000003 HC RX 258: Performed by: ANESTHESIOLOGY

## 2023-07-30 PROCEDURE — 85730 THROMBOPLASTIN TIME PARTIAL: CPT

## 2023-07-30 PROCEDURE — 2580000003 HC RX 258

## 2023-07-30 PROCEDURE — 6370000000 HC RX 637 (ALT 250 FOR IP)

## 2023-07-30 PROCEDURE — 99285 EMERGENCY DEPT VISIT HI MDM: CPT

## 2023-07-30 PROCEDURE — 96374 THER/PROPH/DIAG INJ IV PUSH: CPT

## 2023-07-30 PROCEDURE — 85025 COMPLETE CBC W/AUTO DIFF WBC: CPT

## 2023-07-30 RX ORDER — SODIUM CHLORIDE 9 MG/ML
INJECTION, SOLUTION INTRAVENOUS CONTINUOUS
Status: DISCONTINUED | OUTPATIENT
Start: 2023-07-30 | End: 2023-07-31

## 2023-07-30 RX ORDER — POTASSIUM CHLORIDE 20 MEQ/1
80 TABLET, EXTENDED RELEASE ORAL ONCE
Status: COMPLETED | OUTPATIENT
Start: 2023-07-30 | End: 2023-07-30

## 2023-07-30 RX ORDER — POTASSIUM CHLORIDE 7.45 MG/ML
10 INJECTION INTRAVENOUS
Status: DISPENSED | OUTPATIENT
Start: 2023-07-30 | End: 2023-07-30

## 2023-07-30 RX ORDER — METOPROLOL TARTRATE 5 MG/5ML
5 INJECTION INTRAVENOUS ONCE
Status: COMPLETED | OUTPATIENT
Start: 2023-07-30 | End: 2023-07-30

## 2023-07-30 RX ORDER — ONDANSETRON 2 MG/ML
4 INJECTION INTRAMUSCULAR; INTRAVENOUS EVERY 6 HOURS PRN
Status: DISCONTINUED | OUTPATIENT
Start: 2023-07-30 | End: 2023-08-01 | Stop reason: HOSPADM

## 2023-07-30 RX ORDER — TAMSULOSIN HYDROCHLORIDE 0.4 MG/1
0.4 CAPSULE ORAL DAILY
Status: DISCONTINUED | OUTPATIENT
Start: 2023-07-30 | End: 2023-07-30

## 2023-07-30 RX ORDER — DILTIAZEM HYDROCHLORIDE 5 MG/ML
10 INJECTION INTRAVENOUS ONCE
Status: COMPLETED | OUTPATIENT
Start: 2023-07-30 | End: 2023-07-30

## 2023-07-30 RX ORDER — SODIUM CHLORIDE 0.9 % (FLUSH) 0.9 %
5-40 SYRINGE (ML) INJECTION PRN
Status: DISCONTINUED | OUTPATIENT
Start: 2023-07-30 | End: 2023-08-01 | Stop reason: HOSPADM

## 2023-07-30 RX ORDER — ASPIRIN 81 MG/1
81 TABLET ORAL DAILY
Status: DISCONTINUED | OUTPATIENT
Start: 2023-07-31 | End: 2023-08-01 | Stop reason: HOSPADM

## 2023-07-30 RX ORDER — ETOMIDATE 2 MG/ML
0.15 INJECTION INTRAVENOUS ONCE
Status: DISCONTINUED | OUTPATIENT
Start: 2023-07-30 | End: 2023-07-30

## 2023-07-30 RX ORDER — FLECAINIDE ACETATE 100 MG/1
100 TABLET ORAL EVERY 12 HOURS SCHEDULED
Status: DISCONTINUED | OUTPATIENT
Start: 2023-07-30 | End: 2023-08-01 | Stop reason: HOSPADM

## 2023-07-30 RX ORDER — ACETAMINOPHEN 325 MG/1
650 TABLET ORAL EVERY 6 HOURS PRN
Status: DISCONTINUED | OUTPATIENT
Start: 2023-07-30 | End: 2023-08-01 | Stop reason: HOSPADM

## 2023-07-30 RX ORDER — POLYETHYLENE GLYCOL 3350 17 G/17G
17 POWDER, FOR SOLUTION ORAL DAILY PRN
Status: DISCONTINUED | OUTPATIENT
Start: 2023-07-30 | End: 2023-08-01 | Stop reason: HOSPADM

## 2023-07-30 RX ORDER — ONDANSETRON 4 MG/1
4 TABLET, ORALLY DISINTEGRATING ORAL EVERY 8 HOURS PRN
Status: DISCONTINUED | OUTPATIENT
Start: 2023-07-30 | End: 2023-08-01 | Stop reason: HOSPADM

## 2023-07-30 RX ORDER — ETOMIDATE 2 MG/ML
10 INJECTION INTRAVENOUS ONCE
Status: DISCONTINUED | OUTPATIENT
Start: 2023-07-30 | End: 2023-07-30

## 2023-07-30 RX ORDER — SODIUM CHLORIDE, SODIUM LACTATE, POTASSIUM CHLORIDE, AND CALCIUM CHLORIDE .6; .31; .03; .02 G/100ML; G/100ML; G/100ML; G/100ML
1000 INJECTION, SOLUTION INTRAVENOUS ONCE
Status: COMPLETED | OUTPATIENT
Start: 2023-07-30 | End: 2023-07-30

## 2023-07-30 RX ORDER — HYDRALAZINE HYDROCHLORIDE 100 MG/1
100 TABLET, FILM COATED ORAL 2 TIMES DAILY
Status: DISCONTINUED | OUTPATIENT
Start: 2023-07-30 | End: 2023-08-01 | Stop reason: HOSPADM

## 2023-07-30 RX ORDER — VALSARTAN 80 MG/1
160 TABLET ORAL 2 TIMES DAILY
Status: DISCONTINUED | OUTPATIENT
Start: 2023-07-30 | End: 2023-08-01 | Stop reason: HOSPADM

## 2023-07-30 RX ORDER — SODIUM CHLORIDE 0.9 % (FLUSH) 0.9 %
5-40 SYRINGE (ML) INJECTION EVERY 12 HOURS SCHEDULED
Status: DISCONTINUED | OUTPATIENT
Start: 2023-07-30 | End: 2023-08-01 | Stop reason: HOSPADM

## 2023-07-30 RX ORDER — ACETAMINOPHEN 650 MG/1
650 SUPPOSITORY RECTAL EVERY 6 HOURS PRN
Status: DISCONTINUED | OUTPATIENT
Start: 2023-07-30 | End: 2023-08-01 | Stop reason: HOSPADM

## 2023-07-30 RX ORDER — MAGNESIUM SULFATE IN WATER 40 MG/ML
2000 INJECTION, SOLUTION INTRAVENOUS ONCE
Status: COMPLETED | OUTPATIENT
Start: 2023-07-30 | End: 2023-07-30

## 2023-07-30 RX ORDER — ETOMIDATE 2 MG/ML
20 INJECTION INTRAVENOUS ONCE
Status: DISCONTINUED | OUTPATIENT
Start: 2023-07-30 | End: 2023-07-30

## 2023-07-30 RX ORDER — SODIUM CHLORIDE 9 MG/ML
INJECTION, SOLUTION INTRAVENOUS PRN
Status: DISCONTINUED | OUTPATIENT
Start: 2023-07-30 | End: 2023-08-01 | Stop reason: HOSPADM

## 2023-07-30 RX ADMIN — MAGNESIUM SULFATE HEPTAHYDRATE 2000 MG: 40 INJECTION, SOLUTION INTRAVENOUS at 18:11

## 2023-07-30 RX ADMIN — DILTIAZEM HYDROCHLORIDE 2.5 MG/HR: 5 INJECTION INTRAVENOUS at 17:18

## 2023-07-30 RX ADMIN — SODIUM CHLORIDE, POTASSIUM CHLORIDE, SODIUM LACTATE AND CALCIUM CHLORIDE 1000 ML: 600; 310; 30; 20 INJECTION, SOLUTION INTRAVENOUS at 16:32

## 2023-07-30 RX ADMIN — SODIUM CHLORIDE, PRESERVATIVE FREE 10 ML: 5 INJECTION INTRAVENOUS at 21:06

## 2023-07-30 RX ADMIN — HYDRALAZINE HYDROCHLORIDE 100 MG: 100 TABLET, FILM COATED ORAL at 21:01

## 2023-07-30 RX ADMIN — POTASSIUM CHLORIDE 80 MEQ: 1500 TABLET, EXTENDED RELEASE ORAL at 19:06

## 2023-07-30 RX ADMIN — VALSARTAN 160 MG: 80 TABLET, FILM COATED ORAL at 21:02

## 2023-07-30 RX ADMIN — POTASSIUM CHLORIDE 10 MEQ: 10 INJECTION, SOLUTION INTRAVENOUS at 18:12

## 2023-07-30 RX ADMIN — METOPROLOL TARTRATE 5 MG: 1 INJECTION, SOLUTION INTRAVENOUS at 18:22

## 2023-07-30 RX ADMIN — APIXABAN 5 MG: 5 TABLET, FILM COATED ORAL at 21:01

## 2023-07-30 RX ADMIN — DILTIAZEM HYDROCHLORIDE 5 MG/HR: 5 INJECTION INTRAVENOUS at 17:25

## 2023-07-30 RX ADMIN — FLECAINIDE ACETATE 100 MG: 100 TABLET ORAL at 21:01

## 2023-07-30 RX ADMIN — DILTIAZEM HYDROCHLORIDE 10 MG: 5 INJECTION, SOLUTION INTRAVENOUS at 16:31

## 2023-07-30 RX ADMIN — SODIUM CHLORIDE: 9 INJECTION, SOLUTION INTRAVENOUS at 21:08

## 2023-07-30 ASSESSMENT — PAIN - FUNCTIONAL ASSESSMENT
PAIN_FUNCTIONAL_ASSESSMENT: NONE - DENIES PAIN
PAIN_FUNCTIONAL_ASSESSMENT: NONE - DENIES PAIN

## 2023-07-30 NOTE — ED NOTES
Admit floor aware patient sbar completed and will up in 20 minutes.       June Burdick RN  07/30/23 [id number]

## 2023-07-30 NOTE — ED NOTES
ED TO INPATIENT SBAR HANDOFF    Patient Name: Dexter Lai   :  1965  65 y.o. MRN:  [id number]  Preferred Name  Maggie Jiang   ED Room #:  2TR/2TRA-A2  Family/Caregiver Present yes   Restraints no   Sitter no   Sepsis Risk Score Sepsis Risk Score: 2.59    Situation  Code Status: Prior No additional code details. Allergies: Lisinopril  Weight: Patient Vitals for the past 96 hrs (Last 3 readings):   Weight   23 1603 (!) 308 lb 9 oz (140 kg)     Arrived from: home  Chief Complaint:   Chief Complaint   Patient presents with    [address] Problem/Diagnosis:  Active Problems:    * No active hospital problems. *  Resolved Problems:    * No resolved hospital problems.  *    Imaging:   No orders to display     Abnormal labs:   Abnormal Labs Reviewed   CBC WITH AUTO DIFFERENTIAL - Abnormal; Notable for the following components:       Result Value    RBC 5.91 (*)     RDW 16.1 (*)     MPV 10.6 (*)     All other components within normal limits   BASIC METABOLIC PANEL W/ REFLEX TO MG FOR LOW K - Abnormal; Notable for the following components:    Potassium reflex Magnesium 2.8 (*)     CO2 20 (*)     Glucose 141 (*)     Calcium 7.6 (*)     All other components within normal limits    Narrative:     CALL  [facility] tel. [phone],  Chemistry results called to and read back by micki noriega, 2023 17:06,  by [facility]   TROPONIN - Abnormal; Notable for the following components:    Troponin, High Sensitivity 46 (*)     All other components within normal limits    Narrative:     CALL  [place]  [facility] tel. [phone],  Chemistry results called to and read back by micki noriega, 2023 17:06,  by [facility]   TROPONIN - Abnormal; Notable for the following components:    Troponin, High Sensitivity 98 (*)     All other components within normal limits   BRAIN NATRIURETIC PEPTIDE - Abnormal; Notable for the following components:    Pro-BNP 1,423 (*)     All other components within normal limits    Narrative:     CALL

## 2023-07-30 NOTE — ED NOTES
Defib pads placed on patient, pt moved to trauma 2 for procedure.       Anita Crystal RN  07/30/23 [id number]

## 2023-07-30 NOTE — ED TRIAGE NOTES
Pt presents from home states he went with his mother to have icecream when he started feeling his heart racing. Pt states after going to the [employer] practice yesterday he has been feeling bad due to being out in the sun.  Pt denies chest pain, sob no different than normal.

## 2023-07-30 NOTE — H&P
V2.0  History and Physical      Name:  Leticia Boone /Age/Sex: 1965  (65 y.o. male)   MRN & CSN:  [id number] & [id number] Encounter Date/Time: 2023 7:12 PM EDT   Location:  [facility] PCP: Ca Spence, [address] [place] [address] Day: 1    Assessment and Plan:   A-fib with RVR: resolved, patient was started on Cardizem drip, will continue. Cardiology was consulted in the ED. Continue with home flecainide and Eliquis    Elevated troponin: Could be demand ischemia, telemetry monitor, trend troponin, cardiology consulted, echo. Continue with home aspirin    Hypertension: Cardizem drip, valsartan    Hypokalemia: Replaced in the ED, monitor BMP    Hypomagnesemia: Replaced in the ED, monitor Mg    BPH: Flomax    MARY: CPAP    Admit: Inpatient  Code: Full  DVT proph: SCD, Eliquis      History of Present Illness:     Chief Complaint: palpitations  Leticia Boone is a 65 y.o. male who is presenting with palpitations that started today. Patient has history of A-fib on Eliquis. When I saw the patient he was comfortable in bed, awake alert oriented x3 on room air. Heart rate was controlled. Patient denies chest pain, cough, shortness of breath, nausea vomiting, abdominal pain, dysuria and bleeding. In the ED heart rate 185 which improved to 83 with 1 dose of metoprolol and Cardizem. Labs showed potassium 2.8, magnesium 1.4, proBNP 1423, troponin 46>>98. EKG showed A-fib with RVR. Cardiology was consulted.   Magnesium and potassium replaced     Review of Systems:        Pertinent positives and negatives discussed in HPI     Objective:   No intake or output data in the 24 hours ending 23 1912   Vitals:   Vitals:    23 1645 23 1749 23 1821 23 1827   BP: (!) 241/132 (!) 217/142  (!) 220/176   Pulse: (!) 139 (!) 171  83   Resp: 21 30  21   Temp:       TempSrc:       SpO2: 98% 97% 100% 100%   Weight:           Medications Prior to Admission     Prior to (Right); and Cardiac catheterization. Allergies: Allergies   Allergen Reactions    Lisinopril      cough     Fam HX:  family history includes Diabetes in his mother; Stroke in his maternal uncle. Soc HX:   Social History     Socioeconomic History    Marital status: Single    Number of children: 2   Occupational History    Occupation:    Tobacco Use    Smoking status: Never    Smokeless tobacco: Never   Vaping Use    Vaping Use: Never used   Substance and Sexual Activity    Alcohol use: Yes     Comment: occasionally    Drug use: No       Medications:   Medications:    magnesium sulfate  2,000 mg IntraVENous Once    potassium chloride  10 mEq IntraVENous Q1H      Infusions:    dilTIAZem 5 mg/hr (07/30/23 1725)     PRN Meds:      Labs      CBC:   Recent Labs     07/30/23  1633   WBC 9.9   HGB 16.4        BMP:    Recent Labs     07/30/23  1633      K 2.8*      CO2 20*   BUN 14   CREATININE 1.0   GLUCOSE 141*     Hepatic: No results for input(s): AST, ALT, ALB, BILITOT, ALKPHOS in the last 72 hours. Lipids:   Lab Results   Component Value Date/Time    CHOL 141 06/25/2022 01:42 PM    HDL 34 06/25/2022 01:42 PM    TRIG 127 06/25/2022 01:42 PM     Hemoglobin A1C:   Lab Results   Component Value Date/Time    LABA1C 5.7 06/23/2022 12:31 PM     TSH:   Lab Results   Component Value Date/Time    TSH 2.98 02/24/2018 09:16 AM     Troponin: No results found for: TROPONINT  Lactic Acid: No results for input(s): LACTA in the last 72 hours.   BNP:   Recent Labs     07/30/23  1633   PROBNP 1,423*     UA:  Lab Results   Component Value Date/Time    NITRU Negative 10/26/2020 12:02 AM    COLORU Yellow 10/26/2020 12:02 AM    PHUR 6.0 10/26/2020 12:02 AM    WBCUA 3-5 10/26/2020 12:02 AM    RBCUA 0-2 10/26/2020 12:02 AM    MUCUS 2+ 08/22/2013 08:08 AM    BACTERIA Rare 10/26/2020 12:02 AM    CLARITYU Clear 10/26/2020 12:02 AM    SPECGRAV >=1.030 10/26/2020 12:02 AM    LEUKOCYTESUR Negative 10/26/2020 12:02

## 2023-07-30 NOTE — ED PROVIDER NOTES
ED Attending Attestation Note     Date of evaluation: 7/30/2023    This patient was seen by the resident. I have seen and examined the patient, agree with the workup, evaluation, management and diagnosis. The care plan has been discussed. I have reviewed the ECG and concur with the resident's interpretation. My assessment reveals a gentleman with history of paroxysmal A-fib who presents with A-fib with RVR. This began acutely today. He is fully anticoagulated. He is chronically extremely hypertensive. On exam he is awake alert conversant. Has no alteration in sensorium or mentation. Lungs clear to auscultation bilaterally. His bilateral lower extremities have some trace edema but no significant peripheral edema. The bilateral lower extremities are symmetric. The patient's history of being potentially dehydrated we did proceed with a liter of IV fluids. Diltiazem was administered as a bolus and a drip. He initially responded well to this with a decrease in his heart rate to the 130s but this then increased again to the 170s to 180s. At that point we elected to prepare for cardioversion the patient was consented for this. Cardiology recommended giving 5 mg of IV metoprolol. Subsequent to this patient spontaneously converted to sinus rhythm. He reported complete resolution of all of the symptoms at this point. Studies are notable for severe hypokalemia with associated hypomagnesemia. I suspect patient will require prolonged period of electrolyte replacement. Patient be admitted to the hospital         Critical Care:  Due to the immediate potential for life-threatening deterioration due to tachyarrhythmia and severe electrolyte derangemenets, I spent 33 minutes providing critical care. This time excludes time spent performing procedures but includes time spent on direct patient care, history retrieval, review of the chart, and discussions with patient, family, and consultant(s).

## 2023-07-31 LAB
ANION GAP SERPL CALCULATED.3IONS-SCNC: 12 MMOL/L (ref 3–16)
ANTI-XA UNFRAC HEPARIN: 0.86 IU/ML (ref 0.3–0.7)
APTT BLD: 30.7 SEC (ref 22.7–35.9)
APTT BLD: 43.8 SEC (ref 22.7–35.9)
APTT BLD: 86.1 SEC (ref 22.7–35.9)
BASOPHILS # BLD: 0.1 K/UL (ref 0–0.2)
BASOPHILS NFR BLD: 0.6 %
BUN SERPL-MCNC: 13 MG/DL (ref 7–20)
CALCIUM SERPL-MCNC: 9.2 MG/DL (ref 8.3–10.6)
CHLORIDE SERPL-SCNC: 102 MMOL/L (ref 99–110)
CO2 SERPL-SCNC: 27 MMOL/L (ref 21–32)
CREAT SERPL-MCNC: 1.1 MG/DL (ref 0.9–1.3)
DEPRECATED RDW RBC AUTO: 16.2 % (ref 12.4–15.4)
DEPRECATED RDW RBC AUTO: 16.3 % (ref 12.4–15.4)
EKG ATRIAL RATE: 105 BPM
EKG ATRIAL RATE: 83 BPM
EKG ATRIAL RATE: 84 BPM
EKG DIAGNOSIS: NORMAL
EKG P AXIS: -18 DEGREES
EKG P AXIS: -6 DEGREES
EKG P-R INTERVAL: 108 MS
EKG P-R INTERVAL: 140 MS
EKG Q-T INTERVAL: 304 MS
EKG Q-T INTERVAL: 396 MS
EKG Q-T INTERVAL: 424 MS
EKG QRS DURATION: 100 MS
EKG QRS DURATION: 90 MS
EKG QRS DURATION: 98 MS
EKG QTC CALCULATION (BAZETT): 467 MS
EKG QTC CALCULATION (BAZETT): 498 MS
EKG QTC CALCULATION (BAZETT): 515 MS
EKG R AXIS: -62 DEGREES
EKG R AXIS: -66 DEGREES
EKG R AXIS: -80 DEGREES
EKG T AXIS: 110 DEGREES
EKG T AXIS: 127 DEGREES
EKG T AXIS: 129 DEGREES
EKG VENTRICULAR RATE: 173 BPM
EKG VENTRICULAR RATE: 83 BPM
EKG VENTRICULAR RATE: 84 BPM
EOSINOPHIL # BLD: 0.2 K/UL (ref 0–0.6)
EOSINOPHIL NFR BLD: 2 %
GFR SERPLBLD CREATININE-BSD FMLA CKD-EPI: >60 ML/MIN/{1.73_M2}
GLUCOSE SERPL-MCNC: 113 MG/DL (ref 70–99)
HCT VFR BLD AUTO: 45.1 % (ref 40.5–52.5)
HCT VFR BLD AUTO: 48.3 % (ref 40.5–52.5)
HGB BLD-MCNC: 15 G/DL (ref 13.5–17.5)
HGB BLD-MCNC: 15.9 G/DL (ref 13.5–17.5)
INR PPP: 1.35 (ref 0.84–1.16)
LYMPHOCYTES # BLD: 2.9 K/UL (ref 1–5.1)
LYMPHOCYTES NFR BLD: 29.1 %
MAGNESIUM SERPL-MCNC: 2 MG/DL (ref 1.8–2.4)
MCH RBC QN AUTO: 27.8 PG (ref 26–34)
MCH RBC QN AUTO: 27.8 PG (ref 26–34)
MCHC RBC AUTO-ENTMCNC: 33 G/DL (ref 31–36)
MCHC RBC AUTO-ENTMCNC: 33.2 G/DL (ref 31–36)
MCV RBC AUTO: 83.7 FL (ref 80–100)
MCV RBC AUTO: 84.2 FL (ref 80–100)
MONOCYTES # BLD: 0.7 K/UL (ref 0–1.3)
MONOCYTES NFR BLD: 6.8 %
NEUTROPHILS # BLD: 6.1 K/UL (ref 1.7–7.7)
NEUTROPHILS NFR BLD: 61.5 %
PLATELET # BLD AUTO: 183 K/UL (ref 135–450)
PLATELET # BLD AUTO: 188 K/UL (ref 135–450)
PMV BLD AUTO: 10 FL (ref 5–10.5)
PMV BLD AUTO: 10.1 FL (ref 5–10.5)
POTASSIUM SERPL-SCNC: 3.4 MMOL/L (ref 3.5–5.1)
PROTHROMBIN TIME: 16.6 SEC (ref 11.5–14.8)
RBC # BLD AUTO: 5.39 M/UL (ref 4.2–5.9)
RBC # BLD AUTO: 5.74 M/UL (ref 4.2–5.9)
REASON FOR REJECTION: NORMAL
REJECTED TEST: NORMAL
SODIUM SERPL-SCNC: 141 MMOL/L (ref 136–145)
TROPONIN, HIGH SENSITIVITY: 238 NG/L (ref 0–22)
TROPONIN, HIGH SENSITIVITY: 284 NG/L (ref 0–22)
WBC # BLD AUTO: 10 K/UL (ref 4–11)
WBC # BLD AUTO: 10.2 K/UL (ref 4–11)

## 2023-07-31 PROCEDURE — 85027 COMPLETE CBC AUTOMATED: CPT

## 2023-07-31 PROCEDURE — 85730 THROMBOPLASTIN TIME PARTIAL: CPT

## 2023-07-31 PROCEDURE — 6360000002 HC RX W HCPCS: Performed by: NURSE PRACTITIONER

## 2023-07-31 PROCEDURE — 99222 1ST HOSP IP/OBS MODERATE 55: CPT | Performed by: INTERNAL MEDICINE

## 2023-07-31 PROCEDURE — 6370000000 HC RX 637 (ALT 250 FOR IP): Performed by: STUDENT IN AN ORGANIZED HEALTH CARE EDUCATION/TRAINING PROGRAM

## 2023-07-31 PROCEDURE — 36415 COLL VENOUS BLD VENIPUNCTURE: CPT

## 2023-07-31 PROCEDURE — 2060000000 HC ICU INTERMEDIATE R&B

## 2023-07-31 PROCEDURE — 2580000003 HC RX 258: Performed by: ANESTHESIOLOGY

## 2023-07-31 PROCEDURE — 93010 ELECTROCARDIOGRAM REPORT: CPT | Performed by: INTERNAL MEDICINE

## 2023-07-31 PROCEDURE — 85025 COMPLETE CBC W/AUTO DIFF WBC: CPT

## 2023-07-31 PROCEDURE — 99223 1ST HOSP IP/OBS HIGH 75: CPT | Performed by: NURSE PRACTITIONER

## 2023-07-31 PROCEDURE — 83735 ASSAY OF MAGNESIUM: CPT

## 2023-07-31 PROCEDURE — 99221 1ST HOSP IP/OBS SF/LOW 40: CPT | Performed by: NURSE PRACTITIONER

## 2023-07-31 PROCEDURE — 80048 BASIC METABOLIC PNL TOTAL CA: CPT

## 2023-07-31 PROCEDURE — 85610 PROTHROMBIN TIME: CPT

## 2023-07-31 PROCEDURE — 6370000000 HC RX 637 (ALT 250 FOR IP): Performed by: ANESTHESIOLOGY

## 2023-07-31 PROCEDURE — 84484 ASSAY OF TROPONIN QUANT: CPT

## 2023-07-31 PROCEDURE — 85520 HEPARIN ASSAY: CPT

## 2023-07-31 RX ORDER — CARVEDILOL 25 MG/1
25 TABLET ORAL 2 TIMES DAILY WITH MEALS
Status: DISCONTINUED | OUTPATIENT
Start: 2023-07-31 | End: 2023-08-01 | Stop reason: HOSPADM

## 2023-07-31 RX ORDER — NIFEDIPINE 90 MG/1
90 TABLET, EXTENDED RELEASE ORAL DAILY
Status: DISCONTINUED | OUTPATIENT
Start: 2023-07-31 | End: 2023-08-01 | Stop reason: HOSPADM

## 2023-07-31 RX ORDER — HYDRALAZINE HYDROCHLORIDE 20 MG/ML
10 INJECTION INTRAMUSCULAR; INTRAVENOUS EVERY 8 HOURS PRN
Status: DISCONTINUED | OUTPATIENT
Start: 2023-07-31 | End: 2023-08-01 | Stop reason: HOSPADM

## 2023-07-31 RX ORDER — HEPARIN SODIUM 1000 [USP'U]/ML
2000 INJECTION, SOLUTION INTRAVENOUS; SUBCUTANEOUS PRN
Status: DISCONTINUED | OUTPATIENT
Start: 2023-07-31 | End: 2023-08-01 | Stop reason: ALTCHOICE

## 2023-07-31 RX ORDER — HEPARIN SODIUM 1000 [USP'U]/ML
4000 INJECTION, SOLUTION INTRAVENOUS; SUBCUTANEOUS PRN
Status: DISCONTINUED | OUTPATIENT
Start: 2023-07-31 | End: 2023-08-01 | Stop reason: ALTCHOICE

## 2023-07-31 RX ORDER — HEPARIN SODIUM 10000 [USP'U]/100ML
5-30 INJECTION, SOLUTION INTRAVENOUS CONTINUOUS
Status: DISCONTINUED | OUTPATIENT
Start: 2023-07-31 | End: 2023-08-01

## 2023-07-31 RX ORDER — HEPARIN SODIUM 1000 [USP'U]/ML
4000 INJECTION, SOLUTION INTRAVENOUS; SUBCUTANEOUS ONCE
Status: COMPLETED | OUTPATIENT
Start: 2023-07-31 | End: 2023-07-31

## 2023-07-31 RX ORDER — POTASSIUM CHLORIDE 20 MEQ/1
40 TABLET, EXTENDED RELEASE ORAL ONCE
Status: COMPLETED | OUTPATIENT
Start: 2023-07-31 | End: 2023-07-31

## 2023-07-31 RX ADMIN — VALSARTAN 160 MG: 80 TABLET, FILM COATED ORAL at 08:00

## 2023-07-31 RX ADMIN — NIFEDIPINE 90 MG: 90 TABLET, FILM COATED, EXTENDED RELEASE ORAL at 11:40

## 2023-07-31 RX ADMIN — VALSARTAN 160 MG: 80 TABLET, FILM COATED ORAL at 21:30

## 2023-07-31 RX ADMIN — HYDRALAZINE HYDROCHLORIDE 100 MG: 100 TABLET, FILM COATED ORAL at 21:30

## 2023-07-31 RX ADMIN — HEPARIN SODIUM 15 UNITS/KG/HR: 10000 INJECTION, SOLUTION INTRAVENOUS at 21:46

## 2023-07-31 RX ADMIN — ASPIRIN 81 MG: 81 TABLET, COATED ORAL at 07:58

## 2023-07-31 RX ADMIN — HEPARIN SODIUM 4000 UNITS: 1000 INJECTION INTRAVENOUS; SUBCUTANEOUS at 21:45

## 2023-07-31 RX ADMIN — HYDRALAZINE HYDROCHLORIDE 100 MG: 100 TABLET, FILM COATED ORAL at 07:59

## 2023-07-31 RX ADMIN — SODIUM CHLORIDE, PRESERVATIVE FREE 10 ML: 5 INJECTION INTRAVENOUS at 21:43

## 2023-07-31 RX ADMIN — POTASSIUM CHLORIDE 40 MEQ: 1500 TABLET, EXTENDED RELEASE ORAL at 11:40

## 2023-07-31 RX ADMIN — HEPARIN SODIUM 4000 UNITS: 1000 INJECTION INTRAVENOUS; SUBCUTANEOUS at 13:50

## 2023-07-31 RX ADMIN — HEPARIN SODIUM 7 UNITS/KG/HR: 10000 INJECTION, SOLUTION INTRAVENOUS at 02:28

## 2023-07-31 RX ADMIN — FLECAINIDE ACETATE 100 MG: 100 TABLET ORAL at 21:30

## 2023-07-31 RX ADMIN — HYDRALAZINE HYDROCHLORIDE 10 MG: 20 INJECTION INTRAMUSCULAR; INTRAVENOUS at 03:22

## 2023-07-31 RX ADMIN — CARVEDILOL 25 MG: 25 TABLET, FILM COATED ORAL at 18:43

## 2023-07-31 RX ADMIN — HEPARIN SODIUM 4000 UNITS: 1000 INJECTION INTRAVENOUS; SUBCUTANEOUS at 02:19

## 2023-07-31 RX ADMIN — FLECAINIDE ACETATE 100 MG: 100 TABLET ORAL at 07:58

## 2023-07-31 NOTE — PROGRESS NOTES
Pt diltz gtt discontinue for controlled HR and NSR on tele/EKG as per order via phone with JARET Stover CNP.

## 2023-07-31 NOTE — PROGRESS NOTES
Pt admitted to room 4456 via stretcher from ED. Pt is on diltz gtt 2.5ml/hr. Pt is alert and oriented and ambulatory. Pt /147, HR 80s, NSR on tele, temp 97.8, 95% on RA, RR 26. Pt denies chest pain, SOB, or dizziness. MD notify. New orders place. Pt standard safety measures in place. No skin breakdown noted.

## 2023-07-31 NOTE — CARE COORDINATION
CM spoke with pt and girlfriend at bedside regarding DCP. Pt from home with sonPedro. Has home cpap. States he does not need HHC, but would be agreeable if needed upon DC. States either son or girlfriend will be ride home. States he has PCP, Barbi Angeles, but has not seen her in a while- pt advised that follow ups with PCP are recommended after hospitalization. Pt verbalized understanding. Denies POA/Living Will. Pt denies any CM needs. Please contact CM if needs arise prior to DC.     Thank you  Cat Isaias Hernández RN, BSN, [facility]   [phone]

## 2023-07-31 NOTE — PLAN OF CARE
Problem: Discharge Planning  Goal: Discharge to home or other facility with appropriate resources  Outcome: Progressing  Flowsheets (Taken 7/30/2023 2024)  Discharge to home or other facility with appropriate resources:   Identify barriers to discharge with patient and caregiver   Arrange for needed discharge resources and transportation as appropriate   Identify discharge learning needs (meds, wound care, etc)     Problem: Safety - Adult  Goal: Free from fall injury  Outcome: Progressing     Problem: Chronic Conditions and Co-morbidities  Goal: Patient's chronic conditions and co-morbidity symptoms are monitored and maintained or improved  Outcome: Progressing     Problem: Skin/Tissue Integrity  Goal: Absence of new skin breakdown  Description: 1. Monitor for areas of redness and/or skin breakdown  2. Assess vascular access sites hourly  3. Every 4-6 hours minimum:  Change oxygen saturation probe site  4. Every 4-6 hours:  If on nasal continuous positive airway pressure, respiratory therapy assess nares and determine need for appliance change or resting period.   Outcome: Progressing

## 2023-07-31 NOTE — CONSULTS
[facility]   Electrophysiology Nurse Practitioner  Consult Note    Date: 7/31/2023    Reason for Consultation/ Chief Complaint: AF/RVR    Patient interviewed, examined and pertinent medical data and imaging studies reviewed. Refer to the NP's note for details of clinical findings, assessment and plan with which I agree. Corrections and additions as noted:     Recurrence of Afib in the setting of dehydration and hypokalemia. Continue current medications including Flecainide and BB  Elevated HS troponin, no ECG changes, mild CAD in the past.   If TTE is WNL, change heparin to Eliquis and DC home    Anna Nina MD   Cardiac Electrophysiology  [address] [place] [address] [phone]            History Obtained From: Patient and medical record. Cardiac Hx: Abel Agarwal is a 65 y.o. man with a h/o HTN, HLD, MARY, morbid obesity, renal artery stenosis, s/p PCI, chronic dCHF, LHC (2020) showed normal cors, (2022, Dr. Prakash Choudhury) showed mild CAD, EF 60%, pAF, s/p RFA/PVI of AF/AFL (2/3/2021, Dr. Maria Antonia Hinson), recurrent AF (6/2022), s/p DCCV to NSR who p/w palpitations, found to be in AF/RVR, K+ 2.8, given dilt IVP and converted to NSR, trop 284. HPI: Patient had presented with complaints of palpitations and was found to be in AF/RVR. His potassium was also 2.8. He was given diltiazem IV push and converted to normal sinus rhythm. He states that he had been out at [employer] on Saturday in the hot sun. He also states he had a few beers Saturday night. Yesterday he had taken his mother for ice cream and felt himself go out of rhythm. He ended up coming to the emergency room. He currently is in normal sinus rhythm with PVCs and couplets. He is on carvedilol 25 mg twice a day, flecainide 100 mg every 12 hours. He is on Eliquis 5 mg twice a day. Not missed any doses. This is his first recurrence since last year when he had an episode of A-fib after being dehydrated.   He feels converted to NSR, trop 284. [id number] 2. TSH 4.93 (6/23/2022)     pAF  - In NSR - converted in ED  - S/p RFA/PVI of AF/AFL (2021)  - On Eliquis 5 mg BID at home - on heparin now  - Transition back to Eliquis when able  - On flecainide 100 mg BID  - MARY - compliant with CPAP at home  - Reviewed risk factors, pathophysiology, treatment options and lifestyle modification for atrial fibrillation: Blood pressure control, blood sugar control, healthy diet, minimal alcohol intake, no smoking, activity and exercise, manage stress sleep apnea evaluation and symptoms of a stroke. - Keep K+ > 4.0 and Mg > 2.0- replacement ordered  - Reviewed recent labs  - Dr. Lilian Lao to see later today  - ECG ordered and results personally reviewed      Elevated HStrop  - Max 284  - Will have Dr. Elyssa Dc see  - [facility] 2022 showed mild LAD ds    No tobacco use. All questions and concerns were addressed to the patient/family. Alternatives to my treatment were discussed. The note was completed using EMR. Every effort was made to ensure accuracy; however, inadvertent computerized transcription errors may be present.      Chasidy Love [address] [facility]

## 2023-07-31 NOTE — PROGRESS NOTES
Physician Progress Note      Polly Prince  CIERRA #:                  [id number]  :                       1965  ADMIT DATE:       2023 4:00 PM  [facility] DATE:  RESPONDING  PROVIDER #:        Gini Armendariz MD        QUERY TEXT:    Stage of Chronic Kidney Disease: Please provide further specificity, if known. Clinical indicators include:  Options provided:  -- Chronic kidney disease stage 1  -- Chronic kidney disease stage 2  -- Chronic kidney disease stage 3  -- Chronic kidney disease stage 3a  -- Chronic kidney disease stage 3b  -- Chronic kidney disease stage 4  -- Chronic kidney disease stage 5  -- Chronic kidney disease stage 5, requiring dialysis  -- End stage renal disease  -- Other - I will add my own diagnosis  -- Disagree - Not applicable / Not valid  -- Disagree - Clinically Unable to determine / Unknown        PROVIDER RESPONSE TEXT:    Provider dismissed this query because it was not applicable to the patient or   not a valid query. QUERY TEXT:    Patient admitted with BMI 46 per Nursing flow sheet. Morbid obesity listed as   PMH and not documented under assessment and plan. If possible, please   document in progress notes and discharge summary if you are evaluating and /or   treating any of the following: The medical record reflects the following:  Risk Factors: 65 y.o. male with [address] HTN, CKD, HLD, MARY  Clinical Indicators: BMI >40  Treatment: I&Os, wts    ? Specificity of obesity and morbid obesity should be reported based on   physician documentation, as there are several published classifications and   definitions? [employer]M [employer] Training Guide. CDC:   https://cook-oconnell.info/. WHO:   http://fernando.biz/.  NIH:   LargeFood.be  Options provided:  -- Morbid obesity with BMI 46  -- Other - I will add my own diagnosis  --

## 2023-07-31 NOTE — PROGRESS NOTES
4 Eyes Skin Assessment     NAME:  Malu Aguilar  YOB: 1965  MEDICAL RECORD NUMBER:  [id number]    The patient is being assessed for  Admission    I agree that at least one RN has performed a thorough Head to Toe Skin Assessment on the patient. ALL assessment sites listed below have been assessed. Areas assessed by both nurses:    Head, Face, Ears, Shoulders, Back, Chest, Arms, Elbows, Hands, Sacrum. Buttock, Coccyx, Ischium, Legs. Feet and Heels, and Under Medical Devices     Flaky, dry skin bilat foot  LLE scars        Does the Patient have a Wound?  No noted wound(s)       Gaurang Prevention initiated by RN: No  Wound Care Orders initiated by RN: No    Pressure Injury (Stage 3,4, Unstageable, DTI, NWPT, and Complex wounds) if present, place Wound referral order by RN under : No    New Ostomies, if present place, Ostomy referral order under : No     Nurse 1 eSignature: Electronically signed by Wanda Carcamo RN on 7/30/23 at 9:48 PM EDT    **SHARE this note so that the co-signing nurse can place an eSignature**    Nurse 2 eSignature: Electronically signed by Arpit Handley RN on 7/31/23 at 6:10 AM EDT

## 2023-07-31 NOTE — PROGRESS NOTES
V2.0    [facility] Progress Note      Name:  [place] Amos /Age/Sex: 1965  (65 y.o. male)   MRN & CSN:  [id number] & [id number] Encounter Date/Time: 2023 9:40 AM EDT   Location:  [facility]3825-70 PCP: JARET Arana CNP     Attending:Michael Ness MD       Hospital Day: 2    Assessment and Recommendations   [place] Amos is a 65 y.o. male presents with Atrial fibrillation with rapid ventricular response ([address])      Plan:       A-fib with RVR: resolved, patient was started on Cardizem drip, now discontinued as patient is in sinus rhythm and the heart rate is controlled. .  Cardiology was consulted in the ED, appreciate recommendations. Continue with home Coreg flecainide. He is on the heparin drip in the interim. If no plans for intervention by cardiology we will transition him to his home Eliquis. Elevated troponin: Could be demand ischemia, telemetry monitor, trend troponin, cardiology consulted, echo. Continue with home aspirin. No chest pain or evidence of ischemia on EKG. Hypertension: Continue home valsartan. Restart home nifedipine and Coreg. Hypokalemia: Given replenishment in the ED. Repeat labs pending     Hypomagnesemia: Given replenishment in the ED. Repeat labs pending     BPH: States he no longer takes Flomax      MARY: CPAP      Diet ADULT DIET; Regular   DVT Prophylaxis [] Lovenox, [x]  Heparin, [] SCDs, [] Ambulation,  [] Eliquis, [] Xarelto  [] Coumadin   Code Status Full Code   Disposition From: home  Expected Disposition: home  Estimated Date of Discharge: 24-48 hrs  Patient requires continued admission due to afib, troponin elevation, cards consult. Surrogate Decision Maker/ Shanice Orellana (Child)      Personally reviewed Lab Studies and Imaging         EKG interpreted personally and results latest ekg with Sinus rhythm.  LAD, LVH changes      Drugs that require monitoring for toxicity include heparin and the method of monitoring was

## 2023-08-01 VITALS
WEIGHT: 303.13 LBS | TEMPERATURE: 97.5 F | HEART RATE: 68 BPM | SYSTOLIC BLOOD PRESSURE: 132 MMHG | HEIGHT: 68 IN | RESPIRATION RATE: 16 BRPM | BODY MASS INDEX: 45.94 KG/M2 | OXYGEN SATURATION: 97 % | DIASTOLIC BLOOD PRESSURE: 91 MMHG

## 2023-08-01 LAB
ANION GAP SERPL CALCULATED.3IONS-SCNC: 10 MMOL/L (ref 3–16)
ANION GAP SERPL CALCULATED.3IONS-SCNC: 12 MMOL/L (ref 3–16)
APTT BLD: 70 SEC (ref 22.7–35.9)
APTT BLD: 76.8 SEC (ref 22.7–35.9)
APTT BLD: 86.1 SEC (ref 22.7–35.9)
BUN SERPL-MCNC: 17 MG/DL (ref 7–20)
BUN SERPL-MCNC: 18 MG/DL (ref 7–20)
CALCIUM SERPL-MCNC: 9 MG/DL (ref 8.3–10.6)
CALCIUM SERPL-MCNC: 9.1 MG/DL (ref 8.3–10.6)
CHLORIDE SERPL-SCNC: 100 MMOL/L (ref 99–110)
CHLORIDE SERPL-SCNC: 104 MMOL/L (ref 99–110)
CO2 SERPL-SCNC: 25 MMOL/L (ref 21–32)
CO2 SERPL-SCNC: 25 MMOL/L (ref 21–32)
CREAT SERPL-MCNC: 1.3 MG/DL (ref 0.9–1.3)
CREAT SERPL-MCNC: 1.3 MG/DL (ref 0.9–1.3)
GFR SERPLBLD CREATININE-BSD FMLA CKD-EPI: >60 ML/MIN/{1.73_M2}
GFR SERPLBLD CREATININE-BSD FMLA CKD-EPI: >60 ML/MIN/{1.73_M2}
GLUCOSE SERPL-MCNC: 128 MG/DL (ref 70–99)
GLUCOSE SERPL-MCNC: 156 MG/DL (ref 70–99)
LV EF: 53 %
LVEF MODALITY: NORMAL
MAGNESIUM SERPL-MCNC: 1.9 MG/DL (ref 1.8–2.4)
POTASSIUM SERPL-SCNC: 3.5 MMOL/L (ref 3.5–5.1)
POTASSIUM SERPL-SCNC: 4 MMOL/L (ref 3.5–5.1)
SODIUM SERPL-SCNC: 137 MMOL/L (ref 136–145)
SODIUM SERPL-SCNC: 139 MMOL/L (ref 136–145)
TROPONIN, HIGH SENSITIVITY: 276 NG/L (ref 0–22)

## 2023-08-01 PROCEDURE — 99233 SBSQ HOSP IP/OBS HIGH 50: CPT | Performed by: NURSE PRACTITIONER

## 2023-08-01 PROCEDURE — 80048 BASIC METABOLIC PNL TOTAL CA: CPT

## 2023-08-01 PROCEDURE — 6370000000 HC RX 637 (ALT 250 FOR IP): Performed by: STUDENT IN AN ORGANIZED HEALTH CARE EDUCATION/TRAINING PROGRAM

## 2023-08-01 PROCEDURE — 83735 ASSAY OF MAGNESIUM: CPT

## 2023-08-01 PROCEDURE — 6370000000 HC RX 637 (ALT 250 FOR IP): Performed by: NURSE PRACTITIONER

## 2023-08-01 PROCEDURE — 6360000004 HC RX CONTRAST MEDICATION: Performed by: INTERNAL MEDICINE

## 2023-08-01 PROCEDURE — 6370000000 HC RX 637 (ALT 250 FOR IP): Performed by: ANESTHESIOLOGY

## 2023-08-01 PROCEDURE — 84484 ASSAY OF TROPONIN QUANT: CPT

## 2023-08-01 PROCEDURE — 36415 COLL VENOUS BLD VENIPUNCTURE: CPT

## 2023-08-01 PROCEDURE — 85730 THROMBOPLASTIN TIME PARTIAL: CPT

## 2023-08-01 PROCEDURE — 2580000003 HC RX 258: Performed by: ANESTHESIOLOGY

## 2023-08-01 PROCEDURE — 6360000002 HC RX W HCPCS: Performed by: NURSE PRACTITIONER

## 2023-08-01 PROCEDURE — 99233 SBSQ HOSP IP/OBS HIGH 50: CPT | Performed by: INTERNAL MEDICINE

## 2023-08-01 PROCEDURE — C8929 TTE W OR WO FOL WCON,DOPPLER: HCPCS

## 2023-08-01 RX ORDER — POTASSIUM CHLORIDE 20 MEQ/1
40 TABLET, EXTENDED RELEASE ORAL ONCE
Status: COMPLETED | OUTPATIENT
Start: 2023-08-01 | End: 2023-08-01

## 2023-08-01 RX ADMIN — ASPIRIN 81 MG: 81 TABLET, COATED ORAL at 09:05

## 2023-08-01 RX ADMIN — HEPARIN SODIUM 2000 UNITS: 1000 INJECTION INTRAVENOUS; SUBCUTANEOUS at 10:31

## 2023-08-01 RX ADMIN — CARVEDILOL 25 MG: 25 TABLET, FILM COATED ORAL at 09:04

## 2023-08-01 RX ADMIN — SODIUM CHLORIDE, PRESERVATIVE FREE 10 ML: 5 INJECTION INTRAVENOUS at 09:06

## 2023-08-01 RX ADMIN — PERFLUTREN 1.5 ML: 6.52 INJECTION, SUSPENSION INTRAVENOUS at 08:53

## 2023-08-01 RX ADMIN — NIFEDIPINE 90 MG: 90 TABLET, FILM COATED, EXTENDED RELEASE ORAL at 09:03

## 2023-08-01 RX ADMIN — FLECAINIDE ACETATE 100 MG: 100 TABLET ORAL at 09:03

## 2023-08-01 RX ADMIN — HEPARIN SODIUM 17 UNITS/KG/HR: 10000 INJECTION, SOLUTION INTRAVENOUS at 10:23

## 2023-08-01 RX ADMIN — POTASSIUM CHLORIDE 40 MEQ: 1500 TABLET, EXTENDED RELEASE ORAL at 12:33

## 2023-08-01 RX ADMIN — CARVEDILOL 25 MG: 25 TABLET, FILM COATED ORAL at 17:03

## 2023-08-01 RX ADMIN — APIXABAN 5 MG: 5 TABLET, FILM COATED ORAL at 17:02

## 2023-08-01 RX ADMIN — VALSARTAN 160 MG: 80 TABLET, FILM COATED ORAL at 09:04

## 2023-08-01 RX ADMIN — HYDRALAZINE HYDROCHLORIDE 100 MG: 100 TABLET, FILM COATED ORAL at 09:05

## 2023-08-01 NOTE — PROGRESS NOTES
[facility]   Cardiology  Note   Dr Aguilar Gibson MD, Beth Garza RN, FNP APRN CVNP  Date: 8/1/2023  Admit Date: 7/30/2023       Reason for consultation: PAF/elevated troponins     CC:palpitations     Primary cardiologist:  Leopoldo Frias     HPI: Dexter Lai is a 65 y.o. male with a past medical history of HTN, HLD, MARY, morbid obesity,  CRD  renal artery stenosis, s/p PCI, chronic dCHF, LHC (2020) showed normal cors, (2022, Dr. Emil Liu) showed mild CAD, EF 60%, pAF, s/p RFA/PVI of AF/AFL (2/3/2021, Dr. Yue Dean), recurrent AF (6/2022), s/p DCCV to NSR  TTE 6/2022EF wnl   Came to ED with palpitations, found to be in AF/RVR, K+ 2.8, given diltiazem IVP and converted to NSR, trop 284  no c/o angina   VSS   in NSR no c/o voiced / Blood work reviewed and wnl  Echo pending     Patient seen and examined. Clinical notes reviewed. Telemetry reviewed / Pertinent labs, diagnostic, device, and imaging results reviewed as a part of this visit  I spent a total of 50 minutes and greater than 50% of the time was spent counseling with patient  coordinating care regarding her diagnosis, treatments and plan of care. EKG TTE stress test: any LHC/RHC reviewed       Past Medical History:   Diagnosis Date    Carpal tunnel syndrome     Chronic kidney disease     Hyperlipidemia     Hypertension     Obesity, unspecified     MARY (obstructive sleep apnea)         Past Surgical History:    has a past surgical history that includes Nose surgery; Knee arthroscopy (Right); and Cardiac catheterization. Social History:  Reviewed. reports that he has never smoked. He has never used smokeless tobacco. He reports current alcohol use. He reports that he does not use drugs. Allergies: Allergies   Allergen Reactions    Lisinopril      cough       Family History:  Reviewed. family history includes Diabetes in his mother; Stroke in his maternal uncle.  Denies family history of sudden cardiac death, arrhythmia, premature of 1.3 mg/dL). CBC:   Recent Labs     07/30/23  1633 07/31/23  0230 07/31/23  0845   WBC 9.9 10.2 10.0   HGB 16.4 15.0 15.9   HCT 49.8 45.1 48.3   MCV 84.2 83.7 84.2    183 188     Thyroid:   Lab Results   Component Value Date/Time    TSH 2.98 02/24/2018 09:16 AM     Lipids:   Lab Results   Component Value Date/Time    CHOL 141 06/25/2022 01:42 PM    HDL 34 06/25/2022 01:42 PM    TRIG 127 06/25/2022 01:42 PM     LFTS:   Lab Results   Component Value Date/Time    ALT 25 01/07/2021 03:42 AM    AST 46 01/07/2021 03:42 AM    ALKPHOS 80 01/07/2021 03:42 AM    PROT 6.9 01/07/2021 03:42 AM    PROT 7.9 09/18/2012 12:42 PM    AGRATIO 1.7 10/27/2020 04:42 AM    BILITOT 0.4 01/07/2021 03:42 AM     Cardiac Enzymes:   Lab Results   Component Value Date/Time    TROPONINI 0.14 06/23/2022 12:32 PM    TROPONINI 0.12 06/23/2022 11:38 AM    TROPONINI 0.22 06/23/2022 05:42 AM     Coags:   Lab Results   Component Value Date/Time    PROTIME 16.6 07/31/2023 02:30 AM    INR 1.35 07/31/2023 02:30 AM       Patient Active Problem List    Diagnosis Date Noted    Abnormal myocardial perfusion study 06/28/2022    Chest pain     Typical atrial flutter (HCC)     Atrial fibrillation with RVR ([address]) 01/07/2021    H/O angiography 10/26/2020    Atrial fibrillation with rapid ventricular response ([address])     Essential hypertension     Atrial fibrillation ([address]) 02/27/2020    Hypertensive urgency 02/18/2020    Heart failure with normal ejection fraction ([address]) 10/29/2018    Mixed hyperlipidemia 02/19/2018    Paresthesia 06/07/2016    Lumbar strain 01/04/2016    Depressive disorder 08/19/2013    Obesity 07/15/2011    Obstructive sleep apnea syndrome 11/16/2010        Assessment     AF/RVR, with hx PAF  / IV  dilt IVP and converted to NSR   AF, s/p RFA/PVI of AF/AFL (2/3/2021, Dr. Lizet Kim), recurrent AF (6/2022), s/p DCCV to NSR  TTE 6/2022EF wnl   [id number] 2.  TSH 4.93 (6/23/2022)  EKG NSR LVH LAFB ST & T wave abnormality    High sensitivity

## 2023-08-01 NOTE — PLAN OF CARE
Problem: Discharge Planning  Goal: Discharge to home or other facility with appropriate resources  Outcome: Progressing     Problem: Safety - Adult  Goal: Free from fall injury  Outcome: Progressing- all precautions in place to prevent patient fall . Problem: Chronic Conditions and Co-morbidities  Goal: Patient's chronic conditions and co-morbidity symptoms are monitored and maintained or improved  Outcome: Progressing     Problem: Skin/Tissue Integrity  Goal: Absence of new skin breakdown  Description: 1. Monitor for areas of redness and/or skin breakdown  2. Assess vascular access sites hourly  3. Every 4-6 hours minimum:  Change oxygen saturation probe site  4. Every 4-6 hours:  If on nasal continuous positive airway pressure, respiratory therapy assess nares and determine need for appliance change or resting period. Outcome: Progressing- patient assessed for new or worsening skin breakdonw.

## 2023-08-01 NOTE — PLAN OF CARE
Problem: Discharge Planning  Goal: Discharge to home or other facility with appropriate resources  8/1/2023 1648 by Yunier Chairez RN  Outcome: Adequate for Discharge  8/1/2023 1648 by Yunier Chairez RN  Outcome: Adequate for Discharge  8/1/2023 0307 by Jose Hendricks RN  Outcome: Progressing     Problem: Safety - Adult  Goal: Free from fall injury  8/1/2023 1648 by Yunier Chairez RN  Outcome: Adequate for Discharge  8/1/2023 1648 by Yunier Chairez RN  Outcome: Adequate for Discharge  8/1/2023 0307 by Jose Hendricks RN  Outcome: Progressing     Problem: Chronic Conditions and Co-morbidities  Goal: Patient's chronic conditions and co-morbidity symptoms are monitored and maintained or improved  8/1/2023 1648 by Yunier Chairez RN  Outcome: Adequate for Discharge  8/1/2023 1648 by Yunier Chairez RN  Outcome: Adequate for Discharge  8/1/2023 0307 by Jose Hendricks RN  Outcome: Progressing     Problem: Skin/Tissue Integrity  Goal: Absence of new skin breakdown  Description: 1. Monitor for areas of redness and/or skin breakdown  2. Assess vascular access sites hourly  3. Every 4-6 hours minimum:  Change oxygen saturation probe site  4. Every 4-6 hours:  If on nasal continuous positive airway pressure, respiratory therapy assess nares and determine need for appliance change or resting period.   8/1/2023 1648 by Yunier Chairez RN  Outcome: Adequate for Discharge  8/1/2023 1648 by Yunier Chairez RN  Outcome: Adequate for Discharge  8/1/2023 1220 by Jose Hendricks RN  Outcome: Progressing

## 2023-08-01 NOTE — PROGRESS NOTES
Electrophysiology - PROGRESS NOTE    Admit Date: 7/30/2023     Chief Complaint: AF/RVR     Interval History:   Patient seen and examined and notes reviewed. Patient is being followed for AF/RVR. He has a longstanding history of paroxysmal atrial fibrillation. He underwent a an RFA/PVI of atrial fibrillation atrial flutter in February 2021. In June 2022 we had a recurrent episode of atrial fibrillation in the setting of dehydration. He underwent a DCCV to NSR and has been maintained in normal sinus rhythm since. He then presented with complaints of palpitations and was found to be in AF/RVR with a potassium of 2.8. He was given Dilt IV push and converted to normal sinus rhythm. He also was noted to have PVCs and nonsustained VT on telemetry. He has been on Eliquis 5 mg twice a day and has not missed any doses. He states that on Saturday he had gone to [employer] training was out in the hot sun all day. Then he states that Saturday night he had a few beers and on Sunday he felt himself go out of rhythm. He does have a history of MARY and is compliant with his CPAP. He has been in normal sinus rhythm. His Trope max was 284. He did have a left heart cath last year that showed mild CAD. His echo is pending. His potassium was replaced. He had a rare PVC overnight.     In: 240 [P.O.:240]  Out: 0    Wt Readings from Last 2 Encounters:   08/01/23 (!) 303 lb 2.1 oz (137.5 kg)   12/27/22 (!) 310 lb (140.6 kg)       Data:   Scheduled Meds:   Scheduled Meds:   NIFEdipine  90 mg Oral Daily    carvedilol  25 mg Oral BID WC    aspirin  81 mg Oral Daily    flecainide  100 mg Oral 2 times per day    hydrALAZINE  100 mg Oral BID    valsartan  160 mg Oral BID    sodium chloride flush  5-40 mL IntraVENous 2 times per day     Continuous Infusions:   heparin (PORCINE) Infusion 15 Units/kg/hr (07/31/23 2146)    sodium chloride       PRN Meds:.hydrALAZINE, heparin (porcine),

## 2023-08-01 NOTE — DISCHARGE SUMMARY
V2.0  Discharge Summary    Name:  Rubens Ervin /Age/Sex: 1965 (65 y.o. male)   Admit Date: 2023  Discharge Date: 23    MRN & CSN:  [id number] & [id number] Encounter Date and Time 23 11:19 AM EDT    Attending:  Kaci Barrett MD Discharging Provider: Kaci Barrett MD       Hospital Course:     Patient is a 60-year-old male past medical history of atrial fibrillation, hypertension, BPH who was admitted for A-fib with RVR requiring Cardizem drip. Patient has now converted back to normal sinus rhythm and has been weaned off of Cardizem drip. Patient was seen by cardiology and they recommended that patient should resume his Eliquis upon DC and should continue Coreg and flecainide. Patient had an echocardiogram. Since patient has been cleared by cardiology for discharge, he will be discharged back home in stable condition. He was advised to follow-up with his PCP for transition of care.       Discharge Instruction:     Primary care physician: JARET Lindsay CNP within 2 weeks  Diet: cardiac diet   Activity: activity as tolerated  Disposition: Discharged to:   [x]Home, [][facility], []SNF, []Acute Rehab, []Hospice   Condition on discharge: Stable    Discharge Medications:        Medication List        CONTINUE taking these medications      aspirin 81 MG EC tablet  Take 1 tablet by mouth daily     atorvastatin 10 MG tablet  Commonly known as: LIPITOR  TAKE 1 TABLET BY MOUTH DAILY     carvedilol 25 MG tablet  Commonly known as: COREG  Take 1 tablet by mouth 2 times daily (with meals)     Eliquis 5 MG Tabs tablet  Generic drug: apixaban     flecainide 100 MG tablet  Commonly known as: TAMBOCOR  Take 1 tablet by mouth every 12 hours     hydrALAZINE 100 MG tablet  Commonly known as: APRESOLINE  Take 1 tablet by mouth in the morning and at bedtime     NIFEdipine 90 MG extended release tablet  Commonly known as: PROCARDIA XL  Take 1 tablet by mouth daily     sertraline 50 MG tablet  Commonly

## 2023-08-02 ENCOUNTER — TELEPHONE (OUTPATIENT)
Dept: FAMILY MEDICINE CLINIC | Age: 58
End: 2023-08-02

## 2023-08-02 NOTE — TELEPHONE ENCOUNTER
Care Transitions Initial Follow Up Call    Outreach made within 2 business days of discharge: Yes    Patient: Leslee Serrano Patient : 1965   MRN: [id number]  Reason for Admission: There are no discharge diagnoses documented for the most recent discharge. Discharge Date: 23       Spoke with: Left message on recorder for patient to call back at  [phone]      Discharge department/facility:     [facility] Interactive Patient Contact:  Was patient able to fill all prescriptions:   Was patient instructed to bring all medications to the follow-up visit:   Is patient taking all medications as directed in the discharge summary?    Does patient understand their discharge instructions:   Does patient have questions or concerns that need addressed prior to 7-14 day follow up office visit:     Scheduled appointment with PCP within 7-14 days    Follow Up  Future Appointments   Date Time Provider [address] [place] [address]   8/3/2023  3:30 PM Bessy Pamella, APRN - CNP [facility] [place] - DYD   2023 10:15 AM JARET Martínez [place] [address] Card [facility]   2023  1:00 PM JARET Arora CNP Ken[place], [place]

## 2023-08-02 NOTE — PROGRESS NOTES
Physician Progress Note      PATIENT:               Johnson Whittaker  CSN #:                  [id number]  :                       1965  ADMIT DATE:       2023 4:00 PM  [address] [facility] DATE:        2023 5:46 PM  RESPONDING  PROVIDER #:        Bebeto Danielson MD          QUERY TEXT:    Pt admitted  with Paroxysmal atrial fib. Noted documentation of Suspect   NSTEMI Type II by Cardiology NP  &  PNs. No documentation of NSTEMI   Type II by attending. If possible, please document in progress notes and   discharge summary:    The medical record reflects the following:  Risk Factors: 65 y.o. male with CHF, Afib  Clinical Indicators:  Per  attending: No chest pain or evidence of   ischemia on EKG. Per attending H&P: Elevated troponin: could be demand   ischemia. Per  &  Cardiology NP PNs: High sensitivity troponin   40>64>192>978 in setting of CHF & Afib RVR, suspect NSTEMI Type II  Treatment: Cardiology consult, EKG, ECHO  Options provided:  -- NSTEMI Type II is ruled out  -- NSTEMI Type II likely present on admission  -- Other - I will add my own diagnosis  -- Disagree - Not applicable / Not valid  -- Disagree - Clinically unable to determine / Unknown  -- Refer to Clinical Documentation Reviewer    PROVIDER RESPONSE TEXT:    NSTEMI Type II likely present on admission. Query created by: Meredith Chu on 2023 6:54 PM      Electronically signed by:   Bebeto Danielson MD 2023 8:00 AM

## 2023-08-03 ENCOUNTER — TELEPHONE (OUTPATIENT)
Dept: FAMILY MEDICINE CLINIC | Age: 58
End: 2023-08-03

## 2023-08-03 ENCOUNTER — OFFICE VISIT (OUTPATIENT)
Dept: FAMILY MEDICINE CLINIC | Age: 58
End: 2023-08-03

## 2023-08-03 VITALS
DIASTOLIC BLOOD PRESSURE: 82 MMHG | WEIGHT: 315 LBS | TEMPERATURE: 97.3 F | HEART RATE: 69 BPM | SYSTOLIC BLOOD PRESSURE: 122 MMHG | BODY MASS INDEX: 48.2 KG/M2 | OXYGEN SATURATION: 97 %

## 2023-08-03 DIAGNOSIS — Z09 HOSPITAL DISCHARGE FOLLOW-UP: Primary | ICD-10-CM

## 2023-08-03 DIAGNOSIS — I48.0 PAROXYSMAL ATRIAL FIBRILLATION (HCC): ICD-10-CM

## 2023-08-03 DIAGNOSIS — I48.91 ATRIAL FIBRILLATION WITH RAPID VENTRICULAR RESPONSE (HCC): ICD-10-CM

## 2023-08-03 DIAGNOSIS — I50.30 HEART FAILURE WITH PRESERVED EJECTION FRACTION, UNSPECIFIED HF CHRONICITY (HCC): ICD-10-CM

## 2023-08-03 SDOH — ECONOMIC STABILITY: HOUSING INSECURITY
IN THE LAST 12 MONTHS, WAS THERE A TIME WHEN YOU DID NOT HAVE A STEADY PLACE TO SLEEP OR SLEPT IN A SHELTER (INCLUDING NOW)?: NO

## 2023-08-03 SDOH — ECONOMIC STABILITY: FOOD INSECURITY: WITHIN THE PAST 12 MONTHS, YOU WORRIED THAT YOUR FOOD WOULD RUN OUT BEFORE YOU GOT MONEY TO BUY MORE.: NEVER TRUE

## 2023-08-03 SDOH — ECONOMIC STABILITY: INCOME INSECURITY: HOW HARD IS IT FOR YOU TO PAY FOR THE VERY BASICS LIKE FOOD, HOUSING, MEDICAL CARE, AND HEATING?: NOT HARD AT ALL

## 2023-08-03 SDOH — ECONOMIC STABILITY: FOOD INSECURITY: WITHIN THE PAST 12 MONTHS, THE FOOD YOU BOUGHT JUST DIDN'T LAST AND YOU DIDN'T HAVE MONEY TO GET MORE.: NEVER TRUE

## 2023-08-03 SDOH — ECONOMIC STABILITY: TRANSPORTATION INSECURITY
IN THE PAST 12 MONTHS, HAS LACK OF TRANSPORTATION KEPT YOU FROM MEETINGS, WORK, OR FROM GETTING THINGS NEEDED FOR DAILY LIVING?: NO

## 2023-08-03 ASSESSMENT — PATIENT HEALTH QUESTIONNAIRE - PHQ9
SUM OF ALL RESPONSES TO PHQ QUESTIONS 1-9: 0
9. THOUGHTS THAT YOU WOULD BE BETTER OFF DEAD, OR OF HURTING YOURSELF: NOT AT ALL
SUM OF ALL RESPONSES TO PHQ QUESTIONS 1-9: 0
9. THOUGHTS THAT YOU WOULD BE BETTER OFF DEAD, OR OF HURTING YOURSELF: 0
2. FEELING DOWN, DEPRESSED OR HOPELESS: 0
5. POOR APPETITE OR OVEREATING: 0
5. POOR APPETITE OR OVEREATING: NOT AT ALL
2. FEELING DOWN, DEPRESSED OR HOPELESS: NOT AT ALL
SUM OF ALL RESPONSES TO PHQ QUESTIONS 1-9: 0
SUM OF ALL RESPONSES TO PHQ QUESTIONS 1-9: 0
6. FEELING BAD ABOUT YOURSELF - OR THAT YOU ARE A FAILURE OR HAVE LET YOURSELF OR YOUR FAMILY DOWN: 0
SUM OF ALL RESPONSES TO PHQ9 QUESTIONS 1 & 2: 0
7. TROUBLE CONCENTRATING ON THINGS, SUCH AS READING THE NEWSPAPER OR WATCHING TELEVISION: 0
8. MOVING OR SPEAKING SO SLOWLY THAT OTHER PEOPLE COULD HAVE NOTICED. OR THE OPPOSITE - BEING SO FIDGETY OR RESTLESS THAT YOU HAVE BEEN MOVING AROUND A LOT MORE THAN USUAL: NOT AT ALL
3. TROUBLE FALLING OR STAYING ASLEEP: 0
3. TROUBLE FALLING OR STAYING ASLEEP: NOT AT ALL
4. FEELING TIRED OR HAVING LITTLE ENERGY: NOT AT ALL
10. IF YOU CHECKED OFF ANY PROBLEMS, HOW DIFFICULT HAVE THESE PROBLEMS MADE IT FOR YOU TO DO YOUR WORK, TAKE CARE OF THINGS AT HOME, OR GET ALONG WITH OTHER PEOPLE: NOT DIFFICULT AT ALL
SUM OF ALL RESPONSES TO PHQ QUESTIONS 1-9: 0
6. FEELING BAD ABOUT YOURSELF - OR THAT YOU ARE A FAILURE OR HAVE LET YOURSELF OR YOUR FAMILY DOWN: NOT AT ALL
1. LITTLE INTEREST OR PLEASURE IN DOING THINGS: 0
4. FEELING TIRED OR HAVING LITTLE ENERGY: 0
7. TROUBLE CONCENTRATING ON THINGS, SUCH AS READING THE NEWSPAPER OR WATCHING TELEVISION: NOT AT ALL
8. MOVING OR SPEAKING SO SLOWLY THAT OTHER PEOPLE COULD HAVE NOTICED. OR THE OPPOSITE, BEING SO FIGETY OR RESTLESS THAT YOU HAVE BEEN MOVING AROUND A LOT MORE THAN USUAL: 0
1. LITTLE INTEREST OR PLEASURE IN DOING THINGS: NOT AT ALL
10. IF YOU CHECKED OFF ANY PROBLEMS, HOW DIFFICULT HAVE THESE PROBLEMS MADE IT FOR YOU TO DO YOUR WORK, TAKE CARE OF THINGS AT HOME, OR GET ALONG WITH OTHER PEOPLE: 0

## 2023-08-03 NOTE — TELEPHONE ENCOUNTER
Care Transitions Initial Follow Up Call    Outreach made within 2 business days of discharge: Yes    Patient: Dexter Lai Patient : 1965   MRN: [id number]  Reason for Admission: There are no discharge diagnoses documented for the most recent discharge. Discharge Date: 23       Spoke with: Left message on recorder for patient to call back at  [phone]      Discharge department/facility:     [place] [facility] Interactive Patient Contact:  Was patient able to fill all prescriptions:   Was patient instructed to bring all medications to the follow-up visit:   Is patient taking all medications as directed in the discharge summary?    Does patient understand their discharge instructions:   Does patient have questions or concerns that need addressed prior to 7-14 day follow up office visit:     Scheduled appointment with PCP within 7-14 days    Follow Up  Future Appointments   Date Time Provider [address] [place] [address]   8/3/2023  3:30 PM Camron Mitten, APRN - CNP [facility] [place] - DYJEREMY   2023 10:15 AM JARET Montero [facility] [address] JIMMIE   2023  1:00 PM JARET Castro CNP [place]

## 2023-08-03 NOTE — PROGRESS NOTES
Post-Discharge Transitional Care  Follow Up      Wiley Gonsales   YOB: 1965    Date of Office Visit:  8/3/2023  Date of Hospital Admission: 7/30/23  Date of Hospital Discharge: 8/1/23  Risk of hospital readmission (high >=14%. Medium >=10%) :Readmission Risk Score: 7.6      Care management risk score Rising risk (score 2-5) and Complex Care (Scores >=6): No Risk Score On File     Non face to face  following discharge, date last encounter closed (first attempt may have been earlier): 08/03/2023    Call initiated 2 business days of discharge: Yes    ASSESSMENT/PLAN:   Hospital discharge follow-up  -     [place] DISCHARGE MEDS RECONCILED W/ CURRENT OUTPATIENT MED LIST  Paroxysmal atrial fibrillation ([address])  Assessment & Plan:   Monitored by specialist- no acute findings meriting change in the plan  Atrial fibrillation with rapid ventricular response [facility])  Assessment & Plan:   Hospital admission reviewed normal sinus rhythm today rate controlled  Heart failure with preserved ejection fraction, unspecified HF chronicity ([address])  Assessment & Plan:   Monitored by specialist- no acute findings meriting change in the plan echo tomorrow      Medical Decision Making: high complexity  No follow-ups on file. On this date 8/3/2023 I have spent 45 minutes reviewing previous notes, test results and face to face with the patient discussing the diagnosis and importance of compliance with the treatment plan as well as documenting on the day of the visit. Subjective:   HPI:  Follow up of Hospital problems/diagnosis(es): A-fib RVR    Inpatient course: Discharge summary reviewed- see chart. Interval history/Current status: Patient is eventually been lost to follow-up but evaluation of recent hospitalization has been reviewed. At this time he is taking all medications as prescribed at time of discharge without any issues. He states that he feels fatigued and a little rundown but overall feeling okay.   His A1c

## 2023-08-04 NOTE — PROGRESS NOTES
Physician Progress Note      PATIENT:               Alexa Gardner  CSN #:                  [id number]  :                       1965  ADMIT DATE:       2023 4:00 PM  [address] [facility] DATE:        2023 5:46 PM  RESPONDING  PROVIDER #:        Roma Toscano MD          QUERY TEXT:    Patient admitted with paroxysmal a-fib. Pt has PMH of chronic dCHF and   continued receiving home Coreg and Metoprolol during stay. Noted documentation of both AoC dHF and Chronic dCHF in Cardiology PNs of    &  listed under assessment. If possible, please document in progress   notes and discharge summary if you are evaluating and /or treating any of the   following: The medical record reflects the following:  Risk Factors: 65 y.o. male with HTN, NSTEMI type II, CKD, HLD, Morbid obesity,   MARY  Clinical Indicators: PMH dCHF. Pt denied shortness of breath, Per ED: Trace   bilateral LE edema. Treatment: Coreg, Hydralazine, was on daily PO lasix but it was discontinued   2022. Options provided:  -- Chronic dCHF confirmed and AoC dHF ruled out  -- AoC dHF confirmed as evidenced by##Please document evidence supporting   acute portion of dHF, Please document evidence for acute portion of dHF.  -- Other - I will add my own diagnosis  -- Disagree - Not applicable / Not valid  -- Disagree - Clinically unable to determine / Unknown  -- Refer to Clinical Documentation Reviewer    PROVIDER RESPONSE TEXT:    After study, Chronic dCHF confirmed and AoC dHF ruled out. Query created by: Arelis Mccarty on 8/3/2023 10:52 AM      Electronically signed by:   Roma Toscano MD 2023 11:30 AM

## 2023-08-06 ENCOUNTER — HOSPITAL ENCOUNTER (INPATIENT)
Age: 58
LOS: 5 days | Discharge: HOME | End: 2023-08-11
Attending: STUDENT IN AN ORGANIZED HEALTH CARE EDUCATION/TRAINING PROGRAM | Admitting: INTERNAL MEDICINE
Payer: COMMERCIAL

## 2023-08-06 ENCOUNTER — APPOINTMENT (OUTPATIENT)
Dept: GENERAL RADIOLOGY | Age: 58
End: 2023-08-06
Payer: COMMERCIAL

## 2023-08-06 DIAGNOSIS — J18.9 PNEUMONIA OF BOTH LOWER LOBES DUE TO INFECTIOUS ORGANISM: ICD-10-CM

## 2023-08-06 DIAGNOSIS — I16.0 HYPERTENSIVE URGENCY: ICD-10-CM

## 2023-08-06 DIAGNOSIS — I50.30 HEART FAILURE WITH PRESERVED EJECTION FRACTION, UNSPECIFIED HF CHRONICITY (HCC): ICD-10-CM

## 2023-08-06 DIAGNOSIS — R09.02 HYPOXIA: Primary | ICD-10-CM

## 2023-08-06 LAB
ANION GAP SERPL CALCULATED.3IONS-SCNC: 14 MMOL/L (ref 3–16)
BASE EXCESS BLDV CALC-SCNC: 1.3 MMOL/L (ref -2–3)
BASOPHILS # BLD: 0.1 K/UL (ref 0–0.2)
BASOPHILS NFR BLD: 0.4 %
BILIRUB UR QL STRIP.AUTO: NEGATIVE
BUN SERPL-MCNC: 26 MG/DL (ref 7–20)
CALCIUM SERPL-MCNC: 8.9 MG/DL (ref 8.3–10.6)
CHLORIDE SERPL-SCNC: 102 MMOL/L (ref 99–110)
CLARITY UR: CLEAR
CO2 BLDV-SCNC: 26 MMOL/L
CO2 SERPL-SCNC: 21 MMOL/L (ref 21–32)
COHGB MFR BLDV: 1.4 % (ref 0–1.5)
COLOR UR: YELLOW
CREAT SERPL-MCNC: 1.4 MG/DL (ref 0.9–1.3)
DEPRECATED RDW RBC AUTO: 16.7 % (ref 12.4–15.4)
DO-HGB MFR BLDV: 23.5 %
EKG ATRIAL RATE: 95 BPM
EKG DIAGNOSIS: NORMAL
EKG P AXIS: 5 DEGREES
EKG P-R INTERVAL: 136 MS
EKG Q-T INTERVAL: 374 MS
EKG QRS DURATION: 100 MS
EKG QTC CALCULATION (BAZETT): 469 MS
EKG R AXIS: -66 DEGREES
EKG T AXIS: 112 DEGREES
EKG VENTRICULAR RATE: 95 BPM
EOSINOPHIL # BLD: 0.1 K/UL (ref 0–0.6)
EOSINOPHIL NFR BLD: 0.6 %
EPI CELLS #/AREA URNS HPF: NORMAL /HPF (ref 0–5)
FLUAV RNA RESP QL NAA+PROBE: NOT DETECTED
FLUBV RNA RESP QL NAA+PROBE: NOT DETECTED
GFR SERPLBLD CREATININE-BSD FMLA CKD-EPI: 59 ML/MIN/{1.73_M2}
GLUCOSE SERPL-MCNC: 137 MG/DL (ref 70–99)
GLUCOSE UR STRIP.AUTO-MCNC: 100 MG/DL
HCO3 BLDV-SCNC: 25.3 MMOL/L (ref 24–28)
HCT VFR BLD AUTO: 42.3 % (ref 40.5–52.5)
HGB BLD-MCNC: 14 G/DL (ref 13.5–17.5)
HGB UR QL STRIP.AUTO: NEGATIVE
KETONES UR STRIP.AUTO-MCNC: NEGATIVE MG/DL
LACTATE BLDV-SCNC: 2 MMOL/L (ref 0.4–2)
LEUKOCYTE ESTERASE UR QL STRIP.AUTO: NEGATIVE
LYMPHOCYTES # BLD: 1.7 K/UL (ref 1–5.1)
LYMPHOCYTES NFR BLD: 9.5 %
MCH RBC QN AUTO: 27.8 PG (ref 26–34)
MCHC RBC AUTO-ENTMCNC: 33.1 G/DL (ref 31–36)
MCV RBC AUTO: 84 FL (ref 80–100)
METHGB MFR BLDV: <0 % (ref 0–1.5)
MONOCYTES # BLD: 0.9 K/UL (ref 0–1.3)
MONOCYTES NFR BLD: 5 %
NEUTROPHILS # BLD: 14.9 K/UL (ref 1.7–7.7)
NEUTROPHILS NFR BLD: 84.5 %
NITRITE UR QL STRIP.AUTO: NEGATIVE
NT-PROBNP SERPL-MCNC: 2644 PG/ML (ref 0–124)
PCO2 BLDV: 37.2 MMHG (ref 41–51)
PH BLDV: 7.44 [PH] (ref 7.35–7.45)
PH UR STRIP.AUTO: 7 [PH] (ref 5–8)
PLATELET # BLD AUTO: 173 K/UL (ref 135–450)
PMV BLD AUTO: 9.8 FL (ref 5–10.5)
PO2 BLDV: 42.1 MMHG (ref 25–40)
POTASSIUM SERPL-SCNC: 4.2 MMOL/L (ref 3.5–5.1)
PROCALCITONIN SERPL IA-MCNC: 0.07 NG/ML (ref 0–0.15)
PROT UR STRIP.AUTO-MCNC: 30 MG/DL
RBC # BLD AUTO: 5.04 M/UL (ref 4.2–5.9)
RBC #/AREA URNS HPF: NORMAL /HPF (ref 0–4)
SAO2 % BLDV: 76 %
SARS-COV-2 RNA RESP QL NAA+PROBE: NOT DETECTED
SODIUM SERPL-SCNC: 137 MMOL/L (ref 136–145)
SP GR UR STRIP.AUTO: 1.02 (ref 1–1.03)
UA COMPLETE W REFLEX CULTURE PNL UR: ABNORMAL
UA DIPSTICK W REFLEX MICRO PNL UR: YES
URN SPEC COLLECT METH UR: ABNORMAL
UROBILINOGEN UR STRIP-ACNC: 1 E.U./DL
WBC # BLD AUTO: 17.6 K/UL (ref 4–11)
WBC #/AREA URNS HPF: NORMAL /HPF (ref 0–5)

## 2023-08-06 PROCEDURE — 6360000002 HC RX W HCPCS: Performed by: INTERNAL MEDICINE

## 2023-08-06 PROCEDURE — 2700000000 HC OXYGEN THERAPY PER DAY

## 2023-08-06 PROCEDURE — 83605 ASSAY OF LACTIC ACID: CPT

## 2023-08-06 PROCEDURE — 85025 COMPLETE CBC W/AUTO DIFF WBC: CPT

## 2023-08-06 PROCEDURE — 2060000000 HC ICU INTERMEDIATE R&B

## 2023-08-06 PROCEDURE — 6360000002 HC RX W HCPCS: Performed by: HOSPITALIST

## 2023-08-06 PROCEDURE — 87641 MR-STAPH DNA AMP PROBE: CPT

## 2023-08-06 PROCEDURE — 87077 CULTURE AEROBIC IDENTIFY: CPT

## 2023-08-06 PROCEDURE — 2580000003 HC RX 258: Performed by: HOSPITALIST

## 2023-08-06 PROCEDURE — 6370000000 HC RX 637 (ALT 250 FOR IP): Performed by: HOSPITALIST

## 2023-08-06 PROCEDURE — 96365 THER/PROPH/DIAG IV INF INIT: CPT

## 2023-08-06 PROCEDURE — 94761 N-INVAS EAR/PLS OXIMETRY MLT: CPT

## 2023-08-06 PROCEDURE — 93005 ELECTROCARDIOGRAM TRACING: CPT | Performed by: STUDENT IN AN ORGANIZED HEALTH CARE EDUCATION/TRAINING PROGRAM

## 2023-08-06 PROCEDURE — 2580000003 HC RX 258: Performed by: STUDENT IN AN ORGANIZED HEALTH CARE EDUCATION/TRAINING PROGRAM

## 2023-08-06 PROCEDURE — 99223 1ST HOSP IP/OBS HIGH 75: CPT | Performed by: INTERNAL MEDICINE

## 2023-08-06 PROCEDURE — 87150 DNA/RNA AMPLIFIED PROBE: CPT

## 2023-08-06 PROCEDURE — 82803 BLOOD GASES ANY COMBINATION: CPT

## 2023-08-06 PROCEDURE — 71046 X-RAY EXAM CHEST 2 VIEWS: CPT

## 2023-08-06 PROCEDURE — 99285 EMERGENCY DEPT VISIT HI MDM: CPT

## 2023-08-06 PROCEDURE — 81001 URINALYSIS AUTO W/SCOPE: CPT

## 2023-08-06 PROCEDURE — 83880 ASSAY OF NATRIURETIC PEPTIDE: CPT

## 2023-08-06 PROCEDURE — 80048 BASIC METABOLIC PNL TOTAL CA: CPT

## 2023-08-06 PROCEDURE — 87040 BLOOD CULTURE FOR BACTERIA: CPT

## 2023-08-06 PROCEDURE — 84145 PROCALCITONIN (PCT): CPT

## 2023-08-06 PROCEDURE — 6360000002 HC RX W HCPCS: Performed by: STUDENT IN AN ORGANIZED HEALTH CARE EDUCATION/TRAINING PROGRAM

## 2023-08-06 PROCEDURE — 6370000000 HC RX 637 (ALT 250 FOR IP): Performed by: STUDENT IN AN ORGANIZED HEALTH CARE EDUCATION/TRAINING PROGRAM

## 2023-08-06 PROCEDURE — 87636 SARSCOV2 & INF A&B AMP PRB: CPT

## 2023-08-06 RX ORDER — CLONIDINE HYDROCHLORIDE 0.1 MG/1
0.1 TABLET ORAL EVERY 4 HOURS PRN
Status: DISCONTINUED | OUTPATIENT
Start: 2023-08-06 | End: 2023-08-11 | Stop reason: HOSPADM

## 2023-08-06 RX ORDER — NIFEDIPINE 90 MG/1
90 TABLET, EXTENDED RELEASE ORAL DAILY
Status: DISCONTINUED | OUTPATIENT
Start: 2023-08-06 | End: 2023-08-11 | Stop reason: HOSPADM

## 2023-08-06 RX ORDER — VALSARTAN 160 MG/1
160 TABLET ORAL DAILY
Status: DISCONTINUED | OUTPATIENT
Start: 2023-08-06 | End: 2023-08-11 | Stop reason: HOSPADM

## 2023-08-06 RX ORDER — CLONIDINE HYDROCHLORIDE 0.1 MG/1
0.1 TABLET ORAL ONCE
Status: COMPLETED | OUTPATIENT
Start: 2023-08-06 | End: 2023-08-06

## 2023-08-06 RX ORDER — VALSARTAN 160 MG/1
160 TABLET ORAL ONCE
Status: COMPLETED | OUTPATIENT
Start: 2023-08-06 | End: 2023-08-06

## 2023-08-06 RX ORDER — HYDRALAZINE HYDROCHLORIDE 50 MG/1
100 TABLET, FILM COATED ORAL ONCE
Status: COMPLETED | OUTPATIENT
Start: 2023-08-06 | End: 2023-08-06

## 2023-08-06 RX ORDER — ASPIRIN 81 MG/1
81 TABLET, CHEWABLE ORAL DAILY
Status: DISCONTINUED | OUTPATIENT
Start: 2023-08-06 | End: 2023-08-09 | Stop reason: SDUPTHER

## 2023-08-06 RX ORDER — FLECAINIDE ACETATE 100 MG/1
100 TABLET ORAL EVERY 12 HOURS SCHEDULED
Status: DISCONTINUED | OUTPATIENT
Start: 2023-08-06 | End: 2023-08-11 | Stop reason: HOSPADM

## 2023-08-06 RX ORDER — ACETAMINOPHEN 500 MG
1000 TABLET ORAL
Status: COMPLETED | OUTPATIENT
Start: 2023-08-06 | End: 2023-08-06

## 2023-08-06 RX ORDER — CARVEDILOL 25 MG/1
25 TABLET ORAL 2 TIMES DAILY WITH MEALS
Status: DISCONTINUED | OUTPATIENT
Start: 2023-08-06 | End: 2023-08-11 | Stop reason: HOSPADM

## 2023-08-06 RX ORDER — FUROSEMIDE 10 MG/ML
20 INJECTION INTRAMUSCULAR; INTRAVENOUS 2 TIMES DAILY
Status: DISCONTINUED | OUTPATIENT
Start: 2023-08-06 | End: 2023-08-07

## 2023-08-06 RX ORDER — ONDANSETRON 2 MG/ML
4 INJECTION INTRAMUSCULAR; INTRAVENOUS ONCE
Status: COMPLETED | OUTPATIENT
Start: 2023-08-06 | End: 2023-08-06

## 2023-08-06 RX ORDER — ATORVASTATIN CALCIUM 10 MG/1
10 TABLET, FILM COATED ORAL DAILY
Status: DISCONTINUED | OUTPATIENT
Start: 2023-08-06 | End: 2023-08-09 | Stop reason: ALTCHOICE

## 2023-08-06 RX ORDER — NIFEDIPINE 60 MG/1
60 TABLET, EXTENDED RELEASE ORAL ONCE
Status: DISCONTINUED | OUTPATIENT
Start: 2023-08-06 | End: 2023-08-06

## 2023-08-06 RX ORDER — HYDRALAZINE HYDROCHLORIDE 50 MG/1
50 TABLET, FILM COATED ORAL EVERY 8 HOURS SCHEDULED
Status: DISCONTINUED | OUTPATIENT
Start: 2023-08-06 | End: 2023-08-11 | Stop reason: HOSPADM

## 2023-08-06 RX ADMIN — VALSARTAN 160 MG: 160 TABLET, FILM COATED ORAL at 05:36

## 2023-08-06 RX ADMIN — ATORVASTATIN CALCIUM 10 MG: 10 TABLET, FILM COATED ORAL at 13:39

## 2023-08-06 RX ADMIN — VALSARTAN 160 MG: 160 TABLET, FILM COATED ORAL at 18:53

## 2023-08-06 RX ADMIN — ACETAMINOPHEN 1000 MG: 500 TABLET ORAL at 06:04

## 2023-08-06 RX ADMIN — NIFEDIPINE 90 MG: 90 TABLET, FILM COATED, EXTENDED RELEASE ORAL at 13:39

## 2023-08-06 RX ADMIN — HYDRALAZINE HYDROCHLORIDE 50 MG: 50 TABLET, FILM COATED ORAL at 20:47

## 2023-08-06 RX ADMIN — FUROSEMIDE 20 MG: 10 INJECTION, SOLUTION INTRAMUSCULAR; INTRAVENOUS at 17:44

## 2023-08-06 RX ADMIN — ONDANSETRON 4 MG: 2 INJECTION INTRAMUSCULAR; INTRAVENOUS at 06:37

## 2023-08-06 RX ADMIN — CEFEPIME 2000 MG: 2 INJECTION, POWDER, FOR SOLUTION INTRAVENOUS at 06:15

## 2023-08-06 RX ADMIN — CEFEPIME 2000 MG: 2 INJECTION, POWDER, FOR SOLUTION INTRAVENOUS at 15:56

## 2023-08-06 RX ADMIN — ASPIRIN 81 MG: 81 TABLET, CHEWABLE ORAL at 13:41

## 2023-08-06 RX ADMIN — CLONIDINE HYDROCHLORIDE 0.1 MG: 0.1 TABLET ORAL at 17:41

## 2023-08-06 RX ADMIN — APIXABAN 5 MG: 5 TABLET, FILM COATED ORAL at 20:47

## 2023-08-06 RX ADMIN — CARVEDILOL 25 MG: 25 TABLET, FILM COATED ORAL at 15:48

## 2023-08-06 RX ADMIN — VANCOMYCIN HYDROCHLORIDE 2500 MG: 1 INJECTION, POWDER, LYOPHILIZED, FOR SOLUTION INTRAVENOUS at 06:49

## 2023-08-06 RX ADMIN — FLECAINIDE ACETATE 100 MG: 100 TABLET ORAL at 20:47

## 2023-08-06 RX ADMIN — HYDRALAZINE HYDROCHLORIDE 100 MG: 50 TABLET, FILM COATED ORAL at 05:35

## 2023-08-06 ASSESSMENT — ENCOUNTER SYMPTOMS
VOMITING: 0
COUGH: 1
WHEEZING: 0
TROUBLE SWALLOWING: 0
ABDOMINAL PAIN: 0
SHORTNESS OF BREATH: 1
NAUSEA: 1

## 2023-08-06 ASSESSMENT — LIFESTYLE VARIABLES
HOW OFTEN DO YOU HAVE A DRINK CONTAINING ALCOHOL: NEVER
HOW MANY STANDARD DRINKS CONTAINING ALCOHOL DO YOU HAVE ON A TYPICAL DAY: PATIENT DOES NOT DRINK

## 2023-08-06 ASSESSMENT — PAIN SCALES - GENERAL
PAINLEVEL_OUTOF10: 0

## 2023-08-06 NOTE — ED NOTES
normal limits     Critical values: yes- WBC 17.6, BNP 2,644, /109 on arrival- improved to 175/75 after home meds    Abnormal Assessment Findings: increased resp rate    Background  History:   Past Medical History:   Diagnosis Date    Carpal tunnel syndrome     Chronic kidney disease     Hyperlipidemia     Hypertension     Obesity, unspecified     MARY (obstructive sleep apnea)        Assessment    Vitals/MEWS: MEWS Score: 4  Level of Consciousness: Alert (0)   Vitals:    08/06/23 0702 08/06/23 0715 08/06/23 0800 08/06/23 0830   BP:  (!) 184/80 (!) 174/79 (!) 175/75   Pulse: 96 94 88 84   Resp: 29 (!) 31 29 24   Temp:       TempSrc:       SpO2: 98% 97% 98% 97%   Weight:       Height:         FiO2 (%): high humidity nasal cannula  O2 Flow Rate: O2 Device: High flow nasal cannula O2 Flow Rate (L/min): 8 L/min  Cardiac Rhythm:    Pain Assessment: 0-10 [x] Verbal [] Karlie Shells Scale  Pain Scale: Pain Assessment  Pain Assessment: None - Denies Pain  Pain Level: 0  Last documented pain score (0-10 scale) Pain Level: 0  Last documented pain medication administered: n/a  Mental Status: oriented, alert, coherent, logical, thought processes intact, and able to concentrate and follow conversation  Orientation Level:    NIH Score:    C-SSRS: Risk of Suicide: No Risk  Bedside swallow:    [place] Coma Scale (GCS): [place] Coma Scale  Eye Opening: Spontaneous  Best Verbal Response: Oriented  Best Motor Response: Obeys commands  [place] Coma Scale Score: 15  Active LDA's:   Peripheral IV 08/06/23 Right Antecubital (Active)   Site Assessment Clean, dry & intact 08/06/23 0508       Peripheral IV 08/06/23 Right Hand (Active)   Site Assessment Clean, dry & intact 08/06/23 0636     PO Status: see signed and held orders  Pertinent or High Risk Medications/Drips: no   If Yes, please provide details: n/a  Pending Blood Product Administration: no       You may also review the ED PT Care Timeline found under the Summary Nursing Index

## 2023-08-06 NOTE — PLAN OF CARE
Problem: Discharge Planning  Goal: Discharge to home or other facility with appropriate resources  Outcome: Progressing     Problem: Safety - Adult  Goal: Free from fall injury  Outcome: Progressing     Problem: Cardiovascular - Adult  Goal: Maintains optimal cardiac output and hemodynamic stability  Outcome: Progressing  Flowsheets (Taken 8/6/2023 1957)  Maintains optimal cardiac output and hemodynamic stability:   Monitor blood pressure and heart rate   Monitor urine output and notify Licensed Independent Practitioner for values outside of normal range  Goal: Absence of cardiac dysrhythmias or at baseline  Outcome: Progressing     Problem: Respiratory - Adult  Goal: Achieves optimal ventilation and oxygenation  Outcome: Progressing  Flowsheets (Taken 8/6/2023 1957)  Achieves optimal ventilation and oxygenation:   Assess for changes in respiratory status   Assess for changes in mentation and behavior   Position to facilitate oxygenation and minimize respiratory effort

## 2023-08-06 NOTE — CONSULTS
Clinical Pharmacy Progress Note    Vancomycin - Management by Pharmacy    Consult Date(s): 8/6/23  Consulting Provider(s): Dr. Rosa Sharpe / Plan  HAP - Vancomycin  Concurrent Antimicrobials:   Cefepime - day #1  Day of Vanc Therapy / Ordered Duration: #1 of 5   Current Dosing Method: Bayesian-Guided AUC Dosing  Therapeutic Goal: -600 mg/L*hr  Current Dose / Plan:   SCr 1.4 this AM -- appears slightly above baseline? SCr 1-1.1 during July 2023 admission. Received vancomycin 2500mg IV x1 loading dose overnight. Will continue 1500mg IV q24h. Regimen predicts AUC of 490 mg/L*h. Plan to check level in ~48h, or sooner if clinically indicated. Will monitor closely - pt at risk for accumulation given BMI > 30. Will continue to monitor clinical condition and make adjustments to regimen as appropriate. Please call with questions--  Chacorta Ponce, PharmD, BCPS  Wireless: [id number]   8/6/2023 2:19 PM        Subjective/Objective:   Leticia Boone is a 65 y.o. male with a PMHx significant for HTN, HLD, MARY, CKD with recent admission for AFib with RVR who presented to ED 8/6 with chills and SOB. Admitted with respiratory failure and concern for PNA. Pharmacy is consulted to dose Vancomycin. Ht Readings from Last 1 Encounters:   08/06/23 5' 8\" (1.727 m)     Wt Readings from Last 1 Encounters:   08/06/23 (!) 317 lb 3.9 oz (143.9 kg)     Current & Prior Antimicrobial Regimen(s):  Cefepime (8/6-current)  Vancomycin - Pharmacy to dose  2500mg IV x1 8/6  1500mg IV q24h (8/7-current)    Vancomycin Level(s) / Doses:    Date Time Dose Type of Level / Level Interpretation                 Note: Serum levels collected for AUC-based dosing may be high if collected in close proximity to the dose administered. This is not necessarily indicative of toxicity.     Cultures & Sensitivities:    Date Site Micro Susceptibility / Result   8/6 SARS-CoV-2, PCR  Influenza Negative     MRSA nasal PCR       Recent Labs

## 2023-08-06 NOTE — CONSULTS
involved in this patients care.  If you have any questions or concerns please feel free to contact me  Full Code

## 2023-08-06 NOTE — PROGRESS NOTES
4 Eyes Skin Assessment     NAME:  Titi Keith  YOB: 1965  MEDICAL RECORD NUMBER:  [id number]    The patient is being assessed for  Admission    I agree that at least one RN has performed a thorough Head to Toe Skin Assessment on the patient. ALL assessment sites listed below have been assessed. Areas assessed by both nurses:    Head, Face, Ears, Shoulders, Back, Chest, Arms, Elbows, Hands, Sacrum. Buttock, Coccyx, Ischium, Legs. Feet and Heels, and Under Medical Devices         Does the Patient have a Wound?  No noted wound(s)       Gaurang Prevention initiated by RN: No  Wound Care Orders initiated by RN: No    Pressure Injury (Stage 3,4, Unstageable, DTI, NWPT, and Complex wounds) if present, place Wound referral order by RN under : No    New Ostomies, if present place, Ostomy referral order under : No     Nurse 1 eSignature: Electronically signed by Shaw Vera RN on 8/6/23 at 2:33 PM EDT    **SHARE this note so that the co-signing nurse can place an eSignature**    Nurse 2 eSignature: Electronically signed by Carlos Colindres RN on 8/6/23 at 12:15 PM EDT

## 2023-08-06 NOTE — PROGRESS NOTES
Clinical Pharmacy Progress Note    Admit date: [unfilled]   Patient presents to the ED with complaints of HAP. Pharmacy is consulted to dose Vancomycin x1 dose in ED per Dr. Radha Villegas. Ht = 172.7 cm  Wt = 143.9 kg      Assessment/Plan:  Will order Vancomycin 2500 x1 for administration in ED per ER pharmacy consult. If Vancomycin is to continue on admission and pharmacy is to manage dosing, please re-consult with admission orders.       Thanks--  Neil Hernandez, PharmD  Main Pharmacy: [id number]  8/6/2023 5:59 AM

## 2023-08-07 PROBLEM — R09.02 HYPOXIA: Status: ACTIVE | Noted: 2023-08-07

## 2023-08-07 LAB
DEPRECATED RDW RBC AUTO: 16.6 % (ref 12.4–15.4)
HCT VFR BLD AUTO: 38.9 % (ref 40.5–52.5)
HGB BLD-MCNC: 13 G/DL (ref 13.5–17.5)
MCH RBC QN AUTO: 28.4 PG (ref 26–34)
MCHC RBC AUTO-ENTMCNC: 33.4 G/DL (ref 31–36)
MCV RBC AUTO: 85.2 FL (ref 80–100)
MRSA DNA SPEC QL NAA+PROBE: NORMAL
PLATELET # BLD AUTO: 144 K/UL (ref 135–450)
PMV BLD AUTO: 10.1 FL (ref 5–10.5)
RBC # BLD AUTO: 4.57 M/UL (ref 4.2–5.9)
REPORT: NORMAL
WBC # BLD AUTO: 17.4 K/UL (ref 4–11)

## 2023-08-07 PROCEDURE — 94761 N-INVAS EAR/PLS OXIMETRY MLT: CPT

## 2023-08-07 PROCEDURE — 6360000002 HC RX W HCPCS: Performed by: INTERNAL MEDICINE

## 2023-08-07 PROCEDURE — 6360000002 HC RX W HCPCS: Performed by: STUDENT IN AN ORGANIZED HEALTH CARE EDUCATION/TRAINING PROGRAM

## 2023-08-07 PROCEDURE — 2700000000 HC OXYGEN THERAPY PER DAY

## 2023-08-07 PROCEDURE — 99232 SBSQ HOSP IP/OBS MODERATE 35: CPT | Performed by: INTERNAL MEDICINE

## 2023-08-07 PROCEDURE — 80048 BASIC METABOLIC PNL TOTAL CA: CPT

## 2023-08-07 PROCEDURE — 94660 CPAP INITIATION&MGMT: CPT

## 2023-08-07 PROCEDURE — 36415 COLL VENOUS BLD VENIPUNCTURE: CPT

## 2023-08-07 PROCEDURE — 2060000000 HC ICU INTERMEDIATE R&B

## 2023-08-07 PROCEDURE — 2580000003 HC RX 258: Performed by: INTERNAL MEDICINE

## 2023-08-07 PROCEDURE — 85027 COMPLETE CBC AUTOMATED: CPT

## 2023-08-07 PROCEDURE — APPSS30 APP SPLIT SHARED TIME 16-30 MINUTES: Performed by: NURSE PRACTITIONER

## 2023-08-07 PROCEDURE — 6370000000 HC RX 637 (ALT 250 FOR IP): Performed by: HOSPITALIST

## 2023-08-07 PROCEDURE — 99233 SBSQ HOSP IP/OBS HIGH 50: CPT | Performed by: INTERNAL MEDICINE

## 2023-08-07 RX ORDER — FUROSEMIDE 10 MG/ML
40 INJECTION INTRAMUSCULAR; INTRAVENOUS 2 TIMES DAILY
Status: DISCONTINUED | OUTPATIENT
Start: 2023-08-07 | End: 2023-08-11 | Stop reason: HOSPADM

## 2023-08-07 RX ADMIN — ASPIRIN 81 MG: 81 TABLET, CHEWABLE ORAL at 09:21

## 2023-08-07 RX ADMIN — HYDRALAZINE HYDROCHLORIDE 50 MG: 50 TABLET, FILM COATED ORAL at 20:41

## 2023-08-07 RX ADMIN — ATORVASTATIN CALCIUM 10 MG: 10 TABLET, FILM COATED ORAL at 09:21

## 2023-08-07 RX ADMIN — APIXABAN 5 MG: 5 TABLET, FILM COATED ORAL at 09:21

## 2023-08-07 RX ADMIN — HYDRALAZINE HYDROCHLORIDE 50 MG: 50 TABLET, FILM COATED ORAL at 14:08

## 2023-08-07 RX ADMIN — FLECAINIDE ACETATE 100 MG: 100 TABLET ORAL at 20:40

## 2023-08-07 RX ADMIN — NIFEDIPINE 90 MG: 90 TABLET, FILM COATED, EXTENDED RELEASE ORAL at 09:20

## 2023-08-07 RX ADMIN — CARVEDILOL 25 MG: 25 TABLET, FILM COATED ORAL at 16:49

## 2023-08-07 RX ADMIN — HYDRALAZINE HYDROCHLORIDE 50 MG: 50 TABLET, FILM COATED ORAL at 05:18

## 2023-08-07 RX ADMIN — APIXABAN 5 MG: 5 TABLET, FILM COATED ORAL at 20:40

## 2023-08-07 RX ADMIN — FUROSEMIDE 20 MG: 10 INJECTION, SOLUTION INTRAMUSCULAR; INTRAVENOUS at 09:21

## 2023-08-07 RX ADMIN — VALSARTAN 160 MG: 160 TABLET, FILM COATED ORAL at 09:20

## 2023-08-07 RX ADMIN — FUROSEMIDE 40 MG: 10 INJECTION, SOLUTION INTRAMUSCULAR; INTRAVENOUS at 16:49

## 2023-08-07 RX ADMIN — CARVEDILOL 25 MG: 25 TABLET, FILM COATED ORAL at 09:20

## 2023-08-07 RX ADMIN — FLECAINIDE ACETATE 100 MG: 100 TABLET ORAL at 09:20

## 2023-08-07 RX ADMIN — CEFTRIAXONE SODIUM 2000 MG: 2 INJECTION, POWDER, FOR SOLUTION INTRAMUSCULAR; INTRAVENOUS at 09:29

## 2023-08-07 NOTE — DISCHARGE INSTRUCTIONS
Extra Heart Failure sites:     https://Teravac. com/publication/?i=629872   --- this is American Heart Association interactive Healthier Living with Heart Failure guidebook. Please click hyperlink or copy / paste link into search bar. Use your mouse to scroll through the pages. Lots of information about weight monitoring, diet tips, activity, meds, etc    HF [place] todd  -- this is a free smart phone todd available for iPhone and Android download. Use your phone to track sodium / fluid intake, zone tool symptom tracking, weights, medications, etc. Click on this hyperlink  HF [place] Todd   for QR code for easy download. DASH (Dietary Approach to Stop Hypertension) diet --  SeekAlumni.no -- this diet is a flexible eating plan that promotes heart healthy eating style. Click on hyperlink or copy / paste link into search bar. Lots of low sodium recipes and tips.     CigarRepair.ca  -- more free recipes

## 2023-08-07 NOTE — PROGRESS NOTES
Pt refusing to wear hospital CPAP this evening, states hes comfortable on the 4L nasal cannula. SpO2 98%.

## 2023-08-07 NOTE — PLAN OF CARE
Problem: Discharge Planning  Goal: Discharge to home or other facility with appropriate resources  8/7/2023 0432 by Renea Choudhary RN  Outcome: Progressing  Flowsheets (Taken 8/7/2023 3200)  Discharge to home or other facility with appropriate resources:   Identify barriers to discharge with patient and caregiver   Arrange for needed discharge resources and transportation as appropriate   Identify discharge learning needs (meds, wound care, etc)   Arrange for interpreters to assist at discharge as needed   Refer to discharge planning if patient needs post-hospital services based on physician order or complex needs related to functional status, cognitive ability or social support system     Problem: Safety - Adult  Goal: Free from fall injury  8/7/2023 0432 by Renea Choudhary RN  Outcome: Progressing  Flowsheets (Taken 8/7/2023 0432)  Free From Fall Injury: Instruct family/caregiver on patient safety     Problem: Cardiovascular - Adult  Goal: Maintains optimal cardiac output and hemodynamic stability  8/7/2023 0432 by Renea Choudhary RN  Outcome: Progressing  Flowsheets (Taken 8/7/2023 0432)  Maintains optimal cardiac output and hemodynamic stability:   Monitor blood pressure and heart rate   Monitor urine output and notify Licensed Independent Practitioner for values outside of normal range   Assess for signs of decreased cardiac output   Administer fluid and/or volume expanders as ordered   Administer vasoactive medications as ordered     Problem: Cardiovascular - Adult  Goal: Absence of cardiac dysrhythmias or at baseline  8/7/2023 0432 by Renea Choudhary RN  Outcome: Progressing  Flowsheets (Taken 8/7/2023 0432)  Absence of cardiac dysrhythmias or at baseline:   Monitor cardiac rate and rhythm   Assess for signs of decreased cardiac output   Administer antiarrhythmia medication and electrolyte replacement as ordered     Problem: Respiratory - Adult  Goal: Achieves optimal ventilation and oxygenation  8/7/2023 0432 by Tanner Shoemaker, RN  Outcome: Progressing  Flowsheets (Taken 8/7/2023 5600)  Achieves optimal ventilation and oxygenation:   Assess for changes in respiratory status   Assess for changes in mentation and behavior   Position to facilitate oxygenation and minimize respiratory effort   Oxygen supplementation based on oxygen saturation or arterial blood gases   Initiate smoking cessation protocol as indicated   Encourage broncho-pulmonary hygiene including cough, deep breathe, incentive spirometry   Assess the need for suctioning and aspirate as needed   Assess and instruct to report shortness of breath or any respiratory difficulty   Respiratory therapy support as indicated     Problem: Metabolic/Fluid and Electrolytes - Adult  Goal: Electrolytes maintained within normal limits  Outcome: Progressing  Flowsheets (Taken 8/7/2023 0432)  Electrolytes maintained within normal limits:   Monitor labs and assess patient for signs and symptoms of electrolyte imbalances   Administer electrolyte replacement as ordered   Monitor response to electrolyte replacements, including repeat lab results as appropriate   Fluid restriction as ordered   Instruct patient on fluid and nutrition restrictions as appropriate     Problem: Metabolic/Fluid and Electrolytes - Adult  Goal: Hemodynamic stability and optimal renal function maintained  Outcome: Progressing  Flowsheets (Taken 8/7/2023 0432)  Hemodynamic stability and optimal renal function maintained:   Monitor labs and assess for signs and symptoms of volume excess or deficit   Monitor intake, output and patient weight   Monitor urine specific gravity, serum osmolarity and serum sodium as indicated or ordered   Monitor response to interventions for patient's volume status, including labs, urine output, blood pressure (other measures as available)   Encourage oral intake as appropriate   Instruct patient on fluid and nutrition restrictions as appropriate

## 2023-08-07 NOTE — PLAN OF CARE
Problem: Discharge Planning  Goal: Discharge to home or other facility with appropriate resources  8/7/2023 1532 by Bita Johnson RN  Outcome: Progressing  Flowsheets (Taken 8/7/2023 1532)  Discharge to home or other facility with appropriate resources:   Arrange for needed discharge resources and transportation as appropriate   Identify barriers to discharge with patient and caregiver   Identify discharge learning needs (meds, wound care, etc)     Problem: Safety - Adult  Goal: Free from fall injury  8/7/2023 1532 by iBta Johnson RN  Outcome: Progressing  Flowsheets (Taken 8/7/2023 1532)  Free From Fall Injury: Instruct family/caregiver on patient safety     Problem: Cardiovascular - Adult  Goal: Maintains optimal cardiac output and hemodynamic stability  8/7/2023 1532 by Bita Johnson RN  Outcome: Progressing  Flowsheets (Taken 8/7/2023 1532)  Maintains optimal cardiac output and hemodynamic stability:   Monitor blood pressure and heart rate   Monitor urine output and notify Licensed Independent Practitioner for values outside of normal range   Assess for signs of decreased cardiac output   Administer fluid and/or volume expanders as ordered   Administer vasoactive medications as ordered     Problem: Cardiovascular - Adult  Goal: Absence of cardiac dysrhythmias or at baseline  8/7/2023 1532 by Bita Johnson RN  Outcome: Progressing  Flowsheets (Taken 8/7/2023 1532)  Absence of cardiac dysrhythmias or at baseline:   Monitor cardiac rate and rhythm   Assess for signs of decreased cardiac output   Administer antiarrhythmia medication and electrolyte replacement as ordered     Problem: Respiratory - Adult  Goal: Achieves optimal ventilation and oxygenation  8/7/2023 1532 by Bita Johnson RN  Outcome: Progressing  Flowsheets (Taken 8/7/2023 1532)  Achieves optimal ventilation and oxygenation:   Assess for changes in respiratory status   Position to facilitate oxygenation and minimize respiratory effort   Oxygen

## 2023-08-08 LAB
ANION GAP SERPL CALCULATED.3IONS-SCNC: 10 MMOL/L (ref 3–16)
ANION GAP SERPL CALCULATED.3IONS-SCNC: 10 MMOL/L (ref 3–16)
BUN SERPL-MCNC: 28 MG/DL (ref 7–20)
BUN SERPL-MCNC: 36 MG/DL (ref 7–20)
CALCIUM SERPL-MCNC: 8.9 MG/DL (ref 8.3–10.6)
CALCIUM SERPL-MCNC: 9.1 MG/DL (ref 8.3–10.6)
CHLORIDE SERPL-SCNC: 101 MMOL/L (ref 99–110)
CHLORIDE SERPL-SCNC: 99 MMOL/L (ref 99–110)
CO2 SERPL-SCNC: 24 MMOL/L (ref 21–32)
CO2 SERPL-SCNC: 26 MMOL/L (ref 21–32)
CREAT SERPL-MCNC: 1.2 MG/DL (ref 0.9–1.3)
CREAT SERPL-MCNC: 1.3 MG/DL (ref 0.9–1.3)
DEPRECATED RDW RBC AUTO: 16.5 % (ref 12.4–15.4)
GFR SERPLBLD CREATININE-BSD FMLA CKD-EPI: >60 ML/MIN/{1.73_M2}
GFR SERPLBLD CREATININE-BSD FMLA CKD-EPI: >60 ML/MIN/{1.73_M2}
GLUCOSE SERPL-MCNC: 105 MG/DL (ref 70–99)
GLUCOSE SERPL-MCNC: 129 MG/DL (ref 70–99)
HCT VFR BLD AUTO: 38.7 % (ref 40.5–52.5)
HGB BLD-MCNC: 12.8 G/DL (ref 13.5–17.5)
MCH RBC QN AUTO: 27.9 PG (ref 26–34)
MCHC RBC AUTO-ENTMCNC: 33 G/DL (ref 31–36)
MCV RBC AUTO: 84.5 FL (ref 80–100)
NT-PROBNP SERPL-MCNC: 1398 PG/ML (ref 0–124)
PLATELET # BLD AUTO: 163 K/UL (ref 135–450)
PMV BLD AUTO: 10.3 FL (ref 5–10.5)
POTASSIUM SERPL-SCNC: 3.9 MMOL/L (ref 3.5–5.1)
POTASSIUM SERPL-SCNC: 3.9 MMOL/L (ref 3.5–5.1)
RBC # BLD AUTO: 4.58 M/UL (ref 4.2–5.9)
SODIUM SERPL-SCNC: 135 MMOL/L (ref 136–145)
SODIUM SERPL-SCNC: 135 MMOL/L (ref 136–145)
WBC # BLD AUTO: 13 K/UL (ref 4–11)

## 2023-08-08 PROCEDURE — 2580000003 HC RX 258: Performed by: INTERNAL MEDICINE

## 2023-08-08 PROCEDURE — 87040 BLOOD CULTURE FOR BACTERIA: CPT

## 2023-08-08 PROCEDURE — 6360000002 HC RX W HCPCS: Performed by: INTERNAL MEDICINE

## 2023-08-08 PROCEDURE — 2060000000 HC ICU INTERMEDIATE R&B

## 2023-08-08 PROCEDURE — 85027 COMPLETE CBC AUTOMATED: CPT

## 2023-08-08 PROCEDURE — 36415 COLL VENOUS BLD VENIPUNCTURE: CPT

## 2023-08-08 PROCEDURE — 83880 ASSAY OF NATRIURETIC PEPTIDE: CPT

## 2023-08-08 PROCEDURE — 99232 SBSQ HOSP IP/OBS MODERATE 35: CPT | Performed by: INTERNAL MEDICINE

## 2023-08-08 PROCEDURE — 99233 SBSQ HOSP IP/OBS HIGH 50: CPT | Performed by: INTERNAL MEDICINE

## 2023-08-08 PROCEDURE — 6360000002 HC RX W HCPCS: Performed by: STUDENT IN AN ORGANIZED HEALTH CARE EDUCATION/TRAINING PROGRAM

## 2023-08-08 PROCEDURE — 6370000000 HC RX 637 (ALT 250 FOR IP): Performed by: HOSPITALIST

## 2023-08-08 PROCEDURE — 99233 SBSQ HOSP IP/OBS HIGH 50: CPT | Performed by: NURSE PRACTITIONER

## 2023-08-08 PROCEDURE — 80048 BASIC METABOLIC PNL TOTAL CA: CPT

## 2023-08-08 RX ADMIN — NIFEDIPINE 90 MG: 90 TABLET, FILM COATED, EXTENDED RELEASE ORAL at 09:07

## 2023-08-08 RX ADMIN — CARVEDILOL 25 MG: 25 TABLET, FILM COATED ORAL at 16:33

## 2023-08-08 RX ADMIN — HYDRALAZINE HYDROCHLORIDE 50 MG: 50 TABLET, FILM COATED ORAL at 14:37

## 2023-08-08 RX ADMIN — FUROSEMIDE 40 MG: 10 INJECTION, SOLUTION INTRAMUSCULAR; INTRAVENOUS at 09:09

## 2023-08-08 RX ADMIN — HYDRALAZINE HYDROCHLORIDE 50 MG: 50 TABLET, FILM COATED ORAL at 21:45

## 2023-08-08 RX ADMIN — FLECAINIDE ACETATE 100 MG: 100 TABLET ORAL at 09:07

## 2023-08-08 RX ADMIN — APIXABAN 5 MG: 5 TABLET, FILM COATED ORAL at 09:08

## 2023-08-08 RX ADMIN — VALSARTAN 160 MG: 160 TABLET, FILM COATED ORAL at 09:07

## 2023-08-08 RX ADMIN — ATORVASTATIN CALCIUM 10 MG: 10 TABLET, FILM COATED ORAL at 09:08

## 2023-08-08 RX ADMIN — FUROSEMIDE 40 MG: 10 INJECTION, SOLUTION INTRAMUSCULAR; INTRAVENOUS at 16:33

## 2023-08-08 RX ADMIN — APIXABAN 5 MG: 5 TABLET, FILM COATED ORAL at 21:45

## 2023-08-08 RX ADMIN — HYDRALAZINE HYDROCHLORIDE 50 MG: 50 TABLET, FILM COATED ORAL at 05:11

## 2023-08-08 RX ADMIN — ASPIRIN 81 MG: 81 TABLET, CHEWABLE ORAL at 09:08

## 2023-08-08 RX ADMIN — FLECAINIDE ACETATE 100 MG: 100 TABLET ORAL at 21:45

## 2023-08-08 RX ADMIN — CEFAZOLIN 2000 MG: 2 INJECTION, POWDER, FOR SOLUTION INTRAMUSCULAR; INTRAVENOUS at 17:11

## 2023-08-08 RX ADMIN — CARVEDILOL 25 MG: 25 TABLET, FILM COATED ORAL at 09:08

## 2023-08-08 RX ADMIN — CEFTRIAXONE SODIUM 2000 MG: 2 INJECTION, POWDER, FOR SOLUTION INTRAMUSCULAR; INTRAVENOUS at 09:20

## 2023-08-08 ASSESSMENT — PAIN SCALES - GENERAL
PAINLEVEL_OUTOF10: 0

## 2023-08-08 NOTE — PLAN OF CARE
Problem: Discharge Planning  Goal: Discharge to home or other facility with appropriate resources  Outcome: Progressing  Flowsheets (Taken 8/8/2023 1901)  Discharge to home or other facility with appropriate resources:   Identify barriers to discharge with patient and caregiver   Arrange for needed discharge resources and transportation as appropriate   Identify discharge learning needs (meds, wound care, etc)     Problem: Safety - Adult  Goal: Free from fall injury  Outcome: Progressing  Flowsheets (Taken 8/8/2023 1901)  Free From Fall Injury: Instruct family/caregiver on patient safety     Problem: Cardiovascular - Adult  Goal: Maintains optimal cardiac output and hemodynamic stability  Outcome: Progressing  Flowsheets (Taken 8/8/2023 1901)  Maintains optimal cardiac output and hemodynamic stability:   Monitor blood pressure and heart rate   Monitor urine output and notify Licensed Independent Practitioner for values outside of normal range   Assess for signs of decreased cardiac output   Administer fluid and/or volume expanders as ordered   Administer vasoactive medications as ordered     Problem: Respiratory - Adult  Goal: Achieves optimal ventilation and oxygenation  Outcome: Progressing  Flowsheets (Taken 8/8/2023 1901)  Achieves optimal ventilation and oxygenation:   Assess for changes in respiratory status   Assess for changes in mentation and behavior   Position to facilitate oxygenation and minimize respiratory effort   Oxygen supplementation based on oxygen saturation or arterial blood gases   Encourage broncho-pulmonary hygiene including cough, deep breathe, incentive spirometry   Assess the need for suctioning and aspirate as needed   Assess and instruct to report shortness of breath or any respiratory difficulty   Respiratory therapy support as indicated     Problem: Metabolic/Fluid and Electrolytes - Adult  Goal: Electrolytes maintained within normal limits  Outcome: Progressing  Flowsheets (Taken

## 2023-08-08 NOTE — CARE COORDINATION
Case Management Assessment  Initial Evaluation    Date/Time of Evaluation: 8/8/2023 10:09 AM  Assessment Completed by: Ana Maria Brush RN    If patient is discharged prior to next notation, then this note serves as note for discharge by case management. Patient Name: Fritz Corbett                   YOB: 1965  Diagnosis: Hypoxia [R09.02]  Hypertensive urgency [I16.0]  HCAP (healthcare-associated pneumonia) [J18.9]  Pneumonia of both lower lobes due to infectious organism [J18.9]                   Date / Time: 8/6/2023  4:53 AM    Patient Admission Status: Inpatient   Readmission Risk (Low < 19, Mod (19-27), High > 27): Readmission Risk Score: 14.8    Current PCP: JARET Perales CNP  PCP verified by CM? Yes    Chart Reviewed: Yes      History Provided by: Patient  Patient Orientation: Alert and Oriented, Person, Place, Situation    Patient Cognition: Alert    Hospitalization in the last 30 days (Readmission):  Yes    If yes, Readmission Assessment in CM Navigator will be completed. Advance Directives:      Code Status: Full Code   Patient's Primary Decision Maker is: Legal Next of Kin      Discharge Planning:    Patient lives with: Children Type of Home: House  Primary Care Giver: Self  Patient Support Systems include: Spouse/Significant Other   Current Financial resources:    Current community resources:    Current services prior to admission: None            Current DME:              Type of Home Care services:  None    ADLS  Prior functional level: Independent in ADLs/IADLs  Current functional level: Independent in ADLs/IADLs    PT AM-PAC:   /24  OT AM-PAC:   /24    Family can provide assistance at DC: Yes  Would you like Case Management to discuss the discharge plan with any other family members/significant others, and if so, who?  No  Plans to Return to Present Housing: Yes  Other Identified Issues/Barriers to RETURNING to current housing: infection  Potential Assistance

## 2023-08-08 NOTE — PROGRESS NOTES
V2.0    [facility] Progress Note      Name:  Jessika Watson /Age/Sex: 1965  (63 y.o. male)   MRN & CSN:  [id number] & [id number] Encounter Date/Time: 2023 3:52 PM EDT   Location:  [facility]-01 PCP: JARET Bhardwaj - CNP     Attending:Tory Hernandez MD       Hospital Day: 3    Assessment and Recommendations   Jessika Watson is a 63 y.o. male with pmh of afib,hypertension, hyperlipidemia, obstructive sleep apnea on CPAP, chronic kidney disease who presents with Sepsis and Heart failure along with streptococcal bacteremia    Plan:    Sepsis POA fever tachycardia tachypnea  Gram-positive streptococcal bacteremia with chills on presentation  Source can be skin  Beta hemolytic Strep in blood, changed to Ancef    Afib on eliquis and flecainide with elevated troponin   D/w cardiology, holding on cath for now. Acute hypoxic respiratory failure  Secondary to conditions listed above heart failure  ? ? Flash pulm edema  Response with IV diuretics, continue with, dose increased, cardiac consult  Recent echo showing diastolic dysfunction normal EF  Continue iv diuresis for now     Morbid obesity with a BMI of 47.93    Recent admission for aspiration  MARY on CPAP        Diet ADULT DIET;  Regular; Low Fat/Low Chol/High Fiber/2 gm Na   DVT Prophylaxis [] Lovenox, []  Heparin, [] SCDs, [] Ambulation,  [x] Eliquis, [] Xarelto  [] Coumadin   Code Status Full Code   Disposition From: Home  Expected Disposition: Home  Estimated Date of Discharge: 1-2 days at least  Patient requires continued admission due to continued abx, iv lasix    Surrogate Decision Maker/ POA       Personally reviewed Lab Studies and Imaging     Drugs that require monitoring for toxicity include lasix iv and the method of monitoring was Renal functions   X ray on admission       Subjective:     Chief Complaint: sob, chills    Jessika Watson is a 63 y.o. male with h/o afib,hypertension, hyperlipidemia, obstructive sleep apnea

## 2023-08-09 LAB
ANION GAP SERPL CALCULATED.3IONS-SCNC: 10 MMOL/L (ref 3–16)
BACTERIA BLD CULT ORG #2: ABNORMAL
BACTERIA BLD CULT: ABNORMAL
BACTERIA BLD CULT: ABNORMAL
BUN SERPL-MCNC: 32 MG/DL (ref 7–20)
CALCIUM SERPL-MCNC: 8.9 MG/DL (ref 8.3–10.6)
CHLORIDE SERPL-SCNC: 103 MMOL/L (ref 99–110)
CHOLEST SERPL-MCNC: 101 MG/DL (ref 0–199)
CO2 SERPL-SCNC: 27 MMOL/L (ref 21–32)
CREAT SERPL-MCNC: 1.3 MG/DL (ref 0.9–1.3)
DEPRECATED RDW RBC AUTO: 16.4 % (ref 12.4–15.4)
GFR SERPLBLD CREATININE-BSD FMLA CKD-EPI: >60 ML/MIN/{1.73_M2}
GLUCOSE SERPL-MCNC: 115 MG/DL (ref 70–99)
HCT VFR BLD AUTO: 38.3 % (ref 40.5–52.5)
HDLC SERPL-MCNC: 29 MG/DL (ref 40–60)
HGB BLD-MCNC: 12.8 G/DL (ref 13.5–17.5)
INR PPP: 1.17 (ref 0.84–1.16)
LDLC SERPL CALC-MCNC: 54 MG/DL
MCH RBC QN AUTO: 28.5 PG (ref 26–34)
MCHC RBC AUTO-ENTMCNC: 33.3 G/DL (ref 31–36)
MCV RBC AUTO: 85.6 FL (ref 80–100)
ORGANISM: ABNORMAL
PLATELET # BLD AUTO: 166 K/UL (ref 135–450)
PMV BLD AUTO: 10.3 FL (ref 5–10.5)
POTASSIUM SERPL-SCNC: 4 MMOL/L (ref 3.5–5.1)
PROTHROMBIN TIME: 14.9 SEC (ref 11.5–14.8)
RBC # BLD AUTO: 4.48 M/UL (ref 4.2–5.9)
SODIUM SERPL-SCNC: 140 MMOL/L (ref 136–145)
TRIGL SERPL-MCNC: 92 MG/DL (ref 0–150)
VLDLC SERPL CALC-MCNC: 18 MG/DL
WBC # BLD AUTO: 9.2 K/UL (ref 4–11)

## 2023-08-09 PROCEDURE — C1769 GUIDE WIRE: HCPCS

## 2023-08-09 PROCEDURE — 93567 NJX CAR CTH SPRVLV AORTGRPHY: CPT

## 2023-08-09 PROCEDURE — 2500000003 HC RX 250 WO HCPCS

## 2023-08-09 PROCEDURE — 93567 NJX CAR CTH SPRVLV AORTGRPHY: CPT | Performed by: INTERNAL MEDICINE

## 2023-08-09 PROCEDURE — 93458 L HRT ARTERY/VENTRICLE ANGIO: CPT

## 2023-08-09 PROCEDURE — 2060000000 HC ICU INTERMEDIATE R&B

## 2023-08-09 PROCEDURE — 92928 PRQ TCAT PLMT NTRAC ST 1 LES: CPT | Performed by: INTERNAL MEDICINE

## 2023-08-09 PROCEDURE — 85027 COMPLETE CBC AUTOMATED: CPT

## 2023-08-09 PROCEDURE — 99152 MOD SED SAME PHYS/QHP 5/>YRS: CPT | Performed by: INTERNAL MEDICINE

## 2023-08-09 PROCEDURE — 94660 CPAP INITIATION&MGMT: CPT

## 2023-08-09 PROCEDURE — 99152 MOD SED SAME PHYS/QHP 5/>YRS: CPT

## 2023-08-09 PROCEDURE — 2580000003 HC RX 258

## 2023-08-09 PROCEDURE — C1887 CATHETER, GUIDING: HCPCS

## 2023-08-09 PROCEDURE — 36415 COLL VENOUS BLD VENIPUNCTURE: CPT

## 2023-08-09 PROCEDURE — B2151ZZ FLUOROSCOPY OF LEFT HEART USING LOW OSMOLAR CONTRAST: ICD-10-PCS | Performed by: INTERNAL MEDICINE

## 2023-08-09 PROCEDURE — 6370000000 HC RX 637 (ALT 250 FOR IP)

## 2023-08-09 PROCEDURE — 6360000002 HC RX W HCPCS: Performed by: INTERNAL MEDICINE

## 2023-08-09 PROCEDURE — 6360000004 HC RX CONTRAST MEDICATION: Performed by: INTERNAL MEDICINE

## 2023-08-09 PROCEDURE — C1874 STENT, COATED/COV W/DEL SYS: HCPCS

## 2023-08-09 PROCEDURE — 85610 PROTHROMBIN TIME: CPT

## 2023-08-09 PROCEDURE — 80061 LIPID PANEL: CPT

## 2023-08-09 PROCEDURE — 4A023N7 MEASUREMENT OF CARDIAC SAMPLING AND PRESSURE, LEFT HEART, PERCUTANEOUS APPROACH: ICD-10-PCS | Performed by: INTERNAL MEDICINE

## 2023-08-09 PROCEDURE — 93459 L HRT ART/GRFT ANGIO: CPT | Performed by: INTERNAL MEDICINE

## 2023-08-09 PROCEDURE — 6360000002 HC RX W HCPCS

## 2023-08-09 PROCEDURE — 99223 1ST HOSP IP/OBS HIGH 75: CPT | Performed by: INTERNAL MEDICINE

## 2023-08-09 PROCEDURE — B2111ZZ FLUOROSCOPY OF MULTIPLE CORONARY ARTERIES USING LOW OSMOLAR CONTRAST: ICD-10-PCS | Performed by: INTERNAL MEDICINE

## 2023-08-09 PROCEDURE — 2580000003 HC RX 258: Performed by: INTERNAL MEDICINE

## 2023-08-09 PROCEDURE — 80048 BASIC METABOLIC PNL TOTAL CA: CPT

## 2023-08-09 PROCEDURE — 6360000002 HC RX W HCPCS: Performed by: STUDENT IN AN ORGANIZED HEALTH CARE EDUCATION/TRAINING PROGRAM

## 2023-08-09 PROCEDURE — 99153 MOD SED SAME PHYS/QHP EA: CPT

## 2023-08-09 PROCEDURE — 99233 SBSQ HOSP IP/OBS HIGH 50: CPT | Performed by: NURSE PRACTITIONER

## 2023-08-09 PROCEDURE — C1894 INTRO/SHEATH, NON-LASER: HCPCS

## 2023-08-09 PROCEDURE — 6370000000 HC RX 637 (ALT 250 FOR IP): Performed by: INTERNAL MEDICINE

## 2023-08-09 PROCEDURE — 2709999900 HC NON-CHARGEABLE SUPPLY

## 2023-08-09 PROCEDURE — 027034Z DILATION OF CORONARY ARTERY, ONE ARTERY WITH DRUG-ELUTING INTRALUMINAL DEVICE, PERCUTANEOUS APPROACH: ICD-10-PCS | Performed by: INTERNAL MEDICINE

## 2023-08-09 PROCEDURE — 6370000000 HC RX 637 (ALT 250 FOR IP): Performed by: HOSPITALIST

## 2023-08-09 PROCEDURE — 93005 ELECTROCARDIOGRAM TRACING: CPT | Performed by: INTERNAL MEDICINE

## 2023-08-09 PROCEDURE — 92928 PRQ TCAT PLMT NTRAC ST 1 LES: CPT

## 2023-08-09 RX ORDER — ONDANSETRON 2 MG/ML
4 INJECTION INTRAMUSCULAR; INTRAVENOUS EVERY 6 HOURS PRN
Status: DISCONTINUED | OUTPATIENT
Start: 2023-08-09 | End: 2023-08-11 | Stop reason: HOSPADM

## 2023-08-09 RX ORDER — ASPIRIN 81 MG/1
81 TABLET, CHEWABLE ORAL DAILY
Status: DISCONTINUED | OUTPATIENT
Start: 2023-08-10 | End: 2023-08-11 | Stop reason: HOSPADM

## 2023-08-09 RX ORDER — PRASUGREL 10 MG/1
10 TABLET, FILM COATED ORAL DAILY
Status: DISCONTINUED | OUTPATIENT
Start: 2023-08-10 | End: 2023-08-11

## 2023-08-09 RX ORDER — SODIUM CHLORIDE 9 MG/ML
INJECTION, SOLUTION INTRAVENOUS PRN
Status: DISCONTINUED | OUTPATIENT
Start: 2023-08-09 | End: 2023-08-11 | Stop reason: HOSPADM

## 2023-08-09 RX ORDER — LISINOPRIL 2.5 MG/1
2.5 TABLET ORAL DAILY
Status: DISCONTINUED | OUTPATIENT
Start: 2023-08-09 | End: 2023-08-09 | Stop reason: ALTCHOICE

## 2023-08-09 RX ORDER — SODIUM CHLORIDE 0.9 % (FLUSH) 0.9 %
5-40 SYRINGE (ML) INJECTION PRN
Status: DISCONTINUED | OUTPATIENT
Start: 2023-08-09 | End: 2023-08-11 | Stop reason: HOSPADM

## 2023-08-09 RX ORDER — MORPHINE SULFATE 2 MG/ML
2 INJECTION, SOLUTION INTRAMUSCULAR; INTRAVENOUS
Status: ACTIVE | OUTPATIENT
Start: 2023-08-09 | End: 2023-08-10

## 2023-08-09 RX ORDER — ACETAMINOPHEN 325 MG/1
650 TABLET ORAL EVERY 4 HOURS PRN
Status: DISCONTINUED | OUTPATIENT
Start: 2023-08-09 | End: 2023-08-11 | Stop reason: HOSPADM

## 2023-08-09 RX ORDER — SODIUM CHLORIDE 0.9 % (FLUSH) 0.9 %
5-40 SYRINGE (ML) INJECTION EVERY 12 HOURS SCHEDULED
Status: DISCONTINUED | OUTPATIENT
Start: 2023-08-09 | End: 2023-08-11 | Stop reason: HOSPADM

## 2023-08-09 RX ORDER — ATORVASTATIN CALCIUM 40 MG/1
40 TABLET, FILM COATED ORAL NIGHTLY
Status: DISCONTINUED | OUTPATIENT
Start: 2023-08-09 | End: 2023-08-11 | Stop reason: HOSPADM

## 2023-08-09 RX ORDER — ATROPINE SULFATE 0.4 MG/ML
0.5 INJECTION, SOLUTION INTRAMUSCULAR; INTRAVENOUS; SUBCUTANEOUS
Status: ACTIVE | OUTPATIENT
Start: 2023-08-09 | End: 2023-08-10

## 2023-08-09 RX ORDER — SODIUM CHLORIDE 9 MG/ML
INJECTION, SOLUTION INTRAVENOUS CONTINUOUS
Status: ACTIVE | OUTPATIENT
Start: 2023-08-09 | End: 2023-08-09

## 2023-08-09 RX ADMIN — CEFAZOLIN 2000 MG: 2 INJECTION, POWDER, FOR SOLUTION INTRAMUSCULAR; INTRAVENOUS at 02:03

## 2023-08-09 RX ADMIN — CARVEDILOL 25 MG: 25 TABLET, FILM COATED ORAL at 10:18

## 2023-08-09 RX ADMIN — SODIUM CHLORIDE: 9 INJECTION, SOLUTION INTRAVENOUS at 13:45

## 2023-08-09 RX ADMIN — NIFEDIPINE 90 MG: 90 TABLET, FILM COATED, EXTENDED RELEASE ORAL at 10:13

## 2023-08-09 RX ADMIN — FLECAINIDE ACETATE 100 MG: 100 TABLET ORAL at 10:13

## 2023-08-09 RX ADMIN — ATORVASTATIN CALCIUM 10 MG: 10 TABLET, FILM COATED ORAL at 10:13

## 2023-08-09 RX ADMIN — HYDRALAZINE HYDROCHLORIDE 50 MG: 50 TABLET, FILM COATED ORAL at 05:30

## 2023-08-09 RX ADMIN — CEFAZOLIN 2000 MG: 2 INJECTION, POWDER, FOR SOLUTION INTRAMUSCULAR; INTRAVENOUS at 10:23

## 2023-08-09 RX ADMIN — HYDRALAZINE HYDROCHLORIDE 50 MG: 50 TABLET, FILM COATED ORAL at 14:58

## 2023-08-09 RX ADMIN — CARVEDILOL 25 MG: 25 TABLET, FILM COATED ORAL at 17:44

## 2023-08-09 RX ADMIN — VALSARTAN 160 MG: 160 TABLET, FILM COATED ORAL at 10:13

## 2023-08-09 RX ADMIN — IOHEXOL 290 ML: 350 INJECTION, SOLUTION INTRAVENOUS at 12:50

## 2023-08-09 RX ADMIN — CEFAZOLIN 2000 MG: 2 INJECTION, POWDER, FOR SOLUTION INTRAMUSCULAR; INTRAVENOUS at 17:43

## 2023-08-09 RX ADMIN — APIXABAN 5 MG: 5 TABLET, FILM COATED ORAL at 21:35

## 2023-08-09 RX ADMIN — FUROSEMIDE 40 MG: 10 INJECTION, SOLUTION INTRAMUSCULAR; INTRAVENOUS at 10:14

## 2023-08-09 RX ADMIN — ASPIRIN 81 MG: 81 TABLET, CHEWABLE ORAL at 10:13

## 2023-08-09 RX ADMIN — HYDRALAZINE HYDROCHLORIDE 50 MG: 50 TABLET, FILM COATED ORAL at 21:35

## 2023-08-09 RX ADMIN — FUROSEMIDE 40 MG: 10 INJECTION, SOLUTION INTRAMUSCULAR; INTRAVENOUS at 17:44

## 2023-08-09 RX ADMIN — SODIUM CHLORIDE, PRESERVATIVE FREE 10 ML: 5 INJECTION INTRAVENOUS at 21:35

## 2023-08-09 RX ADMIN — FLECAINIDE ACETATE 100 MG: 100 TABLET ORAL at 21:35

## 2023-08-09 RX ADMIN — ATORVASTATIN CALCIUM 40 MG: 40 TABLET, FILM COATED ORAL at 21:35

## 2023-08-09 ASSESSMENT — PAIN SCALES - GENERAL
PAINLEVEL_OUTOF10: 0
PAINLEVEL_OUTOF10: 0

## 2023-08-09 NOTE — PROGRESS NOTES
[facility]   Cardiology  Note   Pt of Dr Payton Wei MD, Devyn Lopez RN, FNP APRN CVNP  Date: 8/9/2023  Admit Date: 8/6/2023       Reason for consultation: acute CHF  / elevated trop / cp      CC:Chills and shortness of breath      Primary cardiologist:  Mel Hayes    HPI: Philip Fischer is a 63 y.o. male with a past medical history of CAD  2022 LHC demonstrated 40-50% LAD disease  HTN HLD MARY CRD afib morbid obesity admitted with fever, increased SOB today, stated Friday night he was outside and he had mosquito bite on his right lower extremity with increased erythema and warmth around bite. No c/o n/v diarrhea or cp at this time     Influenza A&B, COVID-negative  ProBNP  2.6 K   sCr WNL   High sensitivity  troponin 46  98  284  238  276   WBC 17.4  EKG rate 95 no  acute changes  Chest xray  Ground glass and interstitial opacities particularly in the perihilar regions with more dense consolidation in the lower lungs and right middle lobe. TTE 8/12023  Normal left ventricle size, concentric hypertrophy, and normal systolic function with an estimated ejection fraction of 50-55%. Cannot exclude apical aneurysm. Indeterminate diastolic function. The left atrium is mildly dilated. The aortic root is dilated at approximately 4.4cm. The ascending aorta measures approximately 3.5cm. Patient seen and examined. Clinical notes reviewed. Telemetry reviewed / Pertinent labs, diagnostic, device, and imaging results reviewed as a part of this visit  I spent a total of 50 minutes and greater than 50% of the time was spent counseling with patient  coordinating care regarding her diagnosis, treatments and plan of care.     EKG TTE stress test: any LHC/RHC reviewed     Scheduled Meds:   ceFAZolin  2,000 mg IntraVENous Q8H    furosemide  40 mg IntraVENous BID    apixaban  5 mg Oral BID    aspirin  81 mg Oral Daily    atorvastatin  10 mg Oral Daily    flecainide  100 mg Oral 2 times per day    NIFEdipine  90 Value Date/Time    TSH 2.98 02/24/2018 09:16 AM     Lipids:   Lab Results   Component Value Date/Time    CHOL 141 06/25/2022 01:42 PM    HDL 34 06/25/2022 01:42 PM    TRIG 127 06/25/2022 01:42 PM     LFTS:   Lab Results   Component Value Date/Time    ALT 25 01/07/2021 03:42 AM    AST 46 01/07/2021 03:42 AM    ALKPHOS 80 01/07/2021 03:42 AM    PROT 6.9 01/07/2021 03:42 AM    PROT 7.9 09/18/2012 12:42 PM    AGRATIO 1.7 10/27/2020 04:42 AM    BILITOT 0.4 01/07/2021 03:42 AM     Cardiac Enzymes:   Lab Results   Component Value Date/Time    TROPONINI 0.14 06/23/2022 12:32 PM    TROPONINI 0.12 06/23/2022 11:38 AM    TROPONINI 0.22 06/23/2022 05:42 AM     Coags:   Lab Results   Component Value Date/Time    PROTIME 16.6 07/31/2023 02:30 AM    INR 1.35 07/31/2023 02:30 AM     Patient Active Problem List    Diagnosis Date Noted    Abnormal myocardial perfusion study 06/28/2022    Chest pain     Hypoxia 08/07/2023    HCAP (healthcare-associated pneumonia) 08/06/2023    H/O angiography 10/26/2020    Atrial fibrillation with rapid ventricular response (HCC)     Essential hypertension     Atrial fibrillation ([address]) 02/27/2020    Hypertensive urgency 02/18/2020    Heart failure with normal ejection fraction ([address]) 10/29/2018    Mixed hyperlipidemia 02/19/2018    Paresthesia 06/07/2016    Lumbar strain 01/04/2016    Depressive disorder 08/19/2013    Obesity 07/15/2011    Obstructive sleep apnea syndrome 11/16/2010        Assessment      SOB  resp distress  multifactorial PNA AoC dHF   Influenza A&B, COVID-negative  ProBNP  2.6 K   sCr WNL   High sensitivity  troponin 46  98  284  238  276   WBC 17.4 > 9.2   EKG rate 95 no  acute changes  Chest xray  Ground glass and interstitial opacities particularly in the perihilar regions with more dense consolidation in the lower lungs and right middle lobe.     TTE 8/12023  Normal left ventricle size, concentric hypertrophy, and normal systolic function with an estimated ejection fraction of

## 2023-08-09 NOTE — PROCEDURES
[facility]                                                LEFT HEART CATH    Abner Barragan   63 y.o., male  1965 8/9/2023    Procedure performed by Dr. Raghu Bach MD, [facility]  Surgical assistants :none    Procedure  Selective Coronary Angiography  Cardiac Catheterization for Coronary Anatomy  Left Heart Catheterization  Left Ventriculogram  PTCA with stent to the proximal LAD. Aortic root injection. Radial artery access assisted by ultrasound  Arterial Access Right Radial Artery after a negative Christo test  TR Band    CPT code 07101  CPT code 44196-RP  CPT code 9345 8-5 9  CPT code 37388  CPT code 35827  CPT code 34704 at 2 units    Any and all anesthesia was administered by my staff under my direct supervision and with me personally monitoring the patient    Indication:Cardiac cath to rule out ischemic CAD, Possible angioplasty, The procedure and risks described to patient including risk of CVA, MI, bleeding, emergency surgery, death, previous cath with minor CAD., Consent signed, or positive stress test  Unspecified Angina  Anesthesia: Moderate sedation with Versed and Fentanyl IV  Anesthesia Start time 12:00 noon  Anesthesia end time 1243  Estimated blood loss :minimal    Specimen removed: NONE    Abnormal Stress Test      Procedure Description:  After written informed consent was obtained, the patient was   brought to the cardiac catheterization suite, where patient was prepped and   draped in the usual sterile fashion. Local anesthesia was achieved in the   right wrist with 2% lidocaine. A 5-Amharic hemostasis sheath was placed into   the radial artery.     The pre cocktail of heparin, verapamil and nitroglycerin was injected into the sheath    The JR4 catheter was introduced  to engage the right coronary   artery. Radiographic  images were obtained. The catheter was removed and exchanged over an exchange length 0.35 soft guide wire. .         A 5-Romanian JL 3.5 catheter was introduced; used to engage the left main   coronary artery. Radiographic  images were obtained. The catheter was pulled back . A 5-Romanian angled pigtail catheter was then positioned in the left ventricle. Left ventriculogram was performed. After these images were obtained. , the   catheter was flushed, pulled back across the aortic valve revealing no gradient. The 5 Romanian pigtail catheter was used to perform an aortic root injection. The catheter was removed. The hemostasis sheath was removed and hemostasis was achieved using a TR Band     There were no complications. Patient tolerated the procedure well. The   patient was transferred to the holding area in stable condition. Contrast consumed 290 cc  Flouro time 13.2 minutes  Radiation exposure 3333.99 mGy    Results:  Left ventricular pressure 25-30 mmHg  Aortic pressure 126/75 mmHg    Coronary anatomy:   The left main coronary artery is normal.     Left anterior descending artery proximal stenosis up to 80 to 85%. Circumflex artery is as some stenosis in the obtuse marginal branch 2 and 3 above to 40% that did not require intervention. The right coronary artery is a dominant vessel and minor plaque. Left ventriculogram shows ejection fraction of 55%. Normal wall motion. The aortic root show some dilatation. There is no aneurysm. There is uncoiling of the aorta. PCI  For PCI we used an EBU 4.0 guide catheter. We placed into the ostium of the LAD and we then placed a BMW guidewire down the LAD. We used time to measure distance and stenosis area. We then deployed a 3.0 x 23 mm frontier drug-eluting balloon. Balloon was deployed up to 3.5 mm size.   With balloon deflation we remove

## 2023-08-09 NOTE — PLAN OF CARE
Problem: Discharge Planning  Goal: Discharge to home or other facility with appropriate resources  Outcome: Progressing  Flowsheets (Taken 8/9/2023 1900)  Discharge to home or other facility with appropriate resources:   Identify barriers to discharge with patient and caregiver   Arrange for needed discharge resources and transportation as appropriate   Identify discharge learning needs (meds, wound care, etc)     Problem: Safety - Adult  Goal: Free from fall injury  Outcome: Progressing  Flowsheets (Taken 8/9/2023 1900)  Free From Fall Injury: Instruct family/caregiver on patient safety     Problem: Cardiovascular - Adult  Goal: Maintains optimal cardiac output and hemodynamic stability  Outcome: Progressing  Flowsheets (Taken 8/9/2023 1900)  Maintains optimal cardiac output and hemodynamic stability:   Monitor blood pressure and heart rate   Monitor urine output and notify Licensed Independent Practitioner for values outside of normal range   Administer fluid and/or volume expanders as ordered   Administer vasoactive medications as ordered   Assess for signs of decreased cardiac output  Goal: Absence of cardiac dysrhythmias or at baseline  Outcome: Progressing  Flowsheets (Taken 8/9/2023 1900)  Absence of cardiac dysrhythmias or at baseline:   Monitor cardiac rate and rhythm   Assess for signs of decreased cardiac output   Administer antiarrhythmia medication and electrolyte replacement as ordered     Problem: Respiratory - Adult  Goal: Achieves optimal ventilation and oxygenation  Outcome: Progressing  Flowsheets (Taken 8/9/2023 1900)  Achieves optimal ventilation and oxygenation:   Assess for changes in respiratory status   Assess for changes in mentation and behavior   Position to facilitate oxygenation and minimize respiratory effort   Oxygen supplementation based on oxygen saturation or arterial blood gases   Encourage broncho-pulmonary hygiene including cough, deep breathe, incentive spirometry   Assess the need for suctioning and aspirate as needed   Assess and instruct to report shortness of breath or any respiratory difficulty   Respiratory therapy support as indicated     Problem: Metabolic/Fluid and Electrolytes - Adult  Goal: Electrolytes maintained within normal limits  Outcome: Progressing  Flowsheets (Taken 8/9/2023 1900)  Electrolytes maintained within normal limits:   Monitor labs and assess patient for signs and symptoms of electrolyte imbalances   Administer electrolyte replacement as ordered   Monitor response to electrolyte replacements, including repeat lab results as appropriate   Fluid restriction as ordered   Instruct patient on fluid and nutrition restrictions as appropriate

## 2023-08-09 NOTE — PROGRESS NOTES
V2.0    [facility] [place] Note      Name:  Blanca Massey /Age/Sex: 1965  (63 y.o. male)   MRN & CSN:  [id number] & [id number] Encounter Date/Time: 2023 3:52 PM EDT   Location:  [facility] [place][note 3]4301-01 PCP: JARET Nettles - CNP     Attending:Tory Givens MD       Hospital Day: 4    Assessment and Recommendations   Blanca Massey is a 63 y.o. male with pmh of afib,hypertension, hyperlipidemia, obstructive sleep apnea on CPAP, chronic kidney disease who presents with Sepsis and Heart failure along with streptococcal bacteremia    Plan:    Sepsis POA fever tachycardia tachypnea  Gram-positive streptococcal bacteremia with chills on presentation  Source can be skin  Beta hemolytic Strep in blood, changed to Ancef      Afib on eliquis and flecainide with elevated troponin   D/w cardiology, ischemic w/u today    Acute hypoxic respiratory failure  Secondary to conditions listed above heart failure  ? ? Flash pulm edema  Response with IV diuretics, continue with, dose increased, cardiac consult  Recent echo showing diastolic dysfunction normal EF  Continue iv diuresis for now   Improving     Morbid obesity with a BMI of 47.93    Recent admission for aspiration  MARY on CPAP        Diet ADULT DIET;  Regular; Low Fat/Low Chol/High Fiber/2 gm Na   DVT Prophylaxis [] Lovenox, []  Heparin, [] SCDs, [] Ambulation,  [x] Eliquis, [] Xarelto  [] Coumadin   Code Status Full Code   Disposition From: Home  Expected Disposition: Home  Estimated Date of Discharge: 1-2 days at least  Patient requires continued admission due to continued abx, iv lasix    Surrogate Decision Maker/ POA       Personally reviewed Lab Studies and Imaging     Drugs that require monitoring for toxicity include lasix iv and the method of monitoring was Renal functions   X ray on admission       Subjective:     Chief Complaint: sob, chills    Blanca Massey is a 63 y.o. male with h/o afib,hypertension, hyperlipidemia, obstructive visualized. LUNGS: There is bibasilar airspace disease particularly prominent in the right middle lobe. Perihilar interstitial and groundglass changes are visualized. BONES / SOFT TISSUES: No significant abnormality. OTHER: None. Groundglass and interstitial opacities particularly in the perihilar regions with more dense consolidation in the lower lungs and right middle lobe. Electronically signed by Srinath Leyva MD      CBC:   Recent Labs     08/07/23  0430 08/08/23  0425 08/09/23  0504   WBC 17.4* 13.0* 9.2   HGB 13.0* 12.8* 12.8*    163 166     BMP:    Recent Labs     08/07/23  0430 08/08/23  0426 08/09/23  0504   * 135* 140   K 3.9 3.9 4.0    99 103   CO2 24 26 27   BUN 28* 36* 32*   CREATININE 1.2 1.3 1.3   GLUCOSE 129* 105* 115*     Hepatic: No results for input(s): AST, ALT, ALB, BILITOT, ALKPHOS in the last 72 hours. Lipids:   Lab Results   Component Value Date/Time    CHOL 141 06/25/2022 01:42 PM    HDL 34 06/25/2022 01:42 PM    TRIG 127 06/25/2022 01:42 PM     Hemoglobin A1C:   Lab Results   Component Value Date/Time    LABA1C 5.7 06/23/2022 12:31 PM     TSH:   Lab Results   Component Value Date/Time    TSH 2.98 02/24/2018 09:16 AM     Troponin: No results found for: TROPONINT  Lactic Acid:   No results for input(s): LACTA in the last 72 hours.     BNP:   Recent Labs     08/08/23  0730   PROBNP 1,398*     UA:  Lab Results   Component Value Date/Time    NITRU Negative 08/06/2023 05:50 AM    COLORU Yellow 08/06/2023 05:50 AM    PHUR 7.0 08/06/2023 05:50 AM    WBCUA 3-5 08/06/2023 05:50 AM    RBCUA 0-2 08/06/2023 05:50 AM    MUCUS 2+ 08/22/2013 08:08 AM    BACTERIA Rare 10/26/2020 12:02 AM    CLARITYU Clear 08/06/2023 05:50 AM    SPECGRAV 1.020 08/06/2023 05:50 AM    LEUKOCYTESUR Negative 08/06/2023 05:50 AM    UROBILINOGEN 1.0 08/06/2023 05:50 AM    BILIRUBINUR Negative 08/06/2023 05:50 AM    BILIRUBINUR NEGATIVE 06/17/2011 11:04 AM    BLOODU Negative 08/06/2023 05:50 AM

## 2023-08-09 NOTE — CARE COORDINATION
CM following. Pt getting Cardiac Cath today. Possib;e d/c 8/10/23. No CM needs noted.      Dori Weber RN, BSN, [employer]  Case Management Department  [phone]

## 2023-08-10 LAB
ANION GAP SERPL CALCULATED.3IONS-SCNC: 11 MMOL/L (ref 3–16)
BUN SERPL-MCNC: 29 MG/DL (ref 7–20)
CALCIUM SERPL-MCNC: 9.1 MG/DL (ref 8.3–10.6)
CHLORIDE SERPL-SCNC: 101 MMOL/L (ref 99–110)
CO2 SERPL-SCNC: 26 MMOL/L (ref 21–32)
CREAT SERPL-MCNC: 1.4 MG/DL (ref 0.9–1.3)
DEPRECATED RDW RBC AUTO: 16.5 % (ref 12.4–15.4)
EKG ATRIAL RATE: 57 BPM
EKG DIAGNOSIS: NORMAL
EKG P AXIS: 17 DEGREES
EKG P-R INTERVAL: 164 MS
EKG Q-T INTERVAL: 480 MS
EKG QRS DURATION: 108 MS
EKG QTC CALCULATION (BAZETT): 467 MS
EKG R AXIS: -75 DEGREES
EKG T AXIS: 102 DEGREES
EKG VENTRICULAR RATE: 57 BPM
GFR SERPLBLD CREATININE-BSD FMLA CKD-EPI: 59 ML/MIN/{1.73_M2}
GLUCOSE SERPL-MCNC: 110 MG/DL (ref 70–99)
HCT VFR BLD AUTO: 39.7 % (ref 40.5–52.5)
HGB BLD-MCNC: 13.2 G/DL (ref 13.5–17.5)
MCH RBC QN AUTO: 28.5 PG (ref 26–34)
MCHC RBC AUTO-ENTMCNC: 33.3 G/DL (ref 31–36)
MCV RBC AUTO: 85.6 FL (ref 80–100)
PLATELET # BLD AUTO: 196 K/UL (ref 135–450)
PMV BLD AUTO: 9.7 FL (ref 5–10.5)
POTASSIUM SERPL-SCNC: 3.6 MMOL/L (ref 3.5–5.1)
RBC # BLD AUTO: 4.64 M/UL (ref 4.2–5.9)
SODIUM SERPL-SCNC: 138 MMOL/L (ref 136–145)
WBC # BLD AUTO: 10 K/UL (ref 4–11)

## 2023-08-10 PROCEDURE — 2580000003 HC RX 258: Performed by: INTERNAL MEDICINE

## 2023-08-10 PROCEDURE — 99232 SBSQ HOSP IP/OBS MODERATE 35: CPT | Performed by: INTERNAL MEDICINE

## 2023-08-10 PROCEDURE — 6370000000 HC RX 637 (ALT 250 FOR IP): Performed by: INTERNAL MEDICINE

## 2023-08-10 PROCEDURE — 94660 CPAP INITIATION&MGMT: CPT

## 2023-08-10 PROCEDURE — 85027 COMPLETE CBC AUTOMATED: CPT

## 2023-08-10 PROCEDURE — 80048 BASIC METABOLIC PNL TOTAL CA: CPT

## 2023-08-10 PROCEDURE — 6360000002 HC RX W HCPCS: Performed by: INTERNAL MEDICINE

## 2023-08-10 PROCEDURE — 93010 ELECTROCARDIOGRAM REPORT: CPT | Performed by: INTERNAL MEDICINE

## 2023-08-10 PROCEDURE — 6370000000 HC RX 637 (ALT 250 FOR IP): Performed by: HOSPITALIST

## 2023-08-10 PROCEDURE — 2060000000 HC ICU INTERMEDIATE R&B

## 2023-08-10 PROCEDURE — 36415 COLL VENOUS BLD VENIPUNCTURE: CPT

## 2023-08-10 PROCEDURE — 6360000002 HC RX W HCPCS: Performed by: STUDENT IN AN ORGANIZED HEALTH CARE EDUCATION/TRAINING PROGRAM

## 2023-08-10 RX ADMIN — HYDRALAZINE HYDROCHLORIDE 50 MG: 50 TABLET, FILM COATED ORAL at 05:35

## 2023-08-10 RX ADMIN — VALSARTAN 160 MG: 160 TABLET, FILM COATED ORAL at 10:07

## 2023-08-10 RX ADMIN — FUROSEMIDE 40 MG: 10 INJECTION, SOLUTION INTRAMUSCULAR; INTRAVENOUS at 17:46

## 2023-08-10 RX ADMIN — CARVEDILOL 25 MG: 25 TABLET, FILM COATED ORAL at 17:45

## 2023-08-10 RX ADMIN — HYDRALAZINE HYDROCHLORIDE 50 MG: 50 TABLET, FILM COATED ORAL at 14:12

## 2023-08-10 RX ADMIN — FLECAINIDE ACETATE 100 MG: 100 TABLET ORAL at 20:24

## 2023-08-10 RX ADMIN — ATORVASTATIN CALCIUM 40 MG: 40 TABLET, FILM COATED ORAL at 20:24

## 2023-08-10 RX ADMIN — SODIUM CHLORIDE 20 ML: 9 INJECTION, SOLUTION INTRAVENOUS at 10:20

## 2023-08-10 RX ADMIN — FLECAINIDE ACETATE 100 MG: 100 TABLET ORAL at 10:05

## 2023-08-10 RX ADMIN — CEFAZOLIN 2000 MG: 2 INJECTION, POWDER, FOR SOLUTION INTRAMUSCULAR; INTRAVENOUS at 17:50

## 2023-08-10 RX ADMIN — APIXABAN 5 MG: 5 TABLET, FILM COATED ORAL at 10:06

## 2023-08-10 RX ADMIN — SODIUM CHLORIDE, PRESERVATIVE FREE 10 ML: 5 INJECTION INTRAVENOUS at 10:09

## 2023-08-10 RX ADMIN — SODIUM CHLORIDE, PRESERVATIVE FREE 10 ML: 5 INJECTION INTRAVENOUS at 20:24

## 2023-08-10 RX ADMIN — CEFAZOLIN 2000 MG: 2 INJECTION, POWDER, FOR SOLUTION INTRAMUSCULAR; INTRAVENOUS at 01:32

## 2023-08-10 RX ADMIN — SODIUM CHLORIDE 25 ML: 9 INJECTION, SOLUTION INTRAVENOUS at 01:23

## 2023-08-10 RX ADMIN — FUROSEMIDE 40 MG: 10 INJECTION, SOLUTION INTRAMUSCULAR; INTRAVENOUS at 10:07

## 2023-08-10 RX ADMIN — NIFEDIPINE 90 MG: 90 TABLET, FILM COATED, EXTENDED RELEASE ORAL at 10:06

## 2023-08-10 RX ADMIN — CARVEDILOL 25 MG: 25 TABLET, FILM COATED ORAL at 10:07

## 2023-08-10 RX ADMIN — HYDRALAZINE HYDROCHLORIDE 50 MG: 50 TABLET, FILM COATED ORAL at 20:24

## 2023-08-10 RX ADMIN — APIXABAN 5 MG: 5 TABLET, FILM COATED ORAL at 20:24

## 2023-08-10 RX ADMIN — ASPIRIN 81 MG: 81 TABLET, CHEWABLE ORAL at 10:06

## 2023-08-10 RX ADMIN — CEFAZOLIN 2000 MG: 2 INJECTION, POWDER, FOR SOLUTION INTRAMUSCULAR; INTRAVENOUS at 10:21

## 2023-08-10 RX ADMIN — PRASUGREL 10 MG: 10 TABLET, FILM COATED ORAL at 10:25

## 2023-08-10 ASSESSMENT — PAIN SCALES - GENERAL
PAINLEVEL_OUTOF10: 0
PAINLEVEL_OUTOF10: 0

## 2023-08-10 NOTE — CARE COORDINATION
Case Management Assessment           Daily Note                 Date/ Time of Note: 8/10/2023 2:17 PM         Note completed by: Galdino Mcmahan RN    Patient Name: Philip Fischer  YOB: 1965    Diagnosis:Hypoxia [R09.02]  Hypertensive urgency [I16.0]  HCAP (healthcare-associated pneumonia) [J18.9]  Pneumonia of both lower lobes due to infectious organism [J18.9]  Patient Admission Status: Inpatient  Date of Admission:8/6/2023  4:53 AM    Length of Stay: 4 GLOS: GMLOS: 5 Readmission Risk Score: Readmission Risk Score: 12.4    Current Plan of Care: diuresis, IV abx, s/p heart cath  ________________________________________________________________________________________  PT AM-PAC:   / 24 per last evaluation on: n/a    OT AM-PAC:   / 24 per last evaluation on: n/a    DME Needs for discharge:   DME Ordered: No DME Delivered: No  DME Company:   ________________________________________________________________________________________  Discharge Plan: Home  Pre-cert required for SNF: YES  COVID Result:    Lab Results   Component Value Date/Time    COVID19 NOT DETECTED 08/06/2023 05:29 AM    COVID19 NOT DETECTED 01/28/2021 08:49 PM       Transportation PLAN for discharge: family    Tentative discharge date: 08/11/23    Potential assistance Purchasing Medications:    Does Patient want to participate in local refill/ meds to beds program?: Yes    Current barriers to discharge: Medication managment    Referrals completed: Not Applicable    Resources/ information provided: Not indicated at this time  ________________________________________________________________________________________  Case Management Notes:  continues to follow for DC planning and needs. Patient s/p cath with stent placement. Patient continues on IV abx and IV diuresis per orders. Patient plans for return home at DC, no current needs from .      Leslee Truong and his family were provided with choice of provider; he and his family are

## 2023-08-10 NOTE — PLAN OF CARE
Problem: Discharge Planning  Goal: Discharge to home or other facility with appropriate resources  Flowsheets (Taken 8/10/2023 1659)  Discharge to home or other facility with appropriate resources:   Identify barriers to discharge with patient and caregiver   Arrange for needed discharge resources and transportation as appropriate   Identify discharge learning needs (meds, wound care, etc)   Refer to discharge planning if patient needs post-hospital services based on physician order or complex needs related to functional status, cognitive ability or social support system     Problem: Safety - Adult  Goal: Free from fall injury  Flowsheets (Taken 8/10/2023 1659)  Free From Fall Injury: Instruct family/caregiver on patient safety  Note: Bed in lowest, gripper socks on, call light within reach.       Problem: Cardiovascular - Adult  Goal: Maintains optimal cardiac output and hemodynamic stability  Flowsheets (Taken 8/10/2023 1659)  Maintains optimal cardiac output and hemodynamic stability:   Monitor blood pressure and heart rate   Monitor urine output and notify Licensed Independent Practitioner for values outside of normal range   Assess for signs of decreased cardiac output   Administer fluid and/or volume expanders as ordered   Administer vasoactive medications as ordered     Problem: Respiratory - Adult  Goal: Achieves optimal ventilation and oxygenation  Flowsheets (Taken 8/10/2023 1659)  Achieves optimal ventilation and oxygenation:   Assess for changes in respiratory status   Assess for changes in mentation and behavior   Position to facilitate oxygenation and minimize respiratory effort     Problem: Metabolic/Fluid and Electrolytes - Adult  Goal: Electrolytes maintained within normal limits  Flowsheets (Taken 8/10/2023 1659)  Electrolytes maintained within normal limits: Monitor labs and assess patient for signs and symptoms of electrolyte imbalances  Goal: Hemodynamic stability and optimal renal function

## 2023-08-10 NOTE — PROGRESS NOTES
V2.0    [facility] Progress Note      Name:  Dexter Lai /Age/Sex: 1965  (63 y.o. male)   MRN & CSN:  [id number] & [id number] Encounter Date/Time: 8/10/2023 3:52 PM EDT   Location:  [facility]-01 PCP: JARET Tejada - CNP     Attending:Tory Rivas MD       Hospital Day: 5    Assessment and Recommendations   Dexter Lai is a 63 y.o. male with pmh of afib,hypertension, hyperlipidemia, obstructive sleep apnea on CPAP, chronic kidney disease who presents with Sepsis and Heart failure along with streptococcal bacteremia    Plan:  CAD s/p cath with LAD stenting   Cath on    Impression:  Significant CAD of the LAD that was treated with balloon angioplasty stenting. LV size and function appears to be normal.  LV EDP is elevated and the patient is still volume loaded and needs some additional diuresis. Sepsis POA fever tachycardia tachypnea  Gram-positive streptococcal bacteremia with chills on presentation  Source can be skin  Beta hemolytic Strep in blood, changed to Ancef, improving       Afib on eliquis and flecainide with elevated troponin   D/w cardiology, ischemic w/u today    Acute hypoxic respiratory failure  Secondary to conditions listed above heart failure  ? ? Flash pulm edema  Response with IV diuretics, continue with, dose increased, cardiac consult  Recent echo showing diastolic dysfunction normal EF  Continue iv diuresis for now   Transition to PO diuretics once ok with cardiology    Morbid obesity with a BMI of 47.93    Recent admission for aspiration  MARY on CPAP        Diet ADULT DIET;  Regular   DVT Prophylaxis [] Lovenox, []  Heparin, [] SCDs, [] Ambulation,  [x] Eliquis, [] Xarelto  [] Coumadin   Code Status Full Code   Disposition From: Home  Expected Disposition: Home  Estimated Date of Discharge: 1-2 days at least  Patient requires continued admission due to continued abx, iv lasix    Surrogate Decision Maker/ POA       Personally reviewed Lab Studies

## 2023-08-10 NOTE — PLAN OF CARE
Problem: Discharge Planning  Goal: Discharge to home or other facility with appropriate resources  8/9/2023 2013 by Rei Munroe RN  Outcome: Progressing  8/9/2023 1900 by Abimael Liu RN  Outcome: Progressing  Flowsheets (Taken 8/9/2023 1900)  Discharge to home or other facility with appropriate resources:   Identify barriers to discharge with patient and caregiver   Arrange for needed discharge resources and transportation as appropriate   Identify discharge learning needs (meds, wound care, etc)     Problem: Safety - Adult  Goal: Free from fall injury  8/9/2023 2013 by Rei Munroe RN  Outcome: Progressing  8/9/2023 1900 by Abimael Liu RN  Outcome: Progressing  Flowsheets (Taken 8/9/2023 1900)  Free From Fall Injury: Instruct family/caregiver on patient safety     Problem: Cardiovascular - Adult  Goal: Maintains optimal cardiac output and hemodynamic stability  8/9/2023 2013 by Rei Munroe RN  Outcome: Progressing  8/9/2023 1900 by Abimael Liu RN  Outcome: Progressing  Flowsheets (Taken 8/9/2023 1900)  Maintains optimal cardiac output and hemodynamic stability:   Monitor blood pressure and heart rate   Monitor urine output and notify Licensed Independent Practitioner for values outside of normal range   Administer fluid and/or volume expanders as ordered   Administer vasoactive medications as ordered   Assess for signs of decreased cardiac output  Goal: Absence of cardiac dysrhythmias or at baseline  8/9/2023 2013 by Rei Munroe RN  Outcome: Progressing  8/9/2023 1900 by Abimael Liu RN  Outcome: Progressing  Flowsheets (Taken 8/9/2023 1900)  Absence of cardiac dysrhythmias or at baseline:   Monitor cardiac rate and rhythm   Assess for signs of decreased cardiac output   Administer antiarrhythmia medication and electrolyte replacement as ordered     Problem: Respiratory - Adult  Goal: Achieves optimal ventilation and oxygenation  8/9/2023 2013 by Rei Munroe RN  Outcome: Progressing  8/9/2023 1900 by Hector Alejandro RN  Outcome: Progressing  Flowsheets (Taken 8/9/2023 1900)  Achieves optimal ventilation and oxygenation:   Assess for changes in respiratory status   Assess for changes in mentation and behavior   Position to facilitate oxygenation and minimize respiratory effort   Oxygen supplementation based on oxygen saturation or arterial blood gases   Encourage broncho-pulmonary hygiene including cough, deep breathe, incentive spirometry   Assess the need for suctioning and aspirate as needed   Assess and instruct to report shortness of breath or any respiratory difficulty   Respiratory therapy support as indicated     Problem: Metabolic/Fluid and Electrolytes - Adult  Goal: Electrolytes maintained within normal limits  8/9/2023 2013 by Candis Saleh RN  Outcome: Progressing  8/9/2023 1900 by Hector Alejandro RN  Outcome: Progressing  Flowsheets (Taken 8/9/2023 1900)  Electrolytes maintained within normal limits:   Monitor labs and assess patient for signs and symptoms of electrolyte imbalances   Administer electrolyte replacement as ordered   Monitor response to electrolyte replacements, including repeat lab results as appropriate   Fluid restriction as ordered   Instruct patient on fluid and nutrition restrictions as appropriate  Goal: Hemodynamic stability and optimal renal function maintained  Outcome: Progressing

## 2023-08-11 VITALS
RESPIRATION RATE: 18 BRPM | HEIGHT: 68 IN | HEART RATE: 61 BPM | OXYGEN SATURATION: 97 % | TEMPERATURE: 97.3 F | DIASTOLIC BLOOD PRESSURE: 65 MMHG | WEIGHT: 301.15 LBS | SYSTOLIC BLOOD PRESSURE: 112 MMHG | BODY MASS INDEX: 45.64 KG/M2

## 2023-08-11 LAB
ANION GAP SERPL CALCULATED.3IONS-SCNC: 16 MMOL/L (ref 3–16)
BUN SERPL-MCNC: 29 MG/DL (ref 7–20)
CALCIUM SERPL-MCNC: 9 MG/DL (ref 8.3–10.6)
CHLORIDE SERPL-SCNC: 99 MMOL/L (ref 99–110)
CO2 SERPL-SCNC: 22 MMOL/L (ref 21–32)
CREAT SERPL-MCNC: 1.4 MG/DL (ref 0.9–1.3)
GFR SERPLBLD CREATININE-BSD FMLA CKD-EPI: 59 ML/MIN/{1.73_M2}
GLUCOSE SERPL-MCNC: 110 MG/DL (ref 70–99)
POTASSIUM SERPL-SCNC: 3.7 MMOL/L (ref 3.5–5.1)
SODIUM SERPL-SCNC: 137 MMOL/L (ref 136–145)

## 2023-08-11 PROCEDURE — 6360000002 HC RX W HCPCS: Performed by: INTERNAL MEDICINE

## 2023-08-11 PROCEDURE — 80048 BASIC METABOLIC PNL TOTAL CA: CPT

## 2023-08-11 PROCEDURE — 36415 COLL VENOUS BLD VENIPUNCTURE: CPT

## 2023-08-11 PROCEDURE — 6370000000 HC RX 637 (ALT 250 FOR IP): Performed by: INTERNAL MEDICINE

## 2023-08-11 PROCEDURE — 99232 SBSQ HOSP IP/OBS MODERATE 35: CPT | Performed by: INTERNAL MEDICINE

## 2023-08-11 PROCEDURE — 2580000003 HC RX 258: Performed by: INTERNAL MEDICINE

## 2023-08-11 PROCEDURE — 94660 CPAP INITIATION&MGMT: CPT

## 2023-08-11 PROCEDURE — 6360000002 HC RX W HCPCS: Performed by: STUDENT IN AN ORGANIZED HEALTH CARE EDUCATION/TRAINING PROGRAM

## 2023-08-11 PROCEDURE — 6370000000 HC RX 637 (ALT 250 FOR IP): Performed by: HOSPITALIST

## 2023-08-11 PROCEDURE — 6370000000 HC RX 637 (ALT 250 FOR IP): Performed by: NURSE PRACTITIONER

## 2023-08-11 RX ORDER — ATORVASTATIN CALCIUM 40 MG/1
40 TABLET, FILM COATED ORAL NIGHTLY
Qty: 30 TABLET | Refills: 3 | Status: SHIPPED | OUTPATIENT
Start: 2023-08-11

## 2023-08-11 RX ORDER — CLOPIDOGREL BISULFATE 75 MG/1
75 TABLET ORAL DAILY
Qty: 30 TABLET | Refills: 3 | Status: SHIPPED | OUTPATIENT
Start: 2023-08-11

## 2023-08-11 RX ORDER — ASPIRIN 81 MG/1
81 TABLET, CHEWABLE ORAL DAILY
Qty: 30 TABLET | Refills: 0 | Status: SHIPPED | OUTPATIENT
Start: 2023-08-12 | End: 2023-08-18

## 2023-08-11 RX ORDER — FUROSEMIDE 40 MG/1
40 TABLET ORAL DAILY
Qty: 30 TABLET | Refills: 0 | Status: SHIPPED | OUTPATIENT
Start: 2023-08-11 | End: 2023-09-10

## 2023-08-11 RX ORDER — PRASUGREL 10 MG/1
10 TABLET, FILM COATED ORAL DAILY
Qty: 30 TABLET | Refills: 3 | Status: SHIPPED | OUTPATIENT
Start: 2023-08-11 | End: 2023-08-11 | Stop reason: HOSPADM

## 2023-08-11 RX ORDER — CEPHALEXIN 500 MG/1
500 CAPSULE ORAL 4 TIMES DAILY
Qty: 16 CAPSULE | Refills: 0 | Status: SHIPPED | OUTPATIENT
Start: 2023-08-11 | End: 2023-08-15

## 2023-08-11 RX ORDER — CLOPIDOGREL BISULFATE 75 MG/1
75 TABLET ORAL DAILY
Status: DISCONTINUED | OUTPATIENT
Start: 2023-08-11 | End: 2023-08-11 | Stop reason: HOSPADM

## 2023-08-11 RX ADMIN — CEFAZOLIN 2000 MG: 2 INJECTION, POWDER, FOR SOLUTION INTRAMUSCULAR; INTRAVENOUS at 01:09

## 2023-08-11 RX ADMIN — NIFEDIPINE 90 MG: 90 TABLET, FILM COATED, EXTENDED RELEASE ORAL at 08:43

## 2023-08-11 RX ADMIN — ASPIRIN 81 MG: 81 TABLET, CHEWABLE ORAL at 08:43

## 2023-08-11 RX ADMIN — SODIUM CHLORIDE, PRESERVATIVE FREE 10 ML: 5 INJECTION INTRAVENOUS at 08:49

## 2023-08-11 RX ADMIN — CEFAZOLIN 2000 MG: 2 INJECTION, POWDER, FOR SOLUTION INTRAMUSCULAR; INTRAVENOUS at 08:52

## 2023-08-11 RX ADMIN — HYDRALAZINE HYDROCHLORIDE 50 MG: 50 TABLET, FILM COATED ORAL at 06:01

## 2023-08-11 RX ADMIN — CARVEDILOL 25 MG: 25 TABLET, FILM COATED ORAL at 08:43

## 2023-08-11 RX ADMIN — FUROSEMIDE 40 MG: 10 INJECTION, SOLUTION INTRAMUSCULAR; INTRAVENOUS at 08:46

## 2023-08-11 RX ADMIN — PRASUGREL 10 MG: 10 TABLET, FILM COATED ORAL at 08:45

## 2023-08-11 RX ADMIN — CLOPIDOGREL BISULFATE 75 MG: 75 TABLET ORAL at 11:00

## 2023-08-11 RX ADMIN — FLECAINIDE ACETATE 100 MG: 100 TABLET ORAL at 08:43

## 2023-08-11 RX ADMIN — VALSARTAN 160 MG: 160 TABLET, FILM COATED ORAL at 08:44

## 2023-08-11 RX ADMIN — SODIUM CHLORIDE 20 ML: 9 INJECTION, SOLUTION INTRAVENOUS at 08:52

## 2023-08-11 RX ADMIN — APIXABAN 5 MG: 5 TABLET, FILM COATED ORAL at 08:43

## 2023-08-11 NOTE — CARE COORDINATION
Case Management Assessment            Discharge Note                    Date / Time of Note: 8/11/2023 9:24 AM                  Discharge Note Completed by: Kristi Magallanes RN    Patient Name: Leticia Boone   YOB: 1965  Diagnosis: Hypoxia [R09.02]  Hypertensive urgency [I16.0]  HCAP (healthcare-associated pneumonia) [J18.9]  Pneumonia of both lower lobes due to infectious organism [J18.9]   Date / Time: 8/6/2023  4:53 AM    Current PCP: JARET High - CNP  Clinic patient: No    Hospitalization in the last 30 days: Yes  Readmission Assessment  Number of Days since last admission?: 8-30 days  Previous Disposition: Home with Family  Who is being Lugenia Noss: Patient  What was the patient's/caregiver's perception as to why they think they needed to return back to the hospital?:  (chills, fever, sob)  Did you visit your Primary Care Physician after you left the hospital, before you returned this time?: No  Why weren't you able to visit your PCP?: Did not have an appointment  Did you see a specialist, such as Cardiac, Pulmonary, Orthopedic Physician, etc. after you left the hospital?: No  Who advised the patient to return to the hospital?: Self-referral  Does the patient report anything that got in the way of taking their medications?: No  In our efforts to provide the best possible care to you and others like you, can you think of anything that we could have done to help you after you left the hospital the first time, so that you might not have needed to return so soon?: Other (Comment) (n/a)    Advance Directives:  Code Status: Full Code  [place] DNR form completed and on chart: No, Not Indicated    Financial:  Payor: Obdulia Skaggs / Plan: [address] [place] [address],[address] / Product Type: *No Product type* /      Pharmacy:    Kael Fernández [address] [facility], [address] [facility] [phone] [address] [facility]  [address] [facility] [address]  Phone:

## 2023-08-11 NOTE — FLOWSHEET NOTE
Pt assessment completed. Lungs clear on RA. Pt ambulated per self with steady gait to br. POC discussed with pt for night to include urinal use and measurement. All questions answered.

## 2023-08-11 NOTE — PLAN OF CARE
Problem: Discharge Planning  Goal: Discharge to home or other facility with appropriate resources  8/10/2023 2315 by Aron Torrez RN  Outcome: Progressing  Flowsheets (Taken 8/10/2023 2315)  Discharge to home or other facility with appropriate resources: Identify discharge learning needs (meds, wound care, etc)     Problem: Cardiovascular - Adult  Goal: Maintains optimal cardiac output and hemodynamic stability  8/10/2023 2315 by Aron Torrez RN  Outcome: Progressing  Flowsheets (Taken 8/10/2023 2315)  Maintains optimal cardiac output and hemodynamic stability: Monitor blood pressure and heart rate     Problem: Cardiovascular - Adult  Goal: Absence of cardiac dysrhythmias or at baseline  Outcome: Progressing  Flowsheets (Taken 8/10/2023 2315)  Absence of cardiac dysrhythmias or at baseline: Monitor cardiac rate and rhythm     Problem: Respiratory - Adult  Goal: Achieves optimal ventilation and oxygenation  8/10/2023 2315 by Aron Torrez RN  Outcome: Progressing  Flowsheets (Taken 8/10/2023 2315)  Achieves optimal ventilation and oxygenation: Assess for changes in respiratory status     Problem: Metabolic/Fluid and Electrolytes - Adult  Goal: Electrolytes maintained within normal limits  8/10/2023 2315 by Aron Torrez RN  Outcome: Progressing  Flowsheets (Taken 8/10/2023 2315)  Electrolytes maintained within normal limits: Monitor labs and assess patient for signs and symptoms of electrolyte imbalances     Problem: Metabolic/Fluid and Electrolytes - Adult  Goal: Hemodynamic stability and optimal renal function maintained  8/10/2023 2315 by Aron Torrez RN  Outcome: Progressing  Flowsheets (Taken 8/10/2023 2315)  Hemodynamic stability and optimal renal function maintained: Monitor intake, output and patient weight

## 2023-08-11 NOTE — PLAN OF CARE
Problem: Discharge Planning  Goal: Discharge to home or other facility with appropriate resources  8/11/2023 1052 by Ashley Estrada RN  Outcome: Adequate for Discharge  8/10/2023 2315 by Sundar Witt RN  Outcome: Progressing  Flowsheets (Taken 8/10/2023 2315)  Discharge to home or other facility with appropriate resources: Identify discharge learning needs (meds, wound care, etc)     Problem: Safety - Adult  Goal: Free from fall injury  Outcome: Adequate for Discharge     Problem: Cardiovascular - Adult  Goal: Maintains optimal cardiac output and hemodynamic stability  8/11/2023 1052 by Ashley Estrada RN  Outcome: Adequate for Discharge  8/10/2023 2315 by Sundar Witt RN  Outcome: Progressing  Flowsheets (Taken 8/10/2023 2315)  Maintains optimal cardiac output and hemodynamic stability: Monitor blood pressure and heart rate  Goal: Absence of cardiac dysrhythmias or at baseline  8/11/2023 1052 by Ashley Estrada RN  Outcome: Adequate for Discharge  8/10/2023 2315 by Sundar Witt RN  Outcome: Progressing  Flowsheets (Taken 8/10/2023 2315)  Absence of cardiac dysrhythmias or at baseline: Monitor cardiac rate and rhythm     Problem: Respiratory - Adult  Goal: Achieves optimal ventilation and oxygenation  8/11/2023 1052 by Ashley Estrada RN  Outcome: Adequate for Discharge  8/10/2023 2315 by Sundar Witt RN  Outcome: Progressing  Flowsheets (Taken 8/10/2023 2315)  Achieves optimal ventilation and oxygenation: Assess for changes in respiratory status     Problem: Metabolic/Fluid and Electrolytes - Adult  Goal: Electrolytes maintained within normal limits  8/11/2023 1052 by Ashley Estrada RN  Outcome: Adequate for Discharge  8/10/2023 2315 by Sundar Witt RN  Outcome: Progressing  Flowsheets (Taken 8/10/2023 2315)  Electrolytes maintained within normal limits: Monitor labs and assess patient for signs and symptoms of electrolyte imbalances  Goal:

## 2023-08-12 LAB
BACTERIA BLD CULT ORG #2: NORMAL
BACTERIA BLD CULT: NORMAL

## 2023-08-18 ENCOUNTER — OFFICE VISIT (OUTPATIENT)
Dept: CARDIOLOGY CLINIC | Age: 58
End: 2023-08-18

## 2023-08-18 VITALS
BODY MASS INDEX: 45.01 KG/M2 | WEIGHT: 297 LBS | HEIGHT: 68 IN | HEART RATE: 70 BPM | SYSTOLIC BLOOD PRESSURE: 140 MMHG | DIASTOLIC BLOOD PRESSURE: 70 MMHG

## 2023-08-18 DIAGNOSIS — Z95.5 H/O HEART ARTERY STENT: Primary | ICD-10-CM

## 2023-08-18 NOTE — PROGRESS NOTES
[facility]   Cardiology  Note   Pt of Dr Rosa Vera MD, Acosta Kelley RN, FNP APRN CVNP  Date: 8/18/2023  Admit Date: (Not on file)       Recently in hosp with acute dCHF  / elevated trop / cp  / [address] [place] [address] 8/9/2023 PTCA with stent to the proximal LAD. The left main coronary artery is normal.   Left anterior descending artery proximal stenosis up to 80 to 85%. Circumflex artery is as some stenosis in the obtuse marginal branch 2 and 3 above to 40% that did not require intervention. The right coronary artery is a dominant vessel and minor plaque. Left ventriculogram shows ejection fraction of 55%. Normal wall motion. Radial artery access no complications   VSS down 31# and doing very well   Eating better and more active    HPI: Malu Aguilar is a 63 y.o. male with a past medical history of CAD  2022 [facility] demonstrated 40-50% LAD disease  HTN HLD MARY CRD afib morbid obesity admitted with fever, increased SOB today, stated Friday night he was outside and he had mosquito bite on his right lower extremity with increased erythema and warmth around bite. No c/o n/v diarrhea or cp at this time     TTE 8/12023  Normal left ventricle size, concentric hypertrophy, and normal systolic function with an estimated ejection fraction of 50-55%. Cannot exclude apical aneurysm. Indeterminate diastolic function. The left atrium is mildly dilated. The aortic root is dilated at approximately 4.4cm. The ascending aorta measures approximately 3.5cm. Patient seen and examined. Clinical notes reviewed. Telemetry reviewed / Pertinent labs, diagnostic, device, and imaging results reviewed as a part of this visit  I spent a total of 50 minutes and greater than 50% of the time was spent counseling with patient  coordinating care regarding her diagnosis, treatments and plan of care. EKG TTE stress test: any [facility] reviewed         Vitals:    08/18/23 1118   BP: (!) 140/70   Pulse:        In: 300

## 2023-09-27 ENCOUNTER — PATIENT MESSAGE (OUTPATIENT)
Dept: FAMILY MEDICINE CLINIC | Age: 58
End: 2023-09-27

## 2023-09-28 RX ORDER — FLECAINIDE ACETATE 100 MG/1
100 TABLET ORAL EVERY 12 HOURS SCHEDULED
Qty: 180 TABLET | Refills: 3 | OUTPATIENT
Start: 2023-09-28

## 2023-09-28 RX ORDER — NIFEDIPINE 90 MG/1
90 TABLET, EXTENDED RELEASE ORAL DAILY
Qty: 90 TABLET | Refills: 3 | Status: SHIPPED | OUTPATIENT
Start: 2023-09-28

## 2023-09-28 RX ORDER — HYDRALAZINE HYDROCHLORIDE 100 MG/1
100 TABLET, FILM COATED ORAL 2 TIMES DAILY
Qty: 180 TABLET | Refills: 3 | Status: ON HOLD | OUTPATIENT
Start: 2023-09-28 | End: 2023-10-07 | Stop reason: HOSPADM

## 2023-09-28 RX ORDER — VALSARTAN 160 MG/1
160 TABLET ORAL 2 TIMES DAILY
Qty: 180 TABLET | Refills: 3 | Status: ON HOLD | OUTPATIENT
Start: 2023-09-28 | End: 2023-10-07 | Stop reason: SDUPTHER

## 2023-09-28 NOTE — TELEPHONE ENCOUNTER
Please call patient and tell him I cannot refill flecainide because that drug is contraindicated for patients with coronary artery disease. He recently had stents placed for CAD. He should stop flecainide. Follow up with EP (prevously seen by [facility]) for alternative therapy. I will send refill for other medications.

## 2023-09-28 NOTE — TELEPHONE ENCOUNTER
Requested Prescriptions     Pending Prescriptions Disp Refills    hydrALAZINE (APRESOLINE) 100 MG tablet 180 tablet 3     Sig: Take 1 tablet by mouth in the morning and at bedtime    flecainide (TAMBOCOR) 100 MG tablet 180 tablet 3     Sig: Take 1 tablet by mouth every 12 hours    valsartan (DIOVAN) 160 MG tablet 180 tablet 3     Sig: Take 1 tablet by mouth 2 times daily    NIFEdipine (PROCARDIA XL) 90 MG extended release tablet 90 tablet 3     Sig: Take 1 tablet by mouth daily          Last Office Visit: 7/11/2022     Next Office Visit: 11.21.2023

## 2023-09-28 NOTE — TELEPHONE ENCOUNTER
Requested Prescriptions     Pending Prescriptions Disp Refills    carvedilol (COREG) 25 MG tablet 180 tablet 3     Sig: Take 1 tablet by mouth 2 times daily (with meals)    hydrALAZINE (APRESOLINE) 100 MG tablet 60 tablet 3     Sig: Take 1 tablet by mouth in the morning and at bedtime    flecainide (TAMBOCOR) 100 MG tablet 60 tablet 3     Sig: Take 1 tablet by mouth every 12 hours    apixaban (ELIQUIS) 5 MG TABS tablet 60 tablet 3     Sig: Take 1 tablet by mouth 2 times daily          Number:     Refills: 3    Last Office Visit: 08/18/2023     Next Office Visit: 11/21/2023

## 2023-09-29 RX ORDER — ATORVASTATIN CALCIUM 40 MG/1
40 TABLET, FILM COATED ORAL NIGHTLY
Qty: 30 TABLET | Refills: 3 | Status: SHIPPED | OUTPATIENT
Start: 2023-09-29

## 2023-09-29 RX ORDER — CARVEDILOL 25 MG/1
25 TABLET ORAL 2 TIMES DAILY WITH MEALS
Qty: 180 TABLET | Refills: 3 | Status: SHIPPED | OUTPATIENT
Start: 2023-09-29

## 2023-09-29 RX ORDER — CLOPIDOGREL BISULFATE 75 MG/1
75 TABLET ORAL DAILY
Qty: 30 TABLET | Refills: 3 | Status: SHIPPED | OUTPATIENT
Start: 2023-09-29

## 2023-09-29 RX ORDER — HYDRALAZINE HYDROCHLORIDE 100 MG/1
100 TABLET, FILM COATED ORAL 2 TIMES DAILY
Qty: 180 TABLET | Refills: 3 | Status: SHIPPED | OUTPATIENT
Start: 2023-09-29

## 2023-09-29 RX ORDER — FUROSEMIDE 40 MG/1
40 TABLET ORAL DAILY
Qty: 30 TABLET | Refills: 0 | Status: SHIPPED | OUTPATIENT
Start: 2023-09-29 | End: 2023-10-29

## 2023-09-29 RX ORDER — FLECAINIDE ACETATE 100 MG/1
100 TABLET ORAL EVERY 12 HOURS SCHEDULED
Qty: 180 TABLET | Refills: 3 | Status: SHIPPED | OUTPATIENT
Start: 2023-09-29

## 2023-10-05 ENCOUNTER — APPOINTMENT (OUTPATIENT)
Dept: GENERAL RADIOLOGY | Age: 58
DRG: 280 | End: 2023-10-05
Payer: COMMERCIAL

## 2023-10-05 ENCOUNTER — APPOINTMENT (OUTPATIENT)
Dept: CT IMAGING | Age: 58
DRG: 280 | End: 2023-10-05
Payer: COMMERCIAL

## 2023-10-05 ENCOUNTER — HOSPITAL ENCOUNTER (INPATIENT)
Age: 58
LOS: 2 days | Discharge: HOME | DRG: 280 | End: 2023-10-07
Attending: EMERGENCY MEDICINE | Admitting: STUDENT IN AN ORGANIZED HEALTH CARE EDUCATION/TRAINING PROGRAM
Payer: COMMERCIAL

## 2023-10-05 DIAGNOSIS — E87.6 HYPOKALEMIA: ICD-10-CM

## 2023-10-05 DIAGNOSIS — J81.0 ACUTE PULMONARY EDEMA (HCC): Primary | ICD-10-CM

## 2023-10-05 DIAGNOSIS — E83.42 HYPOMAGNESEMIA: ICD-10-CM

## 2023-10-05 DIAGNOSIS — I50.9 ACUTE ON CHRONIC CONGESTIVE HEART FAILURE, UNSPECIFIED HEART FAILURE TYPE (HCC): ICD-10-CM

## 2023-10-05 PROBLEM — E87.70 HYPERVOLEMIA: Status: ACTIVE | Noted: 2023-10-05

## 2023-10-05 LAB
ALBUMIN SERPL-MCNC: 3.9 G/DL (ref 3.4–5)
ALBUMIN SERPL-MCNC: 4 G/DL (ref 3.4–5)
ALP SERPL-CCNC: 99 U/L (ref 40–129)
ALT SERPL-CCNC: 17 U/L (ref 10–40)
ANION GAP SERPL CALCULATED.3IONS-SCNC: 12 MMOL/L (ref 3–16)
ANION GAP SERPL CALCULATED.3IONS-SCNC: 13 MMOL/L (ref 3–16)
AST SERPL-CCNC: 21 U/L (ref 15–37)
BASE EXCESS BLDV CALC-SCNC: 2.4 MMOL/L (ref -2–3)
BASOPHILS # BLD: 0.1 K/UL (ref 0–0.2)
BASOPHILS NFR BLD: 0.6 %
BILIRUB DIRECT SERPL-MCNC: 0.4 MG/DL (ref 0–0.3)
BILIRUB INDIRECT SERPL-MCNC: 1 MG/DL (ref 0–1)
BILIRUB SERPL-MCNC: 1.4 MG/DL (ref 0–1)
BUN SERPL-MCNC: 17 MG/DL (ref 7–20)
BUN SERPL-MCNC: 19 MG/DL (ref 7–20)
CALCIUM SERPL-MCNC: 8.8 MG/DL (ref 8.3–10.6)
CALCIUM SERPL-MCNC: 8.9 MG/DL (ref 8.3–10.6)
CHLORIDE SERPL-SCNC: 97 MMOL/L (ref 99–110)
CHLORIDE SERPL-SCNC: 99 MMOL/L (ref 99–110)
CO2 BLDV-SCNC: 27 MMOL/L
CO2 SERPL-SCNC: 23 MMOL/L (ref 21–32)
CO2 SERPL-SCNC: 23 MMOL/L (ref 21–32)
COHGB MFR BLDV: 2 % (ref 0–1.5)
CREAT SERPL-MCNC: 1.1 MG/DL (ref 0.9–1.3)
CREAT SERPL-MCNC: 1.3 MG/DL (ref 0.9–1.3)
DEPRECATED RDW RBC AUTO: 16.1 % (ref 12.4–15.4)
DO-HGB MFR BLDV: 13.9 %
EKG ATRIAL RATE: 84 BPM
EKG DIAGNOSIS: NORMAL
EKG P AXIS: -13 DEGREES
EKG P-R INTERVAL: 144 MS
EKG Q-T INTERVAL: 438 MS
EKG QRS DURATION: 100 MS
EKG QTC CALCULATION (BAZETT): 517 MS
EKG R AXIS: -55 DEGREES
EKG T AXIS: 110 DEGREES
EKG VENTRICULAR RATE: 84 BPM
EOSINOPHIL # BLD: 0.1 K/UL (ref 0–0.6)
EOSINOPHIL NFR BLD: 0.5 %
FLUAV RNA UPPER RESP QL NAA+PROBE: NEGATIVE
FLUBV AG NPH QL: NEGATIVE
GFR SERPLBLD CREATININE-BSD FMLA CKD-EPI: >60 ML/MIN/{1.73_M2}
GFR SERPLBLD CREATININE-BSD FMLA CKD-EPI: >60 ML/MIN/{1.73_M2}
GLUCOSE SERPL-MCNC: 147 MG/DL (ref 70–99)
GLUCOSE SERPL-MCNC: 158 MG/DL (ref 70–99)
HCO3 BLDV-SCNC: 26.1 MMOL/L (ref 24–28)
HCT VFR BLD AUTO: 41.4 % (ref 40.5–52.5)
HGB BLD-MCNC: 13.8 G/DL (ref 13.5–17.5)
LACTATE BLDV-SCNC: 1.5 MMOL/L (ref 0.4–2)
LIPASE SERPL-CCNC: 26 U/L (ref 13–60)
LYMPHOCYTES # BLD: 1 K/UL (ref 1–5.1)
LYMPHOCYTES NFR BLD: 6.2 %
MAGNESIUM SERPL-MCNC: 1.5 MG/DL (ref 1.8–2.4)
MAGNESIUM SERPL-MCNC: 2.1 MG/DL (ref 1.8–2.4)
MCH RBC QN AUTO: 28.3 PG (ref 26–34)
MCHC RBC AUTO-ENTMCNC: 33.3 G/DL (ref 31–36)
MCV RBC AUTO: 84.9 FL (ref 80–100)
METHGB MFR BLDV: 0 % (ref 0–1.5)
MONOCYTES # BLD: 0.8 K/UL (ref 0–1.3)
MONOCYTES NFR BLD: 4.9 %
NEUTROPHILS # BLD: 14.1 K/UL (ref 1.7–7.7)
NEUTROPHILS NFR BLD: 87.8 %
NT-PROBNP SERPL-MCNC: 1854 PG/ML (ref 0–124)
PCO2 BLDV: 36.7 MMHG (ref 41–51)
PH BLDV: 7.46 [PH] (ref 7.35–7.45)
PHOSPHATE SERPL-MCNC: 2.7 MG/DL (ref 2.5–4.9)
PLATELET # BLD AUTO: 169 K/UL (ref 135–450)
PMV BLD AUTO: 9.4 FL (ref 5–10.5)
PO2 BLDV: 48 MMHG (ref 25–40)
POTASSIUM SERPL-SCNC: 3.2 MMOL/L (ref 3.5–5.1)
POTASSIUM SERPL-SCNC: 3.4 MMOL/L (ref 3.5–5.1)
PROCALCITONIN SERPL IA-MCNC: 0.33 NG/ML (ref 0–0.15)
PROT SERPL-MCNC: 7.3 G/DL (ref 6.4–8.2)
RBC # BLD AUTO: 4.87 M/UL (ref 4.2–5.9)
SAO2 % BLDV: 86 %
SARS-COV-2 RDRP RESP QL NAA+PROBE: NOT DETECTED
SODIUM SERPL-SCNC: 132 MMOL/L (ref 136–145)
SODIUM SERPL-SCNC: 135 MMOL/L (ref 136–145)
TROPONIN, HIGH SENSITIVITY: 41 NG/L (ref 0–22)
TROPONIN, HIGH SENSITIVITY: 41 NG/L (ref 0–22)
TROPONIN, HIGH SENSITIVITY: 57 NG/L (ref 0–22)
WBC # BLD AUTO: 16.1 K/UL (ref 4–11)

## 2023-10-05 PROCEDURE — 93005 ELECTROCARDIOGRAM TRACING: CPT | Performed by: EMERGENCY MEDICINE

## 2023-10-05 PROCEDURE — 84443 ASSAY THYROID STIM HORMONE: CPT

## 2023-10-05 PROCEDURE — 96365 THER/PROPH/DIAG IV INF INIT: CPT

## 2023-10-05 PROCEDURE — 80076 HEPATIC FUNCTION PANEL: CPT

## 2023-10-05 PROCEDURE — 84145 PROCALCITONIN (PCT): CPT

## 2023-10-05 PROCEDURE — 87804 INFLUENZA ASSAY W/OPTIC: CPT

## 2023-10-05 PROCEDURE — 2580000003 HC RX 258

## 2023-10-05 PROCEDURE — 6370000000 HC RX 637 (ALT 250 FOR IP): Performed by: EMERGENCY MEDICINE

## 2023-10-05 PROCEDURE — 1200000000 HC SEMI PRIVATE

## 2023-10-05 PROCEDURE — 71260 CT THORAX DX C+: CPT

## 2023-10-05 PROCEDURE — 85025 COMPLETE CBC W/AUTO DIFF WBC: CPT

## 2023-10-05 PROCEDURE — 6360000002 HC RX W HCPCS

## 2023-10-05 PROCEDURE — 87635 SARS-COV-2 COVID-19 AMP PRB: CPT

## 2023-10-05 PROCEDURE — 80048 BASIC METABOLIC PNL TOTAL CA: CPT

## 2023-10-05 PROCEDURE — 73560 X-RAY EXAM OF KNEE 1 OR 2: CPT

## 2023-10-05 PROCEDURE — 36415 COLL VENOUS BLD VENIPUNCTURE: CPT

## 2023-10-05 PROCEDURE — 83605 ASSAY OF LACTIC ACID: CPT

## 2023-10-05 PROCEDURE — 85730 THROMBOPLASTIN TIME PARTIAL: CPT

## 2023-10-05 PROCEDURE — 71045 X-RAY EXAM CHEST 1 VIEW: CPT

## 2023-10-05 PROCEDURE — 83880 ASSAY OF NATRIURETIC PEPTIDE: CPT

## 2023-10-05 PROCEDURE — 6370000000 HC RX 637 (ALT 250 FOR IP)

## 2023-10-05 PROCEDURE — 83690 ASSAY OF LIPASE: CPT

## 2023-10-05 PROCEDURE — 83735 ASSAY OF MAGNESIUM: CPT

## 2023-10-05 PROCEDURE — 96375 TX/PRO/DX INJ NEW DRUG ADDON: CPT

## 2023-10-05 PROCEDURE — 84484 ASSAY OF TROPONIN QUANT: CPT

## 2023-10-05 PROCEDURE — 87040 BLOOD CULTURE FOR BACTERIA: CPT

## 2023-10-05 PROCEDURE — 6360000002 HC RX W HCPCS: Performed by: EMERGENCY MEDICINE

## 2023-10-05 PROCEDURE — 80069 RENAL FUNCTION PANEL: CPT

## 2023-10-05 PROCEDURE — 82803 BLOOD GASES ANY COMBINATION: CPT

## 2023-10-05 PROCEDURE — 6360000004 HC RX CONTRAST MEDICATION: Performed by: EMERGENCY MEDICINE

## 2023-10-05 PROCEDURE — 99285 EMERGENCY DEPT VISIT HI MDM: CPT

## 2023-10-05 RX ORDER — FUROSEMIDE 10 MG/ML
40 INJECTION INTRAMUSCULAR; INTRAVENOUS ONCE
Status: COMPLETED | OUTPATIENT
Start: 2023-10-05 | End: 2023-10-05

## 2023-10-05 RX ORDER — HYDRALAZINE HYDROCHLORIDE 50 MG/1
100 TABLET, FILM COATED ORAL 2 TIMES DAILY
Status: CANCELLED | OUTPATIENT
Start: 2023-10-05

## 2023-10-05 RX ORDER — FUROSEMIDE 10 MG/ML
40 INJECTION INTRAMUSCULAR; INTRAVENOUS 2 TIMES DAILY
Status: DISCONTINUED | OUTPATIENT
Start: 2023-10-06 | End: 2023-10-05

## 2023-10-05 RX ORDER — BENZONATATE 100 MG/1
100 CAPSULE ORAL 3 TIMES DAILY PRN
Status: DISCONTINUED | OUTPATIENT
Start: 2023-10-05 | End: 2023-10-07 | Stop reason: HOSPADM

## 2023-10-05 RX ORDER — FUROSEMIDE 40 MG/1
40 TABLET ORAL DAILY
Status: CANCELLED | OUTPATIENT
Start: 2023-10-05

## 2023-10-05 RX ORDER — FUROSEMIDE 10 MG/ML
40 INJECTION INTRAMUSCULAR; INTRAVENOUS DAILY
Status: DISCONTINUED | OUTPATIENT
Start: 2023-10-06 | End: 2023-10-07

## 2023-10-05 RX ORDER — CLOPIDOGREL BISULFATE 75 MG/1
75 TABLET ORAL DAILY
Status: CANCELLED | OUTPATIENT
Start: 2023-10-05

## 2023-10-05 RX ORDER — CARVEDILOL 25 MG/1
25 TABLET ORAL 2 TIMES DAILY WITH MEALS
Status: DISCONTINUED | OUTPATIENT
Start: 2023-10-05 | End: 2023-10-07 | Stop reason: HOSPADM

## 2023-10-05 RX ORDER — ATORVASTATIN CALCIUM 40 MG/1
40 TABLET, FILM COATED ORAL NIGHTLY
Status: DISCONTINUED | OUTPATIENT
Start: 2023-10-05 | End: 2023-10-07 | Stop reason: HOSPADM

## 2023-10-05 RX ORDER — SODIUM CHLORIDE 9 MG/ML
INJECTION, SOLUTION INTRAVENOUS PRN
Status: DISCONTINUED | OUTPATIENT
Start: 2023-10-05 | End: 2023-10-07 | Stop reason: HOSPADM

## 2023-10-05 RX ORDER — SODIUM CHLORIDE 0.9 % (FLUSH) 0.9 %
5-40 SYRINGE (ML) INJECTION PRN
Status: DISCONTINUED | OUTPATIENT
Start: 2023-10-05 | End: 2023-10-07 | Stop reason: HOSPADM

## 2023-10-05 RX ORDER — NIFEDIPINE 90 MG/1
90 TABLET, EXTENDED RELEASE ORAL DAILY
Status: CANCELLED | OUTPATIENT
Start: 2023-10-05

## 2023-10-05 RX ORDER — ACETAMINOPHEN 325 MG/1
650 TABLET ORAL EVERY 6 HOURS PRN
Status: DISCONTINUED | OUTPATIENT
Start: 2023-10-05 | End: 2023-10-07 | Stop reason: HOSPADM

## 2023-10-05 RX ORDER — FUROSEMIDE 10 MG/ML
40 INJECTION INTRAMUSCULAR; INTRAVENOUS 2 TIMES DAILY
Status: DISCONTINUED | OUTPATIENT
Start: 2023-10-05 | End: 2023-10-05

## 2023-10-05 RX ORDER — CLOPIDOGREL BISULFATE 75 MG/1
75 TABLET ORAL DAILY
Status: DISCONTINUED | OUTPATIENT
Start: 2023-10-06 | End: 2023-10-07 | Stop reason: HOSPADM

## 2023-10-05 RX ORDER — CARVEDILOL 25 MG/1
25 TABLET ORAL 2 TIMES DAILY WITH MEALS
Status: CANCELLED | OUTPATIENT
Start: 2023-10-05

## 2023-10-05 RX ORDER — ATORVASTATIN CALCIUM 40 MG/1
40 TABLET, FILM COATED ORAL NIGHTLY
Status: CANCELLED | OUTPATIENT
Start: 2023-10-05

## 2023-10-05 RX ORDER — MAGNESIUM SULFATE IN WATER 40 MG/ML
2000 INJECTION, SOLUTION INTRAVENOUS ONCE
Status: COMPLETED | OUTPATIENT
Start: 2023-10-05 | End: 2023-10-05

## 2023-10-05 RX ORDER — ACETAMINOPHEN 650 MG/1
650 SUPPOSITORY RECTAL EVERY 6 HOURS PRN
Status: DISCONTINUED | OUTPATIENT
Start: 2023-10-05 | End: 2023-10-07 | Stop reason: HOSPADM

## 2023-10-05 RX ORDER — VALSARTAN 160 MG/1
160 TABLET ORAL 2 TIMES DAILY
Status: CANCELLED | OUTPATIENT
Start: 2023-10-06

## 2023-10-05 RX ORDER — ENOXAPARIN SODIUM 100 MG/ML
30 INJECTION SUBCUTANEOUS 2 TIMES DAILY
Status: DISCONTINUED | OUTPATIENT
Start: 2023-10-05 | End: 2023-10-05

## 2023-10-05 RX ORDER — ONDANSETRON 4 MG/1
4 TABLET, ORALLY DISINTEGRATING ORAL EVERY 8 HOURS PRN
Status: DISCONTINUED | OUTPATIENT
Start: 2023-10-05 | End: 2023-10-07 | Stop reason: HOSPADM

## 2023-10-05 RX ORDER — NIFEDIPINE 90 MG/1
90 TABLET, EXTENDED RELEASE ORAL DAILY
Status: DISCONTINUED | OUTPATIENT
Start: 2023-10-06 | End: 2023-10-07 | Stop reason: HOSPADM

## 2023-10-05 RX ORDER — POLYETHYLENE GLYCOL 3350 17 G/17G
17 POWDER, FOR SOLUTION ORAL DAILY PRN
Status: DISCONTINUED | OUTPATIENT
Start: 2023-10-05 | End: 2023-10-07 | Stop reason: HOSPADM

## 2023-10-05 RX ORDER — POTASSIUM CHLORIDE 20 MEQ/1
40 TABLET, EXTENDED RELEASE ORAL ONCE
Status: COMPLETED | OUTPATIENT
Start: 2023-10-05 | End: 2023-10-05

## 2023-10-05 RX ORDER — SODIUM CHLORIDE 0.9 % (FLUSH) 0.9 %
5-40 SYRINGE (ML) INJECTION EVERY 12 HOURS SCHEDULED
Status: DISCONTINUED | OUTPATIENT
Start: 2023-10-05 | End: 2023-10-07 | Stop reason: HOSPADM

## 2023-10-05 RX ORDER — FLECAINIDE ACETATE 100 MG/1
100 TABLET ORAL EVERY 12 HOURS SCHEDULED
Status: DISCONTINUED | OUTPATIENT
Start: 2023-10-05 | End: 2023-10-05

## 2023-10-05 RX ORDER — ONDANSETRON 2 MG/ML
4 INJECTION INTRAMUSCULAR; INTRAVENOUS EVERY 6 HOURS PRN
Status: DISCONTINUED | OUTPATIENT
Start: 2023-10-05 | End: 2023-10-07 | Stop reason: HOSPADM

## 2023-10-05 RX ADMIN — BENZONATATE 100 MG: 100 CAPSULE ORAL at 21:46

## 2023-10-05 RX ADMIN — MAGNESIUM SULFATE HEPTAHYDRATE 2000 MG: 40 INJECTION, SOLUTION INTRAVENOUS at 15:44

## 2023-10-05 RX ADMIN — APIXABAN 5 MG: 5 TABLET, FILM COATED ORAL at 21:53

## 2023-10-05 RX ADMIN — ATORVASTATIN CALCIUM 40 MG: 40 TABLET, FILM COATED ORAL at 21:53

## 2023-10-05 RX ADMIN — NITROGLYCERIN 1 INCH: 20 OINTMENT TOPICAL at 15:36

## 2023-10-05 RX ADMIN — POTASSIUM CHLORIDE 40 MEQ: 1500 TABLET, EXTENDED RELEASE ORAL at 15:41

## 2023-10-05 RX ADMIN — SODIUM CHLORIDE, PRESERVATIVE FREE 10 ML: 5 INJECTION INTRAVENOUS at 21:46

## 2023-10-05 RX ADMIN — FUROSEMIDE 40 MG: 10 INJECTION, SOLUTION INTRAMUSCULAR; INTRAVENOUS at 15:45

## 2023-10-05 RX ADMIN — CARVEDILOL 25 MG: 25 TABLET, FILM COATED ORAL at 21:46

## 2023-10-05 RX ADMIN — FUROSEMIDE 40 MG: 10 INJECTION, SOLUTION INTRAMUSCULAR; INTRAVENOUS at 21:46

## 2023-10-05 RX ADMIN — IOPAMIDOL 75 ML: 755 INJECTION, SOLUTION INTRAVENOUS at 15:12

## 2023-10-05 ASSESSMENT — ENCOUNTER SYMPTOMS
CONSTIPATION: 0
DIARRHEA: 0
STRIDOR: 0
WHEEZING: 0
CHEST TIGHTNESS: 0
NAUSEA: 0
SORE THROAT: 0
COUGH: 1
VOMITING: 0
ABDOMINAL PAIN: 0
ABDOMINAL DISTENTION: 0

## 2023-10-05 ASSESSMENT — PAIN - FUNCTIONAL ASSESSMENT: PAIN_FUNCTIONAL_ASSESSMENT: NONE - DENIES PAIN

## 2023-10-05 NOTE — H&P
Internal Medicine H&P    Date: 10/5/2023   Patient: Wiley Gonsales   Patient : 1965     CC: Syncope       Subjective     HPI:   Mr. Christo Eaton is a 63year old male with a PMH of CAD s/p one stent placed (23), HFpEF, A-fib, HTN, renal artery stenosis s/p stent 2018, and MARY who presented to the ED after a syncopal episode. Per patient and significant other, he went camping over the weekend and has had a head cold since  night with a non productive cough and congestion, he did not take a temperature while home. Denied any shortness of breath, abdominal pain N/V. Today he was at his girlfriends place and was weak he sat down on the couch and then later passed out denies any exertion, no loss of bowel movements or incontinence. [facility] was called and when he got up he fell on his knee. Of note he has not been on his cardiac medications for the past few days to week and was picked up today. As well he has two recent hospital stays in the past 3 months. Once for a-fib with RVR and once for acute hypoxic resp failure, cellulitis and bacteremia with flash pulm edema. In the ED  BP was elevated to 170's/ 80's  Labs were significant for BNP 1854, troponin 41, na 135, K 3.4 Mg, 1.5 and lactiuc acid 1.5, WBC 16.1  EKG showed left ventricular hypertrophy with mild strain  CTPA was performed showed no evidence of PE  CXR showed cardiomegaly mild pulm edema    He was given inch of nitropaste for elevated BP and that improved BP to 140's, Lasix 40 for pulmonary edema and bilateral leg edema. Mg and potassium replenished    When medicine team saw patient he stated he was already feeling improved.   Orthostatic vitals were taken and positive with  Lying /91  Sitting /76  Standing /78    PMHx:      Diagnosis Date    Carpal tunnel syndrome     Chronic kidney disease     Hyperlipidemia     Hypertension     Obesity, unspecified     MARY (obstructive sleep apnea)        PSurgHx: Procedure Laterality Date    CARDIAC CATHETERIZATION      KNEE ARTHROSCOPY Right     NOSE SURGERY      Repair from Fx        Medication:  Medications Prior to Admission: carvedilol (COREG) 25 MG tablet, Take 1 tablet by mouth 2 times daily (with meals)  apixaban (ELIQUIS) 5 MG TABS tablet, Take 1 tablet by mouth 2 times daily  atorvastatin (LIPITOR) 40 MG tablet, Take 1 tablet by mouth nightly  furosemide (LASIX) 40 MG tablet, Take 1 tablet by mouth daily  clopidogrel (PLAVIX) 75 MG tablet, Take 1 tablet by mouth daily  hydrALAZINE (APRESOLINE) 100 MG tablet, Take 1 tablet by mouth in the morning and at bedtime  flecainide (TAMBOCOR) 100 MG tablet, Take 1 tablet by mouth every 12 hours  apixaban (ELIQUIS) 5 MG TABS tablet, Take 1 tablet by mouth 2 times daily  hydrALAZINE (APRESOLINE) 100 MG tablet, Take 1 tablet by mouth in the morning and at bedtime  valsartan (DIOVAN) 160 MG tablet, Take 1 tablet by mouth 2 times daily  NIFEdipine (PROCARDIA XL) 90 MG extended release tablet, Take 1 tablet by mouth daily    Allergies:  Lisinopril    SocHx:  Social History     Socioeconomic History    Marital status: Single     Spouse name: None    Number of children: 2    Years of education: None    Highest education level: None   Occupational History    Occupation:    Tobacco Use    Smoking status: Never    Smokeless tobacco: Never   Vaping Use    Vaping Use: Never used   Substance and Sexual Activity    Alcohol use: Yes     Comment: occasionally    Drug use: No     Social Determinants of Health     Financial Resource Strain: Low Risk  (8/3/2023)    Overall Financial Resource Strain (CARDIA)     Difficulty of Paying Living Expenses: Not hard at all   Food Insecurity: No Food Insecurity (8/3/2023)    Hunger Vital Sign     Worried About Running Out of Food in the Last Year: Never true     Ran Out of Food in the Last Year: Never true   Transportation Needs: Unknown (8/3/2023)    PRAPARE - Transportation     Lack of

## 2023-10-05 NOTE — PROGRESS NOTES
Pt arrived to room 6326 via stretcher. AO x4. Pt educated on use of call light. Pt verbalized understanding. Pt VSS. Pt weight obtained. Portable telemetry placed on Pt. Bed wheels locked and in lowest position. Call light within reach.

## 2023-10-06 LAB
ANION GAP SERPL CALCULATED.3IONS-SCNC: 11 MMOL/L (ref 3–16)
APTT BLD: 32.8 SEC (ref 22.7–35.9)
APTT BLD: 35.4 SEC (ref 22.7–35.9)
BACTERIA BLD CULT ORG #2: NORMAL
BACTERIA BLD CULT: NORMAL
BASOPHILS # BLD: 0 K/UL (ref 0–0.2)
BASOPHILS NFR BLD: 0.2 %
BUN SERPL-MCNC: 22 MG/DL (ref 7–20)
CALCIUM SERPL-MCNC: 9 MG/DL (ref 8.3–10.6)
CHLORIDE SERPL-SCNC: 99 MMOL/L (ref 99–110)
CHOLEST SERPL-MCNC: 116 MG/DL (ref 0–199)
CO2 SERPL-SCNC: 25 MMOL/L (ref 21–32)
CREAT SERPL-MCNC: 1.5 MG/DL (ref 0.9–1.3)
DEPRECATED RDW RBC AUTO: 16.5 % (ref 12.4–15.4)
EKG ATRIAL RATE: 69 BPM
EKG DIAGNOSIS: NORMAL
EKG P AXIS: -14 DEGREES
EKG P-R INTERVAL: 150 MS
EKG Q-T INTERVAL: 456 MS
EKG QRS DURATION: 102 MS
EKG QTC CALCULATION (BAZETT): 488 MS
EKG R AXIS: -42 DEGREES
EKG T AXIS: 167 DEGREES
EKG VENTRICULAR RATE: 69 BPM
EOSINOPHIL # BLD: 0.1 K/UL (ref 0–0.6)
EOSINOPHIL NFR BLD: 1.2 %
GFR SERPLBLD CREATININE-BSD FMLA CKD-EPI: 54 ML/MIN/{1.73_M2}
GLUCOSE SERPL-MCNC: 114 MG/DL (ref 70–99)
HCT VFR BLD AUTO: 38.8 % (ref 40.5–52.5)
HDLC SERPL-MCNC: 28 MG/DL (ref 40–60)
HGB BLD-MCNC: 13.1 G/DL (ref 13.5–17.5)
LDLC SERPL CALC-MCNC: 69 MG/DL
LYMPHOCYTES # BLD: 1.4 K/UL (ref 1–5.1)
LYMPHOCYTES NFR BLD: 12.3 %
MAGNESIUM SERPL-MCNC: 2 MG/DL (ref 1.8–2.4)
MCH RBC QN AUTO: 29.1 PG (ref 26–34)
MCHC RBC AUTO-ENTMCNC: 33.7 G/DL (ref 31–36)
MCV RBC AUTO: 86.3 FL (ref 80–100)
MONOCYTES # BLD: 0.9 K/UL (ref 0–1.3)
MONOCYTES NFR BLD: 8.6 %
NEUTROPHILS # BLD: 8.5 K/UL (ref 1.7–7.7)
NEUTROPHILS NFR BLD: 77.7 %
PLATELET # BLD AUTO: 166 K/UL (ref 135–450)
PMV BLD AUTO: 9.3 FL (ref 5–10.5)
POTASSIUM SERPL-SCNC: 3.4 MMOL/L (ref 3.5–5.1)
RBC # BLD AUTO: 4.5 M/UL (ref 4.2–5.9)
SODIUM SERPL-SCNC: 135 MMOL/L (ref 136–145)
TRIGL SERPL-MCNC: 95 MG/DL (ref 0–150)
TROPONIN, HIGH SENSITIVITY: 71 NG/L (ref 0–22)
TSH SERPL DL<=0.005 MIU/L-ACNC: 2.48 UIU/ML (ref 0.27–4.2)
VLDLC SERPL CALC-MCNC: 19 MG/DL
WBC # BLD AUTO: 11 K/UL (ref 4–11)

## 2023-10-06 PROCEDURE — 6370000000 HC RX 637 (ALT 250 FOR IP)

## 2023-10-06 PROCEDURE — 6360000004 HC RX CONTRAST MEDICATION

## 2023-10-06 PROCEDURE — 2580000003 HC RX 258

## 2023-10-06 PROCEDURE — 83735 ASSAY OF MAGNESIUM: CPT

## 2023-10-06 PROCEDURE — 36415 COLL VENOUS BLD VENIPUNCTURE: CPT

## 2023-10-06 PROCEDURE — 85730 THROMBOPLASTIN TIME PARTIAL: CPT

## 2023-10-06 PROCEDURE — 80048 BASIC METABOLIC PNL TOTAL CA: CPT

## 2023-10-06 PROCEDURE — 1200000000 HC SEMI PRIVATE

## 2023-10-06 PROCEDURE — 85025 COMPLETE CBC W/AUTO DIFF WBC: CPT

## 2023-10-06 PROCEDURE — 99222 1ST HOSP IP/OBS MODERATE 55: CPT | Performed by: INTERNAL MEDICINE

## 2023-10-06 PROCEDURE — 93010 ELECTROCARDIOGRAM REPORT: CPT | Performed by: INTERNAL MEDICINE

## 2023-10-06 PROCEDURE — 93308 TTE F-UP OR LMTD: CPT

## 2023-10-06 PROCEDURE — 93005 ELECTROCARDIOGRAM TRACING: CPT | Performed by: STUDENT IN AN ORGANIZED HEALTH CARE EDUCATION/TRAINING PROGRAM

## 2023-10-06 PROCEDURE — 6360000002 HC RX W HCPCS

## 2023-10-06 PROCEDURE — 80061 LIPID PANEL: CPT

## 2023-10-06 RX ORDER — POTASSIUM CHLORIDE 20 MEQ/1
20 TABLET, EXTENDED RELEASE ORAL ONCE
Status: COMPLETED | OUTPATIENT
Start: 2023-10-06 | End: 2023-10-06

## 2023-10-06 RX ORDER — HEPARIN SODIUM 10000 [USP'U]/100ML
5-30 INJECTION, SOLUTION INTRAVENOUS CONTINUOUS
Status: DISCONTINUED | OUTPATIENT
Start: 2023-10-06 | End: 2023-10-06

## 2023-10-06 RX ORDER — PANTOPRAZOLE SODIUM 40 MG/1
40 TABLET, DELAYED RELEASE ORAL
Qty: 30 TABLET | Refills: 3 | Status: SHIPPED | OUTPATIENT
Start: 2023-10-07

## 2023-10-06 RX ORDER — PANTOPRAZOLE SODIUM 40 MG/1
40 TABLET, DELAYED RELEASE ORAL
Status: DISCONTINUED | OUTPATIENT
Start: 2023-10-07 | End: 2023-10-07 | Stop reason: HOSPADM

## 2023-10-06 RX ADMIN — PERFLUTREN 1.5 ML: 6.52 INJECTION, SUSPENSION INTRAVENOUS at 16:00

## 2023-10-06 RX ADMIN — NIFEDIPINE 90 MG: 90 TABLET, FILM COATED, EXTENDED RELEASE ORAL at 09:16

## 2023-10-06 RX ADMIN — ATORVASTATIN CALCIUM 40 MG: 40 TABLET, FILM COATED ORAL at 20:20

## 2023-10-06 RX ADMIN — POTASSIUM CHLORIDE 20 MEQ: 1500 TABLET, EXTENDED RELEASE ORAL at 02:22

## 2023-10-06 RX ADMIN — CARVEDILOL 25 MG: 25 TABLET, FILM COATED ORAL at 09:16

## 2023-10-06 RX ADMIN — POTASSIUM CHLORIDE 20 MEQ: 1500 TABLET, EXTENDED RELEASE ORAL at 14:46

## 2023-10-06 RX ADMIN — SODIUM CHLORIDE, PRESERVATIVE FREE 10 ML: 5 INJECTION INTRAVENOUS at 20:20

## 2023-10-06 RX ADMIN — ACETAMINOPHEN 650 MG: 325 TABLET ORAL at 15:09

## 2023-10-06 RX ADMIN — HEPARIN SODIUM 7 UNITS/KG/HR: 10000 INJECTION, SOLUTION INTRAVENOUS at 03:03

## 2023-10-06 RX ADMIN — CARVEDILOL 25 MG: 25 TABLET, FILM COATED ORAL at 16:36

## 2023-10-06 RX ADMIN — FUROSEMIDE 40 MG: 10 INJECTION, SOLUTION INTRAMUSCULAR; INTRAVENOUS at 09:16

## 2023-10-06 ASSESSMENT — PAIN DESCRIPTION - ONSET
ONSET: GRADUAL
ONSET: GRADUAL

## 2023-10-06 ASSESSMENT — PAIN DESCRIPTION - DESCRIPTORS
DESCRIPTORS: TIGHTNESS;ACHING
DESCRIPTORS: TIGHTNESS

## 2023-10-06 ASSESSMENT — PAIN SCALES - GENERAL
PAINLEVEL_OUTOF10: 3
PAINLEVEL_OUTOF10: 3
PAINLEVEL_OUTOF10: 0
PAINLEVEL_OUTOF10: 4

## 2023-10-06 ASSESSMENT — PAIN DESCRIPTION - FREQUENCY
FREQUENCY: CONTINUOUS
FREQUENCY: CONTINUOUS

## 2023-10-06 ASSESSMENT — PAIN DESCRIPTION - ORIENTATION
ORIENTATION: LEFT
ORIENTATION: LEFT

## 2023-10-06 ASSESSMENT — PAIN DESCRIPTION - PAIN TYPE
TYPE: ACUTE PAIN
TYPE: ACUTE PAIN

## 2023-10-06 ASSESSMENT — PAIN DESCRIPTION - LOCATION
LOCATION: KNEE
LOCATION: KNEE

## 2023-10-06 ASSESSMENT — PAIN - FUNCTIONAL ASSESSMENT
PAIN_FUNCTIONAL_ASSESSMENT: PREVENTS OR INTERFERES SOME ACTIVE ACTIVITIES AND ADLS
PAIN_FUNCTIONAL_ASSESSMENT: PREVENTS OR INTERFERES SOME ACTIVE ACTIVITIES AND ADLS

## 2023-10-06 NOTE — CARE COORDINATION
Case Management Assessment  Initial Evaluation    Date/Time of Evaluation: 10/6/2023 9:08 AM  Assessment Completed by: Mary Ellen Neal RN    If patient is discharged prior to next notation, then this note serves as note for discharge by case management. Patient Name: Eliazar Alegria                   YOB: 1965  Diagnosis: Hypervolemia [E87.70]  Hypokalemia [E87.6]  Hypomagnesemia [E83.42]  Acute pulmonary edema ([address]) [J81.0]  Acute on chronic congestive heart failure, unspecified heart failure type ([address]) [I50.9]                   Date / Time: 10/5/2023  2:06 PM    Patient Admission Status: Inpatient   Readmission Risk (Low < 19, Mod (19-27), High > 27): Readmission Risk Score: 18    Current PCP: JARET Stone CNP  PCP verified by CM? Yes    Chart Reviewed: Yes      History Provided by: Patient  Patient Orientation: Alert and Oriented    Patient Cognition: Alert    Hospitalization in the last 30 days (Readmission):  No    If yes, Readmission Assessment in CM Navigator will be completed. Advance Directives:      Code Status: Full Code   Patient's Primary Decision Maker is: Legal Next of Kin    Primary Decision MakerBrachel Beck  Child - [phone]    Secondary Decision Maker: Brittni Lorenz Child - [phone]    Discharge Planning:    Patient lives with: Children Type of Home: Apartment  Primary Care Giver: Self  Patient Support Systems include: Children   Current Financial resources:    Current community resources: None  Current services prior to admission: C-pap            Current DME:              Type of Home Care services:  None    ADLS  Prior functional level: Independent in ADLs/IADLs  Current functional level: Independent in ADLs/IADLs    PT AM-PAC:   /24  OT AM-PAC:   /24    Family can provide assistance at DC: Yes  Would you like Case Management to discuss the discharge plan with any other family members/significant others, and if so, who?  No  Plans to

## 2023-10-06 NOTE — PROGRESS NOTES
Patients troponins were rising throughout the night 41->57->71. He did not complain of any chest pain or shortness of breath. EKG from admission showed no signs of ischemia or ST elevations. Recent stent placed a month ago. Repeat EKG was ordered. On call cardiology was called and spoke with Dr. Santosh Johnson and he recommended starting on heparin drip and having cardiology seeing him in the morning. Patient started on heparin drip and consult placed to cardiology.

## 2023-10-06 NOTE — CONSULTS
Nutrition Note  Consult for CHF education. Pt reports not taking medication lately. Educated pt on importance on monitoring salt and fluid intakes. Pt reports eating fast food frequently. Educated pt on importance of not adding more Na to plate after receiving meal. Educated pt on appropriate swaps and better choices to decr Na intake w/ fast food. Provided pt on handouts. Pt agreeable. Instructed the Patient on:   [x] Low Sodium foods  [] Sodium free  [x] Fluids     Educational Materials Provided:   [] [address] Heart Failure Education folder- Nutrition Therapy   [x] Nutrition Care Manual Heart Failure Nutrition Therapy     Time Spent with Patient (minutes): 15 min    The patient will still be monitored per nutrition standards of care. Consult dietitian if nutrition interventions essential to patient care is needed.      Radha Pierce  [place]: [phone]  Office:  [phone]

## 2023-10-06 NOTE — PROGRESS NOTES
D:  Patient is complaining of a cough, noting that he will not be able to sleep without some medication. A:  Dr. Mae Reese DO, provider notified via [address] [place] [address] for orders, per patient request above to consider adding Tessalon PRN. R:  Orders received for Benzonatate (Tessalon) 100 mg PO TID PRN Cough.

## 2023-10-06 NOTE — PROGRESS NOTES
D:  12 Lead EKG obtained at 0643 was Normal Sinus Rhythm, Left axis deviation, ST & T wave abnormality, consider lateral ischemia, Prolonged QT, Abnormal ECG. A:  Notified Provider, Dr. Paulina Richey DO.  R:  Per provider above, \"Thanks. He's already on a heparin drip and cardiology is already consulted. \"

## 2023-10-06 NOTE — PLAN OF CARE
Problem: ABCDS Injury Assessment  Goal: Absence of physical injury  Outcome: Progressing     Problem: Pain  Goal: Verbalizes/displays adequate comfort level or baseline comfort level  10/6/2023 1707 by Khurram Khan RN  Outcome: Progressing  Pt educated on pharmacologic pain strategies for managing pain. Pt verbalized understanding. Curtis rates Left knee pain a 4 out of 10. Pt requested ice pack. Pt currently using ice pack to left knee. Pt states they are satisfied.       Problem: Safety - Adult  Goal: Free from fall injury  10/6/2023 1707 by Khurram Khan, RN  Outcome: Progressing

## 2023-10-06 NOTE — PLAN OF CARE
Problem: Safety - Adult  Goal: Free from fall injury  Outcome: Progressing  Note:  Remains free from falls. Problem: Pain  Goal: Verbalizes/displays adequate comfort level or baseline comfort level  Outcome: Progressing  Note:  Denies pain/needs.

## 2023-10-06 NOTE — PROGRESS NOTES
Loss of IV access, multiple nurses have tried without success, PICC nurse called, awaiting response. Hep gtt on pause at this time.

## 2023-10-06 NOTE — PROGRESS NOTES
4 Eyes Admission Assessment     I agree as the admission nurse that 2 RN's have performed a thorough Head to Toe Skin Assessment on the patient. ALL assessment sites listed below have been assessed on admission. Areas assessed by both nurses: Marylee Credit and Naa Stringer, Sridevi  [x]   Head, Face, and Ears   [x]   Shoulders, Back, and Chest  [x]   Arms, Elbows, and Hands   [x]   Coccyx, Sacrum, and Ischium  [x]   Legs, Feet, and Heels:  Bilateral lower extremities discoloration and scattered abrasions        Does the Patient have Skin Breakdown?   No         Gaurang Prevention initiated:  Yes   Wound Care Orders initiated:  NA      [facility] nurse consulted for Pressure Injury (Stage 3,4, Unstageable, DTI, NWPT, and Complex wounds) or Gaurang score 18 or lower:  NA      Nurse 1 eSignature: Electronically signed by Marylee Credit, RN on 10/6/23 at 8:13 AM EDT    **SHARE this note so that the co-signing nurse is able to place an eSignature**    Nurse 2 eSignature: Electronically signed by Naa Stringer RN on 10/6/23 at 8:15 AM EDT

## 2023-10-06 NOTE — DISCHARGE INSTRUCTIONS
Please take your medications as directed. Please see your Primary Care Physician (PCP) for your post-hospital follow up. If you have any questions, do not hesitate to contact us or your PCP   If you feel seriously ill, please go directly to the Emergency Department, or call 911. Extra Heart Failure sites:     [email or web address]   --- this is American Heart Association interactive Healthier Living with Heart Failure guidebook. Please click hyperlink or copy / paste link into search bar. Use your mouse to scroll through the pages. Lots of information about weight monitoring, diet tips, activity, meds, etc    HF [place] todd  -- this is a free smart phone todd available for iPhone and Android download. Use your phone to track sodium / fluid intake, zone tool symptom tracking, weights, medications, etc. Click on this hyperlink  HF [place] Todd   for QR code for easy download. DASH (Dietary Approach to Stop Hypertension) diet --  SeekAlumni.no -- this diet is a flexible eating plan that promotes heart healthy eating style. Click on hyperlink or copy / paste link into search bar. Lots of low sodium recipes and tips.     [email or web address].[email or web address]  -- more free recipes

## 2023-10-06 NOTE — CONSULTS
[address] [place] [address]         Reason for Consultation/Chief Complaint: \"I passed out. \"       History of Present Illness:  Philip Fischer is a 63 y.o. patient who presented to the hospital with complaints of passing out while sitting in his chair. He had no chest pain and had no warning. No palpitations. No SOB. No strenuous activity preceded this event. Pt states his po intake has been poor lately. Never had this before. Reportedly the pt has not been compliant with his medicines. Bp seems high. Bp falls with standing but is very high suggesting non compliance      Past Medical History:   has a past medical history of Carpal tunnel syndrome, Chronic kidney disease, Hyperlipidemia, Hypertension, Obesity, unspecified, and MARY (obstructive sleep apnea). Surgical History:   has a past surgical history that includes Nose surgery; Knee arthroscopy (Right); and Cardiac catheterization. Social History:   reports that he has never smoked. He has never used smokeless tobacco. He reports current alcohol use. He reports that he does not use drugs. Family History:  No evidence for sudden cardiac death or premature CAD    Home Medications:  Were reviewed and are listed in nursing record. and/or listed below  Prior to Admission medications    Medication Sig Start Date End Date Taking?  Authorizing Provider   carvedilol (COREG) 25 MG tablet Take 1 tablet by mouth 2 times daily (with meals) 9/29/23   JARET Smith CNP   apixaban (ELIQUIS) 5 MG TABS tablet Take 1 tablet by mouth 2 times daily 9/29/23   JARET Smith CNP   atorvastatin (LIPITOR) 40 MG tablet Take 1 tablet by mouth nightly 9/29/23   Dorota Ramirez APRN - CNP   furosemide (LASIX) 40 MG tablet Take 1 tablet by mouth daily 9/29/23 10/29/23  Dorota Ramirez APRN - CNP   clopidogrel (PLAVIX) 75 MG tablet Take 1 tablet by mouth daily 9/29/23   JARET Clancy CNP hydrALAZINE (APRESOLINE) 100 MG tablet Take 1 tablet by mouth in the morning and at bedtime 9/29/23   JARET St CNP   flecainide (TAMBOCOR) 100 MG tablet Take 1 tablet by mouth every 12 hours 9/29/23   JARET St CNP   apixaban (ELIQUIS) 5 MG TABS tablet Take 1 tablet by mouth 2 times daily 9/29/23   JARET St CNP   hydrALAZINE (APRESOLINE) 100 MG tablet Take 1 tablet by mouth in the morning and at bedtime 9/28/23   JARET Sexton CNP   valsartan (DIOVAN) 160 MG tablet Take 1 tablet by mouth 2 times daily 9/28/23   JARET Sexton CNP   NIFEdipine (PROCARDIA XL) 90 MG extended release tablet Take 1 tablet by mouth daily 9/28/23   Liyah Louis, [address] Medications:   sodium chloride flush  5-40 mL IntraVENous 2 times per day    [Held by provider] apixaban  5 mg Oral BID    atorvastatin  40 mg Oral Nightly    carvedilol  25 mg Oral BID WC    clopidogrel  75 mg Oral Daily    NIFEdipine  90 mg Oral Daily    furosemide  40 mg IntraVENous Daily     sodium chloride flush, sodium chloride, ondansetron **OR** ondansetron, polyethylene glycol, acetaminophen **OR** acetaminophen, perflutren lipid microspheres, benzonatate   heparin (PORCINE) Infusion 7 Units/kg/hr (10/06/23 0303)    sodium chloride         Allergies:  Lisinopril     Review of Systems:     A 14 ROS obtained and negative except as mentioned in HPI. Constitutional: there has been no unanticipated weight loss. Eyes: No visual changes or diplopia. No scleral icterus. ENT: No Headaches, hearing loss or vertigo. No mouth sores or sore throat. Cardiovascular:++loss of consciousness. No hemoptysis, pleuritic pain, or phlebitis. Respiratory: No cough or wheezing, no sputum production. No hematemesis. Gastrointestinal: No abdominal pain, appetite loss, blood in stools. No change in bowel or bladder habits. Genitourinary: No dysuria, or hematuria.   Musculoskeletal:  No gait

## 2023-10-06 NOTE — ACP (ADVANCE CARE PLANNING)
Advance Care Planning     Advance Care Planning Inpatient Note  Spiritual Care Department    Today's Date: 10/6/2023  Unit: [address] [place] [address]    Received request from IDT Member. Upon review of chart and communication with care team, patient's decision making abilities are not in question. . Patient and 3 family members  was/were present in the room during visit. Goals of ACP Conversation:  Discuss advance care planning documents    Health Care Decision Makers:       Primary Decision Maker: Nga Antony Child - [phone]    Primary Decision Maker: Frida Michaud Child - [phone]  Summary:  Documented Next of Kin, per patient report    Advance Care Planning Documents (Patient Wishes):  Healthcare Power of /Advance Directive Appointment of [address]  Living Will/Advance Directive     Assessment:   discussed [address] and Living Will documents with patient, as well as the state hierarchy for decision makers. Patient stated that he has discussed his wishes for care with his two children (named above), and trusts them to make decision that are representative of his wishes if/when called upon to do so. Patient also confirmed that after his children, his parents (both present in the room) would be next in line to serve as proxies. He stated that he is comfortable with this as well. Patient requested a copy of ACP documents to review and consider. Writer provided documents and informed pt that chaplains are available to assist him with completion in both the inpatient and outpatient setting. Pt expressed gratitude for the information and documents and stated that he will contact Spiritual Care for assistance when needed.       Interventions:  Provided education on documents for clarity and greater understanding  Discussed and provided education on state decision maker hierarchy    Care Preferences Communicated:   No    Outcomes/Plan:  ACP Discussion: Completed  Patient was provided with ACP documents to review and consider; will contact Spiritual Care for assistance with completion if/when needed.     Electronically signed by Manjinder Mccarthy on 10/6/2023 at 3:02 PM

## 2023-10-07 VITALS
OXYGEN SATURATION: 96 % | WEIGHT: 300.56 LBS | SYSTOLIC BLOOD PRESSURE: 158 MMHG | HEART RATE: 60 BPM | RESPIRATION RATE: 16 BRPM | DIASTOLIC BLOOD PRESSURE: 74 MMHG | TEMPERATURE: 97.9 F | HEIGHT: 68 IN | BODY MASS INDEX: 45.55 KG/M2

## 2023-10-07 PROBLEM — I24.89 DEMAND ISCHEMIA: Status: ACTIVE | Noted: 2023-10-07

## 2023-10-07 PROBLEM — Z98.61 CAD S/P PERCUTANEOUS CORONARY ANGIOPLASTY: Status: ACTIVE | Noted: 2023-10-07

## 2023-10-07 PROBLEM — I50.32 CHRONIC HEART FAILURE WITH PRESERVED EJECTION FRACTION (HFPEF) (HCC): Status: ACTIVE | Noted: 2018-10-29

## 2023-10-07 PROBLEM — I48.0 PAF (PAROXYSMAL ATRIAL FIBRILLATION) (HCC): Status: ACTIVE | Noted: 2020-02-27

## 2023-10-07 PROBLEM — I25.10 CAD S/P PERCUTANEOUS CORONARY ANGIOPLASTY: Status: ACTIVE | Noted: 2023-10-07

## 2023-10-07 PROBLEM — R55 SYNCOPE: Status: ACTIVE | Noted: 2023-10-07

## 2023-10-07 LAB
ANION GAP SERPL CALCULATED.3IONS-SCNC: 12 MMOL/L (ref 3–16)
BASOPHILS # BLD: 0 K/UL (ref 0–0.2)
BASOPHILS NFR BLD: 0.4 %
BUN SERPL-MCNC: 28 MG/DL (ref 7–20)
CALCIUM SERPL-MCNC: 9.6 MG/DL (ref 8.3–10.6)
CHLORIDE SERPL-SCNC: 103 MMOL/L (ref 99–110)
CO2 SERPL-SCNC: 23 MMOL/L (ref 21–32)
CREAT SERPL-MCNC: 1.2 MG/DL (ref 0.9–1.3)
DEPRECATED RDW RBC AUTO: 16.4 % (ref 12.4–15.4)
EOSINOPHIL # BLD: 0.2 K/UL (ref 0–0.6)
EOSINOPHIL NFR BLD: 2.4 %
GFR SERPLBLD CREATININE-BSD FMLA CKD-EPI: >60 ML/MIN/{1.73_M2}
GLUCOSE SERPL-MCNC: 108 MG/DL (ref 70–99)
HCT VFR BLD AUTO: 43.1 % (ref 40.5–52.5)
HGB BLD-MCNC: 14.3 G/DL (ref 13.5–17.5)
LYMPHOCYTES # BLD: 1.8 K/UL (ref 1–5.1)
LYMPHOCYTES NFR BLD: 21.9 %
MAGNESIUM SERPL-MCNC: 2.2 MG/DL (ref 1.8–2.4)
MCH RBC QN AUTO: 28.7 PG (ref 26–34)
MCHC RBC AUTO-ENTMCNC: 33.2 G/DL (ref 31–36)
MCV RBC AUTO: 86.6 FL (ref 80–100)
MONOCYTES # BLD: 0.8 K/UL (ref 0–1.3)
MONOCYTES NFR BLD: 9 %
NEUTROPHILS # BLD: 5.6 K/UL (ref 1.7–7.7)
NEUTROPHILS NFR BLD: 66.3 %
PLATELET # BLD AUTO: 206 K/UL (ref 135–450)
PMV BLD AUTO: 9.2 FL (ref 5–10.5)
POTASSIUM SERPL-SCNC: 3.7 MMOL/L (ref 3.5–5.1)
RBC # BLD AUTO: 4.98 M/UL (ref 4.2–5.9)
SODIUM SERPL-SCNC: 138 MMOL/L (ref 136–145)
WBC # BLD AUTO: 8.4 K/UL (ref 4–11)

## 2023-10-07 PROCEDURE — 85025 COMPLETE CBC W/AUTO DIFF WBC: CPT

## 2023-10-07 PROCEDURE — 6360000002 HC RX W HCPCS

## 2023-10-07 PROCEDURE — 6370000000 HC RX 637 (ALT 250 FOR IP): Performed by: INTERNAL MEDICINE

## 2023-10-07 PROCEDURE — 6370000000 HC RX 637 (ALT 250 FOR IP): Performed by: NURSE PRACTITIONER

## 2023-10-07 PROCEDURE — 2580000003 HC RX 258

## 2023-10-07 PROCEDURE — 36415 COLL VENOUS BLD VENIPUNCTURE: CPT

## 2023-10-07 PROCEDURE — 6370000000 HC RX 637 (ALT 250 FOR IP)

## 2023-10-07 PROCEDURE — 83735 ASSAY OF MAGNESIUM: CPT

## 2023-10-07 PROCEDURE — 80048 BASIC METABOLIC PNL TOTAL CA: CPT

## 2023-10-07 PROCEDURE — 99233 SBSQ HOSP IP/OBS HIGH 50: CPT | Performed by: NURSE PRACTITIONER

## 2023-10-07 RX ORDER — VALSARTAN 80 MG/1
80 TABLET ORAL DAILY
Status: DISCONTINUED | OUTPATIENT
Start: 2023-10-07 | End: 2023-10-07 | Stop reason: HOSPADM

## 2023-10-07 RX ORDER — FUROSEMIDE 40 MG/1
40 TABLET ORAL DAILY
Status: DISCONTINUED | OUTPATIENT
Start: 2023-10-08 | End: 2023-10-07 | Stop reason: HOSPADM

## 2023-10-07 RX ORDER — VALSARTAN 160 MG/1
80 TABLET ORAL DAILY
Qty: 180 TABLET | Refills: 3
Start: 2023-10-07

## 2023-10-07 RX ADMIN — VALSARTAN 80 MG: 80 TABLET, FILM COATED ORAL at 11:53

## 2023-10-07 RX ADMIN — CARVEDILOL 25 MG: 25 TABLET, FILM COATED ORAL at 09:14

## 2023-10-07 RX ADMIN — NIFEDIPINE 90 MG: 90 TABLET, FILM COATED, EXTENDED RELEASE ORAL at 09:13

## 2023-10-07 RX ADMIN — RIVAROXABAN 20 MG: 20 TABLET, FILM COATED ORAL at 09:14

## 2023-10-07 RX ADMIN — FUROSEMIDE 40 MG: 10 INJECTION, SOLUTION INTRAMUSCULAR; INTRAVENOUS at 09:14

## 2023-10-07 RX ADMIN — CLOPIDOGREL BISULFATE 75 MG: 75 TABLET ORAL at 09:13

## 2023-10-07 RX ADMIN — PANTOPRAZOLE SODIUM 40 MG: 40 TABLET, DELAYED RELEASE ORAL at 06:15

## 2023-10-07 RX ADMIN — SODIUM CHLORIDE, PRESERVATIVE FREE 10 ML: 5 INJECTION INTRAVENOUS at 09:14

## 2023-10-07 ASSESSMENT — PAIN SCALES - GENERAL
PAINLEVEL_OUTOF10: 0

## 2023-10-07 NOTE — PLAN OF CARE
Problem: ABCDS Injury Assessment  Goal: Absence of physical injury  Outcome: Adequate for Discharge     Problem: Pain  Goal: Verbalizes/displays adequate comfort level or baseline comfort level  Outcome: Adequate for Discharge     Problem: Discharge Planning  Goal: Discharge to home or other facility with appropriate resources  Outcome: Adequate for Discharge     Problem: Safety - Adult  Goal: Free from fall injury  Outcome: Adequate for Discharge     Problem: Respiratory - Adult  Goal: Achieves optimal ventilation and oxygenation  Outcome: Adequate for Discharge     Problem: Cardiovascular - Adult  Goal: Maintains optimal cardiac output and hemodynamic stability  Outcome: Adequate for Discharge     Problem: Cardiovascular - Adult  Goal: Absence of cardiac dysrhythmias or at baseline  Outcome: Adequate for Discharge     Problem: Metabolic/Fluid and Electrolytes - Adult  Goal: Electrolytes maintained within normal limits  Outcome: Adequate for Discharge     Problem: Metabolic/Fluid and Electrolytes - Adult  Goal: Hemodynamic stability and optimal renal function maintained  Outcome: Adequate for Discharge

## 2023-10-07 NOTE — PROGRESS NOTES
I have seen, examined and evaluated the patient as did the resident physician, Dr. Antonieta Jaramillo on 10/7/23. We have discussed the plan of care and decisions made during that discussion were incorporated into this note. I have reviewed the resident physician's note and agree with the assessment and plan of care as documented. Personally reviewed Lab Studies and Imaging     Discussed management of the case with *** who recommended ***    EKG interpreted personally and results ***    Imaging that was interpreted personally includes *** and results ***    Drugs that require monitoring for toxicity include *** and the method of monitoring was ***    Alberto Desouza MD, [facility]  Hospitalist  Attending Physician    [facility] 8/9/2023 PTCA with stent to the proximal LAD. The left main coronary artery is normal.   Left anterior descending artery proximal stenosis up to 80 to 85%. Circumflex artery is as some stenosis in the obtuse marginal branch 2 and 3 above to 40% that did not require intervention. The right coronary artery is a dominant vessel and minor plaque. Left ventriculogram shows ejection fraction of 55%. Normal wall motion. Radial artery access no complications   VSS down 31# and doing very well   Eating better and more active     HPI: Errol Castanon is a 63 y.o. male with a past medical history of CAD  2022 [facility] demonstrated 40-50% LAD disease  HTN HLD MARY CRD afib morbid obesity admitted with fever, increased SOB today, stated Friday night he was outside and he had mosquito bite on his right lower extremity with increased erythema and warmth around bite.  No c/o n/v diarrhea or cp at this time

## 2023-10-07 NOTE — PROGRESS NOTES
Physician Progress Note      PATIENT:               Karla Hanks  CSN #:                  [id number]  :                       1965  ADMIT DATE:       10/5/2023 2:06 PM  [address] [facility] DATE:  RESPONDING  PROVIDER #:        Verner Ahumada MD          QUERY TEXT:    Patient admitted with Syncope. Noted documentation of NSTEMI in H&P and   Demand ischemia in card consult note. Please document if you are evaluating   and treating the following: The medical record reflects the following:  Risk Factors: 62 y/o with Acute CHF and dehydration  Clinical Indicators: 10/6 PN: \"?NSTEMI: Patient presenting after a syncopal   episode. He reports no chest pain or shortness of breath prior to or during   this episode. EKG shows new T wave inversions in the lateral leads. \"  Per cardiology consult note: \"Impression:  No chest pain. Syncope. Probable   dehydration. Troponin elevation from demand ischemia. No ECG changes. \"  Treatment:  Heparin gtt for demand ischemia. Continue gentle diuresis and bp   control. No need for cath presently. Stop troponin measurement. D/c heparin   in 48 hrs after initiation and reassess for CP at that time. Fourth Universal Definition of Myocardial Infarction:  Clearly separates MI   from myocardial injury. Patients with elevated blood troponin levels but   without clinical evidence of ischemia are said to have had a myocardial   injury. ? To have a myocardial infarction requires both an elevated troponin   blood test along with at least one of the following:  - Symptoms of acute myocardial ischemia (Types 1 - 5 MI)  - Clinical evidence of ischemia, as evidenced in an electrocardiogram (EKG)   showing new ischemic changes (Type 1, Type 2, Type 3, or Type 4a MI)  - Development of pathological Q waves (Types 1 - 5 MI)  - Imaging evidence of new loss of viable myocardium or new regional wall   motion abnormality in a pattern consistent with an ischemic etiology (Types 1   - 5

## 2023-10-07 NOTE — PROGRESS NOTES
Pt is A&Ox4 and discharging home with wife. Pt's Iv and telemetry removed. Pt's questions and concerns addressed with RN. Pt left with medications from outpatient pharmacy, discharge instructions, and all personal belongings.

## 2023-10-07 NOTE — CARE COORDINATION
Case Management Assessment            Discharge Note                    Date / Time of Note: 10/7/2023 1:15 PM                  Discharge Note Completed by: Cristian Molina RN    Patient Name: Joann Winchester   YOB: 1965  Diagnosis: Hypervolemia [E87.70]  Hypokalemia [E87.6]  Hypomagnesemia [E83.42]  Acute pulmonary edema ([address]) [J81.0]  Acute on chronic congestive heart failure, unspecified heart failure type [facility]) [I50.9]   Date / Time: 10/5/2023  2:06 PM    Current PCP: JARET Arrington - CNP  Clinic patient: No    Hospitalization in the last 30 days: No       Advance Directives:  Code Status: Full Code  [place] DNR form completed and on chart: No    Financial:  Payor: Kevin Silver / Plan: [id number] [facility] / Product Type: *No Product type* /      Pharmacy:    [id number] - [address] [phone] - F [phone]  [address] [place] [id number]  Phone: [phone] Fax: [phone]      Assistance purchasing medications?: Potential Assistance Purchasing Medications: No  Assistance provided by Case Management: Prescription Discount Card    Does patient want to participate in local refill/ meds to beds program?: Yes    Meds To Beds General Rules:  1. Can ONLY be done Monday- Friday between 8:30am-5pm  2. Prescription(s) must be in pharmacy by 3pm to be filled same day  3. Copy of patient's insurance/ prescription drug card and patient face sheet must be sent along with the prescription(s)  4. Cost of Rx cannot be added to hospital bill. If financial assistance is needed, please contact unit  or ;  or  CANNOT provide pharmacy voucher for patients co-pays  5.  Patients can then  the prescription on their way out of the hospital at discharge, or pharmacy can deliver to the bedside if staff is available. (payment due at time of pick-up or delivery - cash, resource  if  cost  is an  issue  for  medication  after  30 days. COVID Result:    Lab Results   Component Value Date/Time    COVID19 Not Detected 10/05/2023 02:24 PM    COVID19 NOT DETECTED 08/06/2023 05:29 AM    COVID19 NOT DETECTED 01/28/2021 08:49 PM       The Plan for Transition of Care is related to the following treatment goals of Hypervolemia [E87.70]  Hypokalemia [E87.6]  Hypomagnesemia [E83.42]  Acute pulmonary edema (HCC) [J81.0]  Acute on chronic congestive heart failure, unspecified heart failure type ([address]) [I50.9]    The Patient and/or patient representative David Garza and his family were provided with a choice of provider and agrees with the discharge plan Yes    Freedom of choice list was provided with basic dialogue that supports the patient's individualized plan of care/goals and shares the quality data associated with the providers.  Yes    Care Transitions patient: No    Enrico Gaviria RN  [facility] [employer]  Case Management Department  Ph: [phone]    W/E Coverage

## 2023-10-07 NOTE — PLAN OF CARE
Problem: ABCDS Injury Assessment  Goal: Absence of physical injury  10/7/2023 0026 by Nikolas Bryant RN  Outcome: Progressing     Problem: Pain  Goal: Verbalizes/displays adequate comfort level or baseline comfort level  10/7/2023 0026 by Nikolas Bryant RN  Outcome: Progressing  Flowsheets (Taken 10/7/2023 0026)  Verbalizes/displays adequate comfort level or baseline comfort level:   Encourage patient to monitor pain and request assistance   Assess pain using appropriate pain scale  Note: Patient denies pain at this time. Problem: Discharge Planning  Goal: Discharge to home or other facility with appropriate resources  Outcome: Progressing  Flowsheets (Taken 10/7/2023 0026)  Discharge to home or other facility with appropriate resources:   Identify barriers to discharge with patient and caregiver   Identify discharge learning needs (meds, wound care, etc)     Problem: Safety - Adult  Goal: Free from fall injury  10/7/2023 0026 by Nikolas Bryant RN  Flowsheets (Taken 10/7/2023 0026)  Free From Fall Injury: Instruct family/caregiver on patient safety  Note: Patient ambulates with steady gait. No skid socks on.       Problem: Cardiovascular - Adult  Goal: Maintains optimal cardiac output and hemodynamic stability  Outcome: Progressing  Flowsheets (Taken 10/7/2023 0026)  Maintains optimal cardiac output and hemodynamic stability:   Monitor blood pressure and heart rate   Monitor urine output and notify Licensed Independent Practitioner for values outside of normal range     Problem: Cardiovascular - Adult  Goal: Absence of cardiac dysrhythmias or at baseline  Outcome: Progressing  Flowsheets (Taken 10/7/2023 0026)  Absence of cardiac dysrhythmias or at baseline:   Monitor cardiac rate and rhythm   Assess for signs of decreased cardiac output

## 2023-10-07 NOTE — PROGRESS NOTES
Internal Medicine Progress Note    Date: 10/7/2023   Patient: Weston Schultz Day: 2      CC: Fever (Pt has had a cough x a few days and today had a syncopal episode. The squad found him to be febrile and confused but pt has returned to baseline )       Interval Hx   VSS this morning. Patient has no acute complaints this morning. He reports no current dizziness or lightheadedness. He has no chest pain, no shortness of breath, no palpitations, no abdominal pain, no nausea, no vomiting, and no diarrhea. Per resident HPI, \"Mr. Antonieta Knapp is a 63year old male with a PMH of CAD s/p one stent placed (8/9/23), HFpEF, A-fib, HTN, renal artery stenosis s/p stent 2018, and MARY who presented to the ED after a syncopal episode. Per patient and significant other, he went camping over the weekend and has had a head cold since Sunday night with a non productive cough and congestion, he did not take a temperature while home. Denied any shortness of breath, abdominal pain N/V. Today he was at his girlfriends place and was weak he sat down on the couch and then later passed out denies any exertion, no loss of bowel movements or incontinence. [facility] was called and when he got up he fell on his knee. Of note he has not been on his cardiac medications for the past few days to week and was picked up today. As well he has two recent hospital stays in the past 3 months. Once for a-fib with RVR and once for acute hypoxic resp failure, cellulitis and bacteremia with flash pulm edema. In the ED  BP was elevated to 170's/ 80's  Labs were significant for BNP 1854, troponin 41, na 135, K 3.4 Mg, 1.5 and lactiuc acid 1.5, WBC 16.1  EKG showed left ventricular hypertrophy with mild strain  CTPA was performed showed no evidence of PE  CXR showed cardiomegaly mild pulm edema     He was given inch of nitropaste for elevated BP and that improved BP to 140's, Lasix 40 for pulmonary edema and bilateral leg edema.  Mg \"KETONES\", \"AMORPHOUS\" in the last 72 hours. Radiology:  XR KNEE LEFT (1-2 VIEWS)   Final Result   1. Severe medial compartment and patellofemoral compartment osteoarthrosis of the left knee. Electronically signed by Sylvia Maldonado MD      CT CHEST PULMONARY EMBOLISM W CONTRAST   Final Result      1. Limited exam secondary to respiratory motion artifact and streak artifact. No   discrete findings for central pulmonary embolism. Suboptimal evaluation of the   upper and lower lobe subsegmental pulmonary artery branches. 2. No localized consolidation or pleural effusion. 3. Cardiomegaly with prominent coronary artery calcification. XR CHEST PORTABLE   Final Result      Cardiomegaly. Mild pulmonary edema. Bailey Willingham is a 63year old male with PMH of CAD s/p one stent placed (8/9/23), HFpEF, A-fib, HTN, renal artery stenosis s/p stent 2018, and MARY presenting after a syncopal episode. NSTEMI  Patient presenting after a syncopal episode. He reports no chest pain or shortness of breath prior to or during this episode. EKG shows new T wave inversions in the lateral leads.  - HS Troponin trending up 41-57-71. D/c trend per cardio  - Stop Heparin gtt  - Start Xarelto   - Cardiology consulted, appreciate recommendations  - Left Heart Cath (8/6/23) showed Left anterior descending artery proximal stenosis up to 80 to 85% treated with balloon angioplasty stenting  - Echo (10/6/2023):  Limited echo to evaluate left ventricular ejection fraction. Normal left ventricle size, wall thickness and systolic function with an   estimated ejection fraction of 55-60%. No regional wall motion abnormalities   are seen. Syncope   Differential includes orthostatic vs vasovagal vs cardiac vs medication related since patient was noncompliant for 2 days then restarted his medications yesterday. Patient reports feeling dizzy prior to syncopal episode. He was seated at the time.  In

## 2023-10-09 ENCOUNTER — TELEPHONE (OUTPATIENT)
Dept: FAMILY MEDICINE CLINIC | Age: 58
End: 2023-10-09

## 2023-10-09 LAB
BACTERIA BLD CULT ORG #2: NORMAL
BACTERIA BLD CULT: NORMAL

## 2023-10-09 NOTE — TELEPHONE ENCOUNTER
Care Transitions Initial Follow Up Call    Outreach made within 2 business days of discharge: Yes    Patient: Leticia Boone Patient : 1965   MRN: [id number]  Reason for Admission: There are no discharge diagnoses documented for the most recent discharge. Discharge Date: 10/7/23       Spoke with: Left message on recorder for patient to call back at  [phone]      Discharge department/facility:     [facility] Interactive Patient Contact:  Was patient able to fill all prescriptions:   Was patient instructed to bring all medications to the follow-up visit:   Is patient taking all medications as directed in the discharge summary?    Does patient understand their discharge instructions:   Does patient have questions or concerns that need addressed prior to 7-14 day follow up office visit:     Scheduled appointment with PCP within 7-14 days    Follow Up  Future Appointments   Date Time Provider [address] [place] [address]   10/13/2023 11:30 AM JARET Capellan CNP [facility]   2023  2:30 PM [facility]   11/15/2023  3:30  [address] [facility] CARDIAC [facility]   2023  2:30 PM [facility]       Ash Araya MA

## 2023-10-10 ENCOUNTER — FOLLOWUP TELEPHONE ENCOUNTER (OUTPATIENT)
Dept: INPATIENT UNIT | Age: 58
End: 2023-10-10

## 2023-10-11 NOTE — PROGRESS NOTES
Attempted to reach patient for 72 hour  hospital follow up. Calls made at 1430 and 1600. Calls went to . Message left requesting call back. CTN made call on 10/9 and also left message requesting call back. This is a total of 3 attempts.   Pt has cardiology follow up arranged for 10/13 with Tish Sanders NP.

## 2023-10-26 NOTE — CONSULTS
Prep Survey      Flowsheet Row Responses   [facility] patient discharged from? [facility]   Is LACE score < 7 ? No   Eligibility [facility]   Date of Admission 10/23/23   Date of Discharge 10/25/23   Discharge Disposition Home or Self Care   Discharge diagnosis Upper Abdomin Pain   Does the patient have one of the following disease processes/diagnoses(primary or secondary)? Other   Does the patient have Home health ordered? No   Is there a DME ordered? No   Prep survey completed? Yes            ALY MILLER - Registered Nurse           Procedure: LHC, angioseal RFA  Complication:none  EBL< 5 cc  Preliminary: LV uniform. EF 60%. LM distal 20% taper. LAD prox 40-50% smooth, 40% mid. CX minor LI,  RCA lg dom, no significant obstructive CAD    Medical therapy. ASA 81 mg qd  Successful angioseal RFA.

## 2023-11-15 ENCOUNTER — HOSPITAL ENCOUNTER (OUTPATIENT)
Dept: CARDIAC REHAB | Age: 58
Setting detail: THERAPIES SERIES
Discharge: HOME | End: 2023-11-15
Payer: COMMERCIAL

## 2023-11-15 PROCEDURE — 93798 PHYS/QHP OP CAR RHAB W/ECG: CPT

## 2023-11-20 NOTE — TELEPHONE ENCOUNTER
Requested Prescriptions     Pending Prescriptions Disp Refills    furosemide (LASIX) 40 MG tablet 30 tablet 0     Sig: Take 1 tablet by mouth daily    clopidogrel (PLAVIX) 75 MG tablet 30 tablet 3     Sig: Take 1 tablet by mouth daily          Last Office Visit: 8/3/2023     Next Office Visit: Visit date not found     Last Labs: 101/7/2023

## 2023-11-21 RX ORDER — FUROSEMIDE 40 MG/1
40 TABLET ORAL DAILY
Qty: 30 TABLET | Refills: 0 | Status: SHIPPED | OUTPATIENT
Start: 2023-11-21 | End: 2023-12-21

## 2023-11-21 RX ORDER — CLOPIDOGREL BISULFATE 75 MG/1
75 TABLET ORAL DAILY
Qty: 30 TABLET | Refills: 3 | Status: SHIPPED | OUTPATIENT
Start: 2023-11-21

## 2023-11-24 ENCOUNTER — APPOINTMENT (OUTPATIENT)
Dept: CARDIAC REHAB | Age: 58
End: 2023-11-24
Payer: COMMERCIAL

## 2023-11-27 ENCOUNTER — HOSPITAL ENCOUNTER (OUTPATIENT)
Dept: CARDIAC REHAB | Age: 58
Setting detail: THERAPIES SERIES
End: 2023-11-27
Payer: COMMERCIAL

## 2023-11-29 ENCOUNTER — APPOINTMENT (OUTPATIENT)
Dept: CARDIAC REHAB | Age: 58
End: 2023-11-29
Payer: COMMERCIAL

## 2024-01-08 ENCOUNTER — PATIENT MESSAGE (OUTPATIENT)
Dept: FAMILY MEDICINE CLINIC | Age: 59
End: 2024-01-08

## 2024-01-08 RX ORDER — FUROSEMIDE 40 MG/1
40 TABLET ORAL DAILY
Qty: 30 TABLET | Refills: 0 | Status: SHIPPED | OUTPATIENT
Start: 2024-01-08 | End: 2024-02-07

## 2024-01-08 RX ORDER — PANTOPRAZOLE SODIUM 40 MG/1
40 TABLET, DELAYED RELEASE ORAL
Qty: 90 TABLET | OUTPATIENT
Start: 2024-01-08

## 2024-01-08 RX ORDER — FUROSEMIDE 40 MG/1
40 TABLET ORAL DAILY
Qty: 30 TABLET | Refills: 0 | OUTPATIENT
Start: 2024-01-08 | End: 2024-02-07

## 2024-01-08 NOTE — TELEPHONE ENCOUNTER
Requested Prescriptions     Pending Prescriptions Disp Refills    furosemide (LASIX) 40 MG tablet [Pharmacy Med Name: FUROSEMIDE 40MG TABLETS] 30 tablet 0     Sig: TAKE 1 TABLET BY MOUTH DAILY          Last Office Visit: 8/3/2023     Next Office Visit: Visit date not found

## 2024-01-09 RX ORDER — PANTOPRAZOLE SODIUM 40 MG/1
40 TABLET, DELAYED RELEASE ORAL
Qty: 30 TABLET | Refills: 3 | Status: SHIPPED | OUTPATIENT
Start: 2024-01-09 | End: 2024-01-11 | Stop reason: SDUPTHER

## 2024-01-11 RX ORDER — PANTOPRAZOLE SODIUM 40 MG/1
40 TABLET, DELAYED RELEASE ORAL
Qty: 30 TABLET | Refills: 3 | Status: SHIPPED | OUTPATIENT
Start: 2024-01-11

## 2024-01-11 NOTE — TELEPHONE ENCOUNTER
Requested Prescriptions     Pending Prescriptions Disp Refills    pantoprazole (PROTONIX) 40 MG tablet 30 tablet 3     Sig: Take 1 tablet by mouth every morning (before breakfast)          Last Office Visit: 8/3/2023     Next Office Visit: Visit date not found

## 2024-03-14 RX ORDER — FUROSEMIDE 40 MG/1
40 TABLET ORAL DAILY
Qty: 30 TABLET | Refills: 0 | Status: SHIPPED | OUTPATIENT
Start: 2024-03-14

## 2024-04-08 RX ORDER — ATORVASTATIN CALCIUM 40 MG/1
40 TABLET, FILM COATED ORAL NIGHTLY
Qty: 30 TABLET | Refills: 0 | Status: SHIPPED | OUTPATIENT
Start: 2024-04-08

## 2024-04-10 ENCOUNTER — OFFICE VISIT (OUTPATIENT)
Dept: FAMILY MEDICINE CLINIC | Age: 59
End: 2024-04-10
Payer: COMMERCIAL

## 2024-04-10 ENCOUNTER — TELEPHONE (OUTPATIENT)
Dept: FAMILY MEDICINE CLINIC | Age: 59
End: 2024-04-10

## 2024-04-10 VITALS
SYSTOLIC BLOOD PRESSURE: 140 MMHG | TEMPERATURE: 96.9 F | HEART RATE: 74 BPM | DIASTOLIC BLOOD PRESSURE: 84 MMHG | BODY MASS INDEX: 49.57 KG/M2 | OXYGEN SATURATION: 97 % | WEIGHT: 315 LBS

## 2024-04-10 DIAGNOSIS — I50.32 CHRONIC HEART FAILURE WITH PRESERVED EJECTION FRACTION (HFPEF) (HCC): ICD-10-CM

## 2024-04-10 DIAGNOSIS — I48.0 PAF (PAROXYSMAL ATRIAL FIBRILLATION) (HCC): ICD-10-CM

## 2024-04-10 DIAGNOSIS — R73.03 PRE-DIABETES: ICD-10-CM

## 2024-04-10 DIAGNOSIS — I48.91 ATRIAL FIBRILLATION WITH RAPID VENTRICULAR RESPONSE (HCC): ICD-10-CM

## 2024-04-10 DIAGNOSIS — J01.90 ACUTE SINUSITIS, RECURRENCE NOT SPECIFIED, UNSPECIFIED LOCATION: Primary | ICD-10-CM

## 2024-04-10 LAB — HBA1C MFR BLD: 5.9 %

## 2024-04-10 PROCEDURE — 3017F COLORECTAL CA SCREEN DOC REV: CPT | Performed by: NURSE PRACTITIONER

## 2024-04-10 PROCEDURE — 83036 HEMOGLOBIN GLYCOSYLATED A1C: CPT | Performed by: NURSE PRACTITIONER

## 2024-04-10 PROCEDURE — 99214 OFFICE O/P EST MOD 30 MIN: CPT | Performed by: NURSE PRACTITIONER

## 2024-04-10 PROCEDURE — 3077F SYST BP >= 140 MM HG: CPT | Performed by: NURSE PRACTITIONER

## 2024-04-10 PROCEDURE — G8427 DOCREV CUR MEDS BY ELIG CLIN: HCPCS | Performed by: NURSE PRACTITIONER

## 2024-04-10 PROCEDURE — 1036F TOBACCO NON-USER: CPT | Performed by: NURSE PRACTITIONER

## 2024-04-10 PROCEDURE — 3079F DIAST BP 80-89 MM HG: CPT | Performed by: NURSE PRACTITIONER

## 2024-04-10 PROCEDURE — G8417 CALC BMI ABV UP PARAM F/U: HCPCS | Performed by: NURSE PRACTITIONER

## 2024-04-10 ASSESSMENT — ENCOUNTER SYMPTOMS
COUGH: 1
PHOTOPHOBIA: 0
VOMITING: 0
DIARRHEA: 0
SORE THROAT: 0
ABDOMINAL PAIN: 0
WHEEZING: 0
NAUSEA: 0
STRIDOR: 0
EYE DISCHARGE: 0
SHORTNESS OF BREATH: 0
BACK PAIN: 0
EYE REDNESS: 0
CONSTIPATION: 0
BLOOD IN STOOL: 0
SINUS PAIN: 0
EYE PAIN: 0

## 2024-04-10 ASSESSMENT — PATIENT HEALTH QUESTIONNAIRE - PHQ9
SUM OF ALL RESPONSES TO PHQ QUESTIONS 1-9: 0
2. FEELING DOWN, DEPRESSED OR HOPELESS: NOT AT ALL
5. POOR APPETITE OR OVEREATING: NOT AT ALL
10. IF YOU CHECKED OFF ANY PROBLEMS, HOW DIFFICULT HAVE THESE PROBLEMS MADE IT FOR YOU TO DO YOUR WORK, TAKE CARE OF THINGS AT HOME, OR GET ALONG WITH OTHER PEOPLE: NOT DIFFICULT AT ALL
4. FEELING TIRED OR HAVING LITTLE ENERGY: NOT AT ALL
SUM OF ALL RESPONSES TO PHQ QUESTIONS 1-9: 0
6. FEELING BAD ABOUT YOURSELF - OR THAT YOU ARE A FAILURE OR HAVE LET YOURSELF OR YOUR FAMILY DOWN: NOT AT ALL
SUM OF ALL RESPONSES TO PHQ QUESTIONS 1-9: 0
3. TROUBLE FALLING OR STAYING ASLEEP: NOT AT ALL
7. TROUBLE CONCENTRATING ON THINGS, SUCH AS READING THE NEWSPAPER OR WATCHING TELEVISION: NOT AT ALL
8. MOVING OR SPEAKING SO SLOWLY THAT OTHER PEOPLE COULD HAVE NOTICED. OR THE OPPOSITE, BEING SO FIGETY OR RESTLESS THAT YOU HAVE BEEN MOVING AROUND A LOT MORE THAN USUAL: NOT AT ALL
1. LITTLE INTEREST OR PLEASURE IN DOING THINGS: NOT AT ALL
9. THOUGHTS THAT YOU WOULD BE BETTER OFF DEAD, OR OF HURTING YOURSELF: NOT AT ALL
SUM OF ALL RESPONSES TO PHQ9 QUESTIONS 1 & 2: 0
SUM OF ALL RESPONSES TO PHQ QUESTIONS 1-9: 0

## 2024-04-10 NOTE — ASSESSMENT & PLAN NOTE
A1c 5.9. The patient is asked to make an attempt to improve diet and exercise patterns to aid in medical management of this problem.

## 2024-04-10 NOTE — ASSESSMENT & PLAN NOTE
Continue with symptomatic management. Recommend increased water intake as well as saline irrigation, mucolytics, and antihistamines as needed. Advised to call back if no improvement over the next 4-7 days.   Astepro sample provided to patient.

## 2024-04-10 NOTE — PROGRESS NOTES
Jonny Banks (:  1965) is a 58 y.o. male,Established patient, here for evaluation of the following chief complaint(s):  Cough (X 3 days)      ASSESSMENT/PLAN:  1. Acute sinusitis, recurrence not specified, unspecified location  Assessment & Plan:  Continue with symptomatic management. Recommend increased water intake as well as saline irrigation, mucolytics, and antihistamines as needed. Advised to call back if no improvement over the next 4-7 days.   Astepro sample provided to patient.   2. Pre-diabetes  Assessment & Plan:  A1c 5.9. The patient is asked to make an attempt to improve diet and exercise patterns to aid in medical management of this problem.    3. PAF (paroxysmal atrial fibrillation) ([facility])  Assessment & Plan:  Monitored by specialist- no acute findings   Orders:  -     POCT glycosylated hemoglobin (Hb A1C)  4. Chronic heart failure with preserved ejection fraction (HFpEF) ([facility])  Assessment & Plan:  Monitored by specialist-no acute findings at this time   Orders:  -     POCT glycosylated hemoglobin (Hb A1C)  5. Atrial fibrillation with rapid ventricular response ([facility])  Assessment & Plan:  Stable, controlled  No changes  Continue current treatment plan     Orders:  -     POCT glycosylated hemoglobin (Hb A1C)      No follow-ups on file.    SUBJECTIVE/OBJECTIVE:  Patient presents today with cough and cold symptoms that started Monday. Reports negative covid test on Monday. Reports nonproductive cough and head congestion. Denies fevers. He has not been taking anything for his symptoms.     Current Outpatient Medications   Medication Sig Dispense Refill    atorvastatin (LIPITOR) 40 MG tablet Take 1 tablet by mouth nightly 30 tablet 0    furosemide (LASIX) 40 MG tablet Take 1 tablet by mouth once daily 30 tablet 0    pantoprazole (PROTONIX) 40 MG tablet Take 1 tablet by mouth every morning (before breakfast) 30 tablet 3    clopidogrel (PLAVIX) 75 MG tablet Take 1 tablet by mouth daily 30 tablet 3

## 2024-04-11 ENCOUNTER — OFFICE VISIT (OUTPATIENT)
Dept: FAMILY MEDICINE CLINIC | Age: 59
End: 2024-04-11
Payer: COMMERCIAL

## 2024-04-11 VITALS
WEIGHT: 315 LBS | HEART RATE: 93 BPM | OXYGEN SATURATION: 95 % | TEMPERATURE: 97.3 F | SYSTOLIC BLOOD PRESSURE: 130 MMHG | DIASTOLIC BLOOD PRESSURE: 84 MMHG | BODY MASS INDEX: 49.57 KG/M2

## 2024-04-11 DIAGNOSIS — R30.0 DYSURIA: Primary | ICD-10-CM

## 2024-04-11 LAB
BILIRUBIN, POC: NORMAL
BLOOD URINE, POC: NORMAL
CLARITY, POC: NORMAL
COLOR, POC: YELLOW
GLUCOSE URINE, POC: NORMAL
KETONES, POC: NORMAL
LEUKOCYTE EST, POC: NORMAL
NITRITE, POC: NORMAL
PH, POC: 7
PROTEIN, POC: NORMAL
SPECIFIC GRAVITY, POC: 1.02
UROBILINOGEN, POC: 2

## 2024-04-11 PROCEDURE — 1036F TOBACCO NON-USER: CPT | Performed by: NURSE PRACTITIONER

## 2024-04-11 PROCEDURE — 3075F SYST BP GE 130 - 139MM HG: CPT | Performed by: NURSE PRACTITIONER

## 2024-04-11 PROCEDURE — G8417 CALC BMI ABV UP PARAM F/U: HCPCS | Performed by: NURSE PRACTITIONER

## 2024-04-11 PROCEDURE — G8427 DOCREV CUR MEDS BY ELIG CLIN: HCPCS | Performed by: NURSE PRACTITIONER

## 2024-04-11 PROCEDURE — 3017F COLORECTAL CA SCREEN DOC REV: CPT | Performed by: NURSE PRACTITIONER

## 2024-04-11 PROCEDURE — 99214 OFFICE O/P EST MOD 30 MIN: CPT | Performed by: NURSE PRACTITIONER

## 2024-04-11 PROCEDURE — 81002 URINALYSIS NONAUTO W/O SCOPE: CPT | Performed by: NURSE PRACTITIONER

## 2024-04-11 PROCEDURE — 3079F DIAST BP 80-89 MM HG: CPT | Performed by: NURSE PRACTITIONER

## 2024-04-11 RX ORDER — NITROFURANTOIN 25; 75 MG/1; MG/1
100 CAPSULE ORAL 2 TIMES DAILY
Qty: 14 CAPSULE | Refills: 0 | Status: SHIPPED | OUTPATIENT
Start: 2024-04-11 | End: 2024-04-18

## 2024-04-11 ASSESSMENT — ENCOUNTER SYMPTOMS
SINUS PRESSURE: 0
NAUSEA: 0
COLOR CHANGE: 0
SHORTNESS OF BREATH: 0
BACK PAIN: 0
ABDOMINAL PAIN: 0
WHEEZING: 0
EYE REDNESS: 0
SORE THROAT: 0
RHINORRHEA: 0
EYE ITCHING: 0
CONSTIPATION: 0
CHEST TIGHTNESS: 0
DIARRHEA: 0
BLOOD IN STOOL: 0
COUGH: 1
VOMITING: 0

## 2024-04-11 NOTE — ASSESSMENT & PLAN NOTE
Hematuria noted on along with protein in urine.  Will proceed with culture and antibiotics and glucose noted.  Blood pressure is stable.  He is on Xarelto and Plavix.  I have asked him to complete antibiotics and follow-up in 2 weeks for repeat urine advised patient if symptoms become worse or develops alva blood to return to office sooner.  If there is no resolution in the next 2 weeks we will proceed with CT.

## 2024-04-11 NOTE — TELEPHONE ENCOUNTER
Patient called after seeing Ms. Ramirez this am. States he was told his sinus sx were likely due to Allergies. States that prior to the apointment he had a low grade fever. This evening, phone call was prompted by a fever of 101 which did not respond to Acetaminophen. Patient denies body aches, GI sx, faicla pain or pressure, or shortness of breath. Patient was hospitalized last year for PNA and has had multiple hospitalizations for Afib, as well as CHF. Due to fever being non-responsive to tylenol and no certain source, patient was advised to report to the ED for evaluation. Patient expressed he would likely due this however he would think about it.

## 2024-04-11 NOTE — PROGRESS NOTES
Jonny Banks (:  1965) is a 58 y.o. male,Established patient, here for evaluation of the following chief complaint(s):  Follow-up      ASSESSMENT/PLAN:  1. Dysuria  Assessment & Plan:   Hematuria noted on along with protein in urine.  Will proceed with culture and antibiotics and glucose noted.  Blood pressure is stable.  He is on Xarelto and Plavix.  I have asked him to complete antibiotics and follow-up in 2 weeks for repeat urine advised patient if symptoms become worse or develops alva blood to return to office sooner.  If there is no resolution in the next 2 weeks we will proceed with CT.  Orders:  -     POCT Urinalysis no Micro  -     Culture, Urine      No follow-ups on file.    SUBJECTIVE/OBJECTIVE:  Patient in office today after being seen yesterday for worsening symptoms.  He was seen yesterday for cough and congestion but was not febrile and not having any other symptoms.  He was advised to continue with cough medication and allergies.  He then stated last evening that he was experiencing fevers of 101.  He took Tylenol and continue to take Coricidin.  He states he still has a cough and congestion.  He did also take his Zyrtec.  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  When asked about this he states that they did not follow-up or do anything and he kind of forgot that it was an issue.  He reports that he has had frequency and urgency the past several days including having an episode of urinary incontinence because he cannot get to the restroom in time.  He denies any other urinary concerns and denies any alva hematuria in his blood that he can see.  Denies any abdominal pain or constipation and denies any nausea or vomiting.    Current Outpatient Medications   Medication Sig Dispense Refill    nitrofurantoin, macrocrystal-monohydrate, (MACROBID) 100 MG capsule Take 1 capsule by mouth 2 times

## 2024-04-12 LAB — BACTERIA UR CULT: NORMAL

## 2024-04-23 ENCOUNTER — OFFICE VISIT (OUTPATIENT)
Dept: CARDIOLOGY CLINIC | Age: 59
End: 2024-04-23
Payer: COMMERCIAL

## 2024-04-23 VITALS
SYSTOLIC BLOOD PRESSURE: 190 MMHG | HEIGHT: 68 IN | HEART RATE: 70 BPM | WEIGHT: 315 LBS | DIASTOLIC BLOOD PRESSURE: 102 MMHG | BODY MASS INDEX: 47.74 KG/M2

## 2024-04-23 DIAGNOSIS — Z01.818 PRE-OP TESTING: Primary | ICD-10-CM

## 2024-04-23 DIAGNOSIS — E66.01 CLASS 2 SEVERE OBESITY DUE TO EXCESS CALORIES WITH SERIOUS COMORBIDITY IN ADULT, UNSPECIFIED BMI (HCC): ICD-10-CM

## 2024-04-23 DIAGNOSIS — Z92.89 H/O ANGIOGRAPHY: ICD-10-CM

## 2024-04-23 DIAGNOSIS — I10 PRIMARY HYPERTENSION: ICD-10-CM

## 2024-04-23 DIAGNOSIS — R73.03 PRE-DIABETES: ICD-10-CM

## 2024-04-23 DIAGNOSIS — I48.91 ATRIAL FIBRILLATION WITH RAPID VENTRICULAR RESPONSE (HCC): ICD-10-CM

## 2024-04-23 DIAGNOSIS — Z01.818 PRE-OP TESTING: ICD-10-CM

## 2024-04-23 DIAGNOSIS — I50.30 HEART FAILURE WITH PRESERVED EJECTION FRACTION, UNSPECIFIED HF CHRONICITY (HCC): ICD-10-CM

## 2024-04-23 LAB
DEPRECATED RDW RBC AUTO: 14.2 % (ref 12.4–15.4)
HCT VFR BLD AUTO: 43.7 % (ref 40.5–52.5)
HGB BLD-MCNC: 14.6 G/DL (ref 13.5–17.5)
MCH RBC QN AUTO: 29.4 PG (ref 26–34)
MCHC RBC AUTO-ENTMCNC: 33.4 G/DL (ref 31–36)
MCV RBC AUTO: 87.8 FL (ref 80–100)
PLATELET # BLD AUTO: 317 K/UL (ref 135–450)
PMV BLD AUTO: 9.8 FL (ref 5–10.5)
RBC # BLD AUTO: 4.97 M/UL (ref 4.2–5.9)
WBC # BLD AUTO: 10.3 K/UL (ref 4–11)

## 2024-04-23 PROCEDURE — 93000 ELECTROCARDIOGRAM COMPLETE: CPT | Performed by: NURSE PRACTITIONER

## 2024-04-23 PROCEDURE — 3077F SYST BP >= 140 MM HG: CPT | Performed by: NURSE PRACTITIONER

## 2024-04-23 PROCEDURE — 1036F TOBACCO NON-USER: CPT | Performed by: NURSE PRACTITIONER

## 2024-04-23 PROCEDURE — G8427 DOCREV CUR MEDS BY ELIG CLIN: HCPCS | Performed by: NURSE PRACTITIONER

## 2024-04-23 PROCEDURE — 3080F DIAST BP >= 90 MM HG: CPT | Performed by: NURSE PRACTITIONER

## 2024-04-23 PROCEDURE — G8417 CALC BMI ABV UP PARAM F/U: HCPCS | Performed by: NURSE PRACTITIONER

## 2024-04-23 PROCEDURE — 3017F COLORECTAL CA SCREEN DOC REV: CPT | Performed by: NURSE PRACTITIONER

## 2024-04-23 PROCEDURE — 99215 OFFICE O/P EST HI 40 MIN: CPT | Performed by: NURSE PRACTITIONER

## 2024-04-23 RX ORDER — CYCLOBENZAPRINE HCL 10 MG
1 TABLET ORAL 2 TIMES DAILY PRN
COMMUNITY
Start: 2024-04-05

## 2024-04-23 RX ORDER — VALSARTAN 160 MG/1
160 TABLET ORAL DAILY
Qty: 180 TABLET | Refills: 3
Start: 2024-04-23

## 2024-04-23 NOTE — PROGRESS NOTES
[facility]   Cardiology  Note   [facility]   Bianka Gan RN, FNP APRN CVNP  Date: 4/23/2024      HPI: Jonny Banks is a 58 y.o. male with a past medical history of CAD  2022 [facility] demonstrated 40-50% LAD disease PAF HTN HLD MARY CRD afib morbid obesity   Mr Banks is here for CC for CTS right wrist procedure  Denies cp SOB PND has MARY and wears CPAP   B/p recheck was 160/90  / discussed diet sodium and fluids  EKG today NSR no acute EKG changes   On Xarelto  & Plavix denies any bleeding or falls / no asa or NSIDS only tylenol prn   May have needed  CTS procedure & may  interrupt Plavix and Xarelto as planned  Usually Plavix held  5-7 days / Xarelto 2 days restart  ASAP   Blood work & fu in 6 months     [facility] 8/9/2023 PTCA with stent to the proximal LAD.  The left main coronary artery is normal.   Left anterior descending artery proximal stenosis up to 80 to 85%.   Circumflex artery is as some stenosis in the obtuse marginal branch 2 and 3 above to 40% that did not require intervention.  The right coronary artery is a dominant vessel and minor plaque.  Left ventriculogram shows ejection fraction of 55%. Normal wall motion.    TTE 8/12023  Normal left ventricle size, concentric hypertrophy, and normal systolic function with an estimated ejection fraction of 50-55%.  Cannot exclude apical aneurysm.  Indeterminate diastolic function.  The left atrium is mildly dilated.  The aortic root is dilated at approximately 4.4cm. The ascending aorta measures approximately 3.5cm.    Patient seen and examined. Clinical notes reviewed. Telemetry reviewed / Pertinent labs, diagnostic, device, and imaging results reviewed as a part of this visit  I spent a total of 50 minutes and greater than 50% of the time was spent counseling with patient  coordinating care regarding her diagnosis, treatments and plan of care.      Vitals:    04/23/24 1431   BP: (!) 190/102   Pulse:       In: 300 [P.O.:300]  Out:

## 2024-04-24 LAB
25(OH)D3 SERPL-MCNC: 14.5 NG/ML
ALBUMIN SERPL-MCNC: 4.5 G/DL (ref 3.4–5)
ALBUMIN/GLOB SERPL: 1.4 {RATIO} (ref 1.1–2.2)
ALP SERPL-CCNC: 104 U/L (ref 40–129)
ALT SERPL-CCNC: 24 U/L (ref 10–40)
ANION GAP SERPL CALCULATED.3IONS-SCNC: 14 MMOL/L (ref 3–16)
AST SERPL-CCNC: 25 U/L (ref 15–37)
BILIRUB DIRECT SERPL-MCNC: <0.2 MG/DL (ref 0–0.3)
BILIRUB INDIRECT SERPL-MCNC: NORMAL MG/DL (ref 0–1)
BILIRUB SERPL-MCNC: 0.5 MG/DL (ref 0–1)
BUN SERPL-MCNC: 20 MG/DL (ref 7–20)
CALCIUM SERPL-MCNC: 9.7 MG/DL (ref 8.3–10.6)
CHLORIDE SERPL-SCNC: 103 MMOL/L (ref 99–110)
CHOLEST SERPL-MCNC: 127 MG/DL (ref 0–199)
CO2 SERPL-SCNC: 25 MMOL/L (ref 21–32)
CREAT SERPL-MCNC: 1.1 MG/DL (ref 0.9–1.3)
EST. AVERAGE GLUCOSE BLD GHB EST-MCNC: 111.2 MG/DL
GFR SERPLBLD CREATININE-BSD FMLA CKD-EPI: 78 ML/MIN/{1.73_M2}
GLUCOSE SERPL-MCNC: 110 MG/DL (ref 70–99)
HBA1C MFR BLD: 5.5 %
HDLC SERPL-MCNC: 31 MG/DL (ref 40–60)
LDLC SERPL CALC-MCNC: 59 MG/DL
MAGNESIUM SERPL-MCNC: 1.8 MG/DL (ref 1.8–2.4)
POTASSIUM SERPL-SCNC: 3.8 MMOL/L (ref 3.5–5.1)
PROT SERPL-MCNC: 7.7 G/DL (ref 6.4–8.2)
SODIUM SERPL-SCNC: 142 MMOL/L (ref 136–145)
TRIGL SERPL-MCNC: 186 MG/DL (ref 0–150)
TSH SERPL DL<=0.005 MIU/L-ACNC: 2.65 UIU/ML (ref 0.27–4.2)
VLDLC SERPL CALC-MCNC: 37 MG/DL

## 2024-05-01 ENCOUNTER — OFFICE VISIT (OUTPATIENT)
Dept: FAMILY MEDICINE CLINIC | Age: 59
End: 2024-05-01
Payer: COMMERCIAL

## 2024-05-01 VITALS
SYSTOLIC BLOOD PRESSURE: 142 MMHG | BODY MASS INDEX: 48.05 KG/M2 | DIASTOLIC BLOOD PRESSURE: 98 MMHG | TEMPERATURE: 96.9 F | OXYGEN SATURATION: 95 % | HEART RATE: 76 BPM | WEIGHT: 315 LBS

## 2024-05-01 DIAGNOSIS — R73.03 PRE-DIABETES: ICD-10-CM

## 2024-05-01 DIAGNOSIS — R31.1 BENIGN ESSENTIAL MICROSCOPIC HEMATURIA: ICD-10-CM

## 2024-05-01 DIAGNOSIS — R30.0 DYSURIA: Primary | ICD-10-CM

## 2024-05-01 DIAGNOSIS — I50.32 CHRONIC HEART FAILURE WITH PRESERVED EJECTION FRACTION (HFPEF) (HCC): ICD-10-CM

## 2024-05-01 DIAGNOSIS — I48.0 PAF (PAROXYSMAL ATRIAL FIBRILLATION) (HCC): ICD-10-CM

## 2024-05-01 DIAGNOSIS — I10 PRIMARY HYPERTENSION: ICD-10-CM

## 2024-05-01 DIAGNOSIS — Z01.818 PRE-OP EXAMINATION: ICD-10-CM

## 2024-05-01 PROBLEM — R55 SYNCOPE: Status: RESOLVED | Noted: 2023-10-07 | Resolved: 2024-05-01

## 2024-05-01 LAB
BILIRUBIN, POC: NORMAL
BLOOD URINE, POC: NORMAL
CLARITY, POC: CLEAR
COLOR, POC: YELLOW
GLUCOSE URINE, POC: NORMAL
KETONES, POC: NORMAL
LEUKOCYTE EST, POC: NORMAL
NITRITE, POC: NORMAL
PH, POC: 7
PROTEIN, POC: NORMAL
SPECIFIC GRAVITY, POC: 1.02
UROBILINOGEN, POC: 1

## 2024-05-01 PROCEDURE — 3077F SYST BP >= 140 MM HG: CPT | Performed by: NURSE PRACTITIONER

## 2024-05-01 PROCEDURE — 3017F COLORECTAL CA SCREEN DOC REV: CPT | Performed by: NURSE PRACTITIONER

## 2024-05-01 PROCEDURE — 1036F TOBACCO NON-USER: CPT | Performed by: NURSE PRACTITIONER

## 2024-05-01 PROCEDURE — G8427 DOCREV CUR MEDS BY ELIG CLIN: HCPCS | Performed by: NURSE PRACTITIONER

## 2024-05-01 PROCEDURE — G8417 CALC BMI ABV UP PARAM F/U: HCPCS | Performed by: NURSE PRACTITIONER

## 2024-05-01 PROCEDURE — 81002 URINALYSIS NONAUTO W/O SCOPE: CPT | Performed by: NURSE PRACTITIONER

## 2024-05-01 PROCEDURE — 99214 OFFICE O/P EST MOD 30 MIN: CPT | Performed by: NURSE PRACTITIONER

## 2024-05-01 PROCEDURE — 3080F DIAST BP >= 90 MM HG: CPT | Performed by: NURSE PRACTITIONER

## 2024-05-01 ASSESSMENT — ENCOUNTER SYMPTOMS
COUGH: 0
CHEST TIGHTNESS: 0
CONSTIPATION: 0
COLOR CHANGE: 0
SINUS PRESSURE: 0
BACK PAIN: 0
BLOOD IN STOOL: 0
NAUSEA: 0
WHEEZING: 0
SHORTNESS OF BREATH: 0
DIARRHEA: 0
RHINORRHEA: 0
EYE ITCHING: 0
ABDOMINAL PAIN: 0
EYE REDNESS: 0
SORE THROAT: 0
VOMITING: 0

## 2024-05-01 NOTE — ASSESSMENT & PLAN NOTE
Patient on Xarelto for management of A-fib he will stop this medication 2 days prior to procedure and then restart the medication as soon as possible

## 2024-05-01 NOTE — PROGRESS NOTES
Subjective:     Jonny Banks who presentsto the office today for a preoperative consultation at the request of surgeon Dr. Haq who plans on performing right carpal tunnel release on 5/7/24 .  This consultation is requested for the specific conditions prompting preoperative evaluation (i.e. because of potential affect on operative risk): DM, HTN, HLD, CKD.  Planned anesthesia isIV sedation and Pudendal block.  The patient has the following known anesthesia issues:  none   Patient has a bleeding risk of : none.  Patient's medications, allergies, past medical, surgical,social and family histories were reviewed and updated as appropriate.    Past Medical History:   Diagnosis Date    Carpal tunnel syndrome     Chronic kidney disease     Hyperlipidemia     Hypertension     Obesity, unspecified     MARY (obstructive sleep apnea)      Past Surgical History:   Procedure Laterality Date    CARDIAC CATHETERIZATION      KNEE ARTHROSCOPY Right     NOSE SURGERY      Repair from Fx     Family History   Problem Relation Age of Onset    Diabetes Mother     Stroke Maternal Uncle      Social History     Socioeconomic History    Marital status: Single     Spouse name: Not on file    Number of children: 2    Years of education: Not on file    Highest education level: Not on file   Occupational History    Occupation:    Tobacco Use    Smoking status: Never    Smokeless tobacco: Never   Vaping Use    Vaping Use: Never used   Substance and Sexual Activity    Alcohol use: Yes     Comment: occasionally    Drug use: No    Sexual activity: Not on file   Other Topics Concern    Not on file   Social History Narrative    Not on file     Social Determinants of Health     Financial Resource Strain: Low Risk  (8/3/2023)    Overall Financial Resource Strain (CARDIA)     Difficulty of Paying Living Expenses: Not hard at all   Food Insecurity: Not on file (8/3/2023)   Transportation Needs: Unknown (8/3/2023)    PRAPARE -

## 2024-05-01 NOTE — ASSESSMENT & PLAN NOTE
Patient has also received clearance from cardiology. Perioperative risk related to the patient's upcoming surgery is considered low. he is cleared for surgery.  Pre-op exam was completed on 5/1/24 4:07 PM.

## 2024-05-01 NOTE — ASSESSMENT & PLAN NOTE
Stable, controlled  No changes  Continue current treatment plan   Continue Procardia and valsartan

## 2024-05-06 RX ORDER — ATORVASTATIN CALCIUM 40 MG/1
40 TABLET, FILM COATED ORAL NIGHTLY
Qty: 30 TABLET | Refills: 0 | Status: SHIPPED | OUTPATIENT
Start: 2024-05-06

## 2024-05-06 NOTE — TELEPHONE ENCOUNTER
Requested Prescriptions     Pending Prescriptions Disp Refills    atorvastatin (LIPITOR) 40 MG tablet [Pharmacy Med Name: Atorvastatin Calcium 40 MG Oral Tablet] 30 tablet 0     Sig: Take 1 tablet by mouth nightly          Last Office Visit: 5/1/2024     Next Office Visit: Visit date not found

## 2024-05-29 RX ORDER — FUROSEMIDE 40 MG/1
40 TABLET ORAL DAILY
Qty: 30 TABLET | Refills: 0 | Status: SHIPPED | OUTPATIENT
Start: 2024-05-29

## 2024-05-31 PROBLEM — Z01.818 PRE-OP EXAMINATION: Status: RESOLVED | Noted: 2024-05-01 | Resolved: 2024-05-31

## 2024-09-30 NOTE — TELEPHONE ENCOUNTER
Requested Prescriptions     Pending Prescriptions Disp Refills    furosemide (LASIX) 40 MG tablet [Pharmacy Med Name: Furosemide 40 MG Oral Tablet] 30 tablet 0     Sig: Take 1 tablet by mouth once daily          Last Office Visit: 5/1/2024     Next Office Visit: Visit date not found        none

## 2024-10-01 RX ORDER — FUROSEMIDE 40 MG
40 TABLET ORAL DAILY
Qty: 30 TABLET | Refills: 0 | Status: SHIPPED | OUTPATIENT
Start: 2024-10-01

## 2024-10-05 NOTE — PROGRESS NOTES
Patient called the on call line. Reports he is running a low grade fever, congestion over the last few days and Mild cough. Denies facial pain or pressure. Denies body aches. Denies rhinorrhea. Believes it is a \"sinus infection\". Leaving town on Tuesday. Antibiotics sent. Advised to report to Urgent care if sx dont improve.

## 2024-10-07 ENCOUNTER — OFFICE VISIT (OUTPATIENT)
Dept: FAMILY MEDICINE CLINIC | Age: 59
End: 2024-10-07
Payer: COMMERCIAL

## 2024-10-07 ENCOUNTER — HOSPITAL ENCOUNTER (OUTPATIENT)
Dept: GENERAL RADIOLOGY | Age: 59
Discharge: HOME | End: 2024-10-07
Payer: COMMERCIAL

## 2024-10-07 VITALS
DIASTOLIC BLOOD PRESSURE: 88 MMHG | WEIGHT: 315 LBS | HEART RATE: 89 BPM | BODY MASS INDEX: 47.74 KG/M2 | TEMPERATURE: 101.5 F | HEIGHT: 68 IN | SYSTOLIC BLOOD PRESSURE: 138 MMHG | OXYGEN SATURATION: 95 %

## 2024-10-07 DIAGNOSIS — R06.02 SHORTNESS OF BREATH: Primary | ICD-10-CM

## 2024-10-07 DIAGNOSIS — R06.02 SHORTNESS OF BREATH: ICD-10-CM

## 2024-10-07 PROCEDURE — G8484 FLU IMMUNIZE NO ADMIN: HCPCS | Performed by: NURSE PRACTITIONER

## 2024-10-07 PROCEDURE — 3079F DIAST BP 80-89 MM HG: CPT | Performed by: NURSE PRACTITIONER

## 2024-10-07 PROCEDURE — 3075F SYST BP GE 130 - 139MM HG: CPT | Performed by: NURSE PRACTITIONER

## 2024-10-07 PROCEDURE — 71046 X-RAY EXAM CHEST 2 VIEWS: CPT

## 2024-10-07 PROCEDURE — 3017F COLORECTAL CA SCREEN DOC REV: CPT | Performed by: NURSE PRACTITIONER

## 2024-10-07 PROCEDURE — 99214 OFFICE O/P EST MOD 30 MIN: CPT | Performed by: NURSE PRACTITIONER

## 2024-10-07 PROCEDURE — G8427 DOCREV CUR MEDS BY ELIG CLIN: HCPCS | Performed by: NURSE PRACTITIONER

## 2024-10-07 PROCEDURE — 1036F TOBACCO NON-USER: CPT | Performed by: NURSE PRACTITIONER

## 2024-10-07 PROCEDURE — G2211 COMPLEX E/M VISIT ADD ON: HCPCS | Performed by: NURSE PRACTITIONER

## 2024-10-07 PROCEDURE — G8417 CALC BMI ABV UP PARAM F/U: HCPCS | Performed by: NURSE PRACTITIONER

## 2024-10-07 RX ORDER — ALBUTEROL SULFATE 90 UG/1
2 INHALANT RESPIRATORY (INHALATION) 4 TIMES DAILY PRN
Qty: 54 G | Refills: 1 | Status: SHIPPED | OUTPATIENT
Start: 2024-10-07

## 2024-10-07 RX ORDER — DOXYCYCLINE HYCLATE 100 MG
100 TABLET ORAL 2 TIMES DAILY
Qty: 14 TABLET | Refills: 0 | Status: SHIPPED | OUTPATIENT
Start: 2024-10-07 | End: 2024-10-14

## 2024-10-07 SDOH — ECONOMIC STABILITY: FOOD INSECURITY: WITHIN THE PAST 12 MONTHS, THE FOOD YOU BOUGHT JUST DIDN'T LAST AND YOU DIDN'T HAVE MONEY TO GET MORE.: NEVER TRUE

## 2024-10-07 SDOH — ECONOMIC STABILITY: FOOD INSECURITY: WITHIN THE PAST 12 MONTHS, YOU WORRIED THAT YOUR FOOD WOULD RUN OUT BEFORE YOU GOT MONEY TO BUY MORE.: NEVER TRUE

## 2024-10-07 SDOH — ECONOMIC STABILITY: INCOME INSECURITY: HOW HARD IS IT FOR YOU TO PAY FOR THE VERY BASICS LIKE FOOD, HOUSING, MEDICAL CARE, AND HEATING?: NOT HARD AT ALL

## 2024-10-07 ASSESSMENT — ENCOUNTER SYMPTOMS
EYE REDNESS: 0
CONSTIPATION: 0
RHINORRHEA: 0
SINUS PRESSURE: 0
COLOR CHANGE: 0
NAUSEA: 0
DIARRHEA: 0
WHEEZING: 1
SHORTNESS OF BREATH: 1
ABDOMINAL PAIN: 0
COUGH: 1
BLOOD IN STOOL: 0
BACK PAIN: 0
CHEST TIGHTNESS: 0
EYE ITCHING: 0
VOMITING: 0
SORE THROAT: 0

## 2024-10-07 NOTE — ASSESSMENT & PLAN NOTE
This is an acute problem and is not stable and controlled at this time.  Proceed with evaluation via chest x-ray.  RSV negative in office.  Try to reach urgent care unable to contact them for further results of testing that was done as the patient states they sent it out for further review.  Further recommendations pending results.   One or more undiagnosed new problem with uncertain prognosis till final diagnosis is made

## 2024-10-07 NOTE — PROGRESS NOTES
not hyperactive.        Vitals:    10/07/24 1204   BP: 138/88   Site: Right Upper Arm   Position: Sitting   Cuff Size: Large Adult   Pulse: 89   Temp: (!) 101.5 °F (38.6 °C)   SpO2: 95%   Weight: (!) 153.3 kg (338 lb)   Height: 1.727 m (5' 8\")       Physical Exam  Constitutional:       Appearance: Normal appearance. He is well-developed. He is ill-appearing.   HENT:      Head: Normocephalic and atraumatic.      Right Ear: Hearing normal.      Left Ear: Hearing normal.      Nose: No mucosal edema.      Right Sinus: No maxillary sinus tenderness or frontal sinus tenderness.      Left Sinus: No maxillary sinus tenderness or frontal sinus tenderness.      Mouth/Throat:      Tonsils: No tonsillar abscesses.   Eyes:      Extraocular Movements: Extraocular movements intact.      Pupils: Pupils are equal, round, and reactive to light.   Cardiovascular:      Rate and Rhythm: Normal rate and regular rhythm.      Pulses: Normal pulses.      Heart sounds: Normal heart sounds.   Pulmonary:      Effort: Pulmonary effort is normal.      Breath sounds: Examination of the right-middle field reveals decreased breath sounds. Examination of the left-middle field reveals decreased breath sounds. Examination of the right-lower field reveals decreased breath sounds and wheezing. Examination of the left-lower field reveals decreased breath sounds and wheezing. Decreased breath sounds and wheezing present.   Lymphadenopathy:      Head:      Right side of head: No submental, submandibular, tonsillar, preauricular, posterior auricular or occipital adenopathy.      Left side of head: No submental, submandibular, tonsillar, preauricular, posterior auricular or occipital adenopathy.   Skin:     General: Skin is warm and dry.   Neurological:      Mental Status: He is alert.   Psychiatric:         Mood and Affect: Mood normal.         Behavior: Behavior normal.                 An electronic signature was used to authenticate this note.    --Darline

## 2024-11-13 ENCOUNTER — HOSPITAL ENCOUNTER (OUTPATIENT)
Dept: CT IMAGING | Age: 59
Discharge: HOME | End: 2024-11-13
Payer: COMMERCIAL

## 2024-11-13 ENCOUNTER — HOSPITAL ENCOUNTER (INPATIENT)
Age: 59
LOS: 3 days | Discharge: HOME | DRG: 177 | End: 2024-11-16
Attending: EMERGENCY MEDICINE | Admitting: INTERNAL MEDICINE
Payer: COMMERCIAL

## 2024-11-13 ENCOUNTER — HOSPITAL ENCOUNTER (OUTPATIENT)
Dept: GENERAL RADIOLOGY | Age: 59
Discharge: HOME | End: 2024-11-13
Payer: COMMERCIAL

## 2024-11-13 ENCOUNTER — OFFICE VISIT (OUTPATIENT)
Dept: FAMILY MEDICINE CLINIC | Age: 59
End: 2024-11-13

## 2024-11-13 VITALS
SYSTOLIC BLOOD PRESSURE: 166 MMHG | OXYGEN SATURATION: 95 % | BODY MASS INDEX: 49.72 KG/M2 | HEART RATE: 102 BPM | DIASTOLIC BLOOD PRESSURE: 110 MMHG | TEMPERATURE: 101.5 F | WEIGHT: 315 LBS

## 2024-11-13 DIAGNOSIS — J18.9 PNEUMONIA OF RIGHT LOWER LOBE DUE TO INFECTIOUS ORGANISM: ICD-10-CM

## 2024-11-13 DIAGNOSIS — R30.0 DYSURIA: Primary | ICD-10-CM

## 2024-11-13 DIAGNOSIS — R06.02 SHORTNESS OF BREATH: ICD-10-CM

## 2024-11-13 DIAGNOSIS — J45.901 REACTIVE AIRWAY DISEASE WITH ACUTE EXACERBATION, UNSPECIFIED ASTHMA SEVERITY, UNSPECIFIED WHETHER PERSISTENT: ICD-10-CM

## 2024-11-13 DIAGNOSIS — U07.1 COVID-19: Primary | ICD-10-CM

## 2024-11-13 LAB
ALBUMIN SERPL-MCNC: 3.9 G/DL (ref 3.4–5)
ALBUMIN/GLOB SERPL: 1.4 {RATIO} (ref 1.1–2.2)
ALP SERPL-CCNC: 74 U/L (ref 40–129)
ALT SERPL-CCNC: 16 U/L (ref 10–40)
ANION GAP SERPL CALCULATED.3IONS-SCNC: 11 MMOL/L (ref 3–16)
ANION GAP SERPL CALCULATED.3IONS-SCNC: 12 MMOL/L (ref 3–16)
AST SERPL-CCNC: 27 U/L (ref 15–37)
BASOPHILS # BLD: 0 K/UL (ref 0–0.2)
BASOPHILS NFR BLD: 0.3 %
BILIRUB SERPL-MCNC: 1 MG/DL (ref 0–1)
BILIRUBIN, POC: ABNORMAL
BLOOD URINE, POC: ABNORMAL
BUN SERPL-MCNC: 14 MG/DL (ref 7–20)
BUN SERPL-MCNC: 15 MG/DL (ref 7–20)
CALCIUM SERPL-MCNC: 8.5 MG/DL (ref 8.3–10.6)
CALCIUM SERPL-MCNC: 9.1 MG/DL (ref 8.3–10.6)
CHLORIDE SERPL-SCNC: 92 MMOL/L (ref 99–110)
CHLORIDE SERPL-SCNC: 96 MMOL/L (ref 99–110)
CLARITY, POC: ABNORMAL
CO2 SERPL-SCNC: 21 MMOL/L (ref 21–32)
CO2 SERPL-SCNC: 26 MMOL/L (ref 21–32)
COLOR, POC: ABNORMAL
CREAT SERPL-MCNC: 1.3 MG/DL (ref 0.9–1.3)
CREAT SERPL-MCNC: 1.3 MG/DL (ref 0.9–1.3)
DEPRECATED RDW RBC AUTO: 15.7 % (ref 12.4–15.4)
DEPRECATED RDW RBC AUTO: 15.9 % (ref 12.4–15.4)
EOSINOPHIL # BLD: 0 K/UL (ref 0–0.6)
EOSINOPHIL NFR BLD: 0 %
GFR SERPLBLD CREATININE-BSD FMLA CKD-EPI: 64 ML/MIN/{1.73_M2}
GFR SERPLBLD CREATININE-BSD FMLA CKD-EPI: 64 ML/MIN/{1.73_M2}
GLUCOSE SERPL-MCNC: 144 MG/DL (ref 70–99)
GLUCOSE SERPL-MCNC: 165 MG/DL (ref 70–99)
GLUCOSE URINE, POC: ABNORMAL MG/DL
HCT VFR BLD AUTO: 40.6 % (ref 40.5–52.5)
HCT VFR BLD AUTO: 42.5 % (ref 40.5–52.5)
HGB BLD-MCNC: 13.8 G/DL (ref 13.5–17.5)
HGB BLD-MCNC: 13.8 G/DL (ref 13.5–17.5)
KETONES, POC: ABNORMAL MG/DL
LACTATE BLDV-SCNC: 1.2 MMOL/L (ref 0.4–1.9)
LEUKOCYTE EST, POC: ABNORMAL
LYMPHOCYTES # BLD: 1.1 K/UL (ref 1–5.1)
LYMPHOCYTES NFR BLD: 10.4 %
MCH RBC QN AUTO: 28 PG (ref 26–34)
MCH RBC QN AUTO: 28.3 PG (ref 26–34)
MCHC RBC AUTO-ENTMCNC: 32.4 G/DL (ref 31–36)
MCHC RBC AUTO-ENTMCNC: 33.9 G/DL (ref 31–36)
MCV RBC AUTO: 83.7 FL (ref 80–100)
MCV RBC AUTO: 86.4 FL (ref 80–100)
MONOCYTES # BLD: 0.8 K/UL (ref 0–1.3)
MONOCYTES NFR BLD: 7.3 %
NEUTROPHILS # BLD: 8.9 K/UL (ref 1.7–7.7)
NEUTROPHILS NFR BLD: 82 %
NITRITE, POC: ABNORMAL
NT-PROBNP SERPL-MCNC: 2166 PG/ML (ref 0–124)
PH, POC: 5.5
PLATELET # BLD AUTO: 155 K/UL (ref 135–450)
PLATELET # BLD AUTO: 195 K/UL (ref 135–450)
PMV BLD AUTO: 10.2 FL (ref 5–10.5)
PMV BLD AUTO: 10.2 FL (ref 5–10.5)
POTASSIUM SERPL-SCNC: 3.5 MMOL/L (ref 3.5–5.1)
POTASSIUM SERPL-SCNC: 4.3 MMOL/L (ref 3.5–5.1)
POTASSIUM SERPL-SCNC: ABNORMAL MMOL/L (ref 3.5–5.1)
PROT SERPL-MCNC: 6.7 G/DL (ref 6.4–8.2)
PROTEIN, POC: 300 MG/DL
RBC # BLD AUTO: 4.86 M/UL (ref 4.2–5.9)
RBC # BLD AUTO: 4.92 M/UL (ref 4.2–5.9)
SODIUM SERPL-SCNC: 125 MMOL/L (ref 136–145)
SODIUM SERPL-SCNC: 133 MMOL/L (ref 136–145)
SPECIFIC GRAVITY, POC: 1.03
TROPONIN, HIGH SENSITIVITY: 67 NG/L (ref 0–22)
TROPONIN, HIGH SENSITIVITY: 79 NG/L (ref 0–22)
UROBILINOGEN, POC: 1 MG/DL
WBC # BLD AUTO: 10.9 K/UL (ref 4–11)
WBC # BLD AUTO: 11.5 K/UL (ref 4–11)

## 2024-11-13 PROCEDURE — 6370000000 HC RX 637 (ALT 250 FOR IP): Performed by: PHYSICIAN ASSISTANT

## 2024-11-13 PROCEDURE — 6360000004 HC RX CONTRAST MEDICATION: Performed by: NURSE PRACTITIONER

## 2024-11-13 PROCEDURE — 84132 ASSAY OF SERUM POTASSIUM: CPT

## 2024-11-13 PROCEDURE — 83880 ASSAY OF NATRIURETIC PEPTIDE: CPT

## 2024-11-13 PROCEDURE — 80048 BASIC METABOLIC PNL TOTAL CA: CPT

## 2024-11-13 PROCEDURE — 2580000003 HC RX 258: Performed by: PHYSICIAN ASSISTANT

## 2024-11-13 PROCEDURE — 99285 EMERGENCY DEPT VISIT HI MDM: CPT

## 2024-11-13 PROCEDURE — 6360000002 HC RX W HCPCS: Performed by: PHYSICIAN ASSISTANT

## 2024-11-13 PROCEDURE — 71046 X-RAY EXAM CHEST 2 VIEWS: CPT

## 2024-11-13 PROCEDURE — 94640 AIRWAY INHALATION TREATMENT: CPT

## 2024-11-13 PROCEDURE — 71275 CT ANGIOGRAPHY CHEST: CPT

## 2024-11-13 PROCEDURE — 1200000000 HC SEMI PRIVATE

## 2024-11-13 PROCEDURE — 84484 ASSAY OF TROPONIN QUANT: CPT

## 2024-11-13 PROCEDURE — 36415 COLL VENOUS BLD VENIPUNCTURE: CPT

## 2024-11-13 PROCEDURE — 93005 ELECTROCARDIOGRAM TRACING: CPT | Performed by: PHYSICIAN ASSISTANT

## 2024-11-13 PROCEDURE — 85025 COMPLETE CBC W/AUTO DIFF WBC: CPT

## 2024-11-13 PROCEDURE — 87040 BLOOD CULTURE FOR BACTERIA: CPT

## 2024-11-13 PROCEDURE — 96374 THER/PROPH/DIAG INJ IV PUSH: CPT

## 2024-11-13 PROCEDURE — 83605 ASSAY OF LACTIC ACID: CPT

## 2024-11-13 RX ORDER — ACETAMINOPHEN 500 MG
1000 TABLET ORAL ONCE
Status: COMPLETED | OUTPATIENT
Start: 2024-11-13 | End: 2024-11-13

## 2024-11-13 RX ORDER — IOPAMIDOL 755 MG/ML
75 INJECTION, SOLUTION INTRAVASCULAR
Status: COMPLETED | OUTPATIENT
Start: 2024-11-13 | End: 2024-11-13

## 2024-11-13 RX ORDER — NIFEDIPINE 90 MG/1
90 TABLET, EXTENDED RELEASE ORAL DAILY
Status: DISCONTINUED | OUTPATIENT
Start: 2024-11-14 | End: 2024-11-14

## 2024-11-13 RX ORDER — IPRATROPIUM BROMIDE AND ALBUTEROL SULFATE 2.5; .5 MG/3ML; MG/3ML
1 SOLUTION RESPIRATORY (INHALATION) ONCE
Status: COMPLETED | OUTPATIENT
Start: 2024-11-13 | End: 2024-11-13

## 2024-11-13 RX ORDER — VALSARTAN 160 MG/1
160 TABLET ORAL DAILY
Status: DISCONTINUED | OUTPATIENT
Start: 2024-11-14 | End: 2024-11-14

## 2024-11-13 RX ORDER — CARVEDILOL 25 MG/1
25 TABLET ORAL 2 TIMES DAILY WITH MEALS
Status: DISCONTINUED | OUTPATIENT
Start: 2024-11-14 | End: 2024-11-14

## 2024-11-13 RX ORDER — FUROSEMIDE 40 MG/1
40 TABLET ORAL DAILY
Status: DISCONTINUED | OUTPATIENT
Start: 2024-11-14 | End: 2024-11-14

## 2024-11-13 RX ORDER — PREDNISONE 20 MG/1
40 TABLET ORAL ONCE
Status: COMPLETED | OUTPATIENT
Start: 2024-11-13 | End: 2024-11-13

## 2024-11-13 RX ORDER — AZITHROMYCIN 250 MG/1
500 TABLET, FILM COATED ORAL ONCE
Status: COMPLETED | OUTPATIENT
Start: 2024-11-13 | End: 2024-11-13

## 2024-11-13 RX ORDER — ALBUTEROL SULFATE 0.83 MG/ML
2.5 SOLUTION RESPIRATORY (INHALATION) ONCE
Status: COMPLETED | OUTPATIENT
Start: 2024-11-13 | End: 2024-11-13

## 2024-11-13 RX ADMIN — IOPAMIDOL 75 ML: 755 INJECTION, SOLUTION INTRAVENOUS at 15:17

## 2024-11-13 RX ADMIN — PREDNISONE 40 MG: 20 TABLET ORAL at 21:14

## 2024-11-13 RX ADMIN — IPRATROPIUM BROMIDE AND ALBUTEROL SULFATE 1 DOSE: 2.5; .5 SOLUTION RESPIRATORY (INHALATION) at 21:21

## 2024-11-13 RX ADMIN — ALBUTEROL SULFATE 2.5 MG: 2.5 SOLUTION RESPIRATORY (INHALATION) at 21:21

## 2024-11-13 RX ADMIN — CEFTRIAXONE 1000 MG: 1 INJECTION, POWDER, FOR SOLUTION INTRAMUSCULAR; INTRAVENOUS at 20:07

## 2024-11-13 RX ADMIN — ACETAMINOPHEN 1000 MG: 500 TABLET, FILM COATED ORAL at 19:46

## 2024-11-13 RX ADMIN — AZITHROMYCIN DIHYDRATE 500 MG: 250 TABLET, FILM COATED ORAL at 20:07

## 2024-11-13 ASSESSMENT — PAIN SCALES - GENERAL: PAINLEVEL_OUTOF10: 0

## 2024-11-13 ASSESSMENT — ENCOUNTER SYMPTOMS
RHINORRHEA: 0
NAUSEA: 1
EYE ITCHING: 0
ABDOMINAL PAIN: 0
SHORTNESS OF BREATH: 1
SORE THROAT: 0
COUGH: 1
BLOOD IN STOOL: 0
CONSTIPATION: 0
EYE REDNESS: 0
DIARRHEA: 1
COLOR CHANGE: 0
VOMITING: 0
BACK PAIN: 0
SHORTNESS OF BREATH: 1
SINUS PRESSURE: 0
COUGH: 1
CHEST TIGHTNESS: 0
DIARRHEA: 0
WHEEZING: 1
VOMITING: 0
ABDOMINAL PAIN: 0
EYE REDNESS: 0
NAUSEA: 0

## 2024-11-13 ASSESSMENT — LIFESTYLE VARIABLES
HOW MANY STANDARD DRINKS CONTAINING ALCOHOL DO YOU HAVE ON A TYPICAL DAY: 1 OR 2
HOW OFTEN DO YOU HAVE A DRINK CONTAINING ALCOHOL: MONTHLY OR LESS

## 2024-11-13 ASSESSMENT — PAIN - FUNCTIONAL ASSESSMENT: PAIN_FUNCTIONAL_ASSESSMENT: 0-10

## 2024-11-14 LAB
ANION GAP SERPL CALCULATED.3IONS-SCNC: 12 MMOL/L (ref 3–16)
ANION GAP SERPL CALCULATED.3IONS-SCNC: 15 MMOL/L (ref 3–16)
BUN SERPL-MCNC: 16 MG/DL (ref 7–20)
BUN SERPL-MCNC: 19 MG/DL (ref 7–20)
CALCIUM SERPL-MCNC: 8.6 MG/DL (ref 8.3–10.6)
CALCIUM SERPL-MCNC: 8.8 MG/DL (ref 8.3–10.6)
CHLORIDE SERPL-SCNC: 94 MMOL/L (ref 99–110)
CHLORIDE SERPL-SCNC: 95 MMOL/L (ref 99–110)
CO2 SERPL-SCNC: 20 MMOL/L (ref 21–32)
CO2 SERPL-SCNC: 22 MMOL/L (ref 21–32)
CREAT SERPL-MCNC: 1.2 MG/DL (ref 0.9–1.3)
CREAT SERPL-MCNC: 1.3 MG/DL (ref 0.9–1.3)
CRP SERPL-MCNC: 385 MG/L (ref 0–5.1)
EKG ATRIAL RATE: 108 BPM
EKG DIAGNOSIS: NORMAL
EKG P AXIS: 47 DEGREES
EKG P-R INTERVAL: 138 MS
EKG Q-T INTERVAL: 362 MS
EKG QRS DURATION: 94 MS
EKG QTC CALCULATION (BAZETT): 485 MS
EKG R AXIS: -77 DEGREES
EKG T AXIS: 89 DEGREES
EKG VENTRICULAR RATE: 108 BPM
FLUAV RNA RESP QL NAA+PROBE: NOT DETECTED
FLUBV RNA RESP QL NAA+PROBE: NOT DETECTED
GFR SERPLBLD CREATININE-BSD FMLA CKD-EPI: 64 ML/MIN/{1.73_M2}
GFR SERPLBLD CREATININE-BSD FMLA CKD-EPI: 70 ML/MIN/{1.73_M2}
GLUCOSE SERPL-MCNC: 194 MG/DL (ref 70–99)
GLUCOSE SERPL-MCNC: 208 MG/DL (ref 70–99)
MAGNESIUM SERPL-MCNC: 1.7 MG/DL (ref 1.8–2.4)
MAGNESIUM SERPL-MCNC: 1.8 MG/DL (ref 1.8–2.4)
OSMOLALITY SERPL: 288 MOSM/KG (ref 278–305)
POTASSIUM SERPL-SCNC: 3.8 MMOL/L (ref 3.5–5.1)
POTASSIUM SERPL-SCNC: 4.1 MMOL/L (ref 3.5–5.1)
PROCALCITONIN SERPL IA-MCNC: 1.66 NG/ML (ref 0–0.15)
SARS-COV-2 RNA RESP QL NAA+PROBE: DETECTED
SODIUM SERPL-SCNC: 126 MMOL/L (ref 136–145)
SODIUM SERPL-SCNC: 127 MMOL/L (ref 136–145)
SODIUM SERPL-SCNC: 129 MMOL/L (ref 136–145)
SODIUM SERPL-SCNC: 129 MMOL/L (ref 136–145)
TROPONIN, HIGH SENSITIVITY: 71 NG/L (ref 0–22)
TROPONIN, HIGH SENSITIVITY: 75 NG/L (ref 0–22)
URATE SERPL-MCNC: 8.7 MG/DL (ref 3.5–7.2)

## 2024-11-14 PROCEDURE — 84145 PROCALCITONIN (PCT): CPT

## 2024-11-14 PROCEDURE — 1200000000 HC SEMI PRIVATE

## 2024-11-14 PROCEDURE — 80048 BASIC METABOLIC PNL TOTAL CA: CPT

## 2024-11-14 PROCEDURE — 6370000000 HC RX 637 (ALT 250 FOR IP)

## 2024-11-14 PROCEDURE — 87040 BLOOD CULTURE FOR BACTERIA: CPT

## 2024-11-14 PROCEDURE — 36415 COLL VENOUS BLD VENIPUNCTURE: CPT

## 2024-11-14 PROCEDURE — 86140 C-REACTIVE PROTEIN: CPT

## 2024-11-14 PROCEDURE — 94640 AIRWAY INHALATION TREATMENT: CPT

## 2024-11-14 PROCEDURE — 2580000003 HC RX 258

## 2024-11-14 PROCEDURE — 87641 MR-STAPH DNA AMP PROBE: CPT

## 2024-11-14 PROCEDURE — 83735 ASSAY OF MAGNESIUM: CPT

## 2024-11-14 PROCEDURE — 86738 MYCOPLASMA ANTIBODY: CPT

## 2024-11-14 PROCEDURE — 87449 NOS EACH ORGANISM AG IA: CPT

## 2024-11-14 PROCEDURE — 84550 ASSAY OF BLOOD/URIC ACID: CPT

## 2024-11-14 PROCEDURE — 87636 SARSCOV2 & INF A&B AMP PRB: CPT

## 2024-11-14 PROCEDURE — 83930 ASSAY OF BLOOD OSMOLALITY: CPT

## 2024-11-14 PROCEDURE — 84295 ASSAY OF SERUM SODIUM: CPT

## 2024-11-14 PROCEDURE — 84484 ASSAY OF TROPONIN QUANT: CPT

## 2024-11-14 RX ORDER — ONDANSETRON 2 MG/ML
4 INJECTION INTRAMUSCULAR; INTRAVENOUS EVERY 6 HOURS PRN
Status: DISCONTINUED | OUTPATIENT
Start: 2024-11-14 | End: 2024-11-16 | Stop reason: HOSPADM

## 2024-11-14 RX ORDER — FUROSEMIDE 40 MG/1
40 TABLET ORAL DAILY
Status: DISCONTINUED | OUTPATIENT
Start: 2024-11-14 | End: 2024-11-15

## 2024-11-14 RX ORDER — SODIUM CHLORIDE 0.9 % (FLUSH) 0.9 %
5-40 SYRINGE (ML) INJECTION EVERY 12 HOURS SCHEDULED
Status: DISCONTINUED | OUTPATIENT
Start: 2024-11-14 | End: 2024-11-16 | Stop reason: HOSPADM

## 2024-11-14 RX ORDER — ATORVASTATIN CALCIUM 40 MG/1
40 TABLET, FILM COATED ORAL NIGHTLY
Status: DISCONTINUED | OUTPATIENT
Start: 2024-11-14 | End: 2024-11-16 | Stop reason: HOSPADM

## 2024-11-14 RX ORDER — SODIUM CHLORIDE 9 MG/ML
INJECTION, SOLUTION INTRAVENOUS PRN
Status: DISCONTINUED | OUTPATIENT
Start: 2024-11-14 | End: 2024-11-16 | Stop reason: HOSPADM

## 2024-11-14 RX ORDER — ACETAMINOPHEN 650 MG/1
650 SUPPOSITORY RECTAL EVERY 6 HOURS PRN
Status: DISCONTINUED | OUTPATIENT
Start: 2024-11-14 | End: 2024-11-16 | Stop reason: HOSPADM

## 2024-11-14 RX ORDER — CARVEDILOL 25 MG/1
25 TABLET ORAL 2 TIMES DAILY WITH MEALS
Status: DISCONTINUED | OUTPATIENT
Start: 2024-11-14 | End: 2024-11-16 | Stop reason: HOSPADM

## 2024-11-14 RX ORDER — LEVOFLOXACIN 750 MG/1
750 TABLET, FILM COATED ORAL DAILY
Status: DISCONTINUED | OUTPATIENT
Start: 2024-11-14 | End: 2024-11-16 | Stop reason: HOSPADM

## 2024-11-14 RX ORDER — IPRATROPIUM BROMIDE AND ALBUTEROL SULFATE 2.5; .5 MG/3ML; MG/3ML
1 SOLUTION RESPIRATORY (INHALATION)
Status: DISCONTINUED | OUTPATIENT
Start: 2024-11-14 | End: 2024-11-14

## 2024-11-14 RX ORDER — ACETAMINOPHEN 325 MG/1
650 TABLET ORAL EVERY 6 HOURS PRN
Status: DISCONTINUED | OUTPATIENT
Start: 2024-11-14 | End: 2024-11-16 | Stop reason: HOSPADM

## 2024-11-14 RX ORDER — PANTOPRAZOLE SODIUM 40 MG/1
40 TABLET, DELAYED RELEASE ORAL
Status: DISCONTINUED | OUTPATIENT
Start: 2024-11-14 | End: 2024-11-16 | Stop reason: HOSPADM

## 2024-11-14 RX ORDER — SODIUM CHLORIDE 9 MG/ML
INJECTION, SOLUTION INTRAVENOUS CONTINUOUS
Status: ACTIVE | OUTPATIENT
Start: 2024-11-14 | End: 2024-11-15

## 2024-11-14 RX ORDER — ALBUTEROL SULFATE 90 UG/1
2 INHALANT RESPIRATORY (INHALATION) 4 TIMES DAILY PRN
Status: DISCONTINUED | OUTPATIENT
Start: 2024-11-14 | End: 2024-11-15

## 2024-11-14 RX ORDER — NIFEDIPINE 90 MG/1
90 TABLET, EXTENDED RELEASE ORAL DAILY
Status: DISCONTINUED | OUTPATIENT
Start: 2024-11-14 | End: 2024-11-16 | Stop reason: HOSPADM

## 2024-11-14 RX ORDER — ONDANSETRON 4 MG/1
4 TABLET, ORALLY DISINTEGRATING ORAL EVERY 8 HOURS PRN
Status: DISCONTINUED | OUTPATIENT
Start: 2024-11-14 | End: 2024-11-16 | Stop reason: HOSPADM

## 2024-11-14 RX ORDER — VALSARTAN 160 MG/1
160 TABLET ORAL DAILY
Status: DISCONTINUED | OUTPATIENT
Start: 2024-11-14 | End: 2024-11-16 | Stop reason: HOSPADM

## 2024-11-14 RX ORDER — SODIUM CHLORIDE 0.9 % (FLUSH) 0.9 %
5-40 SYRINGE (ML) INJECTION PRN
Status: DISCONTINUED | OUTPATIENT
Start: 2024-11-14 | End: 2024-11-16 | Stop reason: HOSPADM

## 2024-11-14 RX ORDER — POLYETHYLENE GLYCOL 3350 17 G/17G
17 POWDER, FOR SOLUTION ORAL DAILY PRN
Status: DISCONTINUED | OUTPATIENT
Start: 2024-11-14 | End: 2024-11-16 | Stop reason: HOSPADM

## 2024-11-14 RX ORDER — IPRATROPIUM BROMIDE AND ALBUTEROL SULFATE 2.5; .5 MG/3ML; MG/3ML
1 SOLUTION RESPIRATORY (INHALATION) EVERY 4 HOURS PRN
Status: DISCONTINUED | OUTPATIENT
Start: 2024-11-14 | End: 2024-11-16 | Stop reason: CLARIF

## 2024-11-14 RX ADMIN — CARVEDILOL 25 MG: 25 TABLET, FILM COATED ORAL at 08:27

## 2024-11-14 RX ADMIN — ACETAMINOPHEN 650 MG: 325 TABLET ORAL at 15:03

## 2024-11-14 RX ADMIN — SODIUM CHLORIDE, PRESERVATIVE FREE 10 ML: 5 INJECTION INTRAVENOUS at 08:27

## 2024-11-14 RX ADMIN — ATORVASTATIN CALCIUM 40 MG: 40 TABLET, FILM COATED ORAL at 21:29

## 2024-11-14 RX ADMIN — ALBUTEROL SULFATE 2 PUFF: 90 AEROSOL, METERED RESPIRATORY (INHALATION) at 14:20

## 2024-11-14 RX ADMIN — CARVEDILOL 25 MG: 25 TABLET, FILM COATED ORAL at 17:00

## 2024-11-14 RX ADMIN — ACETAMINOPHEN 650 MG: 325 TABLET ORAL at 21:29

## 2024-11-14 RX ADMIN — FUROSEMIDE 40 MG: 40 TABLET ORAL at 00:13

## 2024-11-14 RX ADMIN — PANTOPRAZOLE SODIUM 40 MG: 40 TABLET, DELAYED RELEASE ORAL at 05:59

## 2024-11-14 RX ADMIN — SODIUM CHLORIDE, PRESERVATIVE FREE 10 ML: 5 INJECTION INTRAVENOUS at 21:29

## 2024-11-14 RX ADMIN — NIFEDIPINE 90 MG: 90 TABLET, FILM COATED, EXTENDED RELEASE ORAL at 00:27

## 2024-11-14 RX ADMIN — LEVOFLOXACIN 750 MG: 750 TABLET, FILM COATED ORAL at 08:26

## 2024-11-14 RX ADMIN — IPRATROPIUM BROMIDE AND ALBUTEROL SULFATE 1 DOSE: 2.5; .5 SOLUTION RESPIRATORY (INHALATION) at 17:09

## 2024-11-14 RX ADMIN — ATORVASTATIN CALCIUM 40 MG: 40 TABLET, FILM COATED ORAL at 04:47

## 2024-11-14 RX ADMIN — SODIUM CHLORIDE: 9 INJECTION, SOLUTION INTRAVENOUS at 05:58

## 2024-11-14 RX ADMIN — VALSARTAN 160 MG: 160 TABLET, FILM COATED ORAL at 00:27

## 2024-11-14 RX ADMIN — CARVEDILOL 25 MG: 25 TABLET, FILM COATED ORAL at 00:13

## 2024-11-14 RX ADMIN — RIVAROXABAN 20 MG: 20 TABLET, FILM COATED ORAL at 08:27

## 2024-11-14 ASSESSMENT — PAIN DESCRIPTION - PAIN TYPE: TYPE: ACUTE PAIN

## 2024-11-14 ASSESSMENT — PAIN DESCRIPTION - DESCRIPTORS: DESCRIPTORS: ACHING

## 2024-11-14 ASSESSMENT — PAIN DESCRIPTION - FREQUENCY: FREQUENCY: INTERMITTENT

## 2024-11-14 ASSESSMENT — PAIN SCALES - GENERAL
PAINLEVEL_OUTOF10: 0
PAINLEVEL_OUTOF10: 2

## 2024-11-14 ASSESSMENT — PAIN DESCRIPTION - ONSET: ONSET: GRADUAL

## 2024-11-14 ASSESSMENT — PAIN DESCRIPTION - LOCATION: LOCATION: HEAD

## 2024-11-14 ASSESSMENT — PAIN - FUNCTIONAL ASSESSMENT: PAIN_FUNCTIONAL_ASSESSMENT: ACTIVITIES ARE NOT PREVENTED

## 2024-11-14 NOTE — ED PROVIDER NOTES
THE [facility]  EMERGENCY DEPARTMENT ENCOUNTER          PHYSICIAN ASSISTANT NOTE       Date of evaluation: 11/13/2024    Chief Complaint     Fever and Cough (Coming in for fever and cough, his doctor is also concerned if he's having blood clots, tested positive for covid last week and started having shortness of breath since then, takes xarelto for afib)      History of Present Illness     Jonny Banks is a 59 y.o. male with past medical history of CAD s/p PCI, HTN, Afib on Xarelto who presents with concern for PNA.  Patient states that he became sick about 1 month ago.  He had a chest x-ray that confirmed a right middle lobe pneumonia.  He completed a course of amoxicillin and doxycycline and did feel improved.  However, he never felt truly back to his baseline.  About 2 weeks ago, he states that he developed a head cold.  Within a few days, his wife became sick as well.  She tested positive for COVID-19 and then patient subsequently did as well. He reports that after he tested positive, he did feel better for a few days before he began to feel worse.  He went to his primary care provider today who sent him for a repeat chest x-ray.  This demonstrated a right middle lobe pneumonia.  He was also sent for blood work as well as a CT PA of his chest.  The CT was negative for PE but did demonstrate the right lower lobe consolidation, consistent with pneumonia.  There is also a small right pleural effusion and small pericardial effusion with mild cardiomegaly.  Patient endorses continued fever, chills, fatigue, congestion, nonproductive cough, shortness of breath, nausea, lack of appetite and decreased p.o. intake.  Reports he did have diarrhea but this is resolved.  He has lower extremity swelling but states this is improved from baseline.  Patient states that he has not taken any of his medications since yesterday morning.    ASSESSMENT / PLAN  (MEDICAL DECISION MAKING)     INITIAL VITALS: BP: (!) 232/112,

## 2024-11-14 NOTE — PROGRESS NOTES
Occupational Therapy/Physical Therapy  Discharge; No Evaluation Charge    Orders received, chart reviewed. Per discussion w/ pt and RN, pt is up ad albert. Pt does admit to SOB but denies concerns for ability to engage in ADLs or ambulate. No evaluation charge. Will sign off. Pt in agreement. RN present and aware.    Ivana Chi OTR/L #[id number]  Margaret Douglas, PT #[id number]

## 2024-11-14 NOTE — CARE COORDINATION
Case Management Assessment  Initial Evaluation    Date/Time of Evaluation: 11/14/2024 4:52 PM  Assessment Completed by: Brigida Faye    If patient is discharged prior to next notation, then this note serves as note for discharge by case management.    Patient Name: Jonny Banks                   YOB: 1965  Diagnosis: Pneumonia of right lower lobe due to infectious organism [J18.9]  Reactive airway disease with acute exacerbation, unspecified asthma severity, unspecified whether persistent [J45.901]  COVID-19 [U07.1]                   Date / Time: 11/13/2024  6:56 PM    Patient Admission Status: Inpatient   Readmission Risk (Low < 19, Mod (19-27), High > 27): Readmission Risk Score: 8    Current PCP: Darline Ramirez, JARET - CNP  PCP verified by CM? Yes    Chart Reviewed: Yes      History Provided by: Patient  Patient Orientation: Alert and Oriented    Patient Cognition: Alert    Hospitalization in the last 30 days (Readmission):  No    If yes, Readmission Assessment in CM Navigator will be completed.    Advance Directives:      Code Status: Full Code   Patient's Primary Decision Maker is: Legal Next of Kin    Primary Decision Maker: DarrenShanice - Child - [phone]    Primary Decision Maker: Jonny Banks - Child - [phone]    Discharge Planning:    Patient lives with: Spouse/Significant Other Type of Home: House  Primary Care Giver: Self  Patient Support Systems include: Spouse/Significant Other   Current Financial resources: Other (Comment)  Current community resources: None  Current services prior to admission: None            Current DME:              Type of Home Care services:  None    ADLS  Prior functional level: Independent in ADLs/IADLs  Current functional level: Independent in ADLs/IADLs    PT AM-PAC:   /24  OT AM-PAC:   /24    Family can provide assistance at DC: Yes  Would you like Case Management to discuss the discharge plan with any other family

## 2024-11-14 NOTE — ED TRIAGE NOTES
Coming in for fever and cough, his doctor is also concerned if he's having blood clots, tested positive for covid last week and started having shortness of breath since then, takes xarelto for afib

## 2024-11-14 NOTE — PROGRESS NOTES
Jonny Banks (:  1965) is a 59 y.o. male,Established patient, here for evaluation of the following chief complaint(s):  Post-COVID Symptoms ( ) and Fever      ASSESSMENT/PLAN:  1. Dysuria  -     POCT Urinalysis no Micro  -     Culture, Urine; Future  2. Shortness of breath  Assessment & Plan:  At this time patient is declining in care.  He is hypertensive and tachycardic however he is not in any respiratory distress.  Chest x-ray stat CT and labs were ordered.  Chest x-ray concerning for ongoing pneumonia CT scan shows pleural effusion, pericardial effusion and ongoing pneumonia without evidence of PE.  Discussed all of the above with patient advised at this time that he should report to the emergency room for further evaluation and intervention.    Orders:  -     CBC; Future  -     Comprehensive Metabolic Panel; Future  -     XR CHEST STANDARD (2 VW); Future  -     CTA PULMONARY W CONTRAST; Future      No follow-ups on file.    SUBJECTIVE/OBJECTIVE:  Patient is in the office today for follow-up from CAP, covid and ongoing cough and fever.  He was seen recently in the office and was unwell with similar symptoms.  He was treated with abx, steroids, inhalers.  He presents today with ongoing symptoms without overall improvement.  He is acutely diaphoretic with fever of 101 in the office.  He is tachycardic with hypertension.  He is not in any respiratory distress.    Current Outpatient Medications   Medication Sig Dispense Refill    albuterol sulfate HFA (VENTOLIN HFA) 108 (90 Base) MCG/ACT inhaler Inhale 2 puffs into the lungs 4 times daily as needed for Wheezing 54 g 1    rivaroxaban (XARELTO) 20 MG TABS tablet Take 1 tablet by mouth daily (with breakfast) 30 tablet 5    furosemide (LASIX) 40 MG tablet Take 1 tablet by mouth once daily 30 tablet 0    atorvastatin (LIPITOR) 40 MG tablet Take 1 tablet by mouth nightly 30 tablet 0    cyclobenzaprine (FLEXERIL) 10 MG tablet Take 1 tablet by mouth 2 times

## 2024-11-14 NOTE — PROGRESS NOTES
Internal Medicine Progress Note    Date: 11/14/2024   Patient: Jonny COLE [facility] Day: 1      CC: Fever and Cough (Coming in for fever and cough, his doctor is also concerned if he's having blood clots, tested positive for covid last week and started having shortness of breath since then, takes xarelto for afib)       Interval Hx   Pt seen and examined this morning. Complaining of nausea and dry cough. Denies any chest pain, headache, fever, chills, leg swelling, abdominal pain. His SOB improved with breathing treatment he received yesterday. /73 this morning. Na 129. Elevated procalcitonin to 1.66 and CRP to 385. Positive SARS Covid.       HPI: 59-year-old male with PMH CAD s/p PCI in proximal LAD 6/2022, HTN, HLD, morbid obesity, MARY, CKD, A-fib (on Xarelto), who presented to the [facility] ED with complaint of fever, chills, cough and SOB.  He started having fever and cough since early October, was diagnosed with CAP and received 7-day course of doxycycline and a 14-day course of amoxicillin after being which his symptoms had resolved until 10 days ago which he started experiencing fever and dry cough and had a positive COVID-19 test at home. Patient has experienced fatigue, fever with measured temperatures between 102-103, with chills, feeling generally weak. Then he retested again about a week after and COVID was negative. His wife was also tastes positive for COVID. He does have associated with nausea with food and recently decreased his oral intake since past couple of days because of nausea and illness. Due to worsening symptoms he presented here for further evaluation. No hx of smoking cigarettes or any other drugs. Occasional alcohol use. On presentation CXR having changes of right lower lung pneumonia mildly improved from previous one. CT chest does shows right lower lung pneumonia with mild right pleural effusion. Na on presentation 133>125.  Blood pressure on presentation  COVID test.  Febrile with temperature 102.8, tachycardic with , RR 37 in ED  Hemodynamically stable, LA 1.2  CT chest ruled out PE, CXR suggested right middle lobe consolidation w/ improvement from 11/7 imaging study  Chest CT showed dense airspace consolidation in RLL, and small R pleural effusion  - Procalcitonin elevated to 1.66 and CRP to 385 on 11/14/24  -Levofloxacin 750 mg daily   - COVID positive on admission.  - MRSA nasal DNA probe  - Legionella and mycoplasma antigen   - Strep pneumonia antigen  -Molecular pneumonia panel  -Sputum Gram stain, blood culture sent   - Will monitor temperature and procacitonin and add empiric antibiotics coverage as needed  - PRN Duonebs for SOB       Hypertensive urgency/emergency  /112 in ED, no symptoms, elevated BNP of unknown chronicity, elevated troponin.  Patient reports not having taken any antihypertensives from the day prior to presentation  HFpEF  BNP 2166, similar to last measurement at 1800  CAD s/p PCI in LAD 8/2023  R renal artery stenosis s/p stent 2018    - Carvedilol 25 mg twice daily  -Valsartan 160 mg daily  -Nifedipine ER 90 mg daily    HLD  -Atorvastatin 40 mg nightly     Elevated troponin (improved)  Uptrending  Trop 67 -> 79- later improved to-> 75 -> 71     Moderate to severe hyponatremia  Na 125  Patient presented with Na 133, repeat chemistry labs showed sodium 125 which improved to 129 on repeat labs in 6 hours without intervention  -Follow-up sodium  - Oral hydration      A-fib, on Xarelto  - Continue rivaroxaban 20 mg daily     MARY  - CPAP     Class III obesity  BMI 50  - Patient counseling     H/o prediabetes (improved)  6/23/2022 Hemoglobin A1c 5.7  1424 A1c 5.9  4/23/2024 A1c 5.5    DVT PPx: Xarelto  Diet: ADULT DIET; Regular   Code status:  Full Code     ELOS:  Barriers to discharge:  Disposition  - Preadmission: Home  - Current: IP, 6 S  - Upon discharge: TBD    Will discuss with attending physician Oliva Schroeder MD      _____________________  Darryl Hawk MD   Internal Medicine Resident, PGY-1

## 2024-11-14 NOTE — PROGRESS NOTES
Pt arrived to room 6319 A&Ox4, RA, tele applied per order. Pt oriented to both room and call light. All safety measures are in place. Plan of care ongoing

## 2024-11-14 NOTE — PROGRESS NOTES
4 Eyes Skin Assessment     NAME:  Jonny Banks  YOB: 1965  MEDICAL RECORD NUMBER:  [id number]    The patient is being assessed for  Admission    I agree that at least one RN has performed a thorough Head to Toe Skin Assessment on the patient. ALL assessment sites listed below have been assessed.      Areas assessed by both nurses:    Head, Face, Ears, Shoulders, Back, Chest, Arms, Elbows, Hands, Sacrum. Buttock, Coccyx, Ischium, and Legs. Feet and Heels        Does the Patient have a Wound? No noted wound(s)       Gaurang Prevention initiated by RN: No  Wound Care Orders initiated by RN: No    Pressure Injury (Stage 3,4, Unstageable, DTI, NWPT, and Complex wounds) if present, place Wound referral order by RN under : No    New Ostomies, if present place, Ostomy referral order unde  r : No     Nurse 1 eSignature: Electronically signed by Isaura Fernandez RN on 11/14/24 at 3:46 AM EST    **SHARE this note so that the co-signing nurse can place an eSignature**    Nurse 2 eSignature: {Esignature:[id number]}

## 2024-11-14 NOTE — CONSULTS
01/31/22        Taylor Torres  [address] [place] [id number]    Dear Taylor,    It has come to my attention that you are due for a Medicare Wellness Visit. Please call my office at [phone] to schedule an appointment as soon as possible.      Please do not hesitate to call if you have any question regarding your appointments.       Please call my office at [phone], to set up your appointments    Sincerely        Hazel Castañeda MD  [facility] [address]  Dept Phone: [phone]  deepti/sp   Clinical Pharmacy Consult Note  Medication History     Admit Date: 11/13/2024    Pharmacy consulted to verify home medication list by Dr. Shady Daniel.    List of of current medications patient is taking is complete. Home Medication list in EPIC updated to reflect changes noted below.    Source of information: Patient, Deon    Patient's home pharmacy: [facility] ([phone])     Changes made to medication list:   Medications removed: (include reason, ex: therapy completed, patient no longer taking, etc.)  Clopidogrel, cyclobenzaprine - patient reported not taking  Medications added:   None  Medication doses adjusted:   None  Other notes:   None    Current Outpatient Medications   Medication Instructions    albuterol sulfate HFA (VENTOLIN HFA) 108 (90 Base) MCG/ACT inhaler 2 puffs, Inhalation, 4 TIMES DAILY PRN    atorvastatin (LIPITOR) 40 mg, Oral, NIGHTLY    carvedilol (COREG) 25 mg, Oral, 2 TIMES DAILY WITH MEALS    furosemide (LASIX) 40 mg, Oral, DAILY    NIFEdipine (PROCARDIA XL) 90 mg, Oral, DAILY    pantoprazole (PROTONIX) 40 mg, Oral, DAILY BEFORE BREAKFAST    rivaroxaban (XARELTO) 20 mg, Oral, DAILY WITH BREAKFAST    valsartan (DIOVAN) 160 mg, Oral, DAILY       Please call with any questions.    José Luis Flynn  PharmD Candidate 2025

## 2024-11-14 NOTE — ED NOTES
How does patient ambulate?   []Low Fall Risk (ambulates by themselves without support)  [x]Stand by assist   []Contact Guard   []Front wheel walker  []Wheelchair   []Steady  []Bed bound  []History of Lower Extremity Amputation  []Unknown, did not assess in the emergency department   How does patient take pills?  [x]Whole with Water  []Crushed in applesauce  []Crushed in pudding  []Other  []Unknown no oral medications were given in the ED  Is patient alert?   [x]Alert  []Drowsy but responds to voice  []Doesn't respond to voice but responds to painful stimuli  []Unresponsive  Is patient oriented?   [x]To person  [x]To place  [x]To time  [x]To situation  []Confused  []Agitated  [x]Follows commands  If patient is disoriented or from a Skill Nursing Facility has family been notified of admission?   []Yes   [x]No  Patient belongings?   [x]Cell phone  [x]Wallet   []Dentures  [x]Clothing  Any specific patient or family belongings/needs/dynamics?   Pt states he needs a Cpap at night  COVID + as of last week  Miscellaneous comments/pending orders?  no    If there are any additional questions please reach out to the Emergency Department.            Napoles, Amparo, RN  11/13/24 4230       Amparo Napoles RN  11/13/24 8118

## 2024-11-14 NOTE — H&P
Internal Medicine  PGY 1  History & Physical      CC fever and cough    History Obtained From:  patient    HISTORY OF PRESENT ILLNESS:  59-year-old male with PMH CAD s/p PCI, HTN, HLD, morbid obesity, MARY, CKD, A-fib (on Xarelto), who presented to the [facility] ED with complaint of fever, chills, cough.    History was obtained from patient at bedside, as well as review of the EMR.  According to the patient he started having fever and cough since early October, was diagnosed with CAP and received 7-day course of doxycycline and a 14-day course of amoxicillin after being which his symptoms had resolved until 10 days ago which he started experiencing fever and cough and had a positive COVID-19 test.  Patient has experienced fatigue, fever with measured temperatures between 102-103, chills, cough.  He denies any sick contacts or travel prior to start of symptoms but did have recent travel during the time that he was symptomatic.  Due to the persistence of symptoms patient presented to the hospital.  Imaging showed persistent airspace disease and patient was admitted for evaluation and management of this CAP.      Past Medical History:        Diagnosis Date    Carpal tunnel syndrome     Chronic kidney disease     Hyperlipidemia     Hypertension     Obesity, unspecified     MARY (obstructive sleep apnea)        Past Surgical History:        Procedure Laterality Date    CARDIAC CATHETERIZATION      KNEE ARTHROSCOPY Right     NOSE SURGERY      Repair from Fx       Medications Priorto Admission:    Not in a hospital admission.    Allergies:  Lisinopril    Social History:   TOBACCO:   reports that he has never smoked. He has never used smokeless tobacco.  ETOH:   reports current alcohol use.  DRUGS : none  Patient currently lives with family at home    Family History:       Problem Relation Age of Onset    Diabetes Mother     Stroke Maternal Uncle        Review of Systems    ROS: A 10 point review of systems was  conducted, significant findings as noted in HPI.    Physical Exam  Vitals reviewed.   Constitutional:       General: He is not in acute distress.     Appearance: He is obese. He is ill-appearing. He is not toxic-appearing or diaphoretic.   HENT:      Head: Normocephalic and atraumatic.      Mouth/Throat:      Mouth: Mucous membranes are moist.   Cardiovascular:      Rate and Rhythm: Normal rate.   Pulmonary:      Effort: Pulmonary effort is normal. No respiratory distress.      Breath sounds: No wheezing.   Abdominal:      General: Bowel sounds are normal.      Palpations: Abdomen is soft.      Tenderness: There is no abdominal tenderness. There is no guarding or rebound.   Musculoskeletal:      Right lower leg: No edema.      Left lower leg: No edema.   Skin:     General: Skin is warm and dry.      Capillary Refill: Capillary refill takes less than 2 seconds.      Coloration: Skin is not jaundiced.      Findings: No erythema.   Neurological:      General: No focal deficit present.      Mental Status: He is alert and oriented to person, place, and time.      Cranial Nerves: No cranial nerve deficit.      Motor: No weakness.       Physical exam:       Vitals:    11/14/24 0027   BP: (!) 195/99   Pulse:    Resp:    Temp:    SpO2:        DATA:    Labs:  CBC:   Recent Labs     11/13/24  1159 11/13/24 1936   WBC 11.5* 10.9   HGB 13.8 13.8   HCT 42.5 40.6    195       BMP:   Recent Labs     11/13/24  1159 11/13/24  1936 11/13/24 2027   * 125*  --    K 4.3 see below 3.5   CL 96* 92*  --    CO2 26 21  --    BUN 14 15  --    CREATININE 1.3 1.3  --    GLUCOSE 144* 165*  --      LFT's:   Recent Labs     11/13/24  1159   AST 27   ALT 16   BILITOT 1.0   ALKPHOS 74     Troponin: No results for input(s): \"TROPONINI\" in the last 72 hours.  BNP:No results for input(s): \"BNP\" in the last 72 hours.  ABGs: No results for input(s): \"PHART\", \"RQA2PDH\", \"PO2ART\" in the last 72 hours.  INR: No results for input(s): \"INR\" in

## 2024-11-14 NOTE — ASSESSMENT & PLAN NOTE
At this time patient is declining in care.  He is hypertensive and tachycardic however he is not in any respiratory distress.  Chest x-ray stat CT and labs were ordered.  Chest x-ray concerning for ongoing pneumonia CT scan shows pleural effusion, pericardial effusion and ongoing pneumonia without evidence of PE.  Discussed all of the above with patient advised at this time that he should report to the emergency room for further evaluation and intervention.

## 2024-11-14 NOTE — RT PROTOCOL NOTE
RT Inhaler-Nebulizer Bronchodilator Protocol Note    There is a bronchodilator order in the chart from a provider indicating to follow the RT Bronchodilator Protocol and there is an “Initiate RT Inhaler-Nebulizer Bronchodilator Protocol” order as well (see protocol at bottom of note).    CXR Findings:  No results found.    The findings from the last RT Protocol Assessment were as follows:   History Pulmonary Disease: None or smoker <15 pack years  Respiratory Pattern: Regular pattern and RR 12-20 bpm  Breath Sounds: Slightly diminished and/or crackles  Cough: Strong, spontaneous, non-productive  Indication for Bronchodilator Therapy:    Bronchodilator Assessment Score: 2    Aerosolized bronchodilator medication orders have been revised according to the RT Inhaler-Nebulizer Bronchodilator Protocol below.    Respiratory Therapist to perform RT Therapy Protocol Assessment initially then follow the protocol.  Repeat RT Therapy Protocol Assessment PRN for score 0-3 or on second treatment, BID, and PRN for scores above 3.    No Indications - adjust the frequency to every 6 hours PRN wheezing or bronchospasm, if no treatments needed after 48 hours then discontinue using Per Protocol order mode.     If indication present, adjust the RT bronchodilator orders based on the Bronchodilator Assessment Score as indicated below.  Use Inhaler orders unless patient has one or more of the following: on home nebulizer, not able to hold breath for 10 seconds, is not alert and oriented, cannot activate and use MDI correctly, or respiratory rate 25 breaths per minute or more, then use the equivalent nebulizer order(s) with same Frequency and PRN reasons based on the score.  If a patient is on this medication at home then do not decrease Frequency below that used at home.    0-3 - enter or revise RT bronchodilator order(s) to equivalent RT Bronchodilator order with Frequency of every 4 hours PRN for wheezing or increased work of breathing  using Per Protocol order mode.        4-6 - enter or revise RT Bronchodilator order(s) to two equivalent RT bronchodilator orders with one order with BID Frequency and one order with Frequency of every 4 hours PRN wheezing or increased work of breathing using Per Protocol order mode.        7-10 - enter or revise RT Bronchodilator order(s) to two equivalent RT bronchodilator orders with one order with TID Frequency and one order with Frequency of every 4 hours PRN wheezing or increased work of breathing using Per Protocol order mode.       11-13 - enter or revise RT Bronchodilator order(s) to one equivalent RT bronchodilator order with QID Frequency and an Albuterol order with Frequency of every 4 hours PRN wheezing or increased work of breathing using Per Protocol order mode.      Greater than 13 - enter or revise RT Bronchodilator order(s) to one equivalent RT bronchodilator order with every 4 hours Frequency and an Albuterol order with Frequency of every 2 hours PRN wheezing or increased work of breathing using Per Protocol order mode.     RT to enter RT Home Evaluation for COPD & MDI Assessment order using Per Protocol order mode.    Electronically signed by Lotus Louis RCP on 11/14/2024 at 5:13 PM

## 2024-11-14 NOTE — ED PROVIDER NOTES
ED Attending Attestation Note     Date of evaluation: 2/23/2023    This patient was seen by the advance practice provider.  I have seen and examined the patient, agree with the workup, evaluation, management and diagnosis. The care plan has been discussed.  I have reviewed the ECG and concur with the DEYVI's interpretation.      Patient History     Chief Complaint: Fever and Cough (Coming in for fever and cough, his doctor is also concerned if he's having blood clots, tested positive for covid last week and started having shortness of breath since then, takes xarelto for afib)      HPI: Jonny Banks is a 59 y.o. who presents to the Emergency Department primary care provider for worsening cough and shortness of breath in the last couple of days, in the setting of a somewhat waxing and waning course of respiratory illness over approximately the last month.  Patient describes that, ago, he began to experience some URI symptoms and cough.  He went to an urgent care and was prescribed amoxicillin, then followed up with his primary care provider, and was reportedly told to continue amoxicillin and azithromycin was added.  Patient describes that he did feel somewhat better after completion of the antibiotics, although that his cough never entirely resolved.  His wife does note, however, that he was able to get back to golfing and his usual daily activities.  Last week he developed URI symptoms again, and tested positive for COVID-19.  In the last couple of days he has had increasing shortness of breath and cough, and his primary care provider ordered an outpatient CTPA due to concern for PE.  This did not show any evidence of PE, but did show a persistent left basilar consolidation, although on chest x-ray this was noted to be somewhat improved from the prior imaging study.  Patient was referred to the emergency department for concern of need for admission and IV antibiotics for failure of outpatient treatment of

## 2024-11-14 NOTE — ED NOTES
Pt fever broke, temp 98.2. Linens changed and pt changed into gown dt diaphoresis and to prepare for admission.     Amparo Napoles RN  11/13/24 4961

## 2024-11-14 NOTE — PLAN OF CARE
[facility] Hospitalist brief note  Consult received.  Case reviewed with ER physician- covid 19    Full note to follow.    Darline Ramirez, APRN - CNP    Thanks  MANSI DE PAZ MD

## 2024-11-15 PROBLEM — N17.9 AKI (ACUTE KIDNEY INJURY) (HCC): Status: ACTIVE | Noted: 2024-11-15

## 2024-11-15 LAB
ANION GAP SERPL CALCULATED.3IONS-SCNC: 13 MMOL/L (ref 3–16)
BACTERIA BLD CULT ORG #2: NORMAL
BACTERIA UR CULT: NORMAL
BACTERIA URNS QL MICRO: ABNORMAL /HPF
BASOPHILS # BLD: 0 K/UL (ref 0–0.2)
BASOPHILS NFR BLD: 0.1 %
BILIRUB UR QL STRIP.AUTO: NEGATIVE
BUN SERPL-MCNC: 46 MG/DL (ref 7–20)
CALCIUM SERPL-MCNC: 8.3 MG/DL (ref 8.3–10.6)
CHLORIDE SERPL-SCNC: 93 MMOL/L (ref 99–110)
CK SERPL-CCNC: 475 U/L (ref 39–308)
CLARITY UR: CLEAR
CO2 SERPL-SCNC: 23 MMOL/L (ref 21–32)
COLOR UR: YELLOW
CREAT SERPL-MCNC: 2 MG/DL (ref 0.9–1.3)
CRP SERPL-MCNC: 285 MG/L (ref 0–5.1)
DEPRECATED RDW RBC AUTO: 15.9 % (ref 12.4–15.4)
EOSINOPHIL # BLD: 0 K/UL (ref 0–0.6)
EOSINOPHIL NFR BLD: 0 %
GFR SERPLBLD CREATININE-BSD FMLA CKD-EPI: 38 ML/MIN/{1.73_M2}
GLUCOSE SERPL-MCNC: 149 MG/DL (ref 70–99)
GLUCOSE UR STRIP.AUTO-MCNC: NEGATIVE MG/DL
HCT VFR BLD AUTO: 36.1 % (ref 40.5–52.5)
HGB BLD-MCNC: 12.2 G/DL (ref 13.5–17.5)
HGB UR QL STRIP.AUTO: ABNORMAL
KETONES UR STRIP.AUTO-MCNC: NEGATIVE MG/DL
LEGIONELLA AG UR QL: NORMAL
LEUKOCYTE ESTERASE UR QL STRIP.AUTO: NEGATIVE
LYMPHOCYTES # BLD: 1.5 K/UL (ref 1–5.1)
LYMPHOCYTES NFR BLD: 14.4 %
MCH RBC QN AUTO: 28.3 PG (ref 26–34)
MCHC RBC AUTO-ENTMCNC: 33.8 G/DL (ref 31–36)
MCV RBC AUTO: 83.7 FL (ref 80–100)
MONOCYTES # BLD: 1.1 K/UL (ref 0–1.3)
MONOCYTES NFR BLD: 10.4 %
MRSA DNA SPEC QL NAA+PROBE: NORMAL
NEUTROPHILS # BLD: 7.7 K/UL (ref 1.7–7.7)
NEUTROPHILS NFR BLD: 75.1 %
NITRITE UR QL STRIP.AUTO: NEGATIVE
OSMOLALITY UR: 584 MOSM/KG (ref 390–1070)
PH UR STRIP.AUTO: 5.5 [PH] (ref 5–8)
PLATELET # BLD AUTO: 136 K/UL (ref 135–450)
PMV BLD AUTO: 9.8 FL (ref 5–10.5)
POTASSIUM SERPL-SCNC: 3.6 MMOL/L (ref 3.5–5.1)
PROCALCITONIN SERPL IA-MCNC: 2.34 NG/ML (ref 0–0.15)
PROT UR STRIP.AUTO-MCNC: 30 MG/DL
RBC # BLD AUTO: 4.31 M/UL (ref 4.2–5.9)
RBC #/AREA URNS HPF: ABNORMAL /HPF (ref 0–4)
S PNEUM AG UR QL: NORMAL
SODIUM SERPL-SCNC: 129 MMOL/L (ref 136–145)
SODIUM SERPL-SCNC: 129 MMOL/L (ref 136–145)
SODIUM UR-SCNC: 27 MMOL/L
SODIUM UR-SCNC: <20 MMOL/L
SP GR UR STRIP.AUTO: >=1.03 (ref 1–1.03)
UA DIPSTICK W REFLEX MICRO PNL UR: YES
URN SPEC COLLECT METH UR: ABNORMAL
UROBILINOGEN UR STRIP-ACNC: 0.2 E.U./DL
WBC # BLD AUTO: 10.2 K/UL (ref 4–11)
WBC #/AREA URNS HPF: ABNORMAL /HPF (ref 0–5)

## 2024-11-15 PROCEDURE — 51798 US URINE CAPACITY MEASURE: CPT

## 2024-11-15 PROCEDURE — 84300 ASSAY OF URINE SODIUM: CPT

## 2024-11-15 PROCEDURE — 2580000003 HC RX 258

## 2024-11-15 PROCEDURE — 6370000000 HC RX 637 (ALT 250 FOR IP)

## 2024-11-15 PROCEDURE — 2580000003 HC RX 258: Performed by: INTERNAL MEDICINE

## 2024-11-15 PROCEDURE — 6370000000 HC RX 637 (ALT 250 FOR IP): Performed by: INTERNAL MEDICINE

## 2024-11-15 PROCEDURE — 80048 BASIC METABOLIC PNL TOTAL CA: CPT

## 2024-11-15 PROCEDURE — 36415 COLL VENOUS BLD VENIPUNCTURE: CPT

## 2024-11-15 PROCEDURE — 84145 PROCALCITONIN (PCT): CPT

## 2024-11-15 PROCEDURE — 81001 URINALYSIS AUTO W/SCOPE: CPT

## 2024-11-15 PROCEDURE — 94660 CPAP INITIATION&MGMT: CPT

## 2024-11-15 PROCEDURE — 82550 ASSAY OF CK (CPK): CPT

## 2024-11-15 PROCEDURE — 87205 SMEAR GRAM STAIN: CPT

## 2024-11-15 PROCEDURE — 5A09357 ASSISTANCE WITH RESPIRATORY VENTILATION, LESS THAN 24 CONSECUTIVE HOURS, CONTINUOUS POSITIVE AIRWAY PRESSURE: ICD-10-PCS | Performed by: INTERNAL MEDICINE

## 2024-11-15 PROCEDURE — 1200000000 HC SEMI PRIVATE

## 2024-11-15 PROCEDURE — 87070 CULTURE OTHR SPECIMN AEROBIC: CPT

## 2024-11-15 PROCEDURE — 86140 C-REACTIVE PROTEIN: CPT

## 2024-11-15 PROCEDURE — 83935 ASSAY OF URINE OSMOLALITY: CPT

## 2024-11-15 PROCEDURE — 85025 COMPLETE CBC W/AUTO DIFF WBC: CPT

## 2024-11-15 PROCEDURE — 94640 AIRWAY INHALATION TREATMENT: CPT

## 2024-11-15 PROCEDURE — 6360000002 HC RX W HCPCS

## 2024-11-15 PROCEDURE — 84295 ASSAY OF SERUM SODIUM: CPT

## 2024-11-15 RX ORDER — MAGNESIUM SULFATE IN WATER 40 MG/ML
4000 INJECTION, SOLUTION INTRAVENOUS ONCE
Status: COMPLETED | OUTPATIENT
Start: 2024-11-15 | End: 2024-11-15

## 2024-11-15 RX ORDER — ATORVASTATIN CALCIUM 40 MG/1
40 TABLET, FILM COATED ORAL NIGHTLY
Status: CANCELLED | OUTPATIENT
Start: 2024-11-15

## 2024-11-15 RX ORDER — IPRATROPIUM BROMIDE AND ALBUTEROL SULFATE 2.5; .5 MG/3ML; MG/3ML
1 SOLUTION RESPIRATORY (INHALATION)
Status: DISCONTINUED | OUTPATIENT
Start: 2024-11-15 | End: 2024-11-16 | Stop reason: CLARIF

## 2024-11-15 RX ORDER — SODIUM CHLORIDE 9 MG/ML
INJECTION, SOLUTION INTRAVENOUS CONTINUOUS
Status: ACTIVE | OUTPATIENT
Start: 2024-11-15 | End: 2024-11-15

## 2024-11-15 RX ORDER — 0.9 % SODIUM CHLORIDE 0.9 %
1000 INTRAVENOUS SOLUTION INTRAVENOUS ONCE
Status: COMPLETED | OUTPATIENT
Start: 2024-11-15 | End: 2024-11-15

## 2024-11-15 RX ADMIN — SODIUM CHLORIDE: 9 INJECTION, SOLUTION INTRAVENOUS at 12:46

## 2024-11-15 RX ADMIN — RIVAROXABAN 20 MG: 20 TABLET, FILM COATED ORAL at 09:30

## 2024-11-15 RX ADMIN — MAGNESIUM SULFATE HEPTAHYDRATE 4000 MG: 40 INJECTION, SOLUTION INTRAVENOUS at 03:57

## 2024-11-15 RX ADMIN — LEVOFLOXACIN 750 MG: 750 TABLET, FILM COATED ORAL at 09:30

## 2024-11-15 RX ADMIN — IPRATROPIUM BROMIDE AND ALBUTEROL SULFATE 1 DOSE: 2.5; .5 SOLUTION RESPIRATORY (INHALATION) at 21:12

## 2024-11-15 RX ADMIN — SODIUM CHLORIDE, PRESERVATIVE FREE 10 ML: 5 INJECTION INTRAVENOUS at 20:57

## 2024-11-15 RX ADMIN — SODIUM CHLORIDE 1000 ML: 9 INJECTION, SOLUTION INTRAVENOUS at 09:37

## 2024-11-15 RX ADMIN — PANTOPRAZOLE SODIUM 40 MG: 40 TABLET, DELAYED RELEASE ORAL at 07:15

## 2024-11-15 RX ADMIN — IPRATROPIUM BROMIDE AND ALBUTEROL SULFATE 1 DOSE: 2.5; .5 SOLUTION RESPIRATORY (INHALATION) at 17:03

## 2024-11-15 ASSESSMENT — PAIN SCALES - GENERAL: PAINLEVEL_OUTOF10: 0

## 2024-11-15 NOTE — PROGRESS NOTES
[facility]  [phone]               Reason for Admission:       CC/Reason for Consult     JOSELYN     Interval History and Plan    Patient is seen in non acute distress lying comfortably in bed. Patient said he had trouble urinating since admission and this morning he has urinated 250cc. Patent says \"he is having a little urine come out whenever he urinates\".    BP:129/75  Net I/O: 275, I=850, O=575,   Wt:148.3 kg  Cr: 2.0 baseline- 1.1-1.5  eGFR: 38    Plan:  Bladder scan q shift  Avoid NSAIDs, ACEi, IV contrast, nephrotoxins   Sodium q6  Avoid hypotension, monitor hypertension  Maintain MAP > 65 mmHg  Strict I/Os  Daily standing weights  Renal diet                     Assessment     JOSELYN likely ATN contrast induced nephropathy vs obstructive cause  BUN/creatinine increased from 19/1.3 to 46/2.0 in one day. Patient had CTA pulmonary with contrast to rule out PE done on 11/13. Patient Eduin score of 13 with 26.1% risk of SAE. Patient has a history of right renal artery stenosis with a stent and has some strictures since 2018. Patient is unsure of exact date for last dilatation.  Bladder scan q shift, straight cath prn  Avoid NSAIDs, ACEi, IV contrast, nephrotoxins   Encourage PO fluid intake  Give additional 1L NS bolus    HTN vascular disease from renal artery stenosis  Patient has a history of right renal artery stenosis.   Stable BP continue monitoring BP   BP goal  130s/80s  Holding valsartan 160mg    Hypovolemic hyponatremia  Na 133 > 125 at lowest; slightly uptrending to 129  Check sodium q6h  IVF as above  Serum osm 288  Urine Na <20 -> 27; Osm 584    Anemia of 12.2  Goal Hgb 10-11.5  Daily CBC    [facility] would like to thank Oliva Rubio MD for opportunity to serve this patient.      Please call with questions at:    24-hour answering service [phone] or   7 am - 5 pm via Perfect [employer] or cell phone    Rl Valentin MD  Internal Medicine Resident  PGY-3      HPI     Mr. Jonny Banks is a 59 y.o. male, with MHx significant for CAD s/p PCI, HTN, HLD, morbid obesity, MARY, CKD, A-fib (on Xarelto), right renal artery stenosis who presented to the ED on 11/13/2024 with fever, chills, and cough. According to the patient, he started having fever and cough since early October and was diagnosed with CAP and received 7-day course of doxycycline and a 14-day course of amoxicillin. His symptoms had resolved until 10 days ago he stated experiencing fatigue, fever with measured temperatures between 102-103, chills, and cough. He denies any sick contacts or travel prior to start of symptoms but did have recent travel during the first time he was symptomatic. Patient is currently COVID positive and was admitted due to persistence of symtpoms. Imaging showed persistent airspace disease and patient was admitted for evaluation and management of his CAP.         PMHx/PSurgHx/SocHx/FHx     Past Medical History:   Diagnosis Date    Carpal tunnel syndrome     Chronic kidney disease     Hyperlipidemia     Hypertension     Obesity, unspecified     MARY (obstructive sleep apnea)        Past Surgical History:   Procedure Laterality Date    CARDIAC CATHETERIZATION      KNEE ARTHROSCOPY Right     NOSE SURGERY      Repair from Fx        reports that he has never smoked. He has never used smokeless tobacco. He reports current alcohol use. He reports that he does not use drugs.    family history includes Diabetes in his mother; Stroke in his maternal uncle.         Medication   Scheduled:   [Held by provider] carvedilol  25 mg Oral BID WC    [Held by provider] NIFEdipine  90 mg Oral Daily    [Held by provider] valsartan  160 mg Oral Daily    [Held by provider] atorvastatin  40 mg Oral Nightly    pantoprazole  40 mg Oral QAM AC    rivaroxaban  20 mg Oral Daily with breakfast    sodium chloride flush  5-40 mL IntraVENous 2 times per day    levoFLOXacin  750 mg Oral Daily     Continuous Infusions:

## 2024-11-15 NOTE — PROGRESS NOTES
Internal Medicine Progress Note    Date: 11/15/2024   [facility]: 2      CC: Fever and Cough (Coming in for fever and cough, his doctor is also concerned if he's having blood clots, tested positive for covid last week and started having shortness of breath since then, takes xarelto for afib)       Interval Hx   Patient sitting in chair while seen and examined this morning.  1 fever spike overnight to 102.6, received Tylenol which has helped to relieve it.  His shortness of breath is improving.  Started having productive cough with yellow sputum.  Overall he feels better.  Vitally stable.  Sodium 129 this morning.  Procalcitonin increased to 2.34 from 1.66.  CRP came down to 285 from 385.  WBC normal.  Hemoglobin stable at 12.2.  His Legionella, strep pneumo, MRSA nasal probe negative, blood culture no growth to date.      HPI: 59-year-old male with PMH CAD s/p PCI in proximal LAD 6/2022, HTN, HLD, morbid obesity, MARY, CKD, A-fib (on Xarelto), who presented to [facility] ED with complaint of fever, chills, cough and SOB.  He started having fever and cough since early October, was diagnosed with CAP and received 7-day course of doxycycline and a 14-day course of amoxicillin after being which his symptoms had resolved until 10 days ago which he started experiencing fever and dry cough and had a positive COVID-19 test at home. Patient has experienced fatigue, fever with measured temperatures between 102-103, with chills, feeling generally weak. Then he retested again about a week after and COVID was negative. His wife was also tastes positive for COVID. He does have associated with nausea with food and recently decreased his oral intake since past couple of days because of nausea and illness. Due to worsening symptoms he presented here for further evaluation. No hx of smoking cigarettes or any other drugs. Occasional alcohol use. On presentation CXR having changes of right  normal.      Gait: Gait normal.      Deep Tendon Reflexes: Reflexes normal.   Psychiatric:         Mood and Affect: Mood normal.         Behavior: Behavior normal.         Thought Content: Thought content normal.         Judgment: Judgment normal.        Labs:  CBC:   Recent Labs     11/13/24  1159 11/13/24  1936 11/15/24  0402   WBC 11.5* 10.9 10.2   HGB 13.8 13.8 12.2*   HCT 42.5 40.6 36.1*    195 136       BMP:   Recent Labs     11/14/24  0121 11/14/24  0506 11/14/24  1516 11/14/24  2310 11/15/24  0402   * 129* 126* 127* 129*   K 3.8 4.1  --   --  3.6   CL 94* 95*  --   --  93*   CO2 20* 22  --   --  23   BUN 16 19  --   --  46*   CREATININE 1.2 1.3  --   --  2.0*   GLUCOSE 194* 208*  --   --  149*     Magnesium:   Recent Labs     11/14/24  0120 11/14/24  1516   MG 1.80 1.70*     LFT's:   Recent Labs     11/13/24  1159   AST 27   ALT 16   BILITOT 1.0   ALKPHOS 74         U/A:   Recent Labs     11/13/24  1141 11/15/24  0942   NITRITE neg  --    COLORU bloody Yellow   PHUR 5.5 5.5   CLARITYU  --  Clear   SPECGRAV 1.030  --    LEUKOCYTESUR neg Negative   UROBILINOGEN  --  0.2   BILIRUBINUR small Negative   BLOODU moderate SMALL*   GLUCOSEU neg Negative       Radiology:  No orders to display   PA AND LATERAL CHEST 2 VIEWS   IMPRESSION:  1. Persistent middle lobe airspace disease, pneumonia with evidence of  radiographic improvement from prior study.    CT ANGIOGRAPHY OF THE CHEST WITH CONTRAST: 11/13/2024  IMPRESSION:     No CT evidence of pulmonary embolism.  Right lower lobe airspace density with consolidation, consistent with pneumonia.  Small right pleural effusion.  Small anterior pericardial effusion. Mild cardiomegaly.  Decreased overall attenuation of the liver consistent with fatty infiltration.      Assessment & Plan   59-year-old male with PMH CAD s/p PCI, HTN, HLD, morbid obesity, MARY, CKD, A-fib (on Xarelto), who presented to the [facility] ED with complaint of fever, chills, cough.

## 2024-11-15 NOTE — PROGRESS NOTES
[facility]  [phone]               Reason for Admission:       CC/Reason for Consult     JOSELYN     Interval History and Plan    Patient is seen in non acute distress lying comfortably in bed. Patient said he had trouble urinating since admission and this morning he has urinated 250cc. Patent says \"he is having a little urine come out whenever he urinates\".    BP:129/75  Net I/O: 275, I=850, O=575,   Wt:148.3 kg  Cr: 2.0 baseline- 1.1-1.5  eGFR: 38    Plan:  Bladder U/S to rule out obstruction, check bladder scan every shift  Avoid NSAIDs, ACEi, IV contrast, nephrotoxins   Order renal bladder US  Avoid hypotension, monitor hypertension  Maintain MAP > 65 mmHg  Strict I/Os  Daily standing weights  Renal diet                     Assessment     JOSELYN likely ATN contrast induced nephropathy vs obstructive cause  BUN/creatinine increased from 19/1.3 to 46/2.0 in one day. Patient had CTA pulmonary with contrast to rule out PE done on 11/13. Patient Eduin score of 13 with 26.1% risk of SAE. Patient has a history of right renal artery stenosis with a stent and has some strictures since 2018. Patient is unsure of exact date for last dilatation.  Bladder U/S, bladder scan every shift and FC if retaining  Urine eosinophils  Order Renal BUS  Avoid NSAIDs, ACEi, IV contrast, nephrotoxins   Continue fluids    HTN vascular disease from renal artery stenosis  Patient has a history of right renal artery stenosis.   Stable BP continue monitoring BP   BP goal  130s/80s between 140s/80s  Holding valsartan 160mg  Atorvastatin 40mg    Hyponatremia  Na is 129. Patient presented with 125 on admission.  Continue monitoring Na      Anemia of 12.2  Goal Hgb 10-11.5           [facility] would like to thank Oliva Rubio MD for opportunity to serve this patient.      Please call with questions at:    24-hour answering service [phone] or   7 am - 5 pm via [employer] or cell phone    Rl  2 times per day    levoFLOXacin  750 mg Oral Daily     Continuous Infusions:   sodium chloride      sodium chloride       PRN: sodium chloride flush, sodium chloride, ondansetron **OR** ondansetron, polyethylene glycol, acetaminophen **OR** acetaminophen, ipratropium 0.5 mg-albuterol 2.5 mg        Vitals     Vitals:    11/15/24 0741   BP: 129/75   Pulse: 79   Resp: 20   Temp: 98.5 °F (36.9 °C)   SpO2: 93%       I & O       Intake/Output Summary (Last 24 hours) at 11/15/2024 1113  Last data filed at 11/14/2024 2340  Gross per 24 hour   Intake 850 ml   Output 325 ml   Net 525 ml        Physical Exam   Seen with Dr. Huber Massey.     Physical Exam  Constitutional:       Appearance: He is obese.   HENT:      Nose: Nose normal.      Mouth/Throat:      Mouth: Mucous membranes are moist.      Pharynx: Oropharynx is clear.   Cardiovascular:      Rate and Rhythm: Normal rate and regular rhythm.      Pulses: Normal pulses.      Heart sounds: Normal heart sounds.   Pulmonary:      Effort: Pulmonary effort is normal.   Abdominal:      General: Bowel sounds are normal.   Musculoskeletal:      Cervical back: Neck supple.      Right lower leg: Edema present.      Left lower leg: Edema present.   Skin:     Coloration: Skin is not jaundiced or pale.   Neurological:      Mental Status: He is alert.   Psychiatric:         Mood and Affect: Mood normal.         Behavior: Behavior normal.          Labs     Recent Labs     11/13/24  1159 11/13/24  1936 11/15/24  0402   WBC 11.5* 10.9 10.2   HGB 13.8 13.8 12.2*   HCT 42.5 40.6 36.1*    195 136     Recent Labs     11/14/24  0120 11/14/24  0121 11/14/24  0506 11/14/24  1516 11/14/24  2310 11/15/24  0402   NA  --  129* 129* 126* 127* 129*   K  --  3.8 4.1  --   --  3.6   CL  --  94* 95*  --   --  93*   CO2  --  20* 22  --   --  23   BUN  --  16 19  --   --  46*   CREATININE  --  1.2 1.3  --   --  2.0*   GLUCOSE  --  194* 208*  --   --  149*   MG 1.80  --   --  1.70*  --   --

## 2024-11-15 NOTE — CARE COORDINATION
CM  following for  d/c planning:    -   Patient A&OX4. IPTA,  From home with GF.  works and still an active .   Active with PCP.      - Plan is to return home. Does not anticipate  any d/c needs at this time .    Cont to follow .    Electronically signed by Joi Matias RN on 11/15/2024 at 9:05 AM     Joi Matias RN Case Manager  [facility]  [address]  [place] [id number] [phone]  Fax [phone]

## 2024-11-15 NOTE — PLAN OF CARE
Problem: Discharge Planning  Goal: Discharge to home or other facility with appropriate resources  Outcome: Progressing    Patient is expected to discharge home once medical stable.     Problem: Respiratory - Adult  Goal: Achieves optimal ventilation and oxygenation  Outcome: Progressing    On room air saturating 93%, denies shortness of breath on exertion. Will continue to monitor.

## 2024-11-15 NOTE — PROGRESS NOTES
Patient requested Q4 while admitted   11/15/24 7048   RT Protocol   History Pulmonary Disease 0   Respiratory pattern 0   Breath sounds 8   Cough 1   Bronchodilator Assessment Score 9

## 2024-11-15 NOTE — CONSULTS
Consult received.  Labs and notes were reviewed.  Case was discussed with the staff.    Full note to follow.    Thanks  Nephrology  [address] [place] [address] [phone]  [place] [address]  Office: [phone]  Cell: [phone]  Fax: [phone]

## 2024-11-16 VITALS
TEMPERATURE: 97.7 F | RESPIRATION RATE: 18 BRPM | WEIGHT: 315 LBS | HEART RATE: 74 BPM | OXYGEN SATURATION: 97 % | HEIGHT: 68 IN | SYSTOLIC BLOOD PRESSURE: 162 MMHG | DIASTOLIC BLOOD PRESSURE: 94 MMHG | BODY MASS INDEX: 47.74 KG/M2

## 2024-11-16 LAB
ANION GAP SERPL CALCULATED.3IONS-SCNC: 11 MMOL/L (ref 3–16)
BASOPHILS # BLD: 0 K/UL (ref 0–0.2)
BASOPHILS NFR BLD: 0.3 %
BUN SERPL-MCNC: 35 MG/DL (ref 7–20)
CALCIUM SERPL-MCNC: 8.3 MG/DL (ref 8.3–10.6)
CHLORIDE SERPL-SCNC: 97 MMOL/L (ref 99–110)
CO2 SERPL-SCNC: 22 MMOL/L (ref 21–32)
CREAT SERPL-MCNC: 1.3 MG/DL (ref 0.9–1.3)
CRP SERPL-MCNC: 251 MG/L (ref 0–5.1)
DEPRECATED RDW RBC AUTO: 15.6 % (ref 12.4–15.4)
EOSINOPHIL # BLD: 0.1 K/UL (ref 0–0.6)
EOSINOPHIL NFR BLD: 0.9 %
GFR SERPLBLD CREATININE-BSD FMLA CKD-EPI: 64 ML/MIN/{1.73_M2}
GLUCOSE SERPL-MCNC: 131 MG/DL (ref 70–99)
HCT VFR BLD AUTO: 35.5 % (ref 40.5–52.5)
HGB BLD-MCNC: 11.8 G/DL (ref 13.5–17.5)
LYMPHOCYTES # BLD: 1.3 K/UL (ref 1–5.1)
LYMPHOCYTES NFR BLD: 18.6 %
MAGNESIUM SERPL-MCNC: 2.31 MG/DL (ref 1.8–2.4)
MCH RBC QN AUTO: 28 PG (ref 26–34)
MCHC RBC AUTO-ENTMCNC: 33.3 G/DL (ref 31–36)
MCV RBC AUTO: 84 FL (ref 80–100)
MONOCYTES # BLD: 0.8 K/UL (ref 0–1.3)
MONOCYTES NFR BLD: 10.9 %
NEUTROPHILS # BLD: 4.9 K/UL (ref 1.7–7.7)
NEUTROPHILS NFR BLD: 69.3 %
PLATELET # BLD AUTO: 152 K/UL (ref 135–450)
PMV BLD AUTO: 10.6 FL (ref 5–10.5)
POTASSIUM SERPL-SCNC: 3.5 MMOL/L (ref 3.5–5.1)
PROCALCITONIN SERPL IA-MCNC: 0.92 NG/ML (ref 0–0.15)
RBC # BLD AUTO: 4.23 M/UL (ref 4.2–5.9)
SODIUM SERPL-SCNC: 129 MMOL/L (ref 136–145)
SODIUM SERPL-SCNC: 130 MMOL/L (ref 136–145)
WBC # BLD AUTO: 7 K/UL (ref 4–11)

## 2024-11-16 PROCEDURE — 94640 AIRWAY INHALATION TREATMENT: CPT

## 2024-11-16 PROCEDURE — 6370000000 HC RX 637 (ALT 250 FOR IP): Performed by: INTERNAL MEDICINE

## 2024-11-16 PROCEDURE — 83735 ASSAY OF MAGNESIUM: CPT

## 2024-11-16 PROCEDURE — 85025 COMPLETE CBC W/AUTO DIFF WBC: CPT

## 2024-11-16 PROCEDURE — 86140 C-REACTIVE PROTEIN: CPT

## 2024-11-16 PROCEDURE — 2580000003 HC RX 258

## 2024-11-16 PROCEDURE — 80048 BASIC METABOLIC PNL TOTAL CA: CPT

## 2024-11-16 PROCEDURE — 6370000000 HC RX 637 (ALT 250 FOR IP)

## 2024-11-16 PROCEDURE — 84295 ASSAY OF SERUM SODIUM: CPT

## 2024-11-16 PROCEDURE — 84145 PROCALCITONIN (PCT): CPT

## 2024-11-16 PROCEDURE — 36415 COLL VENOUS BLD VENIPUNCTURE: CPT

## 2024-11-16 RX ORDER — LEVOFLOXACIN 750 MG/1
750 TABLET, FILM COATED ORAL DAILY
Qty: 4 TABLET | Refills: 0 | Status: SHIPPED | OUTPATIENT
Start: 2024-11-17 | End: 2024-11-21

## 2024-11-16 RX ORDER — ALBUTEROL SULFATE 90 UG/1
2 INHALANT RESPIRATORY (INHALATION) EVERY 6 HOURS PRN
Status: DISCONTINUED | OUTPATIENT
Start: 2024-11-16 | End: 2024-11-16 | Stop reason: HOSPADM

## 2024-11-16 RX ADMIN — ALBUTEROL SULFATE 2 PUFF: 90 AEROSOL, METERED RESPIRATORY (INHALATION) at 08:39

## 2024-11-16 RX ADMIN — LEVOFLOXACIN 750 MG: 750 TABLET, FILM COATED ORAL at 09:26

## 2024-11-16 RX ADMIN — PANTOPRAZOLE SODIUM 40 MG: 40 TABLET, DELAYED RELEASE ORAL at 06:20

## 2024-11-16 RX ADMIN — RIVAROXABAN 20 MG: 20 TABLET, FILM COATED ORAL at 09:26

## 2024-11-16 RX ADMIN — SODIUM CHLORIDE, PRESERVATIVE FREE 10 ML: 5 INJECTION INTRAVENOUS at 09:27

## 2024-11-16 ASSESSMENT — PAIN SCALES - GENERAL: PAINLEVEL_OUTOF10: 0

## 2024-11-16 NOTE — PROGRESS NOTES
Pt discharged with all belongings, IV removed with no complications, education provided on medications and follow-up CT. Pt verbalized understanding. New Rx sent to patients pharmacy. Pt wheeled down to wife's vehicle for discharge home.  Electronically signed by Andria Trevino on 11/16/2024 at 12:35 PM

## 2024-11-16 NOTE — PROGRESS NOTES
Internal Medicine Progress Note    Date: 11/16/2024   [facility]   [facility] Day: 3      CC: Fever and Cough (Coming in for fever and cough, his doctor is also concerned if he's having blood clots, tested positive for covid last week and started having shortness of breath since then, takes xarelto for afib)       Interval Hx   Patient seen and examined at bedside this morning.  No acute events overnight.  Denies any chest pain or shortness of breath this morning.  Creatinine this morning improved to 1.3.  Likely discharge today.      HPI: 59-year-old male with PMH CAD s/p PCI in proximal LAD 6/2022, HTN, HLD, morbid obesity, MARY, CKD, A-fib (on Xarelto), who presented to the [facility] ED with complaint of fever, chills, cough and SOB.  He started having fever and cough since early October, was diagnosed with CAP and received 7-day course of doxycycline and a 14-day course of amoxicillin after being which his symptoms had resolved until 10 days ago which he started experiencing fever and dry cough and had a positive COVID-19 test at home. Patient has experienced fatigue, fever with measured temperatures between 102-103, with chills, feeling generally weak. Then he retested again about a week after and COVID was negative. His wife was also tastes positive for COVID. He does have associated with nausea with food and recently decreased his oral intake since past couple of days because of nausea and illness. Due to worsening symptoms he presented here for further evaluation. No hx of smoking cigarettes or any other drugs. Occasional alcohol use. On presentation CXR having changes of right lower lung pneumonia mildly improved from previous one. CT chest does shows right lower lung pneumonia with mild right pleural effusion. Na on presentation 133>125.  Blood pressure on presentation 232/112. So he admitted for further management.       Objective     Vital Signs:  No data found.      Physical  11/16/24  0601   *   < > 129* 129* 129* 130*   K 4.1  --  3.6  --   --  3.5   CL 95*  --  93*  --   --  97*   CO2 22  --  23  --   --  22   BUN 19  --  46*  --   --  35*   CREATININE 1.3  --  2.0*  --   --  1.3   GLUCOSE 208*  --  149*  --   --  131*    < > = values in this interval not displayed.     Magnesium:   Recent Labs     11/14/24  0120 11/14/24  1516 11/16/24  0601   MG 1.80 1.70* 2.31     LFT's:   No results for input(s): \"AST\", \"ALT\", \"BILITOT\", \"ALKPHOS\" in the last 72 hours.    Invalid input(s): \"ALB\"        U/A:   Recent Labs     11/15/24  0942   COLORU Yellow   PHUR 5.5   WBCUA 0-2   RBCUA 0-2   BACTERIA Rare*   CLARITYU Clear   LEUKOCYTESUR Negative   UROBILINOGEN 0.2   BILIRUBINUR Negative   BLOODU SMALL*   GLUCOSEU Negative       Radiology:  CT CHEST WO CONTRAST    (Results Pending)   PA AND LATERAL CHEST 2 VIEWS   IMPRESSION:  1. Persistent middle lobe airspace disease, pneumonia with evidence of  radiographic improvement from prior study.    CT ANGIOGRAPHY OF THE CHEST WITH CONTRAST: 11/13/2024  IMPRESSION:     No CT evidence of pulmonary embolism.  Right lower lobe airspace density with consolidation, consistent with pneumonia.  Small right pleural effusion.  Small anterior pericardial effusion. Mild cardiomegaly.  Decreased overall attenuation of the liver consistent with fatty infiltration.      Assessment & Plan   59-year-old male with PMH CAD s/p PCI, HTN, HLD, morbid obesity, MARY, CKD, A-fib (on Xarelto), who presented to the [facility] ED with complaint of fever, chills, cough.     #Community acquired pneumonia  - Hx of fever to 102-103 at home with dry cough, fatigue, weakness, SOB. Likely superimposed bacterial infection on COVID PNA. Symptoms since early October, improved with 7 days of doxycycline and 14 days of amoxicillin.  Symptoms returned 10 days prior to presentation and patient had a positive COVID test.  Febrile with temperature 102.8, tachycardic with , RR 37

## 2024-11-16 NOTE — DISCHARGE INSTRUCTIONS
-Please follow-up with your PCP within 1 week  -Please take Levaquin 750mg once daily for 4 days  -We have sent an order for a CT chest, please complete in 1 month  -Please restart taking your nifedipine, lasix and valsartan on Mon 11/18  -Please continue taking all other medications as prescribed

## 2024-11-16 NOTE — PLAN OF CARE
Problem: Discharge Planning  Goal: Discharge to home or other facility with appropriate resources  Outcome: Progressing  Flowsheets (Taken 11/15/2024 2228)  Discharge to home or other facility with appropriate resources:   Identify barriers to discharge with patient and caregiver   Arrange for needed discharge resources and transportation as appropriate     Problem: Safety - Adult  Goal: Free from fall injury  Outcome: Progressing  Flowsheets (Taken 11/15/2024 2228)  Free From Fall Injury: Instruct family/caregiver on patient safety     Problem: Pain  Goal: Verbalizes/displays adequate comfort level or baseline comfort level  Outcome: Progressing  Flowsheets (Taken 11/15/2024 2228)  Verbalizes/displays adequate comfort level or baseline comfort level:   Encourage patient to monitor pain and request assistance   Administer analgesics based on type and severity of pain and evaluate response   Assess pain using appropriate pain scale  Note: Patient denies pain at this time.     Problem: Respiratory - Adult  Goal: Achieves optimal ventilation and oxygenation  Outcome: Progressing  Flowsheets (Taken 11/15/2024 2228)  Achieves optimal ventilation and oxygenation:   Assess for changes in respiratory status   Assess for changes in mentation and behavior   Position to facilitate oxygenation and minimize respiratory effort  Note: Patient denies SOB. Lungs sound diminished. Patient on room air. 02 sat 95%.

## 2024-11-16 NOTE — RT PROTOCOL NOTE
RT Inhaler-Nebulizer Bronchodilator Protocol Note    There is a bronchodilator order in the chart from a provider indicating to follow the RT Bronchodilator Protocol and there is an “Initiate RT Inhaler-Nebulizer Bronchodilator Protocol” order as well (see protocol at bottom of note).    CXR Findings:  No results found.    The findings from the last RT Protocol Assessment were as follows:   History Pulmonary Disease: None or smoker <15 pack years  Respiratory Pattern: Regular pattern and RR 12-20 bpm  Breath Sounds: Slightly diminished and/or crackles  Cough: Strong, spontaneous, non-productive  Indication for Bronchodilator Therapy:    Bronchodilator Assessment Score: 2  Will convert to BID.  Aerosolized bronchodilator medication orders have been revised according to the RT Inhaler-Nebulizer Bronchodilator Protocol below.    Respiratory Therapist to perform RT Therapy Protocol Assessment initially then follow the protocol.  Repeat RT Therapy Protocol Assessment PRN for score 0-3 or on second treatment, BID, and PRN for scores above 3.    No Indications - adjust the frequency to every 6 hours PRN wheezing or bronchospasm, if no treatments needed after 48 hours then discontinue using Per Protocol order mode.     If indication present, adjust the RT bronchodilator orders based on the Bronchodilator Assessment Score as indicated below.  Use Inhaler orders unless patient has one or more of the following: on home nebulizer, not able to hold breath for 10 seconds, is not alert and oriented, cannot activate and use MDI correctly, or respiratory rate 25 breaths per minute or more, then use the equivalent nebulizer order(s) with same Frequency and PRN reasons based on the score.  If a patient is on this medication at home then do not decrease Frequency below that used at home.    0-3 - enter or revise RT bronchodilator order(s) to equivalent RT Bronchodilator order with Frequency of every 4 hours PRN for wheezing or  increased work of breathing using Per Protocol order mode.        4-6 - enter or revise RT Bronchodilator order(s) to two equivalent RT bronchodilator orders with one order with BID Frequency and one order with Frequency of every 4 hours PRN wheezing or increased work of breathing using Per Protocol order mode.        7-10 - enter or revise RT Bronchodilator order(s) to two equivalent RT bronchodilator orders with one order with TID Frequency and one order with Frequency of every 4 hours PRN wheezing or increased work of breathing using Per Protocol order mode.       11-13 - enter or revise RT Bronchodilator order(s) to one equivalent RT bronchodilator order with QID Frequency and an Albuterol order with Frequency of every 4 hours PRN wheezing or increased work of breathing using Per Protocol order mode.      Greater than 13 - enter or revise RT Bronchodilator order(s) to one equivalent RT bronchodilator order with every 4 hours Frequency and an Albuterol order with Frequency of every 2 hours PRN wheezing or increased work of breathing using Per Protocol order mode.     RT to enter RT Home Evaluation for COPD & MDI Assessment order using Per Protocol order mode.    Electronically signed by LIZET BERTRAND RCP on 11/16/2024 at 9:00 AM

## 2024-11-17 LAB
BACTERIA BLD CULT: NORMAL
BACTERIA SPEC RESP CULT: NORMAL
GRAM STN SPEC: NORMAL

## 2024-11-18 ENCOUNTER — TELEPHONE (OUTPATIENT)
Dept: FAMILY MEDICINE CLINIC | Age: 59
End: 2024-11-18

## 2024-11-18 LAB
BACTERIA BLD CULT ORG #2: NORMAL
M PNEUMO IGM SER IA-ACNC: 0.26

## 2024-11-18 NOTE — TELEPHONE ENCOUNTER
Care Transitions Initial Follow Up Call    Outreach made within 2 business days of discharge: Yes    Patient: Jonny Banks Patient : 1965   MRN: [id number]  Reason for Admission: Covid/Pneumonia   Discharge Date: 24       Spoke with: Blake    Discharge department/facility: [facility] Interactive Patient Contact:  Was patient able to fill all prescriptions: Yes  Was patient instructed to bring all medications to the follow-up visit: Yes  Is patient taking all medications as directed in the discharge summary? Yes  Does patient understand their discharge instructions: Yes  Does patient have questions or concerns that need addressed prior to 7-14 day follow up office visit: yes - no    Additional needs identified to be addressed with provider  Standard priority:             Scheduled appointment with PCP within 7-14 days    Follow Up  No future appointments.    Hetal Birch MA

## 2024-11-22 NOTE — PROGRESS NOTES
Physician Progress Note      PATIENT:               MARY HOOPER  CSN #:                  [id number]  :                       1965  ADMIT DATE:       2024 6:56 PM  DISCH DATE:        2024 12:54 PM  RESPONDING  PROVIDER #:        Oliva Rubio MD          QUERY TEXT:    Patient admitted with chest pain.  If possible, please document in the   progress notes and discharge summary if you are evaluating and/or treating any   of the following:    The medical record reflects the following:  Risk Factors: HTN Urgency, CHF, HTN, JOSELYN/CKD, Covid PNA  Clinical Indicators:  Complains of SOB, fatigue,  Trop 67 -> 79- later   improved to-> 75 -> 71  Treatment: EKG, troponin trend, IVF's, Nephrology consult  Options provided:  -- Type 2 MI due to Covid PNA, JOSELYN/ATN.  -- Other - I will add my own diagnosis  -- Disagree - Not applicable / Not valid  -- Disagree - Clinically unable to determine / Unknown  -- Refer to Clinical Documentation Reviewer    PROVIDER RESPONSE TEXT:    This patient has a Type 2 MI due to Covid PNA, JOSELYN/ATN.    Query created by: Tiera Haas on 2024 10:35 AM      QUERY TEXT:    Patient admitted with Covid PNA superimposed bacterial pneumonia.   Documentation reflects Sepsis in note(s) dated  and .  Sepsis   diagnosis dropped on  progress note and discharge summary is not   completed. If possible, please document in the progress notes and discharge   summary if sepsis was:    The medical record reflects the following:  Risk Factors: covid, PNA, JOSELYN  Clinical Indicators:  PN 11/15: Admitted for Sepsis due to pneumonia, send [facility], treating for Viral COVID PNA with superimposed gram +ve pneumonia.   Procalcitonin 1.66, temp 101.5>102.8, -106, RR 27-37, >285>251,   Viral COVID PNA with superimposed gram +ve pneumonia.  Patient endorses   continued fever, chills, fatigue, congestion, nonproductive cough, shortness   of breath, nausea, lack of  appetite and decreased p.o. intake.  Treatment: IVF's, ABT, CX, CXR, CT, isolation,  Levofloxacin 750 mg daily,   MRSA nasal DNA probe, Legionella antigen, Strep pneumonia antigen, Molecular   pneumonia panel,  Sputum Gram stain,  Options provided:  -- Sepsis confirmed after study  -- Sepsis treated and resolved  -- Sepsis ruled out after study  -- Other - I will add my own diagnosis  -- Disagree - Not applicable / Not valid  -- Disagree - Clinically unable to determine / Unknown  -- Refer to Clinical Documentation Reviewer    PROVIDER RESPONSE TEXT:    Sepsis confirmed after study.    Query created by: Tiera Haas on 11/20/2024 9:49 AM      Electronically signed by:  Oliva Rubio MD 11/22/2024 8:21 AM

## 2024-11-22 NOTE — DISCHARGE SUMMARY
INTERNAL MEDICINE DEPARTMENT AT THE [facility]  DISCHARGE SUMMARY    Patient ID: Jonny Banks                                             Discharge Date: 11/22/2024   Patient's PCP: Darline Ramirez, APRN - CNP                                          Discharge Physician: Annika Aguilar MD  Admit Date: 11/13/2024   Admitting Physician: Shady Daniel MD    DISCHARGE DIAGNOSES:  - CAP  - JOSELYN likely ATN contrast-induced nephropathy  - Hypovolemic hyponatremia      Hospital Course:      Jonny Banks is a 59 y.o. male with PMH CAD s/p PCI, HTN, HLD, morbid obesity, MARY, CKD, A-fib (on Xarelto), who presented to the [facility] ED on 11/13  with complaint of fever, chills, cough.  Per patient he had completed a course of doxycycline and amoxicillin for CAP that was diagnosed earlier in October after which his symptoms resolved.  About 10 days following that he began having fever and cough and had a positive COVID-19 test. CXR prior to presentation on 11/13 showed persistent middle lobe airspace disease, pneumonia with evidence of radiographic improvement from prior study. CTPA also showed right lower lobe airspace density with consolidation, consistent with pneumonia, small right pleural effusion, small anterior pericardial effusion, and mild cardiomegaly.  Radiographic findings and persistence of symptoms prompted presentation to the ED.  In the ED patient was febrile to 102.8, but hemodynamically stable and, saturating well on room air.  On presentation CBC, lactate, UA were largely unremarkable.  BMP was remarkable for sodium 125.  Patient was given a dose of azithromycin.  Ceftriaxone in the ED and admitted for further management of pneumonia.  COVID returned back positive however respiratory and blood cultures showed no growth.  Patient's respiratory symptoms improved and he was switched to and discharged on Levaquin 750mg daily to complete a total 7-day course of antibiotics.  An order

## 2024-11-27 NOTE — PROGRESS NOTES
Physician Progress Note      PATIENT:               MARY HOOPER  CSN #:                  [id number]  :                       1965  ADMIT DATE:       2024 6:56 PM  DISCH DATE:        2024 12:54 PM  RESPONDING  PROVIDER #:        Oliva Rubio MD          QUERY TEXT:    Pt admitted with Covid PNA. Pt noted to have Sepsis and acute organ   dysfunction of JOSELYN/ATN. If possible, please document in progress notes and   discharge summary the relationship, if any, between Sepsis and JOSELYN/ATN.    The medical record reflects the following:  Risk Factors: Sepsis, Covid PNA, JOSELYN/ATN  Clinical Indicators: PN 11/15: Admitted for Sepsis due to pneumonia, send pna   [facility], treating for Viral COVID PNA with superimposed gram +ve pneumonia,   Procalcitonin 1.66, temp 101.5>102.8, -106, RR 27-37, >285>251.    BUN/CR 15/1.3-PN 11/15: JOSELYN suspect due to underlying viral illness, possible   hypotension possible ATN along with contrast-induced nephropathy  Treatment: Nephrology consult,  Bladder scan q shift, Avoid NSAIDs, ACEi, IV   contrast, Nephrotoxins, Sodium q6, Avoid hypotension, monitor hypertension,   Maintain MAP > 65 mmHg, Strict I/Os, Daily standing weights, Renal diet, Give   additional 1L NS bolus  Options provided:  -- Acute organ dysfunction of JOSELYN/ATN is associated with sepsis  -- Acute organ dysfunction of JOSELYN/ATN  is unrelated to sepsis  -- Other - I will add my own diagnosis  -- Disagree - Not applicable / Not valid  -- Disagree - Clinically unable to determine / Unknown  -- Refer to Clinical Documentation Reviewer    PROVIDER RESPONSE TEXT:    This patient has Acute organ dysfunction of JOSELYN/ATN is associated with sepsis    Query created by: Nehal Miller on 2024 4:53 AM      Electronically signed by:  Oliva Rubio MD 2024 7:00 AM

## 2024-12-08 ENCOUNTER — HOSPITAL ENCOUNTER (EMERGENCY)
Age: 59
Discharge: HOME | End: 2024-12-08
Attending: EMERGENCY MEDICINE
Payer: COMMERCIAL

## 2024-12-08 ENCOUNTER — APPOINTMENT (OUTPATIENT)
Dept: GENERAL RADIOLOGY | Age: 59
End: 2024-12-08
Payer: COMMERCIAL

## 2024-12-08 VITALS
HEART RATE: 81 BPM | SYSTOLIC BLOOD PRESSURE: 144 MMHG | RESPIRATION RATE: 22 BRPM | BODY MASS INDEX: 49.4 KG/M2 | OXYGEN SATURATION: 96 % | DIASTOLIC BLOOD PRESSURE: 76 MMHG | TEMPERATURE: 98 F | WEIGHT: 315 LBS

## 2024-12-08 DIAGNOSIS — I48.91 ATRIAL FIBRILLATION WITH RAPID VENTRICULAR RESPONSE (HCC): Primary | ICD-10-CM

## 2024-12-08 DIAGNOSIS — E87.6 HYPOKALEMIA: ICD-10-CM

## 2024-12-08 DIAGNOSIS — Z79.01 CHRONIC ANTICOAGULATION: ICD-10-CM

## 2024-12-08 DIAGNOSIS — E83.42 HYPOMAGNESEMIA: ICD-10-CM

## 2024-12-08 DIAGNOSIS — Z01.89 ENCOUNTER FOR CARDIOVERSION PROCEDURE: ICD-10-CM

## 2024-12-08 LAB
ANION GAP SERPL CALCULATED.3IONS-SCNC: 15 MMOL/L (ref 3–16)
BASOPHILS # BLD: 0 K/UL (ref 0–0.2)
BASOPHILS NFR BLD: 0.3 %
BUN SERPL-MCNC: 15 MG/DL (ref 7–20)
CALCIUM SERPL-MCNC: 9.3 MG/DL (ref 8.3–10.6)
CHLORIDE SERPL-SCNC: 101 MMOL/L (ref 99–110)
CO2 SERPL-SCNC: 23 MMOL/L (ref 21–32)
CREAT SERPL-MCNC: 1 MG/DL (ref 0.9–1.3)
DEPRECATED RDW RBC AUTO: 16.7 % (ref 12.4–15.4)
EOSINOPHIL # BLD: 0.3 K/UL (ref 0–0.6)
EOSINOPHIL NFR BLD: 4.1 %
GFR SERPLBLD CREATININE-BSD FMLA CKD-EPI: 87 ML/MIN/{1.73_M2}
GLUCOSE SERPL-MCNC: 159 MG/DL (ref 70–99)
HCT VFR BLD AUTO: 47.1 % (ref 40.5–52.5)
HGB BLD-MCNC: 15.4 G/DL (ref 13.5–17.5)
LYMPHOCYTES # BLD: 2.8 K/UL (ref 1–5.1)
LYMPHOCYTES NFR BLD: 40.3 %
MAGNESIUM SERPL-MCNC: 1.72 MG/DL (ref 1.8–2.4)
MCH RBC QN AUTO: 27.9 PG (ref 26–34)
MCHC RBC AUTO-ENTMCNC: 32.8 G/DL (ref 31–36)
MCV RBC AUTO: 85.1 FL (ref 80–100)
MONOCYTES # BLD: 0.7 K/UL (ref 0–1.3)
MONOCYTES NFR BLD: 10.7 %
NEUTROPHILS # BLD: 3.1 K/UL (ref 1.7–7.7)
NEUTROPHILS NFR BLD: 44.6 %
NT-PROBNP SERPL-MCNC: 1705 PG/ML (ref 0–124)
PLATELET # BLD AUTO: 186 K/UL (ref 135–450)
PMV BLD AUTO: 9.9 FL (ref 5–10.5)
POTASSIUM SERPL-SCNC: 3 MMOL/L (ref 3.5–5.1)
POTASSIUM SERPL-SCNC: ABNORMAL MMOL/L (ref 3.5–5.1)
RBC # BLD AUTO: 5.53 M/UL (ref 4.2–5.9)
SODIUM SERPL-SCNC: 139 MMOL/L (ref 136–145)
TROPONIN, HIGH SENSITIVITY: 37 NG/L (ref 0–22)
TROPONIN, HIGH SENSITIVITY: 46 NG/L (ref 0–22)
TROPONIN, HIGH SENSITIVITY: 75 NG/L (ref 0–22)
WBC # BLD AUTO: 6.9 K/UL (ref 4–11)

## 2024-12-08 PROCEDURE — 84484 ASSAY OF TROPONIN QUANT: CPT

## 2024-12-08 PROCEDURE — 2500000003 HC RX 250 WO HCPCS: Performed by: PHYSICIAN ASSISTANT

## 2024-12-08 PROCEDURE — 84132 ASSAY OF SERUM POTASSIUM: CPT

## 2024-12-08 PROCEDURE — 80048 BASIC METABOLIC PNL TOTAL CA: CPT

## 2024-12-08 PROCEDURE — 83880 ASSAY OF NATRIURETIC PEPTIDE: CPT

## 2024-12-08 PROCEDURE — 71045 X-RAY EXAM CHEST 1 VIEW: CPT

## 2024-12-08 PROCEDURE — 2580000003 HC RX 258: Performed by: PHYSICIAN ASSISTANT

## 2024-12-08 PROCEDURE — 96376 TX/PRO/DX INJ SAME DRUG ADON: CPT

## 2024-12-08 PROCEDURE — 96375 TX/PRO/DX INJ NEW DRUG ADDON: CPT

## 2024-12-08 PROCEDURE — 93005 ELECTROCARDIOGRAM TRACING: CPT | Performed by: EMERGENCY MEDICINE

## 2024-12-08 PROCEDURE — 96365 THER/PROPH/DIAG IV INF INIT: CPT

## 2024-12-08 PROCEDURE — 96367 TX/PROPH/DG ADDL SEQ IV INF: CPT

## 2024-12-08 PROCEDURE — 6370000000 HC RX 637 (ALT 250 FOR IP): Performed by: PHYSICIAN ASSISTANT

## 2024-12-08 PROCEDURE — 83735 ASSAY OF MAGNESIUM: CPT

## 2024-12-08 PROCEDURE — 6360000002 HC RX W HCPCS: Performed by: PHYSICIAN ASSISTANT

## 2024-12-08 PROCEDURE — 6360000002 HC RX W HCPCS: Performed by: EMERGENCY MEDICINE

## 2024-12-08 PROCEDURE — 99285 EMERGENCY DEPT VISIT HI MDM: CPT

## 2024-12-08 PROCEDURE — 85025 COMPLETE CBC W/AUTO DIFF WBC: CPT

## 2024-12-08 RX ORDER — LANOLIN ALCOHOL/MO/W.PET/CERES
400 CREAM (GRAM) TOPICAL ONCE
Status: DISCONTINUED | OUTPATIENT
Start: 2024-12-08 | End: 2024-12-08

## 2024-12-08 RX ORDER — MAGNESIUM SULFATE 1 G/100ML
1000 INJECTION INTRAVENOUS ONCE
Status: COMPLETED | OUTPATIENT
Start: 2024-12-08 | End: 2024-12-08

## 2024-12-08 RX ORDER — NIFEDIPINE 10 MG/1
30 CAPSULE ORAL ONCE
Status: COMPLETED | OUTPATIENT
Start: 2024-12-08 | End: 2024-12-08

## 2024-12-08 RX ORDER — POTASSIUM CHLORIDE 1500 MG/1
20 TABLET, EXTENDED RELEASE ORAL 2 TIMES DAILY
Qty: 14 TABLET | Refills: 0 | Status: SHIPPED | OUTPATIENT
Start: 2024-12-08 | End: 2024-12-14

## 2024-12-08 RX ORDER — METOPROLOL TARTRATE 1 MG/ML
5 INJECTION, SOLUTION INTRAVENOUS EVERY 5 MIN PRN
Status: COMPLETED | OUTPATIENT
Start: 2024-12-08 | End: 2024-12-08

## 2024-12-08 RX ORDER — 0.9 % SODIUM CHLORIDE 0.9 %
500 INTRAVENOUS SOLUTION INTRAVENOUS ONCE
Status: COMPLETED | OUTPATIENT
Start: 2024-12-08 | End: 2024-12-08

## 2024-12-08 RX ORDER — POTASSIUM CHLORIDE 1500 MG/1
40 TABLET, EXTENDED RELEASE ORAL ONCE
Status: COMPLETED | OUTPATIENT
Start: 2024-12-08 | End: 2024-12-08

## 2024-12-08 RX ORDER — PROPOFOL 10 MG/ML
1 INJECTION, EMULSION INTRAVENOUS ONCE
Status: DISCONTINUED | OUTPATIENT
Start: 2024-12-08 | End: 2024-12-08 | Stop reason: HOSPADM

## 2024-12-08 RX ORDER — PROPOFOL 10 MG/ML
INJECTION, EMULSION INTRAVENOUS CONTINUOUS PRN
Status: COMPLETED | OUTPATIENT
Start: 2024-12-08 | End: 2024-12-08

## 2024-12-08 RX ORDER — VALSARTAN 160 MG/1
160 TABLET ORAL ONCE
Status: COMPLETED | OUTPATIENT
Start: 2024-12-08 | End: 2024-12-08

## 2024-12-08 RX ORDER — CARVEDILOL 25 MG/1
25 TABLET ORAL ONCE
Status: COMPLETED | OUTPATIENT
Start: 2024-12-08 | End: 2024-12-08

## 2024-12-08 RX ORDER — METOPROLOL TARTRATE 1 MG/ML
5 INJECTION, SOLUTION INTRAVENOUS ONCE
Status: COMPLETED | OUTPATIENT
Start: 2024-12-08 | End: 2024-12-08

## 2024-12-08 RX ADMIN — NIFEDIPINE 30 MG: 10 CAPSULE ORAL at 15:17

## 2024-12-08 RX ADMIN — MAGNESIUM SULFATE HEPTAHYDRATE 1000 MG: 10 INJECTION, SOLUTION INTRAVENOUS at 17:25

## 2024-12-08 RX ADMIN — METOPROLOL TARTRATE 5 MG: 1 INJECTION, SOLUTION INTRAVENOUS at 10:19

## 2024-12-08 RX ADMIN — SODIUM CHLORIDE 500 ML: 0.9 INJECTION, SOLUTION INTRAVENOUS at 09:52

## 2024-12-08 RX ADMIN — CARVEDILOL 25 MG: 25 TABLET, FILM COATED ORAL at 14:54

## 2024-12-08 RX ADMIN — PROPOFOL 100 MG: 10 INJECTION, EMULSION INTRAVENOUS at 11:35

## 2024-12-08 RX ADMIN — METOPROLOL TARTRATE 5 MG: 1 INJECTION, SOLUTION INTRAVENOUS at 09:45

## 2024-12-08 RX ADMIN — METOPROLOL TARTRATE 5 MG: 1 INJECTION, SOLUTION INTRAVENOUS at 09:56

## 2024-12-08 RX ADMIN — VALSARTAN 160 MG: 160 TABLET, FILM COATED ORAL at 15:16

## 2024-12-08 RX ADMIN — POTASSIUM CHLORIDE 40 MEQ: 1500 TABLET, EXTENDED RELEASE ORAL at 12:26

## 2024-12-08 NOTE — ED PROVIDER NOTES
THE [facility]  EMERGENCY DEPARTMENT ENCOUNTER          PHYSICIAN ASSISTANT NOTE       Date of evaluation: 12/8/2024    Chief Complaint     Tachycardia (Patient states he feels like his heart is racing, diaphoresis, and he is urinating frequently. )      History of Present Illness     Jonny Banks is a 59 y.o. male who presents with palpitations. Patient has history of a-fib and felt his heart start to race about one hour prior to arrival. Reports he has not been in a-fib for about a year to his knowledge and has been compliant with his xarelto and coreg.  Reports h/o cardioversion and ablation in the past. Feels well overall - reports feeling a little lightheaded but no chest pain or shortness of breath. Was hospitalized a couple weeks ago for Covid/pneumonia but has been feeling better recently.    ASSESSMENT / PLAN  (MEDICAL DECISION MAKING)     INITIAL VITALS: BP: (!) 176/138, Temp: 98 °F (36.7 °C), Pulse: (!) 152, Respirations: (!) 34, SpO2: 98 %    Jonny Banks is a 59 y.o. male with tachycardia.  Patient is alert on arrival and in no acute distress.  Patient is conversant.  Patient is hypertensive.  Patient with A-fib with RVR on EKG.  Patient has history of same and has been compliant with his medications including his anticoagulant.  Patient was given 3 rounds of IV metoprolol.  Patient remains in A-fib with RVR.  Patient has stable blood counts and renal function.  Troponin and BNP are elevated, consistent with A-fib with RVR.  Options were discussed with the patient and he opted to proceed with cardioversion, see attending physician procedural note.  Patient was hypokalemic, given oral replacement.  Repeat troponin after cardioversion pending. Given his home medications for persistent hypertension. At this time I am going off service and the oncoming provider will follow-up on repeat troponin, BP, and disposition.      Is this patient to be included in the SEP-1 core measure? No

## 2024-12-08 NOTE — PROGRESS NOTES
ETCO2  Monitoring done for conscious sedation.  Patient is on room air for procedure.    Baseline information:  HR: 172 BP: 210/154  RR: 20 LOC: alert  ETCO2: 33 SpO2: 98    5 minutes after sedation administered:  HR: 77 BP: 172/98  RR: 20 LOC: lethargic  ETCO2: 34 SpO2: 98

## 2024-12-08 NOTE — ED PROVIDER NOTES
ED Attending Attestation Note     Date of evaluation: 2/23/2023    This patient was seen by the advance practice provider.  I have seen and examined the patient, agree with the workup, evaluation, management and diagnosis. The care plan has been discussed.  *** I have reviewed the ECG and concur with the DEYVI's interpretation.      Patient History     Chief Complaint: Tachycardia (Patient states he feels like his heart is racing, diaphoresis, and he is urinating frequently. )      HPI: Jonny Banks is a 59 y.o. who presents to the Emergency Department with complaints of ***.      He has a past medical history of Carpal tunnel syndrome, Chronic kidney disease, Hyperlipidemia, Hypertension, Obesity, unspecified, and MARY (obstructive sleep apnea).    He has a past surgical history that includes Nose surgery; Knee arthroscopy (Right); and Cardiac catheterization.    family history includes Diabetes in his mother; Stroke in his maternal uncle.    He reports that he has never smoked. He has never used smokeless tobacco. He reports current alcohol use. He reports that he does not use drugs.    Pertinent Physical Exam Findings:   ***       Critical Care:  Due to the immediate potential for life-threatening deterioration due to ***, I spent *** minutes providing critical care.  This time excludes time spent performing procedures but includes time spent on direct patient care, history retrieval, review of the chart, and discussions with patient, family, and consultant(s).

## 2024-12-08 NOTE — ED PROVIDER NOTES
THE [facility]  EMERGENCY DEPARTMENT ENCOUNTER          PHYSICIAN ASSISTANT NOTE     Date of evaluation: 12/8/2024    ADDENDUM:      Care of this patient was assumed from NELIA Uribe.  The patient was seen for Tachycardia (Patient states he feels like his heart is racing, diaphoresis, and he is urinating frequently. )  .  The patient's initial evaluation and plan have been discussed with the prior provider who initially evaluated the patient.  Nursing Notes, Past Medical Hx, Past Surgical Hx, Social Hx, Allergies, and Family Hx were all reviewed.    Diagnostic Results       RECENT VITALS:  BP: (!) 144/76, Temp: 98 °F (36.7 °C), Pulse: 81, Respirations: 22, SpO2: 96 %     ED Course          The patient was given the following medications:  Orders Placed This Encounter   Medications    metoprolol (LOPRESSOR) injection 5 mg    sodium chloride 0.9 % bolus 500 mL    metoprolol (LOPRESSOR) injection 5 mg    propofol infusion 147 mg     Order Specific Question:   Titrate Infusion?     Answer:   No     Order Specific Question:   Infusion Dose:     Answer:   20 mcg/kg/min    propofol infusion    potassium chloride (KLOR-CON M) extended release tablet 40 mEq    carvedilol (COREG) tablet 25 mg    valsartan (DIOVAN) tablet 160 mg    NIFEdipine (PROCARDIA) capsule 30 mg    DISCONTD: magnesium oxide (MAG-OX) tablet 400 mg    magnesium sulfate 1000 mg in dextrose 5% 100 mL IVPB    potassium chloride (KLOR-CON M) 20 MEQ extended release tablet     Sig: Take 1 tablet by mouth 2 times daily for 7 days     Dispense:  14 tablet     Refill:  0       CONSULTS:  None    MEDICAL DECISION MAKING / ASSESSMENT / PLAN     Jonny Banks is a 59 y.o. male with a history of A-fib with chronic anticoagulation who [facility] with RVR, uncontrolled with IV fluids and metoprolol x 2.  He was subsequently cardioverted and remained in normal sinus rhythm with a rate in the 80s throughout the remainder of his stay.  He was

## 2024-12-09 LAB
EKG ATRIAL RATE: 150 BPM
EKG DIAGNOSIS: NORMAL
EKG Q-T INTERVAL: 276 MS
EKG QRS DURATION: 96 MS
EKG QTC CALCULATION (BAZETT): 446 MS
EKG R AXIS: -52 DEGREES
EKG T AXIS: 124 DEGREES
EKG VENTRICULAR RATE: 157 BPM

## 2024-12-09 RX ORDER — CLOPIDOGREL BISULFATE 75 MG/1
75 TABLET ORAL DAILY
Qty: 30 TABLET | Refills: 0 | Status: SHIPPED | OUTPATIENT
Start: 2024-12-09

## 2024-12-09 NOTE — TELEPHONE ENCOUNTER
Medication:   Requested Prescriptions     Pending Prescriptions Disp Refills    clopidogrel (PLAVIX) 75 MG tablet [Pharmacy Med Name: Clopidogrel Bisulfate 75 MG Oral Tablet] 30 tablet 0     Sig: Take 1 tablet by mouth once daily     Last Filled:      Last appt: 11/13/2024   Next appt: Visit date not found    Last OARRS:        No data to display

## 2024-12-13 ENCOUNTER — HOSPITAL ENCOUNTER (EMERGENCY)
Age: 59
Discharge: HOME | End: 2024-12-14
Attending: STUDENT IN AN ORGANIZED HEALTH CARE EDUCATION/TRAINING PROGRAM
Payer: COMMERCIAL

## 2024-12-13 ENCOUNTER — APPOINTMENT (OUTPATIENT)
Dept: GENERAL RADIOLOGY | Age: 59
End: 2024-12-13
Payer: COMMERCIAL

## 2024-12-13 DIAGNOSIS — I48.91 ATRIAL FIBRILLATION WITH RAPID VENTRICULAR RESPONSE (HCC): Primary | ICD-10-CM

## 2024-12-13 LAB
ANION GAP SERPL CALCULATED.3IONS-SCNC: 12 MMOL/L (ref 3–16)
BASOPHILS # BLD: 0.1 K/UL (ref 0–0.2)
BASOPHILS NFR BLD: 0.8 %
BUN SERPL-MCNC: 15 MG/DL (ref 7–20)
CALCIUM SERPL-MCNC: 9.5 MG/DL (ref 8.3–10.6)
CHLORIDE SERPL-SCNC: 103 MMOL/L (ref 99–110)
CO2 SERPL-SCNC: 24 MMOL/L (ref 21–32)
CREAT SERPL-MCNC: 0.9 MG/DL (ref 0.9–1.3)
DEPRECATED RDW RBC AUTO: 16.9 % (ref 12.4–15.4)
EOSINOPHIL # BLD: 0.3 K/UL (ref 0–0.6)
EOSINOPHIL NFR BLD: 4.2 %
GFR SERPLBLD CREATININE-BSD FMLA CKD-EPI: >90 ML/MIN/{1.73_M2}
GLUCOSE SERPL-MCNC: 153 MG/DL (ref 70–99)
HCT VFR BLD AUTO: 44.8 % (ref 40.5–52.5)
HGB BLD-MCNC: 14.8 G/DL (ref 13.5–17.5)
LYMPHOCYTES # BLD: 2.5 K/UL (ref 1–5.1)
LYMPHOCYTES NFR BLD: 32.5 %
MAGNESIUM SERPL-MCNC: 1.8 MG/DL (ref 1.8–2.4)
MCH RBC QN AUTO: 28.1 PG (ref 26–34)
MCHC RBC AUTO-ENTMCNC: 33 G/DL (ref 31–36)
MCV RBC AUTO: 85.1 FL (ref 80–100)
MONOCYTES # BLD: 0.7 K/UL (ref 0–1.3)
MONOCYTES NFR BLD: 8.9 %
NEUTROPHILS # BLD: 4.1 K/UL (ref 1.7–7.7)
NEUTROPHILS NFR BLD: 53.6 %
NT-PROBNP SERPL-MCNC: 395 PG/ML (ref 0–124)
PLATELET # BLD AUTO: 239 K/UL (ref 135–450)
PMV BLD AUTO: 9.6 FL (ref 5–10.5)
POTASSIUM SERPL-SCNC: 3.5 MMOL/L (ref 3.5–5.1)
RBC # BLD AUTO: 5.27 M/UL (ref 4.2–5.9)
SODIUM SERPL-SCNC: 139 MMOL/L (ref 136–145)
WBC # BLD AUTO: 7.7 K/UL (ref 4–11)

## 2024-12-13 PROCEDURE — 93005 ELECTROCARDIOGRAM TRACING: CPT

## 2024-12-13 PROCEDURE — 83735 ASSAY OF MAGNESIUM: CPT

## 2024-12-13 PROCEDURE — 71045 X-RAY EXAM CHEST 1 VIEW: CPT

## 2024-12-13 PROCEDURE — 85025 COMPLETE CBC W/AUTO DIFF WBC: CPT

## 2024-12-13 PROCEDURE — 99153 MOD SED SAME PHYS/QHP EA: CPT

## 2024-12-13 PROCEDURE — 80048 BASIC METABOLIC PNL TOTAL CA: CPT

## 2024-12-13 PROCEDURE — 99152 MOD SED SAME PHYS/QHP 5/>YRS: CPT

## 2024-12-13 PROCEDURE — 93005 ELECTROCARDIOGRAM TRACING: CPT | Performed by: EMERGENCY MEDICINE

## 2024-12-13 PROCEDURE — 99285 EMERGENCY DEPT VISIT HI MDM: CPT

## 2024-12-13 PROCEDURE — 92960 CARDIOVERSION ELECTRIC EXT: CPT

## 2024-12-13 PROCEDURE — 2500000003 HC RX 250 WO HCPCS

## 2024-12-13 PROCEDURE — 83880 ASSAY OF NATRIURETIC PEPTIDE: CPT

## 2024-12-13 PROCEDURE — 36415 COLL VENOUS BLD VENIPUNCTURE: CPT

## 2024-12-13 PROCEDURE — 96375 TX/PRO/DX INJ NEW DRUG ADDON: CPT

## 2024-12-13 RX ORDER — ETOMIDATE 2 MG/ML
10 INJECTION INTRAVENOUS ONCE
Status: COMPLETED | OUTPATIENT
Start: 2024-12-13 | End: 2024-12-13

## 2024-12-13 RX ORDER — MAGNESIUM SULFATE 1 G/100ML
1000 INJECTION INTRAVENOUS ONCE
Status: COMPLETED | OUTPATIENT
Start: 2024-12-13 | End: 2024-12-14

## 2024-12-13 RX ADMIN — ETOMIDATE 10 MG: 2 INJECTION, SOLUTION INTRAVENOUS at 23:07

## 2024-12-14 VITALS
SYSTOLIC BLOOD PRESSURE: 143 MMHG | OXYGEN SATURATION: 98 % | TEMPERATURE: 97.7 F | HEART RATE: 79 BPM | HEIGHT: 68 IN | RESPIRATION RATE: 27 BRPM | WEIGHT: 315 LBS | DIASTOLIC BLOOD PRESSURE: 88 MMHG | BODY MASS INDEX: 47.74 KG/M2

## 2024-12-14 LAB
EKG ATRIAL RATE: 89 BPM
EKG DIAGNOSIS: NORMAL
EKG DIAGNOSIS: NORMAL
EKG P AXIS: -1 DEGREES
EKG P-R INTERVAL: 150 MS
EKG Q-T INTERVAL: 278 MS
EKG Q-T INTERVAL: 390 MS
EKG QRS DURATION: 88 MS
EKG QRS DURATION: 98 MS
EKG QTC CALCULATION (BAZETT): 474 MS
EKG QTC CALCULATION (BAZETT): 481 MS
EKG R AXIS: -43 DEGREES
EKG R AXIS: -48 DEGREES
EKG T AXIS: 128 DEGREES
EKG T AXIS: 129 DEGREES
EKG VENTRICULAR RATE: 180 BPM
EKG VENTRICULAR RATE: 89 BPM
POTASSIUM SERPL-SCNC: 3.5 MMOL/L (ref 3.5–5.1)

## 2024-12-14 PROCEDURE — 92960 CARDIOVERSION ELECTRIC EXT: CPT

## 2024-12-14 PROCEDURE — 6370000000 HC RX 637 (ALT 250 FOR IP)

## 2024-12-14 PROCEDURE — 6360000002 HC RX W HCPCS

## 2024-12-14 PROCEDURE — 96375 TX/PRO/DX INJ NEW DRUG ADDON: CPT

## 2024-12-14 PROCEDURE — 36415 COLL VENOUS BLD VENIPUNCTURE: CPT

## 2024-12-14 PROCEDURE — 84132 ASSAY OF SERUM POTASSIUM: CPT

## 2024-12-14 PROCEDURE — 96365 THER/PROPH/DIAG IV INF INIT: CPT

## 2024-12-14 RX ORDER — POTASSIUM CHLORIDE 1500 MG/1
40 TABLET, EXTENDED RELEASE ORAL ONCE
Status: COMPLETED | OUTPATIENT
Start: 2024-12-14 | End: 2024-12-14

## 2024-12-14 RX ORDER — POTASSIUM CHLORIDE 1500 MG/1
TABLET, EXTENDED RELEASE ORAL
Qty: 42 TABLET | Refills: 0 | Status: SHIPPED | OUTPATIENT
Start: 2024-12-14 | End: 2024-12-28

## 2024-12-14 RX ORDER — FUROSEMIDE 40 MG/1
TABLET ORAL
Qty: 40 TABLET | Refills: 0 | Status: SHIPPED | OUTPATIENT
Start: 2024-12-14 | End: 2025-01-16

## 2024-12-14 RX ORDER — MAGNESIUM OXIDE 400 MG/1
400 TABLET ORAL DAILY
Qty: 7 TABLET | Refills: 0 | Status: SHIPPED | OUTPATIENT
Start: 2024-12-14 | End: 2024-12-21

## 2024-12-14 RX ORDER — FUROSEMIDE 10 MG/ML
40 INJECTION INTRAMUSCULAR; INTRAVENOUS ONCE
Status: COMPLETED | OUTPATIENT
Start: 2024-12-14 | End: 2024-12-14

## 2024-12-14 RX ADMIN — POTASSIUM CHLORIDE 40 MEQ: 1500 TABLET, EXTENDED RELEASE ORAL at 01:09

## 2024-12-14 RX ADMIN — FUROSEMIDE 40 MG: 10 INJECTION, SOLUTION INTRAMUSCULAR; INTRAVENOUS at 01:43

## 2024-12-14 RX ADMIN — MAGNESIUM SULFATE HEPTAHYDRATE 1000 MG: 10 INJECTION, SOLUTION INTRAVENOUS at 00:24

## 2024-12-14 NOTE — ED PROVIDER NOTES
ED Attending Attestation Note     Date of evaluation: 12/13/2024    This patient was seen by the resident.  I have seen and examined the patient, agree with the workup, evaluation, management and diagnosis. The care plan has been discussed.  I have reviewed the EKG and agree with the resident's interpretation. My assessment reveals a well-appearing 59-year-old male present with chief complaint of palpitations with shortness of breath.  On examination, the patient is tachycardic with an irregular rhythm with rates in the 150s to 170s consistent with atrial fibrillation with rapid ventricular response.  Patient has warm and perfused extremities.  Lungs clear to auscultation, and is oxygenating well on room air.  Risk and benefits of synchronized cardioversion were discussed, the patient would prefer to proceed with this intervention.  Patient confirms that he is therapeutically anticoagulated.  I was present for the conscious sedation and synchronized cardioversion performed by the resident physician.  Patient was cardioverted to normal sinus rhythm without complication with the use of 10 mg of etomidate for procedural sedation.  Laboratory investigations note borderline hypokalemia and hypomagnesemia which were repleted.  Patient also has evidence of hypervolemia and was given additional dose of Lasix.  Will instructed patient to increase his Lasix at home, and will increase his potassium supplementation as well.  Patient was observed in emergency department for 4 hours without return of atrial fibrillation.  Will discharge with outpatient follow-up at this time.    Critical Care:  Due to the immediate potential for life-threatening deterioration due to tachydysrhythmia, I spent 45 minutes providing critical care.  This time excludes time spent performing procedures but includes time spent on direct patient care, history retrieval, review of the chart, and discussions with patient, family, and consultant(s).        Omega Dejesus MD  12/14/24 2229

## 2024-12-14 NOTE — DISCHARGE INSTRUCTIONS
Today you were seen in the emergency department for A-fib.  You were cardioverted back into sinus rhythm.  We gave you additional potassium and magnesium is here to remain low.  I also wonder if too much fluid backing up could be contributing to your recurrent A-fib this week.  Because of this I would like you to double your dose of Lasix for the next 3 days I am also increasing your doses of potassium and prescribing you doses of magnesium although you can buy magnesium over-the-counter if you desire. Sometimes this can causae diarrhea so if that happens stop taking it or try every other day. I want you to call your cardiologist on Monday to see if you can be seen for further management of your medications.    Please seek care or return to emergency department if your symptoms worsen or do not resolve. In addition, return if:  -This happens again  -You have a fever (greater than 101 degrees)  -You have chest pain, shortness of breath, abdominal pain, or uncontrollable vomiting  -You are unable to eat or drink  -You pass out  -You have difficulty moving your arms or legs   -You have difficulty speaking or slurred speech  -Or you have any concern that you feel needs urgent physician evaluation    Follow-Up/Future Appointments:  Bianka Gan, APRN - CNP  [address]   [address]  [place] [id number] [phone]    Schedule an appointment as soon as possible for a visit       No future appointments.  Discharge Medications:  Current Discharge Medication List        Thank you for allowing me to be a part of your care today - Dr. Lomas

## 2024-12-14 NOTE — ED PROVIDER NOTES
THE [facility]  EMERGENCY DEPARTMENT ENCOUNTER          EM RESIDENT NOTE     Date of evaluation: 12/13/2024    Chief Complaint     Palpitations    History of Present Illness     Jonny Banks is a 59 y.o. malewith a history of paroxysmal A-fib on anticoagulation who presents with palpitations.  Reports that he was seen here on Sunday for his typical symptoms related to A-fib was found to have low potassium and magnesium which was replaced and he was cardioverted back into sinus rhythm.  He was discharged with prescription for electrolyte replacement.  On Monday he had recurrence of his symptoms for approximately 5 minutes that self resolved.  He has been feeling well since then.  This evening he took a sip of cold drink and had recurrence of his symptoms which are palpitations and mild dyspnea he denies chest pain.  He denies any recent fever URI symptoms abdominal pain nausea vomiting diarrhea change to his chronic lower extremity edema.  He has recently been treated for pneumonia and chronically has a cough denies any change to this and is nonproductive.    He has not missed any doses of his anticoagulation recently    MEDICAL DECISION MAKING / ASSESSMENT / PLAN     INITIAL VITALS: BP: (!) 144/110, Temp: 97.7 °F (36.5 °C), Pulse: (!) 150, Respirations: 18, SpO2: 97 %    Jonny Banks is a 59 y.o. male presenting with recurrence of his paroxysmal A-fib.  He is completely anticoagulated.  After discussion of the risk and benefits of rate/rhythm control with medications versus cardioversion patient elects to pursue cardioversion.  Appeared comfortable, conversational, and was in no acute distress. History and physical notable for 1+ symmetric pitting edema bilaterally. Found to have significant tachycardia and hypertension but otherwise normal vitals.     See EMR for complete workup.    ED Course as of 12/14/24 0233   Fri Dec 13, 2024   2223 EKG is irregular at a rate of 180 narrow QRS normal QTc

## 2024-12-16 RX ORDER — PANTOPRAZOLE SODIUM 40 MG/1
40 TABLET, DELAYED RELEASE ORAL
Qty: 30 TABLET | Refills: 4 | Status: SHIPPED | OUTPATIENT
Start: 2024-12-16

## 2024-12-16 RX ORDER — NIFEDIPINE 90 MG/1
90 TABLET, EXTENDED RELEASE ORAL DAILY
Qty: 90 TABLET | Refills: 1 | Status: SHIPPED | OUTPATIENT
Start: 2024-12-16

## 2024-12-16 NOTE — TELEPHONE ENCOUNTER
Medication:   Requested Prescriptions     Pending Prescriptions Disp Refills    pantoprazole (PROTONIX) 40 MG tablet 30 tablet 3     Sig: Take 1 tablet by mouth every morning (before breakfast)        Last Filled:      Patient Phone Number: [phone] (home) [phone] (work)    Last appt: 11/13/2024   Next appt: Visit date not found    Last OARRS:        No data to display

## 2024-12-16 NOTE — TELEPHONE ENCOUNTER
Requested Prescriptions     Pending Prescriptions Disp Refills    NIFEdipine (PROCARDIA XL) 90 MG extended release tablet 30 tablet 1     Sig: Take 1 tablet by mouth daily            Checked Correct Pharmacy: Yes    Any changes since last refill? No     Number: 30    Refills: 1    Last Office Visit: 7/11/2022     Next Office Visit: Visit date not found /HEMANTH ROMAN to schedule        Last Labs: 12.14.2024